# Patient Record
Sex: MALE | Race: WHITE | NOT HISPANIC OR LATINO
[De-identification: names, ages, dates, MRNs, and addresses within clinical notes are randomized per-mention and may not be internally consistent; named-entity substitution may affect disease eponyms.]

---

## 2017-08-18 ENCOUNTER — APPOINTMENT (OUTPATIENT)
Dept: PULMONOLOGY | Facility: CLINIC | Age: 77
End: 2017-08-18
Payer: MEDICARE

## 2017-08-18 VITALS
SYSTOLIC BLOOD PRESSURE: 122 MMHG | BODY MASS INDEX: 30.61 KG/M2 | DIASTOLIC BLOOD PRESSURE: 80 MMHG | OXYGEN SATURATION: 94 % | HEART RATE: 55 BPM | WEIGHT: 232 LBS

## 2017-08-18 PROCEDURE — 94664 DEMO&/EVAL PT USE INHALER: CPT | Mod: 59

## 2017-08-18 PROCEDURE — 94060 EVALUATION OF WHEEZING: CPT

## 2017-08-18 PROCEDURE — 99214 OFFICE O/P EST MOD 30 MIN: CPT | Mod: 25

## 2017-08-28 ENCOUNTER — MEDICATION RENEWAL (OUTPATIENT)
Age: 77
End: 2017-08-28

## 2017-10-02 ENCOUNTER — APPOINTMENT (OUTPATIENT)
Dept: PULMONOLOGY | Facility: CLINIC | Age: 77
End: 2017-10-02
Payer: MEDICARE

## 2017-10-02 VITALS
HEIGHT: 73 IN | DIASTOLIC BLOOD PRESSURE: 80 MMHG | HEART RATE: 60 BPM | RESPIRATION RATE: 16 BRPM | BODY MASS INDEX: 30.75 KG/M2 | WEIGHT: 232 LBS | OXYGEN SATURATION: 88 % | TEMPERATURE: 98 F | SYSTOLIC BLOOD PRESSURE: 140 MMHG

## 2017-10-02 PROCEDURE — 99215 OFFICE O/P EST HI 40 MIN: CPT | Mod: 25

## 2017-10-02 PROCEDURE — 94060 EVALUATION OF WHEEZING: CPT

## 2017-10-02 PROCEDURE — 94664 DEMO&/EVAL PT USE INHALER: CPT | Mod: 59

## 2017-10-02 RX ORDER — UMECLIDINIUM BROMIDE AND VILANTEROL TRIFENATATE 62.5; 25 UG/1; UG/1
62.5-25 POWDER RESPIRATORY (INHALATION) DAILY
Qty: 3 | Refills: 3 | Status: DISCONTINUED | COMMUNITY
Start: 2017-08-18 | End: 2017-10-02

## 2017-10-02 RX ORDER — AZITHROMYCIN 250 MG/1
250 TABLET, FILM COATED ORAL
Qty: 45 | Refills: 3 | Status: DISCONTINUED | COMMUNITY
Start: 2017-08-18 | End: 2017-10-02

## 2017-10-02 RX ORDER — BUDESONIDE 180 UG/1
180 AEROSOL, POWDER RESPIRATORY (INHALATION) DAILY
Qty: 3 | Refills: 1 | Status: DISCONTINUED | COMMUNITY
Start: 2017-08-18 | End: 2017-10-02

## 2017-10-12 ENCOUNTER — RX RENEWAL (OUTPATIENT)
Age: 77
End: 2017-10-12

## 2017-11-09 ENCOUNTER — APPOINTMENT (OUTPATIENT)
Dept: PULMONOLOGY | Facility: CLINIC | Age: 77
End: 2017-11-09

## 2017-11-09 ENCOUNTER — OUTPATIENT (OUTPATIENT)
Dept: OUTPATIENT SERVICES | Facility: HOSPITAL | Age: 77
LOS: 1 days | End: 2017-11-09
Payer: MEDICARE

## 2017-11-09 DIAGNOSIS — J44.9 CHRONIC OBSTRUCTIVE PULMONARY DISEASE, UNSPECIFIED: ICD-10-CM

## 2017-11-10 ENCOUNTER — APPOINTMENT (OUTPATIENT)
Dept: DERMATOLOGY | Facility: CLINIC | Age: 77
End: 2017-11-10
Payer: MEDICARE

## 2017-11-10 PROCEDURE — 99203 OFFICE O/P NEW LOW 30 MIN: CPT | Mod: 25

## 2017-11-10 PROCEDURE — 10061 I&D ABSCESS COMP/MULTIPLE: CPT

## 2017-11-14 ENCOUNTER — APPOINTMENT (OUTPATIENT)
Dept: PULMONOLOGY | Facility: CLINIC | Age: 77
End: 2017-11-14

## 2017-11-28 ENCOUNTER — RX RENEWAL (OUTPATIENT)
Age: 77
End: 2017-11-28

## 2018-01-01 ENCOUNTER — MEDICATION RENEWAL (OUTPATIENT)
Age: 78
End: 2018-01-01

## 2018-01-01 ENCOUNTER — APPOINTMENT (OUTPATIENT)
Dept: PULMONOLOGY | Facility: CLINIC | Age: 78
End: 2018-01-01
Payer: MEDICARE

## 2018-01-01 ENCOUNTER — APPOINTMENT (OUTPATIENT)
Dept: CARDIOLOGY | Facility: CLINIC | Age: 78
End: 2018-01-01
Payer: MEDICARE

## 2018-01-01 ENCOUNTER — NON-APPOINTMENT (OUTPATIENT)
Age: 78
End: 2018-01-01

## 2018-01-01 ENCOUNTER — OTHER (OUTPATIENT)
Age: 78
End: 2018-01-01

## 2018-01-01 VITALS — DIASTOLIC BLOOD PRESSURE: 78 MMHG | SYSTOLIC BLOOD PRESSURE: 134 MMHG

## 2018-01-01 VITALS — DIASTOLIC BLOOD PRESSURE: 82 MMHG | BODY MASS INDEX: 32.46 KG/M2 | SYSTOLIC BLOOD PRESSURE: 132 MMHG | WEIGHT: 246 LBS

## 2018-01-01 VITALS
BODY MASS INDEX: 33.13 KG/M2 | OXYGEN SATURATION: 95 % | SYSTOLIC BLOOD PRESSURE: 138 MMHG | DIASTOLIC BLOOD PRESSURE: 74 MMHG | HEART RATE: 75 BPM | RESPIRATION RATE: 20 BRPM | WEIGHT: 250 LBS | HEIGHT: 73 IN

## 2018-01-01 VITALS — OXYGEN SATURATION: 93 % | HEART RATE: 89 BPM

## 2018-01-01 DIAGNOSIS — I50.42 CHRONIC COMBINED SYSTOLIC (CONGESTIVE) AND DIASTOLIC (CONGESTIVE) HEART FAILURE: ICD-10-CM

## 2018-01-01 DIAGNOSIS — I27.81 COR PULMONALE (CHRONIC): ICD-10-CM

## 2018-01-01 PROCEDURE — 93000 ELECTROCARDIOGRAM COMPLETE: CPT

## 2018-01-01 PROCEDURE — 90732 PPSV23 VACC 2 YRS+ SUBQ/IM: CPT

## 2018-01-01 PROCEDURE — 99213 OFFICE O/P EST LOW 20 MIN: CPT

## 2018-01-01 PROCEDURE — 99214 OFFICE O/P EST MOD 30 MIN: CPT

## 2018-01-01 PROCEDURE — G0009: CPT

## 2018-01-01 RX ORDER — ALBUTEROL SULFATE 90 UG/1
108 (90 BASE) AEROSOL, METERED RESPIRATORY (INHALATION)
Qty: 1 | Refills: 3 | Status: DISCONTINUED | COMMUNITY
Start: 2017-11-28 | End: 2018-01-01

## 2018-01-01 RX ORDER — PREDNISONE 5 MG/1
5 TABLET ORAL DAILY
Qty: 180 | Refills: 1 | Status: DISCONTINUED | COMMUNITY
Start: 2018-08-15 | End: 2018-01-01

## 2018-01-01 RX ORDER — VALSARTAN 320 MG/1
320 TABLET, COATED ORAL
Qty: 90 | Refills: 1 | Status: DISCONTINUED | COMMUNITY
End: 2018-01-01

## 2018-01-01 RX ORDER — AZITHROMYCIN 250 MG/1
250 TABLET, FILM COATED ORAL
Qty: 6 | Refills: 0 | Status: DISCONTINUED | COMMUNITY
Start: 2017-10-02 | End: 2018-01-01

## 2018-01-02 PROCEDURE — G0424: CPT

## 2018-01-02 PROCEDURE — 94618 PULMONARY STRESS TESTING: CPT

## 2018-01-03 ENCOUNTER — OTHER (OUTPATIENT)
Age: 78
End: 2018-01-03

## 2018-02-09 ENCOUNTER — RX RENEWAL (OUTPATIENT)
Age: 78
End: 2018-02-09

## 2018-05-17 ENCOUNTER — NON-APPOINTMENT (OUTPATIENT)
Age: 78
End: 2018-05-17

## 2018-05-17 ENCOUNTER — APPOINTMENT (OUTPATIENT)
Dept: CARDIOLOGY | Facility: CLINIC | Age: 78
End: 2018-05-17
Payer: MEDICARE

## 2018-05-17 VITALS
HEART RATE: 88 BPM | OXYGEN SATURATION: 93 % | DIASTOLIC BLOOD PRESSURE: 68 MMHG | WEIGHT: 231 LBS | BODY MASS INDEX: 30.48 KG/M2 | SYSTOLIC BLOOD PRESSURE: 138 MMHG

## 2018-05-17 DIAGNOSIS — I10 ESSENTIAL (PRIMARY) HYPERTENSION: ICD-10-CM

## 2018-05-17 DIAGNOSIS — Z87.891 PERSONAL HISTORY OF NICOTINE DEPENDENCE: ICD-10-CM

## 2018-05-17 PROCEDURE — 93000 ELECTROCARDIOGRAM COMPLETE: CPT

## 2018-05-17 PROCEDURE — 99204 OFFICE O/P NEW MOD 45 MIN: CPT | Mod: 25

## 2018-05-17 RX ORDER — AZITHROMYCIN 250 MG/1
250 TABLET, FILM COATED ORAL
Qty: 90 | Refills: 0 | Status: DISCONTINUED | COMMUNITY
Start: 2017-10-02 | End: 2018-05-17

## 2018-05-17 RX ORDER — ALBUTEROL SULFATE 90 UG/1
108 (90 BASE) AEROSOL, METERED RESPIRATORY (INHALATION)
Qty: 1 | Refills: 5 | Status: DISCONTINUED | COMMUNITY
Start: 2017-10-02 | End: 2018-05-17

## 2018-05-17 RX ORDER — MULTIVIT-MIN/IRON/FOLIC ACID/K 18-600-40
CAPSULE ORAL
Refills: 0 | Status: ACTIVE | COMMUNITY

## 2018-05-17 RX ORDER — BECLOMETHASONE DIPROPIONATE 80 UG/1
80 AEROSOL, METERED RESPIRATORY (INHALATION) TWICE DAILY
Qty: 1 | Refills: 5 | Status: DISCONTINUED | COMMUNITY
Start: 2017-10-02 | End: 2018-05-17

## 2018-05-21 ENCOUNTER — RX RENEWAL (OUTPATIENT)
Age: 78
End: 2018-05-21

## 2018-05-25 ENCOUNTER — APPOINTMENT (OUTPATIENT)
Dept: PULMONOLOGY | Facility: CLINIC | Age: 78
End: 2018-05-25
Payer: MEDICARE

## 2018-05-25 VITALS
SYSTOLIC BLOOD PRESSURE: 138 MMHG | BODY MASS INDEX: 31.01 KG/M2 | OXYGEN SATURATION: 90 % | WEIGHT: 234 LBS | DIASTOLIC BLOOD PRESSURE: 80 MMHG | HEART RATE: 94 BPM | HEIGHT: 73 IN

## 2018-05-25 PROCEDURE — 99214 OFFICE O/P EST MOD 30 MIN: CPT

## 2018-05-25 RX ORDER — MOMETASONE FUROATE 220 UG/1
220 INHALANT RESPIRATORY (INHALATION) DAILY
Qty: 1 | Refills: 5 | Status: DISCONTINUED | COMMUNITY
Start: 2017-10-12 | End: 2018-05-25

## 2018-05-25 RX ORDER — ALBUTEROL SULFATE 90 UG/1
108 (90 BASE) AEROSOL, METERED RESPIRATORY (INHALATION)
Qty: 1 | Refills: 5 | Status: ACTIVE | COMMUNITY
Start: 2018-05-25 | End: 1900-01-01

## 2018-05-25 RX ORDER — UMECLIDINIUM BROMIDE AND VILANTEROL TRIFENATATE 62.5; 25 UG/1; UG/1
62.5-25 POWDER RESPIRATORY (INHALATION) DAILY
Qty: 1 | Refills: 5 | Status: DISCONTINUED | COMMUNITY
Start: 2017-10-02 | End: 2018-05-25

## 2018-06-14 ENCOUNTER — APPOINTMENT (OUTPATIENT)
Dept: CARDIOLOGY | Facility: CLINIC | Age: 78
End: 2018-06-14
Payer: MEDICARE

## 2018-06-14 PROCEDURE — A9500: CPT

## 2018-06-14 PROCEDURE — 78452 HT MUSCLE IMAGE SPECT MULT: CPT

## 2018-06-14 PROCEDURE — 93015 CV STRESS TEST SUPVJ I&R: CPT

## 2018-06-27 ENCOUNTER — RESULT REVIEW (OUTPATIENT)
Age: 78
End: 2018-06-27

## 2018-07-18 ENCOUNTER — NON-APPOINTMENT (OUTPATIENT)
Age: 78
End: 2018-07-18

## 2018-07-18 ENCOUNTER — APPOINTMENT (OUTPATIENT)
Dept: CARDIOLOGY | Facility: CLINIC | Age: 78
End: 2018-07-18
Payer: MEDICARE

## 2018-07-18 VITALS
OXYGEN SATURATION: 92 % | SYSTOLIC BLOOD PRESSURE: 156 MMHG | DIASTOLIC BLOOD PRESSURE: 75 MMHG | BODY MASS INDEX: 31.01 KG/M2 | WEIGHT: 234 LBS | HEIGHT: 73 IN | HEART RATE: 76 BPM

## 2018-07-18 VITALS — SYSTOLIC BLOOD PRESSURE: 142 MMHG | DIASTOLIC BLOOD PRESSURE: 76 MMHG

## 2018-07-18 PROCEDURE — 99213 OFFICE O/P EST LOW 20 MIN: CPT

## 2018-07-18 PROCEDURE — 93000 ELECTROCARDIOGRAM COMPLETE: CPT

## 2018-08-01 ENCOUNTER — RESULT REVIEW (OUTPATIENT)
Age: 78
End: 2018-08-01

## 2018-08-01 LAB
ANION GAP SERPL CALC-SCNC: 12 MMOL/L
BUN SERPL-MCNC: 26 MG/DL
CALCIUM SERPL-MCNC: 9 MG/DL
CHLORIDE SERPL-SCNC: 106 MMOL/L
CO2 SERPL-SCNC: 27 MMOL/L
CREAT SERPL-MCNC: 0.98 MG/DL
GLUCOSE SERPL-MCNC: 80 MG/DL
POTASSIUM SERPL-SCNC: 4 MMOL/L
SODIUM SERPL-SCNC: 145 MMOL/L

## 2018-08-15 ENCOUNTER — RX RENEWAL (OUTPATIENT)
Age: 78
End: 2018-08-15

## 2018-08-21 RX ORDER — TIOTROPIUM BROMIDE 18 UG/1
18 CAPSULE ORAL; RESPIRATORY (INHALATION) DAILY
Qty: 3 | Refills: 1 | Status: DISCONTINUED | COMMUNITY
Start: 2018-08-21 | End: 2018-08-21

## 2018-08-21 RX ORDER — FLUTICASONE FUROATE, UMECLIDINIUM BROMIDE AND VILANTEROL TRIFENATATE 100; 62.5; 25 UG/1; UG/1; UG/1
100-62.5-25 POWDER RESPIRATORY (INHALATION) DAILY
Qty: 3 | Refills: 3 | Status: DISCONTINUED | COMMUNITY
Start: 2018-05-25 | End: 2018-08-21

## 2018-08-22 ENCOUNTER — RX RENEWAL (OUTPATIENT)
Age: 78
End: 2018-08-22

## 2018-08-30 ENCOUNTER — APPOINTMENT (OUTPATIENT)
Dept: PULMONOLOGY | Facility: CLINIC | Age: 78
End: 2018-08-30
Payer: MEDICARE

## 2018-08-30 VITALS — BODY MASS INDEX: 31.4 KG/M2 | SYSTOLIC BLOOD PRESSURE: 146 MMHG | DIASTOLIC BLOOD PRESSURE: 84 MMHG | WEIGHT: 238 LBS

## 2018-08-30 VITALS — OXYGEN SATURATION: 92 % | HEART RATE: 72 BPM

## 2018-08-30 PROCEDURE — 99214 OFFICE O/P EST MOD 30 MIN: CPT

## 2018-09-19 ENCOUNTER — APPOINTMENT (OUTPATIENT)
Dept: CARDIOLOGY | Facility: CLINIC | Age: 78
End: 2018-09-19
Payer: MEDICARE

## 2018-09-19 ENCOUNTER — NON-APPOINTMENT (OUTPATIENT)
Age: 78
End: 2018-09-19

## 2018-09-19 VITALS
DIASTOLIC BLOOD PRESSURE: 78 MMHG | HEART RATE: 81 BPM | SYSTOLIC BLOOD PRESSURE: 138 MMHG | WEIGHT: 242 LBS | BODY MASS INDEX: 31.93 KG/M2 | RESPIRATION RATE: 22 BRPM | TEMPERATURE: 98.2 F | OXYGEN SATURATION: 92 %

## 2018-09-19 PROCEDURE — 99213 OFFICE O/P EST LOW 20 MIN: CPT | Mod: 25

## 2018-09-19 PROCEDURE — 93000 ELECTROCARDIOGRAM COMPLETE: CPT

## 2018-09-19 RX ORDER — FUROSEMIDE 40 MG/1
40 TABLET ORAL
Refills: 0 | Status: DISCONTINUED | COMMUNITY
End: 2018-09-19

## 2018-09-19 RX ORDER — AZITHROMYCIN 250 MG/1
250 TABLET, FILM COATED ORAL DAILY
Qty: 45 | Refills: 3 | Status: DISCONTINUED | COMMUNITY
Start: 2018-05-25 | End: 2018-09-19

## 2018-10-24 ENCOUNTER — MEDICATION RENEWAL (OUTPATIENT)
Age: 78
End: 2018-10-24

## 2018-10-29 ENCOUNTER — RX RENEWAL (OUTPATIENT)
Age: 78
End: 2018-10-29

## 2018-12-19 PROBLEM — I50.42 CHRONIC COMBINED SYSTOLIC AND DIASTOLIC HEART FAILURE, NYHA CLASS 3: Status: ACTIVE | Noted: 2018-07-18

## 2018-12-19 NOTE — ASSESSMENT
[FreeTextEntry1] : EKG- Paced rhythm\par \par Pharm nuclear stress test 6/2018- Large size fixed inferior and inferoseptal wall. No evidence of reversible ischemia. LVEF 34%\par \par ECHO from [place] 2/2018- LVEF 45%.\par \par BMP- Normal, Normal K on spironolactone and valsartan\par \par Diagnosis:\par 1. HFrEF- On  Valsartan, Spironolactone. NOT ON BB DUE TO COPD\par 2. CAD- Abnormal nuclear stress test- per patient he had a cath in [place] in 2017 which is reportedly normal.\par 3. Probably sick sinus syndrome status post permanent pacer implant March 2018\par 4. Exertion dyspnea - probably secondary to COPD and CHF combination.\par 5. COPD/Hypertension/and other medical problems as noted in the chart\par \par Recommendations:\par \par 1. Continue current medical therapy\par 2. Once again I have asked the Cardiac Cath report from [place] requested.\par 3. F/u in 6 months.\par

## 2018-12-19 NOTE — PHYSICAL EXAM
[General Appearance - Well Developed] : well developed [Normal Appearance] : normal appearance [Well Groomed] : well groomed [General Appearance - Well Nourished] : well nourished [No Deformities] : no deformities [General Appearance - In No Acute Distress] : no acute distress [Normal Conjunctiva] : the conjunctiva exhibited no abnormalities [Eyelids - No Xanthelasma] : the eyelids demonstrated no xanthelasmas [Normal Oral Mucosa] : normal oral mucosa [No Oral Pallor] : no oral pallor [No Oral Cyanosis] : no oral cyanosis [Normal Jugular Venous A Waves Present] : normal jugular venous A waves present [Normal Jugular Venous V Waves Present] : normal jugular venous V waves present [No Jugular Venous Kolb A Waves] : no jugular venous kolb A waves [Respiration, Rhythm And Depth] : normal respiratory rhythm and effort [Exaggerated Use Of Accessory Muscles For Inspiration] : no accessory muscle use [Auscultation Breath Sounds / Voice Sounds] : lungs were clear to auscultation bilaterally [Heart Rate And Rhythm] : heart rate and rhythm were normal [Heart Sounds] : normal S1 and S2 [Murmurs] : no murmurs present [Abdomen Soft] : soft [Abdomen Tenderness] : non-tender [Abdomen Mass (___ Cm)] : no abdominal mass palpated [Nail Clubbing] : no clubbing of the fingernails [Cyanosis, Localized] : no localized cyanosis [Petechial Hemorrhages (___cm)] : no petechial hemorrhages [Skin Color & Pigmentation] : normal skin color and pigmentation [] : no rash [No Venous Stasis] : no venous stasis [Skin Lesions] : no skin lesions [No Skin Ulcers] : no skin ulcer [No Xanthoma] : no  xanthoma was observed [Oriented To Time, Place, And Person] : oriented to person, place, and time [Affect] : the affect was normal [Mood] : the mood was normal [No Anxiety] : not feeling anxious [FreeTextEntry1] : MITCHEL 1/6

## 2018-12-19 NOTE — HISTORY OF PRESENT ILLNESS
[FreeTextEntry1] : Patient is a 78-year-old  male with a past medical history of hypertension, former smoker, COPD with chronic dyspnea was seen by me as a new patient on 5/17/2018. Patient had PPM implanted in [place] around March/April 2018 for probably sick sinus syndrome. Patient had an echocardiogram prior to the pacemaker implantation which showed LVEF 45%. Patient has no history of CAD,  myocardial infarction, cardiac arrhythmias.\par \par Patient has a chronic dyspnea with minimal exertion which he attributes to COPD. There is no change in his dyspnea symptoms.  Patient denies any exertional chest tightness, palpitations or syncope. Patient is tolerating spironolactone. Still I have not received his cardiac catheterization report from [place]

## 2019-01-01 ENCOUNTER — TRANSCRIPTION ENCOUNTER (OUTPATIENT)
Age: 79
End: 2019-01-01

## 2019-01-01 ENCOUNTER — APPOINTMENT (OUTPATIENT)
Dept: THORACIC SURGERY | Facility: CLINIC | Age: 79
End: 2019-01-01
Payer: MEDICARE

## 2019-01-01 ENCOUNTER — APPOINTMENT (OUTPATIENT)
Dept: THORACIC SURGERY | Facility: CLINIC | Age: 79
End: 2019-01-01

## 2019-01-01 ENCOUNTER — INPATIENT (INPATIENT)
Facility: HOSPITAL | Age: 79
LOS: 5 days | Discharge: HOME | DRG: 292 | End: 2019-06-07
Attending: INTERNAL MEDICINE | Admitting: INTERNAL MEDICINE
Payer: MEDICARE

## 2019-01-01 ENCOUNTER — APPOINTMENT (OUTPATIENT)
Dept: INTERNAL MEDICINE | Facility: CLINIC | Age: 79
End: 2019-01-01
Payer: MEDICARE

## 2019-01-01 ENCOUNTER — INPATIENT (INPATIENT)
Facility: HOSPITAL | Age: 79
LOS: 14 days | Discharge: HOME | DRG: 856 | End: 2019-10-11
Attending: THORACIC SURGERY (CARDIOTHORACIC VASCULAR SURGERY) | Admitting: THORACIC SURGERY (CARDIOTHORACIC VASCULAR SURGERY)
Payer: MEDICARE

## 2019-01-01 ENCOUNTER — APPOINTMENT (OUTPATIENT)
Dept: PULMONOLOGY | Facility: CLINIC | Age: 79
End: 2019-01-01
Payer: MEDICARE

## 2019-01-01 ENCOUNTER — APPOINTMENT (OUTPATIENT)
Dept: CARDIOLOGY | Facility: CLINIC | Age: 79
End: 2019-01-01

## 2019-01-01 ENCOUNTER — OUTPATIENT (OUTPATIENT)
Dept: OUTPATIENT SERVICES | Facility: HOSPITAL | Age: 79
LOS: 1 days | End: 2019-01-01
Payer: MEDICARE

## 2019-01-01 ENCOUNTER — APPOINTMENT (OUTPATIENT)
Dept: THORACIC SURGERY | Facility: HOSPITAL | Age: 79
End: 2019-01-01

## 2019-01-01 ENCOUNTER — RESULT REVIEW (OUTPATIENT)
Age: 79
End: 2019-01-01

## 2019-01-01 ENCOUNTER — RX RENEWAL (OUTPATIENT)
Age: 79
End: 2019-01-01

## 2019-01-01 ENCOUNTER — MEDICATION RENEWAL (OUTPATIENT)
Age: 79
End: 2019-01-01

## 2019-01-01 ENCOUNTER — INPATIENT (INPATIENT)
Facility: HOSPITAL | Age: 79
LOS: 5 days | DRG: 542 | End: 2019-11-03
Attending: HOSPITALIST | Admitting: THORACIC SURGERY (CARDIOTHORACIC VASCULAR SURGERY)
Payer: MEDICARE

## 2019-01-01 ENCOUNTER — APPOINTMENT (OUTPATIENT)
Dept: CT IMAGING | Facility: CLINIC | Age: 79
End: 2019-01-01
Payer: MEDICARE

## 2019-01-01 ENCOUNTER — NON-APPOINTMENT (OUTPATIENT)
Age: 79
End: 2019-01-01

## 2019-01-01 ENCOUNTER — APPOINTMENT (OUTPATIENT)
Dept: INTERNAL MEDICINE | Facility: CLINIC | Age: 79
End: 2019-01-01

## 2019-01-01 ENCOUNTER — INPATIENT (INPATIENT)
Facility: HOSPITAL | Age: 79
LOS: 7 days | Discharge: HOME | DRG: 853 | End: 2019-06-18
Attending: HOSPITALIST | Admitting: HOSPITALIST
Payer: MEDICARE

## 2019-01-01 ENCOUNTER — APPOINTMENT (OUTPATIENT)
Dept: PULMONOLOGY | Facility: CLINIC | Age: 79
End: 2019-01-01

## 2019-01-01 ENCOUNTER — INBOUND DOCUMENT (OUTPATIENT)
Age: 79
End: 2019-01-01

## 2019-01-01 ENCOUNTER — APPOINTMENT (OUTPATIENT)
Dept: RADIOLOGY | Facility: CLINIC | Age: 79
End: 2019-01-01
Payer: MEDICARE

## 2019-01-01 ENCOUNTER — OUTPATIENT (OUTPATIENT)
Dept: OUTPATIENT SERVICES | Facility: HOSPITAL | Age: 79
LOS: 1 days | Discharge: HOME | End: 2019-01-01
Payer: MEDICARE

## 2019-01-01 ENCOUNTER — APPOINTMENT (OUTPATIENT)
Dept: CARDIOLOGY | Facility: CLINIC | Age: 79
End: 2019-01-01
Payer: MEDICARE

## 2019-01-01 ENCOUNTER — INPATIENT (INPATIENT)
Facility: HOSPITAL | Age: 79
LOS: 15 days | Discharge: HOME | DRG: 853 | End: 2019-05-06
Attending: THORACIC SURGERY (CARDIOTHORACIC VASCULAR SURGERY) | Admitting: INTERNAL MEDICINE
Payer: MEDICARE

## 2019-01-01 VITALS
DIASTOLIC BLOOD PRESSURE: 71 MMHG | OXYGEN SATURATION: 92 % | HEART RATE: 127 BPM | SYSTOLIC BLOOD PRESSURE: 123 MMHG | RESPIRATION RATE: 34 BRPM

## 2019-01-01 VITALS
OXYGEN SATURATION: 97 % | WEIGHT: 240.52 LBS | HEIGHT: 73 IN | TEMPERATURE: 98 F | RESPIRATION RATE: 18 BRPM | DIASTOLIC BLOOD PRESSURE: 80 MMHG | SYSTOLIC BLOOD PRESSURE: 127 MMHG | HEART RATE: 64 BPM

## 2019-01-01 VITALS
TEMPERATURE: 97.7 F | BODY MASS INDEX: 32.74 KG/M2 | WEIGHT: 247 LBS | HEART RATE: 91 BPM | OXYGEN SATURATION: 95 % | SYSTOLIC BLOOD PRESSURE: 105 MMHG | HEIGHT: 73 IN | DIASTOLIC BLOOD PRESSURE: 68 MMHG

## 2019-01-01 VITALS
RESPIRATION RATE: 20 BRPM | HEART RATE: 72 BPM | DIASTOLIC BLOOD PRESSURE: 76 MMHG | SYSTOLIC BLOOD PRESSURE: 132 MMHG | OXYGEN SATURATION: 93 % | TEMPERATURE: 99 F

## 2019-01-01 VITALS — HEART RATE: 62 BPM | OXYGEN SATURATION: 98 %

## 2019-01-01 VITALS
BODY MASS INDEX: 31.81 KG/M2 | RESPIRATION RATE: 20 BRPM | HEIGHT: 73 IN | DIASTOLIC BLOOD PRESSURE: 75 MMHG | OXYGEN SATURATION: 95 % | SYSTOLIC BLOOD PRESSURE: 143 MMHG | HEART RATE: 90 BPM | WEIGHT: 240 LBS

## 2019-01-01 VITALS
TEMPERATURE: 98 F | SYSTOLIC BLOOD PRESSURE: 131 MMHG | DIASTOLIC BLOOD PRESSURE: 42 MMHG | HEART RATE: 95 BPM | RESPIRATION RATE: 18 BRPM

## 2019-01-01 VITALS
HEART RATE: 105 BPM | RESPIRATION RATE: 22 BRPM | DIASTOLIC BLOOD PRESSURE: 72 MMHG | TEMPERATURE: 98 F | SYSTOLIC BLOOD PRESSURE: 130 MMHG | OXYGEN SATURATION: 98 %

## 2019-01-01 VITALS
HEART RATE: 100 BPM | WEIGHT: 244 LBS | SYSTOLIC BLOOD PRESSURE: 118 MMHG | RESPIRATION RATE: 20 BRPM | OXYGEN SATURATION: 85 % | BODY MASS INDEX: 32.19 KG/M2 | DIASTOLIC BLOOD PRESSURE: 78 MMHG

## 2019-01-01 VITALS
OXYGEN SATURATION: 90 % | DIASTOLIC BLOOD PRESSURE: 80 MMHG | WEIGHT: 245 LBS | BODY MASS INDEX: 32.47 KG/M2 | RESPIRATION RATE: 20 BRPM | HEIGHT: 73 IN | SYSTOLIC BLOOD PRESSURE: 128 MMHG

## 2019-01-01 VITALS
HEART RATE: 85 BPM | BODY MASS INDEX: 31.14 KG/M2 | TEMPERATURE: 97.5 F | OXYGEN SATURATION: 95 % | RESPIRATION RATE: 22 BRPM | HEIGHT: 73 IN | DIASTOLIC BLOOD PRESSURE: 83 MMHG | WEIGHT: 235 LBS | SYSTOLIC BLOOD PRESSURE: 142 MMHG

## 2019-01-01 VITALS
WEIGHT: 257 LBS | DIASTOLIC BLOOD PRESSURE: 66 MMHG | HEIGHT: 73 IN | HEART RATE: 127 BPM | BODY MASS INDEX: 34.06 KG/M2 | SYSTOLIC BLOOD PRESSURE: 153 MMHG | OXYGEN SATURATION: 88 %

## 2019-01-01 VITALS
TEMPERATURE: 98 F | OXYGEN SATURATION: 98 % | DIASTOLIC BLOOD PRESSURE: 76 MMHG | SYSTOLIC BLOOD PRESSURE: 118 MMHG | HEART RATE: 63 BPM | RESPIRATION RATE: 20 BRPM

## 2019-01-01 VITALS — WEIGHT: 245 LBS | BODY MASS INDEX: 32.32 KG/M2

## 2019-01-01 VITALS
HEART RATE: 99 BPM | WEIGHT: 255.07 LBS | DIASTOLIC BLOOD PRESSURE: 80 MMHG | SYSTOLIC BLOOD PRESSURE: 131 MMHG | HEIGHT: 73 IN | TEMPERATURE: 98 F | OXYGEN SATURATION: 100 % | RESPIRATION RATE: 22 BRPM

## 2019-01-01 VITALS
BODY MASS INDEX: 30.48 KG/M2 | HEART RATE: 92 BPM | WEIGHT: 230 LBS | HEIGHT: 73 IN | OXYGEN SATURATION: 91 % | RESPIRATION RATE: 18 BRPM

## 2019-01-01 VITALS — SYSTOLIC BLOOD PRESSURE: 128 MMHG | DIASTOLIC BLOOD PRESSURE: 70 MMHG | HEART RATE: 97 BPM | OXYGEN SATURATION: 91 %

## 2019-01-01 VITALS
HEIGHT: 73 IN | DIASTOLIC BLOOD PRESSURE: 70 MMHG | WEIGHT: 240 LBS | RESPIRATION RATE: 16 BRPM | HEART RATE: 70 BPM | SYSTOLIC BLOOD PRESSURE: 130 MMHG | OXYGEN SATURATION: 95 % | BODY MASS INDEX: 31.81 KG/M2

## 2019-01-01 VITALS — DIASTOLIC BLOOD PRESSURE: 62 MMHG | SYSTOLIC BLOOD PRESSURE: 99 MMHG

## 2019-01-01 VITALS
RESPIRATION RATE: 18 BRPM | DIASTOLIC BLOOD PRESSURE: 76 MMHG | HEART RATE: 77 BPM | TEMPERATURE: 98 F | OXYGEN SATURATION: 96 % | SYSTOLIC BLOOD PRESSURE: 130 MMHG

## 2019-01-01 VITALS — DIASTOLIC BLOOD PRESSURE: 80 MMHG | SYSTOLIC BLOOD PRESSURE: 138 MMHG

## 2019-01-01 VITALS
HEART RATE: 93 BPM | WEIGHT: 240 LBS | RESPIRATION RATE: 18 BRPM | TEMPERATURE: 98 F | OXYGEN SATURATION: 84 % | BODY MASS INDEX: 31.66 KG/M2 | SYSTOLIC BLOOD PRESSURE: 120 MMHG | DIASTOLIC BLOOD PRESSURE: 72 MMHG

## 2019-01-01 DIAGNOSIS — M54.6 PAIN IN THORACIC SPINE: ICD-10-CM

## 2019-01-01 DIAGNOSIS — Z51.5 ENCOUNTER FOR PALLIATIVE CARE: ICD-10-CM

## 2019-01-01 DIAGNOSIS — Z95.0 PRESENCE OF CARDIAC PACEMAKER: Chronic | ICD-10-CM

## 2019-01-01 DIAGNOSIS — J86.9 PYOTHORAX WITHOUT FISTULA: ICD-10-CM

## 2019-01-01 DIAGNOSIS — I65.23 OCCLUSION AND STENOSIS OF BILATERAL CAROTID ARTERIES: ICD-10-CM

## 2019-01-01 DIAGNOSIS — I10 ESSENTIAL (PRIMARY) HYPERTENSION: ICD-10-CM

## 2019-01-01 DIAGNOSIS — Z71.89 OTHER SPECIFIED COUNSELING: ICD-10-CM

## 2019-01-01 DIAGNOSIS — S21.101A UNSPECIFIED OPEN WOUND OF RIGHT FRONT WALL OF THORAX WITHOUT PENETRATION INTO THORACIC CAVITY, INITIAL ENCOUNTER: ICD-10-CM

## 2019-01-01 DIAGNOSIS — A41.9 SEPSIS, UNSPECIFIED ORGANISM: ICD-10-CM

## 2019-01-01 DIAGNOSIS — I50.23 ACUTE ON CHRONIC SYSTOLIC (CONGESTIVE) HEART FAILURE: ICD-10-CM

## 2019-01-01 DIAGNOSIS — J18.1 LOBAR PNEUMONIA, UNSPECIFIED ORGANISM: ICD-10-CM

## 2019-01-01 DIAGNOSIS — J90 PLEURAL EFFUSION, NOT ELSEWHERE CLASSIFIED: ICD-10-CM

## 2019-01-01 DIAGNOSIS — I50.9 HEART FAILURE, UNSPECIFIED: ICD-10-CM

## 2019-01-01 DIAGNOSIS — J42 UNSPECIFIED CHRONIC BRONCHITIS: ICD-10-CM

## 2019-01-01 DIAGNOSIS — J44.1 CHRONIC OBSTRUCTIVE PULMONARY DISEASE WITH (ACUTE) EXACERBATION: ICD-10-CM

## 2019-01-01 DIAGNOSIS — I35.0 NONRHEUMATIC AORTIC (VALVE) STENOSIS: ICD-10-CM

## 2019-01-01 DIAGNOSIS — K76.9 LIVER DISEASE, UNSPECIFIED: ICD-10-CM

## 2019-01-01 DIAGNOSIS — M54.2 CERVICALGIA: ICD-10-CM

## 2019-01-01 DIAGNOSIS — M54.9 DORSALGIA, UNSPECIFIED: ICD-10-CM

## 2019-01-01 DIAGNOSIS — R16.0 HEPATOMEGALY, NOT ELSEWHERE CLASSIFIED: ICD-10-CM

## 2019-01-01 DIAGNOSIS — Z29.9 ENCOUNTER FOR PROPHYLACTIC MEASURES, UNSPECIFIED: ICD-10-CM

## 2019-01-01 DIAGNOSIS — J86.0 PYOTHORAX WITH FISTULA: ICD-10-CM

## 2019-01-01 DIAGNOSIS — J15.9 UNSPECIFIED BACTERIAL PNEUMONIA: ICD-10-CM

## 2019-01-01 DIAGNOSIS — J86.9 PYOTHORAX W/OUT FISTULA: ICD-10-CM

## 2019-01-01 DIAGNOSIS — J44.9 CHRONIC OBSTRUCTIVE PULMONARY DISEASE, UNSPECIFIED: ICD-10-CM

## 2019-01-01 DIAGNOSIS — E66.9 OBESITY, UNSPECIFIED: ICD-10-CM

## 2019-01-01 DIAGNOSIS — Z87.01 PERSONAL HISTORY OF PNEUMONIA (RECURRENT): ICD-10-CM

## 2019-01-01 DIAGNOSIS — R60.0 LOCALIZED EDEMA: ICD-10-CM

## 2019-01-01 DIAGNOSIS — M89.9 DISORDER OF BONE, UNSPECIFIED: ICD-10-CM

## 2019-01-01 DIAGNOSIS — I25.10 ATHEROSCLEROTIC HEART DISEASE OF NATIVE CORONARY ARTERY WITHOUT ANGINA PECTORIS: ICD-10-CM

## 2019-01-01 DIAGNOSIS — K59.03 DRUG INDUCED CONSTIPATION: ICD-10-CM

## 2019-01-01 DIAGNOSIS — L02.213 CUTANEOUS ABSCESS OF CHEST WALL: ICD-10-CM

## 2019-01-01 DIAGNOSIS — R52 PAIN, UNSPECIFIED: ICD-10-CM

## 2019-01-01 DIAGNOSIS — J96.01 ACUTE RESPIRATORY FAILURE WITH HYPOXIA: ICD-10-CM

## 2019-01-01 DIAGNOSIS — J96.11 CHRONIC RESPIRATORY FAILURE WITH HYPOXIA: ICD-10-CM

## 2019-01-01 DIAGNOSIS — R06.09 OTHER FORMS OF DYSPNEA: ICD-10-CM

## 2019-01-01 DIAGNOSIS — F41.9 ANXIETY DISORDER, UNSPECIFIED: ICD-10-CM

## 2019-01-01 DIAGNOSIS — G89.3 NEOPLASM RELATED PAIN (ACUTE) (CHRONIC): ICD-10-CM

## 2019-01-01 DIAGNOSIS — Z22.39 CARRIER OF OTHER SPECIFIED BACTERIAL DISEASES: ICD-10-CM

## 2019-01-01 DIAGNOSIS — G95.20 UNSPECIFIED CORD COMPRESSION: ICD-10-CM

## 2019-01-01 DIAGNOSIS — I49.9 CARDIAC ARRHYTHMIA, UNSPECIFIED: ICD-10-CM

## 2019-01-01 DIAGNOSIS — E78.5 HYPERLIPIDEMIA, UNSPECIFIED: ICD-10-CM

## 2019-01-01 DIAGNOSIS — I25.810 ATHEROSCLEROSIS OF CORONARY ARTERY BYPASS GRAFT(S) WITHOUT ANGINA PECTORIS: ICD-10-CM

## 2019-01-01 DIAGNOSIS — R91.1 SOLITARY PULMONARY NODULE: ICD-10-CM

## 2019-01-01 DIAGNOSIS — J96.21 ACUTE AND CHRONIC RESPIRATORY FAILURE WITH HYPOXIA: ICD-10-CM

## 2019-01-01 DIAGNOSIS — E87.2 ACIDOSIS: ICD-10-CM

## 2019-01-01 DIAGNOSIS — Z00.00 ENCOUNTER FOR GENERAL ADULT MEDICAL EXAMINATION WITHOUT ABNORMAL FINDINGS: ICD-10-CM

## 2019-01-01 DIAGNOSIS — R06.00 DYSPNEA, UNSPECIFIED: ICD-10-CM

## 2019-01-01 DIAGNOSIS — R06.02 SHORTNESS OF BREATH: ICD-10-CM

## 2019-01-01 DIAGNOSIS — J18.9 PNEUMONIA, UNSPECIFIED ORGANISM: ICD-10-CM

## 2019-01-01 DIAGNOSIS — I50.22 CHRONIC SYSTOLIC (CONGESTIVE) HEART FAILURE: ICD-10-CM

## 2019-01-01 LAB
-  CEFTRIAXONE: SIGNIFICANT CHANGE UP
-  CLINDAMYCIN: SIGNIFICANT CHANGE UP
-  ERYTHROMYCIN: SIGNIFICANT CHANGE UP
-  PENICILLIN: SIGNIFICANT CHANGE UP
-  VANCOMYCIN: SIGNIFICANT CHANGE UP
ABO RH CONFIRMATION: SIGNIFICANT CHANGE UP
AFP-TM SERPL-MCNC: 2.1 NG/ML — SIGNIFICANT CHANGE UP
ALBUMIN FLD-MCNC: 1.9 G/DL — SIGNIFICANT CHANGE UP
ALBUMIN FLD-MCNC: 2.5 G/DL — SIGNIFICANT CHANGE UP
ALBUMIN SERPL ELPH-MCNC: 2.4 G/DL — LOW (ref 3.3–5.2)
ALBUMIN SERPL ELPH-MCNC: 2.7 G/DL — LOW (ref 3.3–5.2)
ALBUMIN SERPL ELPH-MCNC: 2.7 G/DL — LOW (ref 3.3–5.2)
ALBUMIN SERPL ELPH-MCNC: 2.8 G/DL — LOW (ref 3.3–5.2)
ALBUMIN SERPL ELPH-MCNC: 2.8 G/DL — LOW (ref 3.3–5.2)
ALBUMIN SERPL ELPH-MCNC: 2.9 G/DL — LOW (ref 3.3–5.2)
ALBUMIN SERPL ELPH-MCNC: 3 G/DL — LOW (ref 3.3–5.2)
ALBUMIN SERPL ELPH-MCNC: 3.1 G/DL — LOW (ref 3.3–5.2)
ALBUMIN SERPL ELPH-MCNC: 3.2 G/DL — LOW (ref 3.3–5.2)
ALBUMIN SERPL ELPH-MCNC: 3.6 G/DL — SIGNIFICANT CHANGE UP (ref 3.3–5.2)
ALBUMIN SERPL ELPH-MCNC: 3.7 G/DL — SIGNIFICANT CHANGE UP (ref 3.3–5.2)
ALBUMIN SERPL ELPH-MCNC: 3.8 G/DL — SIGNIFICANT CHANGE UP (ref 3.3–5.2)
ALBUMIN SERPL ELPH-MCNC: <1 G/DL — LOW (ref 3.3–5.2)
ALP SERPL-CCNC: 100 U/L — SIGNIFICANT CHANGE UP (ref 40–120)
ALP SERPL-CCNC: 100 U/L — SIGNIFICANT CHANGE UP (ref 40–120)
ALP SERPL-CCNC: 104 U/L — SIGNIFICANT CHANGE UP (ref 40–120)
ALP SERPL-CCNC: 107 U/L — SIGNIFICANT CHANGE UP (ref 40–120)
ALP SERPL-CCNC: 116 U/L — SIGNIFICANT CHANGE UP (ref 40–120)
ALP SERPL-CCNC: 24 U/L — LOW (ref 40–120)
ALP SERPL-CCNC: 60 U/L — SIGNIFICANT CHANGE UP (ref 40–120)
ALP SERPL-CCNC: 64 U/L — SIGNIFICANT CHANGE UP (ref 40–120)
ALP SERPL-CCNC: 64 U/L — SIGNIFICANT CHANGE UP (ref 40–120)
ALP SERPL-CCNC: 65 U/L — SIGNIFICANT CHANGE UP (ref 40–120)
ALP SERPL-CCNC: 66 U/L — SIGNIFICANT CHANGE UP (ref 40–120)
ALP SERPL-CCNC: 70 U/L — SIGNIFICANT CHANGE UP (ref 40–120)
ALP SERPL-CCNC: 70 U/L — SIGNIFICANT CHANGE UP (ref 40–120)
ALP SERPL-CCNC: 82 U/L — SIGNIFICANT CHANGE UP (ref 40–120)
ALP SERPL-CCNC: 84 U/L — SIGNIFICANT CHANGE UP (ref 40–120)
ALP SERPL-CCNC: 85 U/L — SIGNIFICANT CHANGE UP (ref 40–120)
ALP SERPL-CCNC: 90 U/L — SIGNIFICANT CHANGE UP (ref 40–120)
ALT FLD-CCNC: 10 U/L — SIGNIFICANT CHANGE UP
ALT FLD-CCNC: 12 U/L — SIGNIFICANT CHANGE UP
ALT FLD-CCNC: 12 U/L — SIGNIFICANT CHANGE UP
ALT FLD-CCNC: 15 U/L — SIGNIFICANT CHANGE UP
ALT FLD-CCNC: 15 U/L — SIGNIFICANT CHANGE UP
ALT FLD-CCNC: 21 U/L — SIGNIFICANT CHANGE UP
ALT FLD-CCNC: 5 U/L — SIGNIFICANT CHANGE UP
ALT FLD-CCNC: 6 U/L — SIGNIFICANT CHANGE UP
ALT FLD-CCNC: 7 U/L — SIGNIFICANT CHANGE UP
ALT FLD-CCNC: 8 U/L — SIGNIFICANT CHANGE UP
ALT FLD-CCNC: 8 U/L — SIGNIFICANT CHANGE UP
ALT FLD-CCNC: <5 U/L — SIGNIFICANT CHANGE UP
ANION GAP SERPL CALC-SCNC: 10 MMOL/L — SIGNIFICANT CHANGE UP (ref 5–17)
ANION GAP SERPL CALC-SCNC: 11 MMOL/L — SIGNIFICANT CHANGE UP (ref 5–17)
ANION GAP SERPL CALC-SCNC: 12 MMOL/L — SIGNIFICANT CHANGE UP (ref 5–17)
ANION GAP SERPL CALC-SCNC: 13 MMOL/L — SIGNIFICANT CHANGE UP (ref 5–17)
ANION GAP SERPL CALC-SCNC: 14 MMOL/L — SIGNIFICANT CHANGE UP (ref 5–17)
ANION GAP SERPL CALC-SCNC: 21 MMOL/L — HIGH (ref 5–17)
ANION GAP SERPL CALC-SCNC: 8 MMOL/L — SIGNIFICANT CHANGE UP (ref 5–17)
ANION GAP SERPL CALC-SCNC: 9 MMOL/L — SIGNIFICANT CHANGE UP (ref 5–17)
ANISOCYTOSIS BLD QL: SLIGHT — SIGNIFICANT CHANGE UP
APPEARANCE UR: CLEAR — SIGNIFICANT CHANGE UP
APPEARANCE UR: CLEAR — SIGNIFICANT CHANGE UP
APTT BLD: 29.2 SEC — SIGNIFICANT CHANGE UP (ref 27.5–36.3)
APTT BLD: 29.4 SEC — SIGNIFICANT CHANGE UP (ref 27.5–36.3)
APTT BLD: 30.1 SEC — SIGNIFICANT CHANGE UP (ref 27.5–36.3)
APTT BLD: 31.4 SEC — SIGNIFICANT CHANGE UP (ref 27.5–36.3)
APTT BLD: 32.5 SEC — SIGNIFICANT CHANGE UP (ref 27.5–36.3)
APTT BLD: 33 SEC — SIGNIFICANT CHANGE UP (ref 27.5–36.3)
APTT BLD: 33 SEC — SIGNIFICANT CHANGE UP (ref 27.5–36.3)
AST SERPL-CCNC: 10 U/L — SIGNIFICANT CHANGE UP
AST SERPL-CCNC: 11 U/L — SIGNIFICANT CHANGE UP
AST SERPL-CCNC: 12 U/L — SIGNIFICANT CHANGE UP
AST SERPL-CCNC: 13 U/L — SIGNIFICANT CHANGE UP
AST SERPL-CCNC: 14 U/L — SIGNIFICANT CHANGE UP
AST SERPL-CCNC: 17 U/L — SIGNIFICANT CHANGE UP
AST SERPL-CCNC: 19 U/L — SIGNIFICANT CHANGE UP
AST SERPL-CCNC: 20 U/L — SIGNIFICANT CHANGE UP
AST SERPL-CCNC: 20 U/L — SIGNIFICANT CHANGE UP
AST SERPL-CCNC: 26 U/L — SIGNIFICANT CHANGE UP
AST SERPL-CCNC: 27 U/L — SIGNIFICANT CHANGE UP
AST SERPL-CCNC: 36 U/L — SIGNIFICANT CHANGE UP
AST SERPL-CCNC: 7 U/L — SIGNIFICANT CHANGE UP
B PERT IGG+IGM PNL SER: ABNORMAL
B PERT IGG+IGM PNL SER: ABNORMAL
BACTERIA # UR AUTO: ABNORMAL
BACTERIA # UR AUTO: ABNORMAL
BASE EXCESS BLDA CALC-SCNC: -0.1 MMOL/L — SIGNIFICANT CHANGE UP (ref -3–3)
BASE EXCESS BLDA CALC-SCNC: -4.1 MMOL/L — LOW (ref -2–2)
BASE EXCESS BLDA CALC-SCNC: -5.4 MMOL/L — LOW (ref -2–2)
BASE EXCESS BLDA CALC-SCNC: 0.7 MMOL/L — SIGNIFICANT CHANGE UP (ref -2–2)
BASE EXCESS BLDA CALC-SCNC: 15.3 MMOL/L — HIGH (ref -2–2)
BASE EXCESS BLDA CALC-SCNC: 2 MMOL/L — SIGNIFICANT CHANGE UP (ref -2–2)
BASOPHILS # BLD AUTO: 0 K/UL — SIGNIFICANT CHANGE UP (ref 0–0.2)
BASOPHILS # BLD AUTO: 0.03 K/UL — SIGNIFICANT CHANGE UP (ref 0–0.2)
BASOPHILS # BLD AUTO: 0.03 K/UL — SIGNIFICANT CHANGE UP (ref 0–0.2)
BASOPHILS # BLD AUTO: 0.04 K/UL — SIGNIFICANT CHANGE UP (ref 0–0.2)
BASOPHILS # BLD AUTO: 0.06 K/UL — SIGNIFICANT CHANGE UP (ref 0–0.2)
BASOPHILS # BLD AUTO: 0.07 K/UL — SIGNIFICANT CHANGE UP (ref 0–0.2)
BASOPHILS # BLD AUTO: 0.11 K/UL — SIGNIFICANT CHANGE UP (ref 0–0.2)
BASOPHILS NFR BLD AUTO: 0 % — SIGNIFICANT CHANGE UP (ref 0–2)
BASOPHILS NFR BLD AUTO: 0 % — SIGNIFICANT CHANGE UP (ref 0–2)
BASOPHILS NFR BLD AUTO: 0.1 % — SIGNIFICANT CHANGE UP (ref 0–2)
BASOPHILS NFR BLD AUTO: 0.2 % — SIGNIFICANT CHANGE UP (ref 0–2)
BASOPHILS NFR BLD AUTO: 0.2 % — SIGNIFICANT CHANGE UP (ref 0–2)
BASOPHILS NFR BLD AUTO: 0.3 % — SIGNIFICANT CHANGE UP (ref 0–2)
BASOPHILS NFR BLD AUTO: 0.3 % — SIGNIFICANT CHANGE UP (ref 0–2)
BASOPHILS NFR BLD AUTO: 0.4 % — SIGNIFICANT CHANGE UP (ref 0–2)
BASOPHILS NFR BLD AUTO: 0.4 % — SIGNIFICANT CHANGE UP (ref 0–2)
BASOPHILS NFR BLD AUTO: 0.5 % — SIGNIFICANT CHANGE UP (ref 0–2)
BASOPHILS NFR BLD AUTO: 0.6 % — SIGNIFICANT CHANGE UP (ref 0–2)
BASOPHILS NFR BLD AUTO: 0.9 % — SIGNIFICANT CHANGE UP (ref 0–2)
BILIRUB DIRECT SERPL-MCNC: 0.1 MG/DL — SIGNIFICANT CHANGE UP (ref 0–0.3)
BILIRUB INDIRECT FLD-MCNC: 0.3 MG/DL — SIGNIFICANT CHANGE UP (ref 0.2–1)
BILIRUB SERPL-MCNC: 0.3 MG/DL — LOW (ref 0.4–2)
BILIRUB SERPL-MCNC: 0.4 MG/DL — SIGNIFICANT CHANGE UP (ref 0.4–2)
BILIRUB SERPL-MCNC: 0.5 MG/DL — SIGNIFICANT CHANGE UP (ref 0.4–2)
BILIRUB SERPL-MCNC: <0.2 MG/DL — LOW (ref 0.4–2)
BILIRUB UR-MCNC: NEGATIVE — SIGNIFICANT CHANGE UP
BILIRUB UR-MCNC: NEGATIVE — SIGNIFICANT CHANGE UP
BLD GP AB SCN SERPL QL: SIGNIFICANT CHANGE UP
BLOOD GAS COMMENTS ARTERIAL: SIGNIFICANT CHANGE UP
BUN SERPL-MCNC: 22 MG/DL — HIGH (ref 8–20)
BUN SERPL-MCNC: 22 MG/DL — HIGH (ref 8–20)
BUN SERPL-MCNC: 24 MG/DL — HIGH (ref 8–20)
BUN SERPL-MCNC: 24 MG/DL — HIGH (ref 8–20)
BUN SERPL-MCNC: 27 MG/DL — HIGH (ref 8–20)
BUN SERPL-MCNC: 27 MG/DL — HIGH (ref 8–20)
BUN SERPL-MCNC: 28 MG/DL — HIGH (ref 8–20)
BUN SERPL-MCNC: 28 MG/DL — HIGH (ref 8–20)
BUN SERPL-MCNC: 29 MG/DL — HIGH (ref 8–20)
BUN SERPL-MCNC: 29 MG/DL — HIGH (ref 8–20)
BUN SERPL-MCNC: 30 MG/DL — HIGH (ref 8–20)
BUN SERPL-MCNC: 31 MG/DL — HIGH (ref 8–20)
BUN SERPL-MCNC: 32 MG/DL — HIGH (ref 8–20)
BUN SERPL-MCNC: 33 MG/DL — HIGH (ref 8–20)
BUN SERPL-MCNC: 34 MG/DL — HIGH (ref 8–20)
BUN SERPL-MCNC: 35 MG/DL — HIGH (ref 8–20)
BUN SERPL-MCNC: 35 MG/DL — HIGH (ref 8–20)
BUN SERPL-MCNC: 36 MG/DL — HIGH (ref 8–20)
BUN SERPL-MCNC: 37 MG/DL — HIGH (ref 8–20)
BUN SERPL-MCNC: 37 MG/DL — HIGH (ref 8–20)
BUN SERPL-MCNC: 38 MG/DL — HIGH (ref 8–20)
BUN SERPL-MCNC: 39 MG/DL — HIGH (ref 8–20)
BUN SERPL-MCNC: 39 MG/DL — HIGH (ref 8–20)
BUN SERPL-MCNC: 40 MG/DL — HIGH (ref 8–20)
BUN SERPL-MCNC: 41 MG/DL — HIGH (ref 8–20)
BUN SERPL-MCNC: 44 MG/DL — HIGH (ref 8–20)
BUN SERPL-MCNC: 46 MG/DL — HIGH (ref 8–20)
BUN SERPL-MCNC: 49 MG/DL — HIGH (ref 8–20)
BUN SERPL-MCNC: 52 MG/DL — HIGH (ref 8–20)
BUN SERPL-MCNC: 53 MG/DL — HIGH (ref 8–20)
BUN SERPL-MCNC: 53 MG/DL — HIGH (ref 8–20)
BUN SERPL-MCNC: 54 MG/DL — HIGH (ref 8–20)
BUN SERPL-MCNC: 54 MG/DL — HIGH (ref 8–20)
BUN SERPL-MCNC: 58 MG/DL — HIGH (ref 8–20)
BUN SERPL-MCNC: 58 MG/DL — HIGH (ref 8–20)
BUN SERPL-MCNC: 59 MG/DL — HIGH (ref 8–20)
BUN SERPL-MCNC: 59 MG/DL — HIGH (ref 8–20)
BUN SERPL-MCNC: 62 MG/DL — HIGH (ref 8–20)
CALCIUM SERPL-MCNC: 6.8 MG/DL — LOW (ref 8.6–10.2)
CALCIUM SERPL-MCNC: 8.6 MG/DL — SIGNIFICANT CHANGE UP (ref 8.6–10.2)
CALCIUM SERPL-MCNC: 8.7 MG/DL — SIGNIFICANT CHANGE UP (ref 8.6–10.2)
CALCIUM SERPL-MCNC: 8.8 MG/DL — SIGNIFICANT CHANGE UP (ref 8.6–10.2)
CALCIUM SERPL-MCNC: 8.8 MG/DL — SIGNIFICANT CHANGE UP (ref 8.6–10.2)
CALCIUM SERPL-MCNC: 8.9 MG/DL — SIGNIFICANT CHANGE UP (ref 8.6–10.2)
CALCIUM SERPL-MCNC: 9 MG/DL — SIGNIFICANT CHANGE UP (ref 8.6–10.2)
CALCIUM SERPL-MCNC: 9.1 MG/DL — SIGNIFICANT CHANGE UP (ref 8.6–10.2)
CALCIUM SERPL-MCNC: 9.2 MG/DL — SIGNIFICANT CHANGE UP (ref 8.6–10.2)
CALCIUM SERPL-MCNC: 9.3 MG/DL — SIGNIFICANT CHANGE UP (ref 8.6–10.2)
CALCIUM SERPL-MCNC: 9.4 MG/DL — SIGNIFICANT CHANGE UP (ref 8.6–10.2)
CALCIUM SERPL-MCNC: 9.5 MG/DL — SIGNIFICANT CHANGE UP (ref 8.6–10.2)
CALCIUM SERPL-MCNC: 9.6 MG/DL — SIGNIFICANT CHANGE UP (ref 8.6–10.2)
CALCIUM SERPL-MCNC: 9.6 MG/DL — SIGNIFICANT CHANGE UP (ref 8.6–10.2)
CALCIUM SERPL-MCNC: 9.7 MG/DL — SIGNIFICANT CHANGE UP (ref 8.6–10.2)
CALCIUM SERPL-MCNC: 9.8 MG/DL — SIGNIFICANT CHANGE UP (ref 8.6–10.2)
CALCIUM SERPL-MCNC: 9.9 MG/DL — SIGNIFICANT CHANGE UP (ref 8.6–10.2)
CANCER AG19-9 SERPL-ACNC: 37 U/ML — HIGH
CEA SERPL-MCNC: 6.2 NG/ML — HIGH (ref 0–3.8)
CHLORIDE SERPL-SCNC: 100 MMOL/L — SIGNIFICANT CHANGE UP (ref 98–107)
CHLORIDE SERPL-SCNC: 101 MMOL/L — SIGNIFICANT CHANGE UP (ref 98–107)
CHLORIDE SERPL-SCNC: 101 MMOL/L — SIGNIFICANT CHANGE UP (ref 98–107)
CHLORIDE SERPL-SCNC: 102 MMOL/L — SIGNIFICANT CHANGE UP (ref 98–107)
CHLORIDE SERPL-SCNC: 102 MMOL/L — SIGNIFICANT CHANGE UP (ref 98–107)
CHLORIDE SERPL-SCNC: 104 MMOL/L — SIGNIFICANT CHANGE UP (ref 98–107)
CHLORIDE SERPL-SCNC: 105 MMOL/L — SIGNIFICANT CHANGE UP (ref 98–107)
CHLORIDE SERPL-SCNC: 105 MMOL/L — SIGNIFICANT CHANGE UP (ref 98–107)
CHLORIDE SERPL-SCNC: 106 MMOL/L — SIGNIFICANT CHANGE UP (ref 98–107)
CHLORIDE SERPL-SCNC: 110 MMOL/L — HIGH (ref 98–107)
CHLORIDE SERPL-SCNC: 90 MMOL/L — LOW (ref 98–107)
CHLORIDE SERPL-SCNC: 92 MMOL/L — LOW (ref 98–107)
CHLORIDE SERPL-SCNC: 93 MMOL/L — LOW (ref 98–107)
CHLORIDE SERPL-SCNC: 94 MMOL/L — LOW (ref 98–107)
CHLORIDE SERPL-SCNC: 94 MMOL/L — LOW (ref 98–107)
CHLORIDE SERPL-SCNC: 95 MMOL/L — LOW (ref 98–107)
CHLORIDE SERPL-SCNC: 96 MMOL/L — LOW (ref 98–107)
CHLORIDE SERPL-SCNC: 96 MMOL/L — LOW (ref 98–107)
CHLORIDE SERPL-SCNC: 97 MMOL/L — LOW (ref 98–107)
CHLORIDE SERPL-SCNC: 98 MMOL/L — SIGNIFICANT CHANGE UP (ref 98–107)
CHLORIDE SERPL-SCNC: 99 MMOL/L — SIGNIFICANT CHANGE UP (ref 98–107)
CK SERPL-CCNC: 16 U/L — LOW (ref 30–200)
CK SERPL-CCNC: 28 U/L — LOW (ref 30–200)
CK SERPL-CCNC: 35 U/L — SIGNIFICANT CHANGE UP (ref 30–200)
CO2 SERPL-SCNC: 24 MMOL/L — SIGNIFICANT CHANGE UP (ref 22–29)
CO2 SERPL-SCNC: 24 MMOL/L — SIGNIFICANT CHANGE UP (ref 22–29)
CO2 SERPL-SCNC: 26 MMOL/L — SIGNIFICANT CHANGE UP (ref 22–29)
CO2 SERPL-SCNC: 27 MMOL/L — SIGNIFICANT CHANGE UP (ref 22–29)
CO2 SERPL-SCNC: 28 MMOL/L — SIGNIFICANT CHANGE UP (ref 22–29)
CO2 SERPL-SCNC: 28 MMOL/L — SIGNIFICANT CHANGE UP (ref 22–29)
CO2 SERPL-SCNC: 29 MMOL/L — SIGNIFICANT CHANGE UP (ref 22–29)
CO2 SERPL-SCNC: 30 MMOL/L — HIGH (ref 22–29)
CO2 SERPL-SCNC: 31 MMOL/L — HIGH (ref 22–29)
CO2 SERPL-SCNC: 31 MMOL/L — HIGH (ref 22–29)
CO2 SERPL-SCNC: 32 MMOL/L — HIGH (ref 22–29)
CO2 SERPL-SCNC: 32 MMOL/L — HIGH (ref 22–29)
CO2 SERPL-SCNC: 33 MMOL/L — HIGH (ref 22–29)
CO2 SERPL-SCNC: 33 MMOL/L — HIGH (ref 22–29)
CO2 SERPL-SCNC: 34 MMOL/L — HIGH (ref 22–29)
CO2 SERPL-SCNC: 35 MMOL/L — HIGH (ref 22–29)
CO2 SERPL-SCNC: 36 MMOL/L — HIGH (ref 22–29)
CO2 SERPL-SCNC: 37 MMOL/L — HIGH (ref 22–29)
CO2 SERPL-SCNC: 38 MMOL/L — HIGH (ref 22–29)
CO2 SERPL-SCNC: 39 MMOL/L — HIGH (ref 22–29)
CO2 SERPL-SCNC: 40 MMOL/L — HIGH (ref 22–29)
CO2 SERPL-SCNC: 40 MMOL/L — HIGH (ref 22–29)
CO2 SERPL-SCNC: 41 MMOL/L — HIGH (ref 22–29)
CO2 SERPL-SCNC: 42 MMOL/L — HIGH (ref 22–29)
CO2 SERPL-SCNC: 42 MMOL/L — HIGH (ref 22–29)
CO2 SERPL-SCNC: 43 MMOL/L — HIGH (ref 22–29)
CO2 SERPL-SCNC: 45 MMOL/L — CRITICAL HIGH (ref 22–29)
CO2 SERPL-SCNC: 9 MMOL/L — CRITICAL LOW (ref 22–29)
COLOR FLD: YELLOW
COLOR FLD: YELLOW
COLOR SPEC: YELLOW — SIGNIFICANT CHANGE UP
COLOR SPEC: YELLOW — SIGNIFICANT CHANGE UP
COMMENT - FLUIDS: SIGNIFICANT CHANGE UP
CREAT SERPL-MCNC: 0.69 MG/DL — SIGNIFICANT CHANGE UP (ref 0.5–1.3)
CREAT SERPL-MCNC: 0.78 MG/DL — SIGNIFICANT CHANGE UP (ref 0.5–1.3)
CREAT SERPL-MCNC: 0.78 MG/DL — SIGNIFICANT CHANGE UP (ref 0.5–1.3)
CREAT SERPL-MCNC: 0.83 MG/DL — SIGNIFICANT CHANGE UP (ref 0.5–1.3)
CREAT SERPL-MCNC: 0.84 MG/DL — SIGNIFICANT CHANGE UP (ref 0.5–1.3)
CREAT SERPL-MCNC: 0.84 MG/DL — SIGNIFICANT CHANGE UP (ref 0.5–1.3)
CREAT SERPL-MCNC: 0.87 MG/DL — SIGNIFICANT CHANGE UP (ref 0.5–1.3)
CREAT SERPL-MCNC: 0.87 MG/DL — SIGNIFICANT CHANGE UP (ref 0.5–1.3)
CREAT SERPL-MCNC: 0.88 MG/DL — SIGNIFICANT CHANGE UP (ref 0.5–1.3)
CREAT SERPL-MCNC: 0.88 MG/DL — SIGNIFICANT CHANGE UP (ref 0.5–1.3)
CREAT SERPL-MCNC: 0.91 MG/DL — SIGNIFICANT CHANGE UP (ref 0.5–1.3)
CREAT SERPL-MCNC: 0.92 MG/DL — SIGNIFICANT CHANGE UP (ref 0.5–1.3)
CREAT SERPL-MCNC: 0.93 MG/DL — SIGNIFICANT CHANGE UP (ref 0.5–1.3)
CREAT SERPL-MCNC: 0.96 MG/DL — SIGNIFICANT CHANGE UP (ref 0.5–1.3)
CREAT SERPL-MCNC: 0.98 MG/DL — SIGNIFICANT CHANGE UP (ref 0.5–1.3)
CREAT SERPL-MCNC: 1 MG/DL — SIGNIFICANT CHANGE UP (ref 0.5–1.3)
CREAT SERPL-MCNC: 1 MG/DL — SIGNIFICANT CHANGE UP (ref 0.5–1.3)
CREAT SERPL-MCNC: 1.01 MG/DL — SIGNIFICANT CHANGE UP (ref 0.5–1.3)
CREAT SERPL-MCNC: 1.02 MG/DL — SIGNIFICANT CHANGE UP (ref 0.5–1.3)
CREAT SERPL-MCNC: 1.02 MG/DL — SIGNIFICANT CHANGE UP (ref 0.5–1.3)
CREAT SERPL-MCNC: 1.04 MG/DL — SIGNIFICANT CHANGE UP (ref 0.5–1.3)
CREAT SERPL-MCNC: 1.05 MG/DL — SIGNIFICANT CHANGE UP (ref 0.5–1.3)
CREAT SERPL-MCNC: 1.07 MG/DL — SIGNIFICANT CHANGE UP (ref 0.5–1.3)
CREAT SERPL-MCNC: 1.08 MG/DL — SIGNIFICANT CHANGE UP (ref 0.5–1.3)
CREAT SERPL-MCNC: 1.08 MG/DL — SIGNIFICANT CHANGE UP (ref 0.5–1.3)
CREAT SERPL-MCNC: 1.09 MG/DL — SIGNIFICANT CHANGE UP (ref 0.5–1.3)
CREAT SERPL-MCNC: 1.1 MG/DL — SIGNIFICANT CHANGE UP (ref 0.5–1.3)
CREAT SERPL-MCNC: 1.13 MG/DL — SIGNIFICANT CHANGE UP (ref 0.5–1.3)
CREAT SERPL-MCNC: 1.15 MG/DL — SIGNIFICANT CHANGE UP (ref 0.5–1.3)
CREAT SERPL-MCNC: 1.16 MG/DL — SIGNIFICANT CHANGE UP (ref 0.5–1.3)
CREAT SERPL-MCNC: 1.16 MG/DL — SIGNIFICANT CHANGE UP (ref 0.5–1.3)
CREAT SERPL-MCNC: 1.17 MG/DL — SIGNIFICANT CHANGE UP (ref 0.5–1.3)
CREAT SERPL-MCNC: 1.2 MG/DL — SIGNIFICANT CHANGE UP (ref 0.5–1.3)
CREAT SERPL-MCNC: 1.21 MG/DL — SIGNIFICANT CHANGE UP (ref 0.5–1.3)
CREAT SERPL-MCNC: 1.24 MG/DL — SIGNIFICANT CHANGE UP (ref 0.5–1.3)
CREAT SERPL-MCNC: 1.24 MG/DL — SIGNIFICANT CHANGE UP (ref 0.5–1.3)
CREAT SERPL-MCNC: 1.26 MG/DL — SIGNIFICANT CHANGE UP (ref 0.5–1.3)
CREAT SERPL-MCNC: 1.26 MG/DL — SIGNIFICANT CHANGE UP (ref 0.5–1.3)
CREAT SERPL-MCNC: 1.27 MG/DL — SIGNIFICANT CHANGE UP (ref 0.5–1.3)
CREAT SERPL-MCNC: 1.28 MG/DL — SIGNIFICANT CHANGE UP (ref 0.5–1.3)
CREAT SERPL-MCNC: 1.32 MG/DL — HIGH (ref 0.5–1.3)
CREAT SERPL-MCNC: 1.33 MG/DL — HIGH (ref 0.5–1.3)
CREAT SERPL-MCNC: 1.33 MG/DL — HIGH (ref 0.5–1.3)
CREAT SERPL-MCNC: 1.38 MG/DL — HIGH (ref 0.5–1.3)
CREAT SERPL-MCNC: 1.4 MG/DL — HIGH (ref 0.5–1.3)
CREAT SERPL-MCNC: 1.42 MG/DL — HIGH (ref 0.5–1.3)
CREAT SERPL-MCNC: 1.44 MG/DL — HIGH (ref 0.5–1.3)
CREAT SERPL-MCNC: 1.61 MG/DL — HIGH (ref 0.5–1.3)
CREAT SERPL-MCNC: 1.65 MG/DL — HIGH (ref 0.5–1.3)
CREAT SERPL-MCNC: 1.81 MG/DL — HIGH (ref 0.5–1.3)
CULTURE RESULTS: NO GROWTH — SIGNIFICANT CHANGE UP
CULTURE RESULTS: SIGNIFICANT CHANGE UP
DIFF PNL FLD: ABNORMAL
DIFF PNL FLD: NEGATIVE — SIGNIFICANT CHANGE UP
ELLIPTOCYTES BLD QL SMEAR: SLIGHT — SIGNIFICANT CHANGE UP
ELLIPTOCYTES BLD QL SMEAR: SLIGHT — SIGNIFICANT CHANGE UP
EOSINOPHIL # BLD AUTO: 0 K/UL — SIGNIFICANT CHANGE UP (ref 0–0.5)
EOSINOPHIL # BLD AUTO: 0.02 K/UL — SIGNIFICANT CHANGE UP (ref 0–0.5)
EOSINOPHIL # BLD AUTO: 0.03 K/UL — SIGNIFICANT CHANGE UP (ref 0–0.5)
EOSINOPHIL # BLD AUTO: 0.06 K/UL — SIGNIFICANT CHANGE UP (ref 0–0.5)
EOSINOPHIL # BLD AUTO: 0.09 K/UL — SIGNIFICANT CHANGE UP (ref 0–0.5)
EOSINOPHIL # BLD AUTO: 0.1 K/UL — SIGNIFICANT CHANGE UP (ref 0–0.5)
EOSINOPHIL # BLD AUTO: 0.11 K/UL — SIGNIFICANT CHANGE UP (ref 0–0.5)
EOSINOPHIL # BLD AUTO: 0.11 K/UL — SIGNIFICANT CHANGE UP (ref 0–0.5)
EOSINOPHIL NFR BLD AUTO: 0 % — SIGNIFICANT CHANGE UP (ref 0–5)
EOSINOPHIL NFR BLD AUTO: 0 % — SIGNIFICANT CHANGE UP (ref 0–6)
EOSINOPHIL NFR BLD AUTO: 0.2 % — SIGNIFICANT CHANGE UP (ref 0–5)
EOSINOPHIL NFR BLD AUTO: 0.3 % — SIGNIFICANT CHANGE UP (ref 0–6)
EOSINOPHIL NFR BLD AUTO: 0.4 % — SIGNIFICANT CHANGE UP (ref 0–6)
EOSINOPHIL NFR BLD AUTO: 0.5 % — SIGNIFICANT CHANGE UP (ref 0–6)
EOSINOPHIL NFR BLD AUTO: 0.8 % — SIGNIFICANT CHANGE UP (ref 0–6)
EOSINOPHIL NFR BLD AUTO: 0.9 % — SIGNIFICANT CHANGE UP (ref 0–5)
EOSINOPHIL NFR BLD AUTO: 0.9 % — SIGNIFICANT CHANGE UP (ref 0–6)
EOSINOPHIL NFR BLD AUTO: 0.9 % — SIGNIFICANT CHANGE UP (ref 0–6)
EOSINOPHIL NFR BLD AUTO: 1 % — SIGNIFICANT CHANGE UP (ref 0–6)
EOSINOPHIL NFR BLD AUTO: 1.1 % — SIGNIFICANT CHANGE UP (ref 0–5)
EPI CELLS # UR: SIGNIFICANT CHANGE UP
EPI CELLS # UR: SIGNIFICANT CHANGE UP
FLUID INTAKE SUBSTANCE CLASS: SIGNIFICANT CHANGE UP
FLUID INTAKE SUBSTANCE CLASS: SIGNIFICANT CHANGE UP
FLUID SEGMENTED GRANULOCYTES: 77 % — SIGNIFICANT CHANGE UP
FLUID SEGMENTED GRANULOCYTES: 86 % — SIGNIFICANT CHANGE UP
GAS PNL BLDA: SIGNIFICANT CHANGE UP
GIANT PLATELETS BLD QL SMEAR: PRESENT — SIGNIFICANT CHANGE UP
GIANT PLATELETS BLD QL SMEAR: PRESENT — SIGNIFICANT CHANGE UP
GLUCOSE BLDC GLUCOMTR-MCNC: 101 MG/DL — HIGH (ref 70–99)
GLUCOSE BLDC GLUCOMTR-MCNC: 122 MG/DL — HIGH (ref 70–99)
GLUCOSE BLDC GLUCOMTR-MCNC: 136 MG/DL — HIGH (ref 70–99)
GLUCOSE BLDC GLUCOMTR-MCNC: 94 MG/DL — SIGNIFICANT CHANGE UP (ref 70–99)
GLUCOSE FLD-MCNC: 125 MG/DL — SIGNIFICANT CHANGE UP
GLUCOSE FLD-MCNC: 48 MG/DL — SIGNIFICANT CHANGE UP
GLUCOSE SERPL-MCNC: 100 MG/DL — SIGNIFICANT CHANGE UP (ref 70–115)
GLUCOSE SERPL-MCNC: 102 MG/DL — SIGNIFICANT CHANGE UP (ref 70–115)
GLUCOSE SERPL-MCNC: 104 MG/DL — SIGNIFICANT CHANGE UP (ref 70–115)
GLUCOSE SERPL-MCNC: 104 MG/DL — SIGNIFICANT CHANGE UP (ref 70–115)
GLUCOSE SERPL-MCNC: 105 MG/DL — SIGNIFICANT CHANGE UP (ref 70–115)
GLUCOSE SERPL-MCNC: 106 MG/DL — SIGNIFICANT CHANGE UP (ref 70–115)
GLUCOSE SERPL-MCNC: 109 MG/DL — SIGNIFICANT CHANGE UP (ref 70–115)
GLUCOSE SERPL-MCNC: 110 MG/DL — SIGNIFICANT CHANGE UP (ref 70–115)
GLUCOSE SERPL-MCNC: 110 MG/DL — SIGNIFICANT CHANGE UP (ref 70–115)
GLUCOSE SERPL-MCNC: 111 MG/DL — SIGNIFICANT CHANGE UP (ref 70–115)
GLUCOSE SERPL-MCNC: 116 MG/DL — HIGH (ref 70–115)
GLUCOSE SERPL-MCNC: 120 MG/DL — HIGH (ref 70–115)
GLUCOSE SERPL-MCNC: 120 MG/DL — HIGH (ref 70–115)
GLUCOSE SERPL-MCNC: 122 MG/DL — HIGH (ref 70–115)
GLUCOSE SERPL-MCNC: 126 MG/DL — HIGH (ref 70–115)
GLUCOSE SERPL-MCNC: 127 MG/DL — HIGH (ref 70–115)
GLUCOSE SERPL-MCNC: 132 MG/DL — HIGH (ref 70–115)
GLUCOSE SERPL-MCNC: 133 MG/DL — HIGH (ref 70–115)
GLUCOSE SERPL-MCNC: 134 MG/DL — HIGH (ref 70–115)
GLUCOSE SERPL-MCNC: 137 MG/DL — HIGH (ref 70–115)
GLUCOSE SERPL-MCNC: 152 MG/DL — HIGH (ref 70–115)
GLUCOSE SERPL-MCNC: 161 MG/DL — HIGH (ref 70–115)
GLUCOSE SERPL-MCNC: 173 MG/DL — HIGH (ref 70–115)
GLUCOSE SERPL-MCNC: 201 MG/DL — HIGH (ref 70–115)
GLUCOSE SERPL-MCNC: 79 MG/DL — SIGNIFICANT CHANGE UP (ref 70–115)
GLUCOSE SERPL-MCNC: 82 MG/DL — SIGNIFICANT CHANGE UP (ref 70–115)
GLUCOSE SERPL-MCNC: 83 MG/DL — SIGNIFICANT CHANGE UP (ref 70–115)
GLUCOSE SERPL-MCNC: 84 MG/DL — SIGNIFICANT CHANGE UP (ref 70–115)
GLUCOSE SERPL-MCNC: 85 MG/DL — SIGNIFICANT CHANGE UP (ref 70–115)
GLUCOSE SERPL-MCNC: 87 MG/DL — SIGNIFICANT CHANGE UP (ref 70–115)
GLUCOSE SERPL-MCNC: 90 MG/DL — SIGNIFICANT CHANGE UP (ref 70–115)
GLUCOSE SERPL-MCNC: 91 MG/DL — SIGNIFICANT CHANGE UP (ref 70–115)
GLUCOSE SERPL-MCNC: 91 MG/DL — SIGNIFICANT CHANGE UP (ref 70–115)
GLUCOSE SERPL-MCNC: 93 MG/DL — SIGNIFICANT CHANGE UP (ref 70–115)
GLUCOSE SERPL-MCNC: 93 MG/DL — SIGNIFICANT CHANGE UP (ref 70–115)
GLUCOSE SERPL-MCNC: 94 MG/DL — SIGNIFICANT CHANGE UP (ref 70–115)
GLUCOSE SERPL-MCNC: 94 MG/DL — SIGNIFICANT CHANGE UP (ref 70–115)
GLUCOSE SERPL-MCNC: 95 MG/DL — SIGNIFICANT CHANGE UP (ref 70–115)
GLUCOSE SERPL-MCNC: 96 MG/DL — SIGNIFICANT CHANGE UP (ref 70–115)
GLUCOSE SERPL-MCNC: 97 MG/DL — SIGNIFICANT CHANGE UP (ref 70–115)
GLUCOSE SERPL-MCNC: 98 MG/DL — SIGNIFICANT CHANGE UP (ref 70–115)
GLUCOSE SERPL-MCNC: 99 MG/DL — SIGNIFICANT CHANGE UP (ref 70–115)
GLUCOSE UR QL: NEGATIVE MG/DL — SIGNIFICANT CHANGE UP
GLUCOSE UR QL: NEGATIVE MG/DL — SIGNIFICANT CHANGE UP
GRAM STN FLD: SIGNIFICANT CHANGE UP
HBA1C BLD-MCNC: 5.4 % — SIGNIFICANT CHANGE UP (ref 4–5.6)
HCO3 BLDA-SCNC: 20 MMOL/L — SIGNIFICANT CHANGE UP (ref 20–26)
HCO3 BLDA-SCNC: 21 MMOL/L — SIGNIFICANT CHANGE UP (ref 20–26)
HCO3 BLDA-SCNC: 24 MMOL/L — SIGNIFICANT CHANGE UP (ref 20–26)
HCO3 BLDA-SCNC: 24 MMOL/L — SIGNIFICANT CHANGE UP (ref 20–26)
HCO3 BLDA-SCNC: 25 MMOL/L — SIGNIFICANT CHANGE UP (ref 20–26)
HCO3 BLDA-SCNC: 25 MMOL/L — SIGNIFICANT CHANGE UP (ref 20–26)
HCO3 BLDA-SCNC: 39 MMOL/L — HIGH (ref 20–26)
HCT VFR BLD CALC: 32.6 % — LOW (ref 42–52)
HCT VFR BLD CALC: 34.3 % — LOW (ref 42–52)
HCT VFR BLD CALC: 34.5 % — LOW (ref 42–52)
HCT VFR BLD CALC: 34.5 % — LOW (ref 42–52)
HCT VFR BLD CALC: 34.9 % — LOW (ref 42–52)
HCT VFR BLD CALC: 35.2 % — LOW (ref 42–52)
HCT VFR BLD CALC: 35.6 % — LOW (ref 42–52)
HCT VFR BLD CALC: 35.9 % — LOW (ref 42–52)
HCT VFR BLD CALC: 36.5 % — LOW (ref 42–52)
HCT VFR BLD CALC: 37 % — LOW (ref 42–52)
HCT VFR BLD CALC: 37.1 % — LOW (ref 42–52)
HCT VFR BLD CALC: 37.3 % — LOW (ref 42–52)
HCT VFR BLD CALC: 38 % — LOW (ref 39–50)
HCT VFR BLD CALC: 38.4 % — LOW (ref 42–52)
HCT VFR BLD CALC: 38.5 % — LOW (ref 39–50)
HCT VFR BLD CALC: 38.8 % — LOW (ref 42–52)
HCT VFR BLD CALC: 38.9 % — LOW (ref 39–50)
HCT VFR BLD CALC: 38.9 % — LOW (ref 39–50)
HCT VFR BLD CALC: 39.1 % — SIGNIFICANT CHANGE UP (ref 39–50)
HCT VFR BLD CALC: 39.1 % — SIGNIFICANT CHANGE UP (ref 39–50)
HCT VFR BLD CALC: 39.4 % — LOW (ref 42–52)
HCT VFR BLD CALC: 39.4 % — LOW (ref 42–52)
HCT VFR BLD CALC: 39.5 % — LOW (ref 42–52)
HCT VFR BLD CALC: 39.5 % — SIGNIFICANT CHANGE UP (ref 39–50)
HCT VFR BLD CALC: 39.6 % — SIGNIFICANT CHANGE UP (ref 39–50)
HCT VFR BLD CALC: 39.9 % — LOW (ref 42–52)
HCT VFR BLD CALC: 40 % — SIGNIFICANT CHANGE UP (ref 39–50)
HCT VFR BLD CALC: 40 % — SIGNIFICANT CHANGE UP (ref 39–50)
HCT VFR BLD CALC: 40.1 % — SIGNIFICANT CHANGE UP (ref 39–50)
HCT VFR BLD CALC: 40.2 % — SIGNIFICANT CHANGE UP (ref 39–50)
HCT VFR BLD CALC: 40.5 % — SIGNIFICANT CHANGE UP (ref 39–50)
HCT VFR BLD CALC: 40.7 % — LOW (ref 42–52)
HCT VFR BLD CALC: 40.7 % — SIGNIFICANT CHANGE UP (ref 39–50)
HCT VFR BLD CALC: 41.1 % — SIGNIFICANT CHANGE UP (ref 39–50)
HCT VFR BLD CALC: 41.1 % — SIGNIFICANT CHANGE UP (ref 39–50)
HCT VFR BLD CALC: 41.2 % — LOW (ref 42–52)
HCT VFR BLD CALC: 41.3 % — SIGNIFICANT CHANGE UP (ref 39–50)
HCT VFR BLD CALC: 41.4 % — SIGNIFICANT CHANGE UP (ref 39–50)
HCT VFR BLD CALC: 41.5 % — SIGNIFICANT CHANGE UP (ref 39–50)
HCT VFR BLD CALC: 41.6 % — LOW (ref 42–52)
HCT VFR BLD CALC: 41.7 % — LOW (ref 42–52)
HCT VFR BLD CALC: 41.9 % — SIGNIFICANT CHANGE UP (ref 39–50)
HCT VFR BLD CALC: 42 % — SIGNIFICANT CHANGE UP (ref 42–52)
HCT VFR BLD CALC: 43.1 % — SIGNIFICANT CHANGE UP (ref 42–52)
HCT VFR BLD CALC: 45.1 % — SIGNIFICANT CHANGE UP (ref 42–52)
HCT VFR BLD CALC: 45.7 % — SIGNIFICANT CHANGE UP (ref 42–52)
HCT VFR BLD CALC: 47.7 % — SIGNIFICANT CHANGE UP (ref 39–50)
HCT VFR BLD CALC: 47.8 % — SIGNIFICANT CHANGE UP (ref 42–52)
HGB BLD-MCNC: 10.1 G/DL — LOW (ref 14–18)
HGB BLD-MCNC: 10.1 G/DL — LOW (ref 14–18)
HGB BLD-MCNC: 10.3 G/DL — LOW (ref 14–18)
HGB BLD-MCNC: 10.3 G/DL — LOW (ref 14–18)
HGB BLD-MCNC: 10.4 G/DL — LOW (ref 14–18)
HGB BLD-MCNC: 10.6 G/DL — LOW (ref 14–18)
HGB BLD-MCNC: 10.7 G/DL — LOW (ref 14–18)
HGB BLD-MCNC: 10.9 G/DL — LOW (ref 14–18)
HGB BLD-MCNC: 11 G/DL — LOW (ref 14–18)
HGB BLD-MCNC: 11.1 G/DL — LOW (ref 14–18)
HGB BLD-MCNC: 11.2 G/DL — LOW (ref 14–18)
HGB BLD-MCNC: 11.5 G/DL — LOW (ref 13–17)
HGB BLD-MCNC: 11.6 G/DL — LOW (ref 13–17)
HGB BLD-MCNC: 11.6 G/DL — LOW (ref 14–18)
HGB BLD-MCNC: 11.7 G/DL — LOW (ref 13–17)
HGB BLD-MCNC: 11.8 G/DL — LOW (ref 13–17)
HGB BLD-MCNC: 11.9 G/DL — LOW (ref 13–17)
HGB BLD-MCNC: 12 G/DL — LOW (ref 14–18)
HGB BLD-MCNC: 12 G/DL — LOW (ref 14–18)
HGB BLD-MCNC: 12.1 G/DL — LOW (ref 13–17)
HGB BLD-MCNC: 12.1 G/DL — LOW (ref 14–18)
HGB BLD-MCNC: 12.2 G/DL — LOW (ref 13–17)
HGB BLD-MCNC: 12.4 G/DL — LOW (ref 13–17)
HGB BLD-MCNC: 12.4 G/DL — LOW (ref 14–18)
HGB BLD-MCNC: 12.4 G/DL — LOW (ref 14–18)
HGB BLD-MCNC: 12.5 G/DL — LOW (ref 14–18)
HGB BLD-MCNC: 12.5 G/DL — LOW (ref 14–18)
HGB BLD-MCNC: 12.6 G/DL — LOW (ref 14–18)
HGB BLD-MCNC: 12.8 G/DL — LOW (ref 13–17)
HGB BLD-MCNC: 12.9 G/DL — LOW (ref 13–17)
HGB BLD-MCNC: 12.9 G/DL — LOW (ref 14–18)
HGB BLD-MCNC: 13.1 G/DL — LOW (ref 14–18)
HGB BLD-MCNC: 13.3 G/DL — LOW (ref 14–18)
HGB BLD-MCNC: 13.3 G/DL — SIGNIFICANT CHANGE UP (ref 13–17)
HGB BLD-MCNC: 13.7 G/DL — LOW (ref 14–18)
HGB BLD-MCNC: 14 G/DL — SIGNIFICANT CHANGE UP (ref 13–17)
HGB BLD-MCNC: 14.1 G/DL — SIGNIFICANT CHANGE UP (ref 14–18)
HGB BLD-MCNC: 14.9 G/DL — SIGNIFICANT CHANGE UP (ref 14–18)
HGB BLD-MCNC: 9.6 G/DL — LOW (ref 14–18)
HOROWITZ INDEX BLDA+IHG-RTO: 100 — SIGNIFICANT CHANGE UP
HOROWITZ INDEX BLDA+IHG-RTO: 100 — SIGNIFICANT CHANGE UP
HOROWITZ INDEX BLDA+IHG-RTO: 3 — SIGNIFICANT CHANGE UP
HOROWITZ INDEX BLDA+IHG-RTO: SIGNIFICANT CHANGE UP
HYALINE CASTS # UR AUTO: ABNORMAL /LPF
HYPOCHROMIA BLD QL: SLIGHT — SIGNIFICANT CHANGE UP
IMM GRANULOCYTES NFR BLD AUTO: 0.9 % — SIGNIFICANT CHANGE UP (ref 0–1.5)
IMM GRANULOCYTES NFR BLD AUTO: 0.9 % — SIGNIFICANT CHANGE UP (ref 0–1.5)
IMM GRANULOCYTES NFR BLD AUTO: 2.3 % — HIGH (ref 0–1.5)
IMM GRANULOCYTES NFR BLD AUTO: 3.7 % — HIGH (ref 0–1.5)
IMM GRANULOCYTES NFR BLD AUTO: 4.7 % — HIGH (ref 0–1.5)
INR BLD: 1.04 RATIO — SIGNIFICANT CHANGE UP (ref 0.88–1.16)
INR BLD: 1.09 RATIO — SIGNIFICANT CHANGE UP (ref 0.88–1.16)
INR BLD: 1.1 RATIO — SIGNIFICANT CHANGE UP (ref 0.88–1.16)
INR BLD: 1.11 RATIO — SIGNIFICANT CHANGE UP (ref 0.88–1.16)
INR BLD: 1.18 RATIO — HIGH (ref 0.88–1.16)
INR BLD: 1.19 RATIO — HIGH (ref 0.88–1.16)
INR BLD: 1.21 RATIO — HIGH (ref 0.88–1.16)
INR BLD: 1.3 RATIO — HIGH (ref 0.88–1.16)
KETONES UR-MCNC: ABNORMAL
KETONES UR-MCNC: NEGATIVE — SIGNIFICANT CHANGE UP
LACTATE BLDV-MCNC: 1.3 MMOL/L — SIGNIFICANT CHANGE UP (ref 0.5–2)
LACTATE BLDV-MCNC: 1.5 MMOL/L — SIGNIFICANT CHANGE UP (ref 0.5–2)
LACTATE BLDV-MCNC: 2.9 MMOL/L — HIGH (ref 0.5–2)
LDH SERPL L TO P-CCNC: 247 U/L — HIGH (ref 98–192)
LDH SERPL L TO P-CCNC: 4930 U/L — SIGNIFICANT CHANGE UP
LDH SERPL L TO P-CCNC: 715 U/L — SIGNIFICANT CHANGE UP
LEGIONELLA AG UR QL: NEGATIVE — SIGNIFICANT CHANGE UP
LEUKOCYTE ESTERASE UR-ACNC: ABNORMAL
LEUKOCYTE ESTERASE UR-ACNC: NEGATIVE — SIGNIFICANT CHANGE UP
LYMPHOCYTES # BLD AUTO: 0.12 K/UL — LOW (ref 1–3.3)
LYMPHOCYTES # BLD AUTO: 0.4 K/UL — LOW (ref 1–4.8)
LYMPHOCYTES # BLD AUTO: 0.57 K/UL — LOW (ref 1–3.3)
LYMPHOCYTES # BLD AUTO: 0.6 K/UL — LOW (ref 1–4.8)
LYMPHOCYTES # BLD AUTO: 0.6 K/UL — LOW (ref 1–4.8)
LYMPHOCYTES # BLD AUTO: 0.63 K/UL — LOW (ref 1–3.3)
LYMPHOCYTES # BLD AUTO: 0.7 K/UL — LOW (ref 1–4.8)
LYMPHOCYTES # BLD AUTO: 0.75 K/UL — LOW (ref 1–3.3)
LYMPHOCYTES # BLD AUTO: 0.76 K/UL — LOW (ref 1–3.3)
LYMPHOCYTES # BLD AUTO: 0.9 % — LOW (ref 13–44)
LYMPHOCYTES # BLD AUTO: 1 % — LOW (ref 20–55)
LYMPHOCYTES # BLD AUTO: 1 K/UL — SIGNIFICANT CHANGE UP (ref 1–4.8)
LYMPHOCYTES # BLD AUTO: 1 K/UL — SIGNIFICANT CHANGE UP (ref 1–4.8)
LYMPHOCYTES # BLD AUTO: 1.03 K/UL — SIGNIFICANT CHANGE UP (ref 1–3.3)
LYMPHOCYTES # BLD AUTO: 1.45 K/UL — SIGNIFICANT CHANGE UP (ref 1–3.3)
LYMPHOCYTES # BLD AUTO: 1.6 K/UL — SIGNIFICANT CHANGE UP (ref 1–4.8)
LYMPHOCYTES # BLD AUTO: 1.7 K/UL — SIGNIFICANT CHANGE UP (ref 1–4.8)
LYMPHOCYTES # BLD AUTO: 1.9 K/UL — SIGNIFICANT CHANGE UP (ref 1–4.8)
LYMPHOCYTES # BLD AUTO: 11.9 % — LOW (ref 20–55)
LYMPHOCYTES # BLD AUTO: 13.2 % — SIGNIFICANT CHANGE UP (ref 13–44)
LYMPHOCYTES # BLD AUTO: 18.1 % — LOW (ref 20–55)
LYMPHOCYTES # BLD AUTO: 18.4 % — LOW (ref 20–55)
LYMPHOCYTES # BLD AUTO: 2.9 % — LOW (ref 20–55)
LYMPHOCYTES # BLD AUTO: 21 % — SIGNIFICANT CHANGE UP (ref 20–55)
LYMPHOCYTES # BLD AUTO: 3 % — LOW (ref 20–55)
LYMPHOCYTES # BLD AUTO: 3.6 % — LOW (ref 20–55)
LYMPHOCYTES # BLD AUTO: 3.9 % — LOW (ref 20–55)
LYMPHOCYTES # BLD AUTO: 5.8 % — LOW (ref 13–44)
LYMPHOCYTES # BLD AUTO: 6.1 % — LOW (ref 13–44)
LYMPHOCYTES # BLD AUTO: 7 % — LOW (ref 20–55)
LYMPHOCYTES # BLD AUTO: 7 % — LOW (ref 20–55)
LYMPHOCYTES # BLD AUTO: 7.4 % — LOW (ref 13–44)
LYMPHOCYTES # BLD AUTO: 9.2 % — LOW (ref 13–44)
LYMPHOCYTES # BLD AUTO: 9.4 % — LOW (ref 13–44)
LYMPHOCYTES # FLD: 4 % — SIGNIFICANT CHANGE UP
LYMPHOCYTES # FLD: 5 % — SIGNIFICANT CHANGE UP
MACROCYTES BLD QL: SLIGHT — SIGNIFICANT CHANGE UP
MAGNESIUM SERPL-MCNC: 1.8 MG/DL — SIGNIFICANT CHANGE UP (ref 1.6–2.6)
MAGNESIUM SERPL-MCNC: 1.9 MG/DL — SIGNIFICANT CHANGE UP (ref 1.6–2.6)
MAGNESIUM SERPL-MCNC: 1.9 MG/DL — SIGNIFICANT CHANGE UP (ref 1.6–2.6)
MAGNESIUM SERPL-MCNC: 1.9 MG/DL — SIGNIFICANT CHANGE UP (ref 1.8–2.6)
MAGNESIUM SERPL-MCNC: 2 MG/DL — SIGNIFICANT CHANGE UP (ref 1.6–2.6)
MAGNESIUM SERPL-MCNC: 2 MG/DL — SIGNIFICANT CHANGE UP (ref 1.8–2.6)
MAGNESIUM SERPL-MCNC: 2 MG/DL — SIGNIFICANT CHANGE UP (ref 1.8–2.6)
MAGNESIUM SERPL-MCNC: 2.1 MG/DL — SIGNIFICANT CHANGE UP (ref 1.6–2.6)
MAGNESIUM SERPL-MCNC: 2.2 MG/DL — SIGNIFICANT CHANGE UP (ref 1.6–2.6)
MAGNESIUM SERPL-MCNC: 2.3 MG/DL — SIGNIFICANT CHANGE UP (ref 1.6–2.6)
MAGNESIUM SERPL-MCNC: 2.4 MG/DL — SIGNIFICANT CHANGE UP (ref 1.6–2.6)
MAGNESIUM SERPL-MCNC: 2.4 MG/DL — SIGNIFICANT CHANGE UP (ref 1.6–2.6)
MAGNESIUM SERPL-MCNC: 2.5 MG/DL — SIGNIFICANT CHANGE UP (ref 1.6–2.6)
MAGNESIUM SERPL-MCNC: 2.6 MG/DL — SIGNIFICANT CHANGE UP (ref 1.6–2.6)
MAGNESIUM SERPL-MCNC: 2.6 MG/DL — SIGNIFICANT CHANGE UP (ref 1.8–2.6)
MANUAL SMEAR VERIFICATION: SIGNIFICANT CHANGE UP
MANUAL SMEAR VERIFICATION: SIGNIFICANT CHANGE UP
MCHC RBC-ENTMCNC: 25.5 PG — LOW (ref 27–31)
MCHC RBC-ENTMCNC: 25.8 PG — LOW (ref 27–31)
MCHC RBC-ENTMCNC: 25.8 PG — LOW (ref 27–31)
MCHC RBC-ENTMCNC: 25.9 PG — LOW (ref 27–34)
MCHC RBC-ENTMCNC: 26 PG — LOW (ref 27–31)
MCHC RBC-ENTMCNC: 26 PG — LOW (ref 27–34)
MCHC RBC-ENTMCNC: 26 PG — LOW (ref 27–34)
MCHC RBC-ENTMCNC: 26.1 PG — LOW (ref 27–31)
MCHC RBC-ENTMCNC: 26.1 PG — LOW (ref 27–34)
MCHC RBC-ENTMCNC: 26.2 PG — LOW (ref 27–31)
MCHC RBC-ENTMCNC: 26.2 PG — LOW (ref 27–34)
MCHC RBC-ENTMCNC: 26.2 PG — LOW (ref 27–34)
MCHC RBC-ENTMCNC: 26.3 PG — LOW (ref 27–31)
MCHC RBC-ENTMCNC: 26.3 PG — LOW (ref 27–31)
MCHC RBC-ENTMCNC: 26.3 PG — LOW (ref 27–34)
MCHC RBC-ENTMCNC: 26.4 PG — LOW (ref 27–34)
MCHC RBC-ENTMCNC: 26.4 PG — LOW (ref 27–34)
MCHC RBC-ENTMCNC: 26.5 PG — LOW (ref 27–31)
MCHC RBC-ENTMCNC: 26.6 PG — LOW (ref 27–31)
MCHC RBC-ENTMCNC: 26.7 PG — LOW (ref 27–31)
MCHC RBC-ENTMCNC: 26.7 PG — LOW (ref 27–34)
MCHC RBC-ENTMCNC: 26.8 PG — LOW (ref 27–34)
MCHC RBC-ENTMCNC: 26.9 PG — LOW (ref 27–31)
MCHC RBC-ENTMCNC: 26.9 PG — LOW (ref 27–34)
MCHC RBC-ENTMCNC: 27 PG — SIGNIFICANT CHANGE UP (ref 27–34)
MCHC RBC-ENTMCNC: 27.1 PG — SIGNIFICANT CHANGE UP (ref 27–31)
MCHC RBC-ENTMCNC: 27.2 PG — SIGNIFICANT CHANGE UP (ref 27–31)
MCHC RBC-ENTMCNC: 27.4 PG — SIGNIFICANT CHANGE UP (ref 27–31)
MCHC RBC-ENTMCNC: 27.5 PG — SIGNIFICANT CHANGE UP (ref 27–31)
MCHC RBC-ENTMCNC: 27.7 PG — SIGNIFICANT CHANGE UP (ref 27–31)
MCHC RBC-ENTMCNC: 27.8 PG — SIGNIFICANT CHANGE UP (ref 27–31)
MCHC RBC-ENTMCNC: 27.9 PG — SIGNIFICANT CHANGE UP (ref 27–31)
MCHC RBC-ENTMCNC: 27.9 PG — SIGNIFICANT CHANGE UP (ref 27–31)
MCHC RBC-ENTMCNC: 28.1 PG — SIGNIFICANT CHANGE UP (ref 27–31)
MCHC RBC-ENTMCNC: 28.2 PG — SIGNIFICANT CHANGE UP (ref 27–31)
MCHC RBC-ENTMCNC: 28.2 PG — SIGNIFICANT CHANGE UP (ref 27–31)
MCHC RBC-ENTMCNC: 28.3 PG — SIGNIFICANT CHANGE UP (ref 27–31)
MCHC RBC-ENTMCNC: 28.4 PG — SIGNIFICANT CHANGE UP (ref 27–31)
MCHC RBC-ENTMCNC: 28.4 PG — SIGNIFICANT CHANGE UP (ref 27–34)
MCHC RBC-ENTMCNC: 29.3 G/DL — LOW (ref 32–36)
MCHC RBC-ENTMCNC: 29.4 G/DL — LOW (ref 32–36)
MCHC RBC-ENTMCNC: 29.4 GM/DL — LOW (ref 32–36)
MCHC RBC-ENTMCNC: 29.5 G/DL — LOW (ref 32–36)
MCHC RBC-ENTMCNC: 29.5 GM/DL — LOW (ref 32–36)
MCHC RBC-ENTMCNC: 29.6 GM/DL — LOW (ref 32–36)
MCHC RBC-ENTMCNC: 29.7 G/DL — LOW (ref 32–36)
MCHC RBC-ENTMCNC: 29.8 G/DL — LOW (ref 32–36)
MCHC RBC-ENTMCNC: 29.8 GM/DL — LOW (ref 32–36)
MCHC RBC-ENTMCNC: 29.9 G/DL — LOW (ref 32–36)
MCHC RBC-ENTMCNC: 29.9 GM/DL — LOW (ref 32–36)
MCHC RBC-ENTMCNC: 30 G/DL — LOW (ref 32–36)
MCHC RBC-ENTMCNC: 30 GM/DL — LOW (ref 32–36)
MCHC RBC-ENTMCNC: 30.1 GM/DL — LOW (ref 32–36)
MCHC RBC-ENTMCNC: 30.2 GM/DL — LOW (ref 32–36)
MCHC RBC-ENTMCNC: 30.2 GM/DL — LOW (ref 32–36)
MCHC RBC-ENTMCNC: 30.3 G/DL — LOW (ref 32–36)
MCHC RBC-ENTMCNC: 30.3 G/DL — LOW (ref 32–36)
MCHC RBC-ENTMCNC: 30.3 GM/DL — LOW (ref 32–36)
MCHC RBC-ENTMCNC: 30.4 G/DL — LOW (ref 32–36)
MCHC RBC-ENTMCNC: 30.4 GM/DL — LOW (ref 32–36)
MCHC RBC-ENTMCNC: 30.4 GM/DL — LOW (ref 32–36)
MCHC RBC-ENTMCNC: 30.5 G/DL — LOW (ref 32–36)
MCHC RBC-ENTMCNC: 30.8 GM/DL — LOW (ref 32–36)
MCHC RBC-ENTMCNC: 30.8 GM/DL — LOW (ref 32–36)
MCHC RBC-ENTMCNC: 30.9 G/DL — LOW (ref 32–36)
MCHC RBC-ENTMCNC: 30.9 G/DL — LOW (ref 32–36)
MCHC RBC-ENTMCNC: 31 G/DL — LOW (ref 32–36)
MCHC RBC-ENTMCNC: 31.1 G/DL — LOW (ref 32–36)
MCHC RBC-ENTMCNC: 31.1 GM/DL — LOW (ref 32–36)
MCHC RBC-ENTMCNC: 31.2 G/DL — LOW (ref 32–36)
MCHC RBC-ENTMCNC: 31.3 G/DL — LOW (ref 32–36)
MCHC RBC-ENTMCNC: 31.3 GM/DL — LOW (ref 32–36)
MCHC RBC-ENTMCNC: 31.4 G/DL — LOW (ref 32–36)
MCHC RBC-ENTMCNC: 31.4 G/DL — LOW (ref 32–36)
MCHC RBC-ENTMCNC: 32 G/DL — SIGNIFICANT CHANGE UP (ref 32–36)
MCHC RBC-ENTMCNC: 32.4 GM/DL — SIGNIFICANT CHANGE UP (ref 32–36)
MCV RBC AUTO: 85.9 FL — SIGNIFICANT CHANGE UP (ref 80–100)
MCV RBC AUTO: 86 FL — SIGNIFICANT CHANGE UP (ref 80–100)
MCV RBC AUTO: 86.1 FL — SIGNIFICANT CHANGE UP (ref 80–100)
MCV RBC AUTO: 86.1 FL — SIGNIFICANT CHANGE UP (ref 80–100)
MCV RBC AUTO: 86.2 FL — SIGNIFICANT CHANGE UP (ref 80–100)
MCV RBC AUTO: 86.4 FL — SIGNIFICANT CHANGE UP (ref 80–100)
MCV RBC AUTO: 86.6 FL — SIGNIFICANT CHANGE UP (ref 80–100)
MCV RBC AUTO: 86.7 FL — SIGNIFICANT CHANGE UP (ref 80–100)
MCV RBC AUTO: 86.7 FL — SIGNIFICANT CHANGE UP (ref 80–100)
MCV RBC AUTO: 86.8 FL — SIGNIFICANT CHANGE UP (ref 80–100)
MCV RBC AUTO: 87 FL — SIGNIFICANT CHANGE UP (ref 80–100)
MCV RBC AUTO: 87.1 FL — SIGNIFICANT CHANGE UP (ref 80–94)
MCV RBC AUTO: 87.2 FL — SIGNIFICANT CHANGE UP (ref 80–100)
MCV RBC AUTO: 87.2 FL — SIGNIFICANT CHANGE UP (ref 80–100)
MCV RBC AUTO: 87.3 FL — SIGNIFICANT CHANGE UP (ref 80–94)
MCV RBC AUTO: 87.3 FL — SIGNIFICANT CHANGE UP (ref 80–94)
MCV RBC AUTO: 87.4 FL — SIGNIFICANT CHANGE UP (ref 80–100)
MCV RBC AUTO: 87.5 FL — SIGNIFICANT CHANGE UP (ref 80–100)
MCV RBC AUTO: 87.5 FL — SIGNIFICANT CHANGE UP (ref 80–100)
MCV RBC AUTO: 87.6 FL — SIGNIFICANT CHANGE UP (ref 80–94)
MCV RBC AUTO: 87.8 FL — SIGNIFICANT CHANGE UP (ref 80–100)
MCV RBC AUTO: 88.2 FL — SIGNIFICANT CHANGE UP (ref 80–94)
MCV RBC AUTO: 88.3 FL — SIGNIFICANT CHANGE UP (ref 80–94)
MCV RBC AUTO: 88.4 FL — SIGNIFICANT CHANGE UP (ref 80–94)
MCV RBC AUTO: 88.5 FL — SIGNIFICANT CHANGE UP (ref 80–100)
MCV RBC AUTO: 88.7 FL — SIGNIFICANT CHANGE UP (ref 80–94)
MCV RBC AUTO: 88.8 FL — SIGNIFICANT CHANGE UP (ref 80–94)
MCV RBC AUTO: 88.9 FL — SIGNIFICANT CHANGE UP (ref 80–94)
MCV RBC AUTO: 89 FL — SIGNIFICANT CHANGE UP (ref 80–94)
MCV RBC AUTO: 89.1 FL — SIGNIFICANT CHANGE UP (ref 80–94)
MCV RBC AUTO: 89.1 FL — SIGNIFICANT CHANGE UP (ref 80–94)
MCV RBC AUTO: 89.2 FL — SIGNIFICANT CHANGE UP (ref 80–94)
MCV RBC AUTO: 89.2 FL — SIGNIFICANT CHANGE UP (ref 80–94)
MCV RBC AUTO: 89.3 FL — SIGNIFICANT CHANGE UP (ref 80–94)
MCV RBC AUTO: 89.4 FL — SIGNIFICANT CHANGE UP (ref 80–94)
MCV RBC AUTO: 90.4 FL — SIGNIFICANT CHANGE UP (ref 80–94)
MCV RBC AUTO: 90.6 FL — SIGNIFICANT CHANGE UP (ref 80–94)
MCV RBC AUTO: 90.9 FL — SIGNIFICANT CHANGE UP (ref 80–94)
MCV RBC AUTO: 90.9 FL — SIGNIFICANT CHANGE UP (ref 80–94)
MCV RBC AUTO: 91 FL — SIGNIFICANT CHANGE UP (ref 80–100)
MCV RBC AUTO: 91.2 FL — SIGNIFICANT CHANGE UP (ref 80–94)
MCV RBC AUTO: 91.2 FL — SIGNIFICANT CHANGE UP (ref 80–94)
MCV RBC AUTO: 91.3 FL — SIGNIFICANT CHANGE UP (ref 80–94)
MCV RBC AUTO: 91.4 FL — SIGNIFICANT CHANGE UP (ref 80–94)
MESOTHL CELL # FLD: 2 % — SIGNIFICANT CHANGE UP
METAMYELOCYTES # FLD: 0.9 % — HIGH (ref 0–0)
METHOD TYPE: SIGNIFICANT CHANGE UP
MICROCYTES BLD QL: SLIGHT — SIGNIFICANT CHANGE UP
MONOCYTES # BLD AUTO: 0.5 K/UL — SIGNIFICANT CHANGE UP (ref 0–0.8)
MONOCYTES # BLD AUTO: 0.5 K/UL — SIGNIFICANT CHANGE UP (ref 0–0.8)
MONOCYTES # BLD AUTO: 0.54 K/UL — SIGNIFICANT CHANGE UP (ref 0–0.9)
MONOCYTES # BLD AUTO: 0.56 K/UL — SIGNIFICANT CHANGE UP (ref 0–0.9)
MONOCYTES # BLD AUTO: 0.66 K/UL — SIGNIFICANT CHANGE UP (ref 0–0.9)
MONOCYTES # BLD AUTO: 0.7 K/UL — SIGNIFICANT CHANGE UP (ref 0–0.8)
MONOCYTES # BLD AUTO: 0.72 K/UL — SIGNIFICANT CHANGE UP (ref 0–0.9)
MONOCYTES # BLD AUTO: 1 K/UL — HIGH (ref 0–0.8)
MONOCYTES # BLD AUTO: 1.1 K/UL — HIGH (ref 0–0.8)
MONOCYTES # BLD AUTO: 1.2 K/UL — HIGH (ref 0–0.8)
MONOCYTES # BLD AUTO: 1.2 K/UL — HIGH (ref 0–0.8)
MONOCYTES # BLD AUTO: 1.3 K/UL — HIGH (ref 0–0.8)
MONOCYTES # BLD AUTO: 1.32 K/UL — HIGH (ref 0–0.9)
MONOCYTES # BLD AUTO: 1.34 K/UL — HIGH (ref 0–0.9)
MONOCYTES # BLD AUTO: 1.46 K/UL — HIGH (ref 0–0.9)
MONOCYTES # BLD AUTO: 2.1 K/UL — HIGH (ref 0–0.8)
MONOCYTES NFR BLD AUTO: 10.6 % — SIGNIFICANT CHANGE UP (ref 2–14)
MONOCYTES NFR BLD AUTO: 11 % — HIGH (ref 3–10)
MONOCYTES NFR BLD AUTO: 11.2 % — HIGH (ref 3–10)
MONOCYTES NFR BLD AUTO: 11.3 % — SIGNIFICANT CHANGE UP (ref 2–14)
MONOCYTES NFR BLD AUTO: 12 % — SIGNIFICANT CHANGE UP (ref 2–14)
MONOCYTES NFR BLD AUTO: 12.1 % — HIGH (ref 3–10)
MONOCYTES NFR BLD AUTO: 12.2 % — SIGNIFICANT CHANGE UP (ref 2–14)
MONOCYTES NFR BLD AUTO: 14 % — HIGH (ref 3–10)
MONOCYTES NFR BLD AUTO: 15.7 % — HIGH (ref 3–10)
MONOCYTES NFR BLD AUTO: 2.6 % — LOW (ref 3–10)
MONOCYTES NFR BLD AUTO: 3.8 % — SIGNIFICANT CHANGE UP (ref 3–10)
MONOCYTES NFR BLD AUTO: 4.3 % — SIGNIFICANT CHANGE UP (ref 2–14)
MONOCYTES NFR BLD AUTO: 4.3 % — SIGNIFICANT CHANGE UP (ref 2–14)
MONOCYTES NFR BLD AUTO: 6 % — SIGNIFICANT CHANGE UP (ref 3–10)
MONOCYTES NFR BLD AUTO: 6.9 % — SIGNIFICANT CHANGE UP (ref 3–10)
MONOCYTES NFR BLD AUTO: 7 % — SIGNIFICANT CHANGE UP (ref 3–10)
MONOCYTES NFR BLD AUTO: 7.5 % — SIGNIFICANT CHANGE UP (ref 3–10)
MONOCYTES NFR BLD AUTO: 8.5 % — SIGNIFICANT CHANGE UP (ref 2–14)
MONOS+MACROS # FLD: 18 % — SIGNIFICANT CHANGE UP
MONOS+MACROS # FLD: 8 % — SIGNIFICANT CHANGE UP
MRSA PCR RESULT.: SIGNIFICANT CHANGE UP
MYELOCYTES NFR BLD: 1.7 % — HIGH (ref 0–0)
NEUTROPHILS # BLD AUTO: 10.32 K/UL — HIGH (ref 1.8–7.4)
NEUTROPHILS # BLD AUTO: 10.51 K/UL — HIGH (ref 1.8–7.4)
NEUTROPHILS # BLD AUTO: 12.2 K/UL — HIGH (ref 1.8–8)
NEUTROPHILS # BLD AUTO: 12.38 K/UL — HIGH (ref 1.8–7.4)
NEUTROPHILS # BLD AUTO: 13.3 K/UL — HIGH (ref 1.8–8)
NEUTROPHILS # BLD AUTO: 14.2 K/UL — HIGH (ref 1.8–8)
NEUTROPHILS # BLD AUTO: 16.5 K/UL — HIGH (ref 1.8–8)
NEUTROPHILS # BLD AUTO: 5.35 K/UL — SIGNIFICANT CHANGE UP (ref 1.8–7.4)
NEUTROPHILS # BLD AUTO: 5.7 K/UL — SIGNIFICANT CHANGE UP (ref 1.8–8)
NEUTROPHILS # BLD AUTO: 5.8 K/UL — SIGNIFICANT CHANGE UP (ref 1.8–8)
NEUTROPHILS # BLD AUTO: 6 K/UL — SIGNIFICANT CHANGE UP (ref 1.8–8)
NEUTROPHILS # BLD AUTO: 6.1 K/UL — SIGNIFICANT CHANGE UP (ref 1.8–8)
NEUTROPHILS # BLD AUTO: 6.4 K/UL — SIGNIFICANT CHANGE UP (ref 1.8–7.4)
NEUTROPHILS # BLD AUTO: 7.51 K/UL — HIGH (ref 1.8–7.4)
NEUTROPHILS # BLD AUTO: 8.02 K/UL — HIGH (ref 1.8–7.4)
NEUTROPHILS # BLD AUTO: 8.2 K/UL — HIGH (ref 1.8–8)
NEUTROPHILS NFR BLD AUTO: 63.8 % — SIGNIFICANT CHANGE UP (ref 37–73)
NEUTROPHILS NFR BLD AUTO: 64.7 % — SIGNIFICANT CHANGE UP (ref 37–73)
NEUTROPHILS NFR BLD AUTO: 66.5 % — SIGNIFICANT CHANGE UP (ref 37–73)
NEUTROPHILS NFR BLD AUTO: 68.6 % — SIGNIFICANT CHANGE UP (ref 43–77)
NEUTROPHILS NFR BLD AUTO: 70.9 % — SIGNIFICANT CHANGE UP (ref 37–73)
NEUTROPHILS NFR BLD AUTO: 73.3 % — SIGNIFICANT CHANGE UP (ref 43–77)
NEUTROPHILS NFR BLD AUTO: 78.5 % — HIGH (ref 43–77)
NEUTROPHILS NFR BLD AUTO: 79.8 % — HIGH (ref 43–77)
NEUTROPHILS NFR BLD AUTO: 82.5 % — HIGH (ref 43–77)
NEUTROPHILS NFR BLD AUTO: 84.3 % — HIGH (ref 43–77)
NEUTROPHILS NFR BLD AUTO: 85.5 % — HIGH (ref 37–73)
NEUTROPHILS NFR BLD AUTO: 86 % — HIGH (ref 37–73)
NEUTROPHILS NFR BLD AUTO: 87 % — HIGH (ref 37–73)
NEUTROPHILS NFR BLD AUTO: 88.1 % — HIGH (ref 37–73)
NEUTROPHILS NFR BLD AUTO: 91.3 % — HIGH (ref 43–77)
NEUTROPHILS NFR BLD AUTO: 91.4 % — HIGH (ref 37–73)
NEUTROPHILS NFR BLD AUTO: 92 % — HIGH (ref 37–73)
NEUTROPHILS NFR BLD AUTO: 93.5 % — HIGH (ref 37–73)
NEUTS BAND # BLD: 3.5 % — SIGNIFICANT CHANGE UP (ref 0–8)
NIGHT BLUE STAIN TISS: SIGNIFICANT CHANGE UP
NITRITE UR-MCNC: NEGATIVE — SIGNIFICANT CHANGE UP
NITRITE UR-MCNC: NEGATIVE — SIGNIFICANT CHANGE UP
NON-GYNECOLOGICAL CYTOLOGY STUDY: SIGNIFICANT CHANGE UP
NT-PROBNP SERPL-SCNC: 1976 PG/ML — HIGH (ref 0–300)
NT-PROBNP SERPL-SCNC: 2686 PG/ML — HIGH (ref 0–300)
NT-PROBNP SERPL-SCNC: 2829 PG/ML — HIGH (ref 0–300)
NT-PROBNP SERPL-SCNC: 2943 PG/ML — HIGH (ref 0–300)
NT-PROBNP SERPL-SCNC: 3231 PG/ML — HIGH (ref 0–300)
NT-PROBNP SERPL-SCNC: 3346 PG/ML — HIGH (ref 0–300)
NT-PROBNP SERPL-SCNC: 3549 PG/ML — HIGH (ref 0–300)
NT-PROBNP SERPL-SCNC: 3671 PG/ML — HIGH (ref 0–300)
NT-PROBNP SERPL-SCNC: 3700 PG/ML — HIGH (ref 0–300)
ORGANISM # SPEC MICROSCOPIC CNT: SIGNIFICANT CHANGE UP
OVALOCYTES BLD QL SMEAR: SLIGHT — SIGNIFICANT CHANGE UP
PCO2 BLDA: 64 MMHG — HIGH (ref 35–45)
PCO2 BLDA: 64 MMHG — HIGH (ref 35–45)
PCO2 BLDA: 68 MMHG — HIGH (ref 35–45)
PCO2 BLDA: >100 MMHG — CRITICAL HIGH (ref 35–45)
PCO2 BLDA: >108 MMHG — CRITICAL HIGH (ref 35–45)
PCO2 BLDA: >108 MMHG — CRITICAL HIGH (ref 35–45)
PCO2 BLDA: SIGNIFICANT CHANGE UP MMHG (ref 35–45)
PH BLDA: 7.04 — CRITICAL LOW (ref 7.35–7.45)
PH BLDA: 7.07 — CRITICAL LOW (ref 7.35–7.45)
PH BLDA: 7.07 — CRITICAL LOW (ref 7.35–7.45)
PH BLDA: 7.09 — CRITICAL LOW (ref 7.35–7.45)
PH BLDA: 7.18 — CRITICAL LOW (ref 7.35–7.45)
PH BLDA: 7.19 — CRITICAL LOW (ref 7.35–7.45)
PH BLDA: 7.43 — SIGNIFICANT CHANGE UP (ref 7.35–7.45)
PH FLD: 8 — SIGNIFICANT CHANGE UP
PH FLD: 8 — SIGNIFICANT CHANGE UP
PH UR: 5 — SIGNIFICANT CHANGE UP (ref 5–8)
PH UR: 5 — SIGNIFICANT CHANGE UP (ref 5–8)
PHOSPHATE SERPL-MCNC: 2.8 MG/DL — SIGNIFICANT CHANGE UP (ref 2.4–4.7)
PHOSPHATE SERPL-MCNC: 2.9 MG/DL — SIGNIFICANT CHANGE UP (ref 2.4–4.7)
PHOSPHATE SERPL-MCNC: 2.9 MG/DL — SIGNIFICANT CHANGE UP (ref 2.4–4.7)
PHOSPHATE SERPL-MCNC: 3 MG/DL — SIGNIFICANT CHANGE UP (ref 2.4–4.7)
PHOSPHATE SERPL-MCNC: 3.1 MG/DL — SIGNIFICANT CHANGE UP (ref 2.4–4.7)
PHOSPHATE SERPL-MCNC: 3.1 MG/DL — SIGNIFICANT CHANGE UP (ref 2.4–4.7)
PHOSPHATE SERPL-MCNC: 4.3 MG/DL — SIGNIFICANT CHANGE UP (ref 2.4–4.7)
PHOSPHATE SERPL-MCNC: 4.5 MG/DL — SIGNIFICANT CHANGE UP (ref 2.4–4.7)
PHOSPHATE SERPL-MCNC: 4.6 MG/DL — SIGNIFICANT CHANGE UP (ref 2.4–4.7)
PLAT MORPH BLD: NORMAL — SIGNIFICANT CHANGE UP
PLATELET # BLD AUTO: 123 K/UL — LOW (ref 150–400)
PLATELET # BLD AUTO: 126 K/UL — LOW (ref 150–400)
PLATELET # BLD AUTO: 129 K/UL — LOW (ref 150–400)
PLATELET # BLD AUTO: 136 K/UL — LOW (ref 150–400)
PLATELET # BLD AUTO: 142 K/UL — LOW (ref 150–400)
PLATELET # BLD AUTO: 156 K/UL — SIGNIFICANT CHANGE UP (ref 150–400)
PLATELET # BLD AUTO: 158 K/UL — SIGNIFICANT CHANGE UP (ref 150–400)
PLATELET # BLD AUTO: 164 K/UL — SIGNIFICANT CHANGE UP (ref 150–400)
PLATELET # BLD AUTO: 168 K/UL — SIGNIFICANT CHANGE UP (ref 150–400)
PLATELET # BLD AUTO: 171 K/UL — SIGNIFICANT CHANGE UP (ref 150–400)
PLATELET # BLD AUTO: 173 K/UL — SIGNIFICANT CHANGE UP (ref 150–400)
PLATELET # BLD AUTO: 181 K/UL — SIGNIFICANT CHANGE UP (ref 150–400)
PLATELET # BLD AUTO: 182 K/UL — SIGNIFICANT CHANGE UP (ref 150–400)
PLATELET # BLD AUTO: 182 K/UL — SIGNIFICANT CHANGE UP (ref 150–400)
PLATELET # BLD AUTO: 186 K/UL — SIGNIFICANT CHANGE UP (ref 150–400)
PLATELET # BLD AUTO: 189 K/UL — SIGNIFICANT CHANGE UP (ref 150–400)
PLATELET # BLD AUTO: 192 K/UL — SIGNIFICANT CHANGE UP (ref 150–400)
PLATELET # BLD AUTO: 194 K/UL — SIGNIFICANT CHANGE UP (ref 150–400)
PLATELET # BLD AUTO: 195 K/UL — SIGNIFICANT CHANGE UP (ref 150–400)
PLATELET # BLD AUTO: 196 K/UL — SIGNIFICANT CHANGE UP (ref 150–400)
PLATELET # BLD AUTO: 198 K/UL — SIGNIFICANT CHANGE UP (ref 150–400)
PLATELET # BLD AUTO: 199 K/UL — SIGNIFICANT CHANGE UP (ref 150–400)
PLATELET # BLD AUTO: 199 K/UL — SIGNIFICANT CHANGE UP (ref 150–400)
PLATELET # BLD AUTO: 201 K/UL — SIGNIFICANT CHANGE UP (ref 150–400)
PLATELET # BLD AUTO: 201 K/UL — SIGNIFICANT CHANGE UP (ref 150–400)
PLATELET # BLD AUTO: 204 K/UL — SIGNIFICANT CHANGE UP (ref 150–400)
PLATELET # BLD AUTO: 210 K/UL — SIGNIFICANT CHANGE UP (ref 150–400)
PLATELET # BLD AUTO: 211 K/UL — SIGNIFICANT CHANGE UP (ref 150–400)
PLATELET # BLD AUTO: 213 K/UL — SIGNIFICANT CHANGE UP (ref 150–400)
PLATELET # BLD AUTO: 213 K/UL — SIGNIFICANT CHANGE UP (ref 150–400)
PLATELET # BLD AUTO: 214 K/UL — SIGNIFICANT CHANGE UP (ref 150–400)
PLATELET # BLD AUTO: 215 K/UL — SIGNIFICANT CHANGE UP (ref 150–400)
PLATELET # BLD AUTO: 215 K/UL — SIGNIFICANT CHANGE UP (ref 150–400)
PLATELET # BLD AUTO: 218 K/UL — SIGNIFICANT CHANGE UP (ref 150–400)
PLATELET # BLD AUTO: 219 K/UL — SIGNIFICANT CHANGE UP (ref 150–400)
PLATELET # BLD AUTO: 220 K/UL — SIGNIFICANT CHANGE UP (ref 150–400)
PLATELET # BLD AUTO: 220 K/UL — SIGNIFICANT CHANGE UP (ref 150–400)
PLATELET # BLD AUTO: 224 K/UL — SIGNIFICANT CHANGE UP (ref 150–400)
PLATELET # BLD AUTO: 225 K/UL — SIGNIFICANT CHANGE UP (ref 150–400)
PLATELET # BLD AUTO: 228 K/UL — SIGNIFICANT CHANGE UP (ref 150–400)
PLATELET # BLD AUTO: 229 K/UL — SIGNIFICANT CHANGE UP (ref 150–400)
PLATELET # BLD AUTO: 232 K/UL — SIGNIFICANT CHANGE UP (ref 150–400)
PLATELET # BLD AUTO: 232 K/UL — SIGNIFICANT CHANGE UP (ref 150–400)
PLATELET # BLD AUTO: 233 K/UL — SIGNIFICANT CHANGE UP (ref 150–400)
PLATELET # BLD AUTO: 237 K/UL — SIGNIFICANT CHANGE UP (ref 150–400)
PLATELET # BLD AUTO: 240 K/UL — SIGNIFICANT CHANGE UP (ref 150–400)
PLATELET # BLD AUTO: 256 K/UL — SIGNIFICANT CHANGE UP (ref 150–400)
PLATELET # BLD AUTO: 261 K/UL — SIGNIFICANT CHANGE UP (ref 150–400)
PO2 BLDA: 118 MMHG — HIGH (ref 83–108)
PO2 BLDA: 282 MMHG — HIGH (ref 83–108)
PO2 BLDA: 336 MMHG — HIGH (ref 83–108)
PO2 BLDA: 37 MMHG — CRITICAL LOW (ref 83–108)
PO2 BLDA: 75 MMHG — LOW (ref 83–108)
PO2 BLDA: 84 MMHG — SIGNIFICANT CHANGE UP (ref 83–108)
PO2 BLDA: 89 MMHG — SIGNIFICANT CHANGE UP (ref 83–108)
POIKILOCYTOSIS BLD QL AUTO: SLIGHT — SIGNIFICANT CHANGE UP
POLYCHROMASIA BLD QL SMEAR: SLIGHT — SIGNIFICANT CHANGE UP
POTASSIUM SERPL-MCNC: 3.4 MMOL/L — LOW (ref 3.5–5.3)
POTASSIUM SERPL-MCNC: 3.5 MMOL/L — SIGNIFICANT CHANGE UP (ref 3.5–5.3)
POTASSIUM SERPL-MCNC: 3.6 MMOL/L — SIGNIFICANT CHANGE UP (ref 3.5–5.3)
POTASSIUM SERPL-MCNC: 3.7 MMOL/L — SIGNIFICANT CHANGE UP (ref 3.5–5.3)
POTASSIUM SERPL-MCNC: 3.8 MMOL/L — SIGNIFICANT CHANGE UP (ref 3.5–5.3)
POTASSIUM SERPL-MCNC: 3.8 MMOL/L — SIGNIFICANT CHANGE UP (ref 3.5–5.3)
POTASSIUM SERPL-MCNC: 4 MMOL/L — SIGNIFICANT CHANGE UP (ref 3.5–5.3)
POTASSIUM SERPL-MCNC: 4.1 MMOL/L — SIGNIFICANT CHANGE UP (ref 3.5–5.3)
POTASSIUM SERPL-MCNC: 4.2 MMOL/L — SIGNIFICANT CHANGE UP (ref 3.5–5.3)
POTASSIUM SERPL-MCNC: 4.2 MMOL/L — SIGNIFICANT CHANGE UP (ref 3.5–5.3)
POTASSIUM SERPL-MCNC: 4.3 MMOL/L — SIGNIFICANT CHANGE UP (ref 3.5–5.3)
POTASSIUM SERPL-MCNC: 4.4 MMOL/L — SIGNIFICANT CHANGE UP (ref 3.5–5.3)
POTASSIUM SERPL-MCNC: 4.5 MMOL/L — SIGNIFICANT CHANGE UP (ref 3.5–5.3)
POTASSIUM SERPL-MCNC: 4.6 MMOL/L — SIGNIFICANT CHANGE UP (ref 3.5–5.3)
POTASSIUM SERPL-MCNC: 4.7 MMOL/L — SIGNIFICANT CHANGE UP (ref 3.5–5.3)
POTASSIUM SERPL-MCNC: 4.7 MMOL/L — SIGNIFICANT CHANGE UP (ref 3.5–5.3)
POTASSIUM SERPL-MCNC: 4.8 MMOL/L — SIGNIFICANT CHANGE UP (ref 3.5–5.3)
POTASSIUM SERPL-MCNC: 4.9 MMOL/L — SIGNIFICANT CHANGE UP (ref 3.5–5.3)
POTASSIUM SERPL-MCNC: 5 MMOL/L — SIGNIFICANT CHANGE UP (ref 3.5–5.3)
POTASSIUM SERPL-MCNC: 5.1 MMOL/L — SIGNIFICANT CHANGE UP (ref 3.5–5.3)
POTASSIUM SERPL-MCNC: 5.2 MMOL/L — SIGNIFICANT CHANGE UP (ref 3.5–5.3)
POTASSIUM SERPL-MCNC: 5.4 MMOL/L — HIGH (ref 3.5–5.3)
POTASSIUM SERPL-MCNC: 5.4 MMOL/L — HIGH (ref 3.5–5.3)
POTASSIUM SERPL-MCNC: 5.6 MMOL/L — HIGH (ref 3.5–5.3)
POTASSIUM SERPL-SCNC: 3.4 MMOL/L — LOW (ref 3.5–5.3)
POTASSIUM SERPL-SCNC: 3.5 MMOL/L — SIGNIFICANT CHANGE UP (ref 3.5–5.3)
POTASSIUM SERPL-SCNC: 3.6 MMOL/L — SIGNIFICANT CHANGE UP (ref 3.5–5.3)
POTASSIUM SERPL-SCNC: 3.7 MMOL/L — SIGNIFICANT CHANGE UP (ref 3.5–5.3)
POTASSIUM SERPL-SCNC: 3.8 MMOL/L — SIGNIFICANT CHANGE UP (ref 3.5–5.3)
POTASSIUM SERPL-SCNC: 3.8 MMOL/L — SIGNIFICANT CHANGE UP (ref 3.5–5.3)
POTASSIUM SERPL-SCNC: 4 MMOL/L — SIGNIFICANT CHANGE UP (ref 3.5–5.3)
POTASSIUM SERPL-SCNC: 4.1 MMOL/L — SIGNIFICANT CHANGE UP (ref 3.5–5.3)
POTASSIUM SERPL-SCNC: 4.2 MMOL/L — SIGNIFICANT CHANGE UP (ref 3.5–5.3)
POTASSIUM SERPL-SCNC: 4.2 MMOL/L — SIGNIFICANT CHANGE UP (ref 3.5–5.3)
POTASSIUM SERPL-SCNC: 4.3 MMOL/L — SIGNIFICANT CHANGE UP (ref 3.5–5.3)
POTASSIUM SERPL-SCNC: 4.4 MMOL/L — SIGNIFICANT CHANGE UP (ref 3.5–5.3)
POTASSIUM SERPL-SCNC: 4.5 MMOL/L — SIGNIFICANT CHANGE UP (ref 3.5–5.3)
POTASSIUM SERPL-SCNC: 4.6 MMOL/L — SIGNIFICANT CHANGE UP (ref 3.5–5.3)
POTASSIUM SERPL-SCNC: 4.7 MMOL/L — SIGNIFICANT CHANGE UP (ref 3.5–5.3)
POTASSIUM SERPL-SCNC: 4.7 MMOL/L — SIGNIFICANT CHANGE UP (ref 3.5–5.3)
POTASSIUM SERPL-SCNC: 4.8 MMOL/L — SIGNIFICANT CHANGE UP (ref 3.5–5.3)
POTASSIUM SERPL-SCNC: 4.9 MMOL/L — SIGNIFICANT CHANGE UP (ref 3.5–5.3)
POTASSIUM SERPL-SCNC: 5 MMOL/L — SIGNIFICANT CHANGE UP (ref 3.5–5.3)
POTASSIUM SERPL-SCNC: 5.1 MMOL/L — SIGNIFICANT CHANGE UP (ref 3.5–5.3)
POTASSIUM SERPL-SCNC: 5.2 MMOL/L — SIGNIFICANT CHANGE UP (ref 3.5–5.3)
POTASSIUM SERPL-SCNC: 5.4 MMOL/L — HIGH (ref 3.5–5.3)
POTASSIUM SERPL-SCNC: 5.4 MMOL/L — HIGH (ref 3.5–5.3)
POTASSIUM SERPL-SCNC: 5.6 MMOL/L — HIGH (ref 3.5–5.3)
PROCALCITONIN SERPL-MCNC: 0.2 NG/ML — HIGH (ref 0.02–0.1)
PROCALCITONIN SERPL-MCNC: 0.28 NG/ML — HIGH (ref 0.02–0.1)
PROT FLD-MCNC: 3.9 G/DL — SIGNIFICANT CHANGE UP
PROT FLD-MCNC: 4.3 G/DL — SIGNIFICANT CHANGE UP
PROT SERPL-MCNC: 2.2 G/DL — LOW (ref 6.6–8.7)
PROT SERPL-MCNC: 6 G/DL — LOW (ref 6.6–8.7)
PROT SERPL-MCNC: 6.2 G/DL — LOW (ref 6.6–8.7)
PROT SERPL-MCNC: 6.6 G/DL — SIGNIFICANT CHANGE UP (ref 6.6–8.7)
PROT SERPL-MCNC: 6.6 G/DL — SIGNIFICANT CHANGE UP (ref 6.6–8.7)
PROT SERPL-MCNC: 6.7 G/DL — SIGNIFICANT CHANGE UP (ref 6.6–8.7)
PROT SERPL-MCNC: 6.9 G/DL — SIGNIFICANT CHANGE UP (ref 6.6–8.7)
PROT SERPL-MCNC: 6.9 G/DL — SIGNIFICANT CHANGE UP (ref 6.6–8.7)
PROT SERPL-MCNC: 7 G/DL — SIGNIFICANT CHANGE UP (ref 6.6–8.7)
PROT SERPL-MCNC: 7.2 G/DL — SIGNIFICANT CHANGE UP (ref 6.6–8.7)
PROT SERPL-MCNC: 7.2 G/DL — SIGNIFICANT CHANGE UP (ref 6.6–8.7)
PROT SERPL-MCNC: 7.4 G/DL — SIGNIFICANT CHANGE UP (ref 6.6–8.7)
PROT SERPL-MCNC: 7.5 G/DL — SIGNIFICANT CHANGE UP (ref 6.6–8.7)
PROT SERPL-MCNC: 7.6 G/DL — SIGNIFICANT CHANGE UP (ref 6.6–8.7)
PROT SERPL-MCNC: 8.2 G/DL — SIGNIFICANT CHANGE UP (ref 6.6–8.7)
PROT UR-MCNC: 15 MG/DL
PROT UR-MCNC: 30 MG/DL
PROTHROM AB SERPL-ACNC: 12 SEC — SIGNIFICANT CHANGE UP (ref 10–12.9)
PROTHROM AB SERPL-ACNC: 12.6 SEC — SIGNIFICANT CHANGE UP (ref 10–12.9)
PROTHROM AB SERPL-ACNC: 12.7 SEC — SIGNIFICANT CHANGE UP (ref 10–12.9)
PROTHROM AB SERPL-ACNC: 12.8 SEC — SIGNIFICANT CHANGE UP (ref 10–12.9)
PROTHROM AB SERPL-ACNC: 13.6 SEC — HIGH (ref 10–12.9)
PROTHROM AB SERPL-ACNC: 13.8 SEC — HIGH (ref 10–12.9)
PROTHROM AB SERPL-ACNC: 14 SEC — HIGH (ref 10–12.9)
PROTHROM AB SERPL-ACNC: 15.1 SEC — HIGH (ref 10–12.9)
PSA FLD-MCNC: 0.22 NG/ML — SIGNIFICANT CHANGE UP (ref 0–4)
RAPID RVP RESULT: SIGNIFICANT CHANGE UP
RAPID RVP RESULT: SIGNIFICANT CHANGE UP
RBC # BLD: 3.72 M/UL — LOW (ref 4.6–6.2)
RBC # BLD: 3.91 M/UL — LOW (ref 4.6–6.2)
RBC # BLD: 3.93 M/UL — LOW (ref 4.6–6.2)
RBC # BLD: 3.94 M/UL — LOW (ref 4.6–6.2)
RBC # BLD: 3.95 M/UL — LOW (ref 4.6–6.2)
RBC # BLD: 3.96 M/UL — LOW (ref 4.6–6.2)
RBC # BLD: 4.04 M/UL — LOW (ref 4.6–6.2)
RBC # BLD: 4.08 M/UL — LOW (ref 4.6–6.2)
RBC # BLD: 4.09 M/UL — LOW (ref 4.6–6.2)
RBC # BLD: 4.14 M/UL — LOW (ref 4.6–6.2)
RBC # BLD: 4.18 M/UL — LOW (ref 4.6–6.2)
RBC # BLD: 4.18 M/UL — LOW (ref 4.6–6.2)
RBC # BLD: 4.27 M/UL — LOW (ref 4.6–6.2)
RBC # BLD: 4.31 M/UL — LOW (ref 4.6–6.2)
RBC # BLD: 4.39 M/UL — SIGNIFICANT CHANGE UP (ref 4.2–5.8)
RBC # BLD: 4.42 M/UL — LOW (ref 4.6–6.2)
RBC # BLD: 4.44 M/UL — LOW (ref 4.6–6.2)
RBC # BLD: 4.44 M/UL — LOW (ref 4.6–6.2)
RBC # BLD: 4.44 M/UL — SIGNIFICANT CHANGE UP (ref 4.2–5.8)
RBC # BLD: 4.45 M/UL — SIGNIFICANT CHANGE UP (ref 4.2–5.8)
RBC # BLD: 4.46 M/UL — SIGNIFICANT CHANGE UP (ref 4.2–5.8)
RBC # BLD: 4.47 M/UL — SIGNIFICANT CHANGE UP (ref 4.2–5.8)
RBC # BLD: 4.49 M/UL — LOW (ref 4.6–6.2)
RBC # BLD: 4.52 M/UL — LOW (ref 4.6–6.2)
RBC # BLD: 4.52 M/UL — LOW (ref 4.6–6.2)
RBC # BLD: 4.52 M/UL — SIGNIFICANT CHANGE UP (ref 4.2–5.8)
RBC # BLD: 4.52 M/UL — SIGNIFICANT CHANGE UP (ref 4.2–5.8)
RBC # BLD: 4.54 M/UL — SIGNIFICANT CHANGE UP (ref 4.2–5.8)
RBC # BLD: 4.56 M/UL — LOW (ref 4.6–6.2)
RBC # BLD: 4.6 M/UL — SIGNIFICANT CHANGE UP (ref 4.2–5.8)
RBC # BLD: 4.6 M/UL — SIGNIFICANT CHANGE UP (ref 4.2–5.8)
RBC # BLD: 4.62 M/UL — SIGNIFICANT CHANGE UP (ref 4.6–6.2)
RBC # BLD: 4.63 M/UL — SIGNIFICANT CHANGE UP (ref 4.2–5.8)
RBC # BLD: 4.65 M/UL — SIGNIFICANT CHANGE UP (ref 4.2–5.8)
RBC # BLD: 4.67 M/UL — SIGNIFICANT CHANGE UP (ref 4.2–5.8)
RBC # BLD: 4.67 M/UL — SIGNIFICANT CHANGE UP (ref 4.6–6.2)
RBC # BLD: 4.68 M/UL — SIGNIFICANT CHANGE UP (ref 4.2–5.8)
RBC # BLD: 4.69 M/UL — SIGNIFICANT CHANGE UP (ref 4.2–5.8)
RBC # BLD: 4.72 M/UL — SIGNIFICANT CHANGE UP (ref 4.2–5.8)
RBC # BLD: 4.72 M/UL — SIGNIFICANT CHANGE UP (ref 4.6–6.2)
RBC # BLD: 4.75 M/UL — SIGNIFICANT CHANGE UP (ref 4.2–5.8)
RBC # BLD: 4.77 M/UL — SIGNIFICANT CHANGE UP (ref 4.2–5.8)
RBC # BLD: 4.78 M/UL — SIGNIFICANT CHANGE UP (ref 4.2–5.8)
RBC # BLD: 4.88 M/UL — SIGNIFICANT CHANGE UP (ref 4.2–5.8)
RBC # BLD: 4.99 M/UL — SIGNIFICANT CHANGE UP (ref 4.6–6.2)
RBC # BLD: 5 M/UL — SIGNIFICANT CHANGE UP (ref 4.6–6.2)
RBC # BLD: 5.24 M/UL — SIGNIFICANT CHANGE UP (ref 4.2–5.8)
RBC # BLD: 5.35 M/UL — SIGNIFICANT CHANGE UP (ref 4.6–6.2)
RBC # FLD: 14.7 % — SIGNIFICANT CHANGE UP (ref 11–15.6)
RBC # FLD: 14.8 % — SIGNIFICANT CHANGE UP (ref 11–15.6)
RBC # FLD: 14.9 % — SIGNIFICANT CHANGE UP (ref 11–15.6)
RBC # FLD: 15 % — HIGH (ref 10.3–14.5)
RBC # FLD: 15 % — SIGNIFICANT CHANGE UP (ref 11–15.6)
RBC # FLD: 15.1 % — SIGNIFICANT CHANGE UP (ref 11–15.6)
RBC # FLD: 15.2 % — SIGNIFICANT CHANGE UP (ref 11–15.6)
RBC # FLD: 15.3 % — HIGH (ref 10.3–14.5)
RBC # FLD: 15.3 % — HIGH (ref 10.3–14.5)
RBC # FLD: 15.3 % — SIGNIFICANT CHANGE UP (ref 11–15.6)
RBC # FLD: 15.4 % — HIGH (ref 10.3–14.5)
RBC # FLD: 15.5 % — HIGH (ref 10.3–14.5)
RBC # FLD: 15.5 % — SIGNIFICANT CHANGE UP (ref 11–15.6)
RBC # FLD: 15.6 % — HIGH (ref 10.3–14.5)
RBC # FLD: 15.6 % — HIGH (ref 10.3–14.5)
RBC # FLD: 15.6 % — SIGNIFICANT CHANGE UP (ref 11–15.6)
RBC # FLD: 15.7 % — HIGH (ref 11–15.6)
RBC # FLD: 15.7 % — HIGH (ref 11–15.6)
RBC # FLD: 15.8 % — HIGH (ref 10.3–14.5)
RBC # FLD: 15.9 % — HIGH (ref 10.3–14.5)
RBC # FLD: 15.9 % — HIGH (ref 11–15.6)
RBC # FLD: 15.9 % — HIGH (ref 11–15.6)
RBC # FLD: 16 % — HIGH (ref 10.3–14.5)
RBC # FLD: 16 % — HIGH (ref 11–15.6)
RBC # FLD: 16.1 % — HIGH (ref 11–15.6)
RBC # FLD: 16.2 % — HIGH (ref 10.3–14.5)
RBC # FLD: 16.3 % — HIGH (ref 10.3–14.5)
RBC # FLD: 17.2 % — HIGH (ref 10.3–14.5)
RBC # FLD: 17.4 % — HIGH (ref 10.3–14.5)
RBC # FLD: 17.5 % — HIGH (ref 10.3–14.5)
RBC # FLD: 17.7 % — HIGH (ref 10.3–14.5)
RBC BLD AUTO: ABNORMAL
RBC BLD AUTO: NORMAL — SIGNIFICANT CHANGE UP
RBC BLD AUTO: NORMAL — SIGNIFICANT CHANGE UP
RBC CASTS # UR COMP ASSIST: NEGATIVE /HPF — SIGNIFICANT CHANGE UP (ref 0–4)
RBC CASTS # UR COMP ASSIST: SIGNIFICANT CHANGE UP /HPF (ref 0–4)
RCV VOL RI: 1906 /UL — HIGH (ref 0–5)
RCV VOL RI: HIGH /UL (ref 0–5)
S AUREUS DNA NOSE QL NAA+PROBE: SIGNIFICANT CHANGE UP
SAO2 % BLDA: 100 % — HIGH (ref 95–99)
SAO2 % BLDA: 100 % — HIGH (ref 95–99)
SAO2 % BLDA: 66 % — LOW (ref 95–99)
SAO2 % BLDA: 93 % — LOW (ref 95–99)
SAO2 % BLDA: 94 % — LOW (ref 95–99)
SAO2 % BLDA: 98 % — SIGNIFICANT CHANGE UP (ref 95–99)
SAO2 % BLDA: 98 % — SIGNIFICANT CHANGE UP (ref 95–99)
SCHISTOCYTES BLD QL AUTO: SLIGHT — SIGNIFICANT CHANGE UP
SODIUM SERPL-SCNC: 136 MMOL/L — SIGNIFICANT CHANGE UP (ref 135–145)
SODIUM SERPL-SCNC: 139 MMOL/L — SIGNIFICANT CHANGE UP (ref 135–145)
SODIUM SERPL-SCNC: 140 MMOL/L — SIGNIFICANT CHANGE UP (ref 135–145)
SODIUM SERPL-SCNC: 141 MMOL/L — SIGNIFICANT CHANGE UP (ref 135–145)
SODIUM SERPL-SCNC: 142 MMOL/L — SIGNIFICANT CHANGE UP (ref 135–145)
SODIUM SERPL-SCNC: 143 MMOL/L — SIGNIFICANT CHANGE UP (ref 135–145)
SODIUM SERPL-SCNC: 144 MMOL/L — SIGNIFICANT CHANGE UP (ref 135–145)
SODIUM SERPL-SCNC: 145 MMOL/L — SIGNIFICANT CHANGE UP (ref 135–145)
SODIUM SERPL-SCNC: 146 MMOL/L — HIGH (ref 135–145)
SODIUM SERPL-SCNC: 147 MMOL/L — HIGH (ref 135–145)
SODIUM SERPL-SCNC: 148 MMOL/L — HIGH (ref 135–145)
SODIUM SERPL-SCNC: 148 MMOL/L — HIGH (ref 135–145)
SODIUM SERPL-SCNC: 149 MMOL/L — HIGH (ref 135–145)
SP GR SPEC: 1.02 — SIGNIFICANT CHANGE UP (ref 1.01–1.02)
SP GR SPEC: 1.02 — SIGNIFICANT CHANGE UP (ref 1.01–1.02)
SPECIMEN SOURCE FLD: SIGNIFICANT CHANGE UP
SPECIMEN SOURCE: SIGNIFICANT CHANGE UP
STOMATOCYTES BLD QL SMEAR: PRESENT — SIGNIFICANT CHANGE UP
STOMATOCYTES BLD QL SMEAR: SLIGHT — SIGNIFICANT CHANGE UP
SURGICAL PATHOLOGY STUDY: SIGNIFICANT CHANGE UP
SURGICAL PATHOLOGY STUDY: SIGNIFICANT CHANGE UP
TOTAL NUCLEATED CELL COUNT, BODY FLUID: 2068 /UL — HIGH (ref 0–5)
TOTAL NUCLEATED CELL COUNT, BODY FLUID: 8613 /UL — HIGH (ref 0–5)
TROPONIN T SERPL-MCNC: 0.05 NG/ML — SIGNIFICANT CHANGE UP (ref 0–0.06)
TROPONIN T SERPL-MCNC: 0.05 NG/ML — SIGNIFICANT CHANGE UP (ref 0–0.06)
TROPONIN T SERPL-MCNC: 0.09 NG/ML — HIGH (ref 0–0.06)
TROPONIN T SERPL-MCNC: 0.09 NG/ML — HIGH (ref 0–0.06)
TROPONIN T SERPL-MCNC: 0.1 NG/ML — HIGH (ref 0–0.06)
TSH SERPL-MCNC: 0.74 UIU/ML — SIGNIFICANT CHANGE UP (ref 0.27–4.2)
TSH SERPL-MCNC: 0.75 UIU/ML — SIGNIFICANT CHANGE UP (ref 0.27–4.2)
TUBE TYPE: SIGNIFICANT CHANGE UP
TUBE TYPE: SIGNIFICANT CHANGE UP
TYPE + AB SCN PNL BLD: SIGNIFICANT CHANGE UP
URATE CRY FLD QL MICRO: ABNORMAL
UROBILINOGEN FLD QL: NEGATIVE MG/DL — SIGNIFICANT CHANGE UP
UROBILINOGEN FLD QL: NEGATIVE MG/DL — SIGNIFICANT CHANGE UP
VANCOMYCIN TROUGH SERPL-MCNC: 10.3 UG/ML — SIGNIFICANT CHANGE UP (ref 10–20)
VANCOMYCIN TROUGH SERPL-MCNC: 15.1 UG/ML — SIGNIFICANT CHANGE UP (ref 10–20)
VANCOMYCIN TROUGH SERPL-MCNC: 19.6 UG/ML — SIGNIFICANT CHANGE UP (ref 10–20)
VARIANT LYMPHS # BLD: 0.9 % — SIGNIFICANT CHANGE UP (ref 0–6)
WBC # BLD: 10.1 K/UL — SIGNIFICANT CHANGE UP (ref 4.8–10.8)
WBC # BLD: 10.3 K/UL — SIGNIFICANT CHANGE UP (ref 4.8–10.8)
WBC # BLD: 10.69 K/UL — HIGH (ref 3.8–10.5)
WBC # BLD: 10.79 K/UL — HIGH (ref 3.8–10.5)
WBC # BLD: 10.8 K/UL — SIGNIFICANT CHANGE UP (ref 4.8–10.8)
WBC # BLD: 10.9 K/UL — HIGH (ref 3.8–10.5)
WBC # BLD: 10.96 K/UL — HIGH (ref 3.8–10.5)
WBC # BLD: 10.96 K/UL — HIGH (ref 3.8–10.5)
WBC # BLD: 11.14 K/UL — HIGH (ref 3.8–10.5)
WBC # BLD: 11.4 K/UL — HIGH (ref 4.8–10.8)
WBC # BLD: 11.4 K/UL — HIGH (ref 4.8–10.8)
WBC # BLD: 11.6 K/UL — HIGH (ref 4.8–10.8)
WBC # BLD: 11.7 K/UL — HIGH (ref 3.8–10.5)
WBC # BLD: 11.9 K/UL — HIGH (ref 3.8–10.5)
WBC # BLD: 11.95 K/UL — HIGH (ref 3.8–10.5)
WBC # BLD: 12 K/UL — HIGH (ref 4.8–10.8)
WBC # BLD: 12.01 K/UL — HIGH (ref 3.8–10.5)
WBC # BLD: 12.1 K/UL — HIGH (ref 4.8–10.8)
WBC # BLD: 12.2 K/UL — HIGH (ref 4.8–10.8)
WBC # BLD: 12.2 K/UL — HIGH (ref 4.8–10.8)
WBC # BLD: 12.23 K/UL — HIGH (ref 3.8–10.5)
WBC # BLD: 12.47 K/UL — HIGH (ref 3.8–10.5)
WBC # BLD: 12.93 K/UL — HIGH (ref 3.8–10.5)
WBC # BLD: 13.06 K/UL — HIGH (ref 3.8–10.5)
WBC # BLD: 13.06 K/UL — HIGH (ref 3.8–10.5)
WBC # BLD: 13.3 K/UL — HIGH (ref 4.8–10.8)
WBC # BLD: 13.55 K/UL — HIGH (ref 3.8–10.5)
WBC # BLD: 13.7 K/UL — HIGH (ref 4.8–10.8)
WBC # BLD: 13.8 K/UL — HIGH (ref 4.8–10.8)
WBC # BLD: 14.6 K/UL — HIGH (ref 4.8–10.8)
WBC # BLD: 15.2 K/UL — HIGH (ref 4.8–10.8)
WBC # BLD: 16.1 K/UL — HIGH (ref 4.8–10.8)
WBC # BLD: 16.4 K/UL — HIGH (ref 4.8–10.8)
WBC # BLD: 16.5 K/UL — HIGH (ref 4.8–10.8)
WBC # BLD: 17.5 K/UL — HIGH (ref 4.8–10.8)
WBC # BLD: 17.7 K/UL — HIGH (ref 4.8–10.8)
WBC # BLD: 18.4 K/UL — HIGH (ref 4.8–10.8)
WBC # BLD: 20.61 K/UL — HIGH (ref 3.8–10.5)
WBC # BLD: 29.7 K/UL — HIGH (ref 4.8–10.8)
WBC # BLD: 6.82 K/UL — SIGNIFICANT CHANGE UP (ref 3.8–10.5)
WBC # BLD: 7.75 K/UL — SIGNIFICANT CHANGE UP (ref 3.8–10.5)
WBC # BLD: 8.6 K/UL — SIGNIFICANT CHANGE UP (ref 4.8–10.8)
WBC # BLD: 8.8 K/UL — SIGNIFICANT CHANGE UP (ref 4.8–10.8)
WBC # BLD: 9 K/UL — SIGNIFICANT CHANGE UP (ref 4.8–10.8)
WBC # BLD: 9.1 K/UL — SIGNIFICANT CHANGE UP (ref 4.8–10.8)
WBC # BLD: 9.17 K/UL — SIGNIFICANT CHANGE UP (ref 3.8–10.5)
WBC # BLD: 9.6 K/UL — SIGNIFICANT CHANGE UP (ref 4.8–10.8)
WBC # BLD: 9.72 K/UL — SIGNIFICANT CHANGE UP (ref 3.8–10.5)
WBC # FLD AUTO: 10.1 K/UL — SIGNIFICANT CHANGE UP (ref 4.8–10.8)
WBC # FLD AUTO: 10.3 K/UL — SIGNIFICANT CHANGE UP (ref 4.8–10.8)
WBC # FLD AUTO: 10.69 K/UL — HIGH (ref 3.8–10.5)
WBC # FLD AUTO: 10.79 K/UL — HIGH (ref 3.8–10.5)
WBC # FLD AUTO: 10.8 K/UL — SIGNIFICANT CHANGE UP (ref 4.8–10.8)
WBC # FLD AUTO: 10.9 K/UL — HIGH (ref 3.8–10.5)
WBC # FLD AUTO: 10.96 K/UL — HIGH (ref 3.8–10.5)
WBC # FLD AUTO: 10.96 K/UL — HIGH (ref 3.8–10.5)
WBC # FLD AUTO: 11.14 K/UL — HIGH (ref 3.8–10.5)
WBC # FLD AUTO: 11.4 K/UL — HIGH (ref 4.8–10.8)
WBC # FLD AUTO: 11.4 K/UL — HIGH (ref 4.8–10.8)
WBC # FLD AUTO: 11.6 K/UL — HIGH (ref 4.8–10.8)
WBC # FLD AUTO: 11.7 K/UL — HIGH (ref 3.8–10.5)
WBC # FLD AUTO: 11.9 K/UL — HIGH (ref 3.8–10.5)
WBC # FLD AUTO: 11.95 K/UL — HIGH (ref 3.8–10.5)
WBC # FLD AUTO: 12 K/UL — HIGH (ref 4.8–10.8)
WBC # FLD AUTO: 12.01 K/UL — HIGH (ref 3.8–10.5)
WBC # FLD AUTO: 12.1 K/UL — HIGH (ref 4.8–10.8)
WBC # FLD AUTO: 12.2 K/UL — HIGH (ref 4.8–10.8)
WBC # FLD AUTO: 12.2 K/UL — HIGH (ref 4.8–10.8)
WBC # FLD AUTO: 12.23 K/UL — HIGH (ref 3.8–10.5)
WBC # FLD AUTO: 12.47 K/UL — HIGH (ref 3.8–10.5)
WBC # FLD AUTO: 12.93 K/UL — HIGH (ref 3.8–10.5)
WBC # FLD AUTO: 13.06 K/UL — HIGH (ref 3.8–10.5)
WBC # FLD AUTO: 13.06 K/UL — HIGH (ref 3.8–10.5)
WBC # FLD AUTO: 13.3 K/UL — HIGH (ref 4.8–10.8)
WBC # FLD AUTO: 13.55 K/UL — HIGH (ref 3.8–10.5)
WBC # FLD AUTO: 13.7 K/UL — HIGH (ref 4.8–10.8)
WBC # FLD AUTO: 13.8 K/UL — HIGH (ref 4.8–10.8)
WBC # FLD AUTO: 14.6 K/UL — HIGH (ref 4.8–10.8)
WBC # FLD AUTO: 15.2 K/UL — HIGH (ref 4.8–10.8)
WBC # FLD AUTO: 16.1 K/UL — HIGH (ref 4.8–10.8)
WBC # FLD AUTO: 16.4 K/UL — HIGH (ref 4.8–10.8)
WBC # FLD AUTO: 16.5 K/UL — HIGH (ref 4.8–10.8)
WBC # FLD AUTO: 17.5 K/UL — HIGH (ref 4.8–10.8)
WBC # FLD AUTO: 17.7 K/UL — HIGH (ref 4.8–10.8)
WBC # FLD AUTO: 18.4 K/UL — HIGH (ref 4.8–10.8)
WBC # FLD AUTO: 20.61 K/UL — HIGH (ref 3.8–10.5)
WBC # FLD AUTO: 29.7 K/UL — HIGH (ref 4.8–10.8)
WBC # FLD AUTO: 6.82 K/UL — SIGNIFICANT CHANGE UP (ref 3.8–10.5)
WBC # FLD AUTO: 7.75 K/UL — SIGNIFICANT CHANGE UP (ref 3.8–10.5)
WBC # FLD AUTO: 8.6 K/UL — SIGNIFICANT CHANGE UP (ref 4.8–10.8)
WBC # FLD AUTO: 8.8 K/UL — SIGNIFICANT CHANGE UP (ref 4.8–10.8)
WBC # FLD AUTO: 9 K/UL — SIGNIFICANT CHANGE UP (ref 4.8–10.8)
WBC # FLD AUTO: 9.1 K/UL — SIGNIFICANT CHANGE UP (ref 4.8–10.8)
WBC # FLD AUTO: 9.17 K/UL — SIGNIFICANT CHANGE UP (ref 3.8–10.5)
WBC # FLD AUTO: 9.6 K/UL — SIGNIFICANT CHANGE UP (ref 4.8–10.8)
WBC # FLD AUTO: 9.72 K/UL — SIGNIFICANT CHANGE UP (ref 3.8–10.5)
WBC UR QL: SIGNIFICANT CHANGE UP
WBC UR QL: SIGNIFICANT CHANGE UP

## 2019-01-01 PROCEDURE — 83605 ASSAY OF LACTIC ACID: CPT

## 2019-01-01 PROCEDURE — 82803 BLOOD GASES ANY COMBINATION: CPT

## 2019-01-01 PROCEDURE — 99233 SBSQ HOSP IP/OBS HIGH 50: CPT

## 2019-01-01 PROCEDURE — 87449 NOS EACH ORGANISM AG IA: CPT

## 2019-01-01 PROCEDURE — 93970 EXTREMITY STUDY: CPT

## 2019-01-01 PROCEDURE — 99223 1ST HOSP IP/OBS HIGH 75: CPT

## 2019-01-01 PROCEDURE — 99232 SBSQ HOSP IP/OBS MODERATE 35: CPT

## 2019-01-01 PROCEDURE — 84484 ASSAY OF TROPONIN QUANT: CPT

## 2019-01-01 PROCEDURE — 96375 TX/PRO/DX INJ NEW DRUG ADDON: CPT

## 2019-01-01 PROCEDURE — 71250 CT THORAX DX C-: CPT

## 2019-01-01 PROCEDURE — 31624 DX BRONCHOSCOPE/LAVAGE: CPT | Mod: 78

## 2019-01-01 PROCEDURE — 84145 PROCALCITONIN (PCT): CPT

## 2019-01-01 PROCEDURE — 99232 SBSQ HOSP IP/OBS MODERATE 35: CPT | Mod: 24

## 2019-01-01 PROCEDURE — 93005 ELECTROCARDIOGRAM TRACING: CPT

## 2019-01-01 PROCEDURE — 85027 COMPLETE CBC AUTOMATED: CPT

## 2019-01-01 PROCEDURE — 71250 CT THORAX DX C-: CPT | Mod: 26

## 2019-01-01 PROCEDURE — 71045 X-RAY EXAM CHEST 1 VIEW: CPT | Mod: 26

## 2019-01-01 PROCEDURE — 71045 X-RAY EXAM CHEST 1 VIEW: CPT

## 2019-01-01 PROCEDURE — 72157 MRI CHEST SPINE W/O & W/DYE: CPT | Mod: 26

## 2019-01-01 PROCEDURE — 83880 ASSAY OF NATRIURETIC PEPTIDE: CPT

## 2019-01-01 PROCEDURE — 94760 N-INVAS EAR/PLS OXIMETRY 1: CPT

## 2019-01-01 PROCEDURE — 88112 CYTOPATH CELL ENHANCE TECH: CPT

## 2019-01-01 PROCEDURE — 74177 CT ABD & PELVIS W/CONTRAST: CPT | Mod: 26

## 2019-01-01 PROCEDURE — 99497 ADVNCD CARE PLAN 30 MIN: CPT

## 2019-01-01 PROCEDURE — 96376 TX/PRO/DX INJ SAME DRUG ADON: CPT

## 2019-01-01 PROCEDURE — 80202 ASSAY OF VANCOMYCIN: CPT

## 2019-01-01 PROCEDURE — 82435 ASSAY OF BLOOD CHLORIDE: CPT

## 2019-01-01 PROCEDURE — 99213 OFFICE O/P EST LOW 20 MIN: CPT

## 2019-01-01 PROCEDURE — 97530 THERAPEUTIC ACTIVITIES: CPT

## 2019-01-01 PROCEDURE — 87633 RESP VIRUS 12-25 TARGETS: CPT

## 2019-01-01 PROCEDURE — 93306 TTE W/DOPPLER COMPLETE: CPT

## 2019-01-01 PROCEDURE — 97163 PT EVAL HIGH COMPLEX 45 MIN: CPT

## 2019-01-01 PROCEDURE — 71045 X-RAY EXAM CHEST 1 VIEW: CPT | Mod: 26,77

## 2019-01-01 PROCEDURE — 80053 COMPREHEN METABOLIC PANEL: CPT

## 2019-01-01 PROCEDURE — 84295 ASSAY OF SERUM SODIUM: CPT

## 2019-01-01 PROCEDURE — 94660 CPAP INITIATION&MGMT: CPT

## 2019-01-01 PROCEDURE — 87640 STAPH A DNA AMP PROBE: CPT

## 2019-01-01 PROCEDURE — 80048 BASIC METABOLIC PNL TOTAL CA: CPT

## 2019-01-01 PROCEDURE — 94640 AIRWAY INHALATION TREATMENT: CPT

## 2019-01-01 PROCEDURE — 87070 CULTURE OTHR SPECIMN AEROBIC: CPT

## 2019-01-01 PROCEDURE — 86301 IMMUNOASSAY TUMOR CA 19-9: CPT

## 2019-01-01 PROCEDURE — 84100 ASSAY OF PHOSPHORUS: CPT

## 2019-01-01 PROCEDURE — 99223 1ST HOSP IP/OBS HIGH 75: CPT | Mod: 25

## 2019-01-01 PROCEDURE — 99358 PROLONG SERVICE W/O CONTACT: CPT

## 2019-01-01 PROCEDURE — 36000 PLACE NEEDLE IN VEIN: CPT

## 2019-01-01 PROCEDURE — 97110 THERAPEUTIC EXERCISES: CPT

## 2019-01-01 PROCEDURE — 85610 PROTHROMBIN TIME: CPT

## 2019-01-01 PROCEDURE — 99233 SBSQ HOSP IP/OBS HIGH 50: CPT | Mod: 57

## 2019-01-01 PROCEDURE — 10060 I&D ABSCESS SIMPLE/SINGLE: CPT

## 2019-01-01 PROCEDURE — 36415 COLL VENOUS BLD VENIPUNCTURE: CPT

## 2019-01-01 PROCEDURE — 99232 SBSQ HOSP IP/OBS MODERATE 35: CPT | Mod: 25

## 2019-01-01 PROCEDURE — 82947 ASSAY GLUCOSE BLOOD QUANT: CPT

## 2019-01-01 PROCEDURE — 71046 X-RAY EXAM CHEST 2 VIEWS: CPT | Mod: 26

## 2019-01-01 PROCEDURE — 87102 FUNGUS ISOLATION CULTURE: CPT

## 2019-01-01 PROCEDURE — 93567 NJX CAR CTH SPRVLV AORTGRPHY: CPT

## 2019-01-01 PROCEDURE — 99024 POSTOP FOLLOW-UP VISIT: CPT

## 2019-01-01 PROCEDURE — 87206 SMEAR FLUORESCENT/ACID STAI: CPT

## 2019-01-01 PROCEDURE — 83036 HEMOGLOBIN GLYCOSYLATED A1C: CPT

## 2019-01-01 PROCEDURE — 99284 EMERGENCY DEPT VISIT MOD MDM: CPT

## 2019-01-01 PROCEDURE — 99239 HOSP IP/OBS DSCHRG MGMT >30: CPT

## 2019-01-01 PROCEDURE — 87075 CULTR BACTERIA EXCEPT BLOOD: CPT

## 2019-01-01 PROCEDURE — 93460 R&L HRT ART/VENTRICLE ANGIO: CPT | Mod: 26

## 2019-01-01 PROCEDURE — 83735 ASSAY OF MAGNESIUM: CPT

## 2019-01-01 PROCEDURE — 99215 OFFICE O/P EST HI 40 MIN: CPT

## 2019-01-01 PROCEDURE — 82962 GLUCOSE BLOOD TEST: CPT

## 2019-01-01 PROCEDURE — 36600 WITHDRAWAL OF ARTERIAL BLOOD: CPT

## 2019-01-01 PROCEDURE — 87015 SPECIMEN INFECT AGNT CONCNTJ: CPT

## 2019-01-01 PROCEDURE — C1769: CPT

## 2019-01-01 PROCEDURE — 72050 X-RAY EXAM NECK SPINE 4/5VWS: CPT

## 2019-01-01 PROCEDURE — 87086 URINE CULTURE/COLONY COUNT: CPT

## 2019-01-01 PROCEDURE — 97116 GAIT TRAINING THERAPY: CPT

## 2019-01-01 PROCEDURE — 82550 ASSAY OF CK (CPK): CPT

## 2019-01-01 PROCEDURE — 97605 NEG PRS WND THER DME<=50SQCM: CPT

## 2019-01-01 PROCEDURE — 72128 CT CHEST SPINE W/O DYE: CPT

## 2019-01-01 PROCEDURE — 88305 TISSUE EXAM BY PATHOLOGIST: CPT

## 2019-01-01 PROCEDURE — 99233 SBSQ HOSP IP/OBS HIGH 50: CPT | Mod: GC

## 2019-01-01 PROCEDURE — 84443 ASSAY THYROID STIM HORMONE: CPT

## 2019-01-01 PROCEDURE — 87040 BLOOD CULTURE FOR BACTERIA: CPT

## 2019-01-01 PROCEDURE — 87581 M.PNEUMON DNA AMP PROBE: CPT

## 2019-01-01 PROCEDURE — 86901 BLOOD TYPING SEROLOGIC RH(D): CPT

## 2019-01-01 PROCEDURE — 99356: CPT

## 2019-01-01 PROCEDURE — 99222 1ST HOSP IP/OBS MODERATE 55: CPT

## 2019-01-01 PROCEDURE — 96374 THER/PROPH/DIAG INJ IV PUSH: CPT

## 2019-01-01 PROCEDURE — 99214 OFFICE O/P EST MOD 30 MIN: CPT | Mod: 25

## 2019-01-01 PROCEDURE — 84132 ASSAY OF SERUM POTASSIUM: CPT

## 2019-01-01 PROCEDURE — 76937 US GUIDE VASCULAR ACCESS: CPT

## 2019-01-01 PROCEDURE — 93010 ELECTROCARDIOGRAM REPORT: CPT

## 2019-01-01 PROCEDURE — 82330 ASSAY OF CALCIUM: CPT

## 2019-01-01 PROCEDURE — 99053 MED SERV 10PM-8AM 24 HR FAC: CPT

## 2019-01-01 PROCEDURE — 87641 MR-STAPH DNA AMP PROBE: CPT

## 2019-01-01 PROCEDURE — 93970 EXTREMITY STUDY: CPT | Mod: 26

## 2019-01-01 PROCEDURE — 92610 EVALUATE SWALLOWING FUNCTION: CPT

## 2019-01-01 PROCEDURE — ZZZZZ: CPT

## 2019-01-01 PROCEDURE — 77290 THER RAD SIMULAJ FIELD CPLX: CPT | Mod: 26

## 2019-01-01 PROCEDURE — 77263 THER RADIOLOGY TX PLNG CPLX: CPT

## 2019-01-01 PROCEDURE — 77307 TELETHX ISODOSE PLAN CPLX: CPT | Mod: 26

## 2019-01-01 PROCEDURE — 31624 DX BRONCHOSCOPE/LAVAGE: CPT

## 2019-01-01 PROCEDURE — C1887: CPT

## 2019-01-01 PROCEDURE — 83986 ASSAY PH BODY FLUID NOS: CPT

## 2019-01-01 PROCEDURE — 87486 CHLMYD PNEUM DNA AMP PROBE: CPT

## 2019-01-01 PROCEDURE — 80076 HEPATIC FUNCTION PANEL: CPT

## 2019-01-01 PROCEDURE — C1889: CPT

## 2019-01-01 PROCEDURE — 32652 THORACOSCOPY REM TOTL CORTEX: CPT

## 2019-01-01 PROCEDURE — 82945 GLUCOSE OTHER FLUID: CPT

## 2019-01-01 PROCEDURE — 72074 X-RAY EXAM THORAC SPINE4/>VW: CPT

## 2019-01-01 PROCEDURE — 32609 THORACOSCOPY W/BX PLEURA: CPT

## 2019-01-01 PROCEDURE — 72074 X-RAY EXAM THORAC SPINE4/>VW: CPT | Mod: 26

## 2019-01-01 PROCEDURE — 99498 ADVNCD CARE PLAN ADDL 30 MIN: CPT

## 2019-01-01 PROCEDURE — 85730 THROMBOPLASTIN TIME PARTIAL: CPT

## 2019-01-01 PROCEDURE — 99212 OFFICE O/P EST SF 10 MIN: CPT

## 2019-01-01 PROCEDURE — 99285 EMERGENCY DEPT VISIT HI MDM: CPT | Mod: 25

## 2019-01-01 PROCEDURE — 76942 ECHO GUIDE FOR BIOPSY: CPT

## 2019-01-01 PROCEDURE — 32609 THORACOSCOPY W/BX PLEURA: CPT | Mod: AS

## 2019-01-01 PROCEDURE — 87116 MYCOBACTERIA CULTURE: CPT

## 2019-01-01 PROCEDURE — 88112 CYTOPATH CELL ENHANCE TECH: CPT | Mod: 26

## 2019-01-01 PROCEDURE — 85014 HEMATOCRIT: CPT

## 2019-01-01 PROCEDURE — 86850 RBC ANTIBODY SCREEN: CPT

## 2019-01-01 PROCEDURE — 94010 BREATHING CAPACITY TEST: CPT

## 2019-01-01 PROCEDURE — 86900 BLOOD TYPING SEROLOGIC ABO: CPT

## 2019-01-01 PROCEDURE — 72050 X-RAY EXAM NECK SPINE 4/5VWS: CPT | Mod: 26

## 2019-01-01 PROCEDURE — 99291 CRITICAL CARE FIRST HOUR: CPT | Mod: 25

## 2019-01-01 PROCEDURE — 99231 SBSQ HOSP IP/OBS SF/LOW 25: CPT

## 2019-01-01 PROCEDURE — 87186 SC STD MICRODIL/AGAR DIL: CPT

## 2019-01-01 PROCEDURE — 86923 COMPATIBILITY TEST ELECTRIC: CPT

## 2019-01-01 PROCEDURE — 93460 R&L HRT ART/VENTRICLE ANGIO: CPT

## 2019-01-01 PROCEDURE — 89051 BODY FLUID CELL COUNT: CPT

## 2019-01-01 PROCEDURE — 72157 MRI CHEST SPINE W/O & W/DYE: CPT

## 2019-01-01 PROCEDURE — 99231 SBSQ HOSP IP/OBS SF/LOW 25: CPT | Mod: 24

## 2019-01-01 PROCEDURE — 88305 TISSUE EXAM BY PATHOLOGIST: CPT | Mod: 26

## 2019-01-01 PROCEDURE — 93308 TTE F-UP OR LMTD: CPT

## 2019-01-01 PROCEDURE — C1894: CPT

## 2019-01-01 PROCEDURE — 81001 URINALYSIS AUTO W/SCOPE: CPT

## 2019-01-01 PROCEDURE — 76937 US GUIDE VASCULAR ACCESS: CPT | Mod: 26

## 2019-01-01 PROCEDURE — 72128 CT CHEST SPINE W/O DYE: CPT | Mod: 26

## 2019-01-01 PROCEDURE — 99285 EMERGENCY DEPT VISIT HI MDM: CPT

## 2019-01-01 PROCEDURE — 71046 X-RAY EXAM CHEST 2 VIEWS: CPT

## 2019-01-01 PROCEDURE — 82042 OTHER SOURCE ALBUMIN QUAN EA: CPT

## 2019-01-01 PROCEDURE — 87205 SMEAR GRAM STAIN: CPT

## 2019-01-01 PROCEDURE — 99291 CRITICAL CARE FIRST HOUR: CPT

## 2019-01-01 PROCEDURE — 77431 RADIATION THERAPY MANAGEMENT: CPT

## 2019-01-01 PROCEDURE — 82105 ALPHA-FETOPROTEIN SERUM: CPT

## 2019-01-01 PROCEDURE — 83615 LACTATE (LD) (LDH) ENZYME: CPT

## 2019-01-01 PROCEDURE — 99152 MOD SED SAME PHYS/QHP 5/>YRS: CPT

## 2019-01-01 PROCEDURE — 87798 DETECT AGENT NOS DNA AMP: CPT

## 2019-01-01 PROCEDURE — 76942 ECHO GUIDE FOR BIOPSY: CPT | Mod: 26,59

## 2019-01-01 PROCEDURE — 74177 CT ABD & PELVIS W/CONTRAST: CPT

## 2019-01-01 PROCEDURE — 84157 ASSAY OF PROTEIN OTHER: CPT

## 2019-01-01 PROCEDURE — 93000 ELECTROCARDIOGRAM COMPLETE: CPT

## 2019-01-01 PROCEDURE — G0103: CPT

## 2019-01-01 PROCEDURE — 80069 RENAL FUNCTION PANEL: CPT

## 2019-01-01 PROCEDURE — 82378 CARCINOEMBRYONIC ANTIGEN: CPT

## 2019-01-01 PROCEDURE — 99153 MOD SED SAME PHYS/QHP EA: CPT

## 2019-01-01 PROCEDURE — 71045 X-RAY EXAM CHEST 1 VIEW: CPT | Mod: 26,76

## 2019-01-01 PROCEDURE — 12345: CPT | Mod: NC

## 2019-01-01 PROCEDURE — 77334 RADIATION TREATMENT AID(S): CPT | Mod: 26

## 2019-01-01 RX ORDER — ALPRAZOLAM 0.25 MG
0.25 TABLET ORAL EVERY 8 HOURS
Qty: 0 | Refills: 0 | Status: DISCONTINUED | OUTPATIENT
Start: 2019-01-01 | End: 2019-01-01

## 2019-01-01 RX ORDER — TRAMADOL HYDROCHLORIDE 50 MG/1
50 TABLET ORAL ONCE
Refills: 0 | Status: DISCONTINUED | OUTPATIENT
Start: 2019-01-01 | End: 2019-01-01

## 2019-01-01 RX ORDER — ASPIRIN/CALCIUM CARB/MAGNESIUM 324 MG
81 TABLET ORAL DAILY
Refills: 0 | Status: DISCONTINUED | OUTPATIENT
Start: 2019-01-01 | End: 2019-01-01

## 2019-01-01 RX ORDER — IPRATROPIUM/ALBUTEROL SULFATE 18-103MCG
3 AEROSOL WITH ADAPTER (GRAM) INHALATION EVERY 6 HOURS
Refills: 0 | Status: DISCONTINUED | OUTPATIENT
Start: 2019-01-01 | End: 2019-01-01

## 2019-01-01 RX ORDER — CEFTRIAXONE SODIUM 1 G
VIAL (EA) INJECTION
Refills: 0 | Status: ACTIVE | COMMUNITY

## 2019-01-01 RX ORDER — CARVEDILOL PHOSPHATE 80 MG/1
3.12 CAPSULE, EXTENDED RELEASE ORAL EVERY 12 HOURS
Refills: 0 | Status: DISCONTINUED | OUTPATIENT
Start: 2019-01-01 | End: 2019-01-01

## 2019-01-01 RX ORDER — HYDROMORPHONE HYDROCHLORIDE 2 MG/ML
0.5 INJECTION INTRAMUSCULAR; INTRAVENOUS; SUBCUTANEOUS EVERY 6 HOURS
Refills: 0 | Status: DISCONTINUED | OUTPATIENT
Start: 2019-01-01 | End: 2019-01-01

## 2019-01-01 RX ORDER — ALPRAZOLAM 0.25 MG
0.25 TABLET ORAL AT BEDTIME
Refills: 0 | Status: DISCONTINUED | OUTPATIENT
Start: 2019-01-01 | End: 2019-01-01

## 2019-01-01 RX ORDER — IPRATROPIUM/ALBUTEROL SULFATE 18-103MCG
3 AEROSOL WITH ADAPTER (GRAM) INHALATION
Qty: 0 | Refills: 0 | DISCHARGE
Start: 2019-01-01

## 2019-01-01 RX ORDER — SPIRONOLACTONE 25 MG/1
25 TABLET, FILM COATED ORAL DAILY
Refills: 0 | Status: DISCONTINUED | OUTPATIENT
Start: 2019-01-01 | End: 2019-01-01

## 2019-01-01 RX ORDER — FENTANYL CITRATE 50 UG/ML
30 INJECTION INTRAVENOUS
Qty: 0 | Refills: 0 | Status: DISCONTINUED | OUTPATIENT
Start: 2019-01-01 | End: 2019-01-01

## 2019-01-01 RX ORDER — MORPHINE SULFATE 50 MG/1
4 CAPSULE, EXTENDED RELEASE ORAL
Qty: 100 | Refills: 0 | Status: DISCONTINUED | OUTPATIENT
Start: 2019-01-01 | End: 2019-01-01

## 2019-01-01 RX ORDER — SODIUM CHLORIDE 9 MG/ML
150 INJECTION INTRAMUSCULAR; INTRAVENOUS; SUBCUTANEOUS
Qty: 0 | Refills: 0 | Status: DISCONTINUED | OUTPATIENT
Start: 2019-01-01 | End: 2019-01-01

## 2019-01-01 RX ORDER — PANTOPRAZOLE SODIUM 20 MG/1
40 TABLET, DELAYED RELEASE ORAL
Qty: 0 | Refills: 0 | Status: DISCONTINUED | OUTPATIENT
Start: 2019-01-01 | End: 2019-01-01

## 2019-01-01 RX ORDER — DEXAMETHASONE 0.5 MG/5ML
8 ELIXIR ORAL EVERY 6 HOURS
Refills: 0 | Status: COMPLETED | OUTPATIENT
Start: 2019-01-01 | End: 2019-01-01

## 2019-01-01 RX ORDER — MAGNESIUM SULFATE 500 MG/ML
2 VIAL (ML) INJECTION ONCE
Refills: 0 | Status: COMPLETED | OUTPATIENT
Start: 2019-01-01 | End: 2019-01-01

## 2019-01-01 RX ORDER — FLUTICASONE FUROATE, UMECLIDINIUM BROMIDE AND VILANTEROL TRIFENATATE 100; 62.5; 25 UG/1; UG/1; UG/1
100-62.5-25 POWDER RESPIRATORY (INHALATION) DAILY
Qty: 3 | Refills: 3 | Status: DISCONTINUED | COMMUNITY
Start: 2019-01-01 | End: 2019-01-01

## 2019-01-01 RX ORDER — FLUTICASONE FUROATE AND VILANTEROL TRIFENATATE 200; 25 UG/1; UG/1
200-25 POWDER RESPIRATORY (INHALATION) DAILY
Qty: 3 | Refills: 1 | Status: ACTIVE | COMMUNITY
Start: 2018-08-21 | End: 1900-01-01

## 2019-01-01 RX ORDER — DOCUSATE SODIUM 100 MG
100 CAPSULE ORAL THREE TIMES A DAY
Qty: 0 | Refills: 0 | Status: DISCONTINUED | OUTPATIENT
Start: 2019-01-01 | End: 2019-01-01

## 2019-01-01 RX ORDER — SODIUM CHLORIDE 9 MG/ML
1000 INJECTION, SOLUTION INTRAVENOUS
Refills: 0 | Status: DISCONTINUED | OUTPATIENT
Start: 2019-01-01 | End: 2019-01-01

## 2019-01-01 RX ORDER — SERTRALINE 25 MG/1
25 TABLET, FILM COATED ORAL DAILY
Refills: 0 | Status: DISCONTINUED | OUTPATIENT
Start: 2019-01-01 | End: 2019-01-01

## 2019-01-01 RX ORDER — CARVEDILOL PHOSPHATE 80 MG/1
1 CAPSULE, EXTENDED RELEASE ORAL
Qty: 0 | Refills: 0 | DISCHARGE
Start: 2019-01-01

## 2019-01-01 RX ORDER — SPIRONOLACTONE 25 MG/1
1 TABLET, FILM COATED ORAL
Qty: 0 | Refills: 0 | DISCHARGE
Start: 2019-01-01

## 2019-01-01 RX ORDER — AZITHROMYCIN 500 MG/1
500 TABLET, FILM COATED ORAL ONCE
Qty: 0 | Refills: 0 | Status: COMPLETED | OUTPATIENT
Start: 2019-01-01 | End: 2019-01-01

## 2019-01-01 RX ORDER — ACETAMINOPHEN 500 MG
650 TABLET ORAL EVERY 6 HOURS
Refills: 0 | Status: DISCONTINUED | OUTPATIENT
Start: 2019-01-01 | End: 2019-01-01

## 2019-01-01 RX ORDER — SENNA PLUS 8.6 MG/1
2 TABLET ORAL AT BEDTIME
Qty: 0 | Refills: 0 | Status: DISCONTINUED | OUTPATIENT
Start: 2019-01-01 | End: 2019-01-01

## 2019-01-01 RX ORDER — SPIRONOLACTONE 25 MG/1
25 TABLET ORAL DAILY
Qty: 90 | Refills: 2 | Status: ACTIVE | COMMUNITY
Start: 2018-07-18

## 2019-01-01 RX ORDER — VALSARTAN 320 MG/1
320 TABLET, COATED ORAL
Qty: 30 | Refills: 1 | Status: ACTIVE | COMMUNITY
Start: 2019-01-01 | End: 1900-01-01

## 2019-01-01 RX ORDER — CEFTRIAXONE 500 MG/1
1 INJECTION, POWDER, FOR SOLUTION INTRAMUSCULAR; INTRAVENOUS EVERY 24 HOURS
Qty: 0 | Refills: 0 | Status: COMPLETED | OUTPATIENT
Start: 2019-01-01 | End: 2019-01-01

## 2019-01-01 RX ORDER — HYDROMORPHONE HYDROCHLORIDE 2 MG/ML
0.25 INJECTION INTRAMUSCULAR; INTRAVENOUS; SUBCUTANEOUS ONCE
Refills: 0 | Status: DISCONTINUED | OUTPATIENT
Start: 2019-01-01 | End: 2019-01-01

## 2019-01-01 RX ORDER — SENNA PLUS 8.6 MG/1
2 TABLET ORAL AT BEDTIME
Refills: 0 | Status: DISCONTINUED | OUTPATIENT
Start: 2019-01-01 | End: 2019-01-01

## 2019-01-01 RX ORDER — SODIUM POLYSTYRENE SULFONATE 4.1 MEQ/G
15 POWDER, FOR SUSPENSION ORAL ONCE
Qty: 0 | Refills: 0 | Status: DISCONTINUED | OUTPATIENT
Start: 2019-01-01 | End: 2019-01-01

## 2019-01-01 RX ORDER — PANTOPRAZOLE SODIUM 20 MG/1
1 TABLET, DELAYED RELEASE ORAL
Qty: 14 | Refills: 0 | OUTPATIENT
Start: 2019-01-01 | End: 2019-01-01

## 2019-01-01 RX ORDER — ACETAMINOPHEN 500 MG
650 TABLET ORAL ONCE
Refills: 0 | Status: COMPLETED | OUTPATIENT
Start: 2019-01-01 | End: 2019-01-01

## 2019-01-01 RX ORDER — ASPIRIN 81 MG
81 TABLET, DELAYED RELEASE (ENTERIC COATED) ORAL DAILY
Refills: 0 | Status: DISCONTINUED | COMMUNITY
End: 2019-01-01

## 2019-01-01 RX ORDER — ALPRAZOLAM 0.25 MG
0.5 TABLET ORAL
Qty: 0 | Refills: 0 | Status: DISCONTINUED | OUTPATIENT
Start: 2019-01-01 | End: 2019-01-01

## 2019-01-01 RX ORDER — UMECLIDINIUM 62.5 UG/1
62.5 AEROSOL, POWDER ORAL DAILY
Qty: 3 | Refills: 3 | Status: DISCONTINUED | COMMUNITY
Start: 2018-08-21 | End: 2019-01-01

## 2019-01-01 RX ORDER — LOSARTAN POTASSIUM 100 MG/1
25 TABLET, FILM COATED ORAL ONCE
Refills: 0 | Status: COMPLETED | OUTPATIENT
Start: 2019-01-01 | End: 2019-01-01

## 2019-01-01 RX ORDER — ATORVASTATIN CALCIUM 80 MG/1
20 TABLET, FILM COATED ORAL AT BEDTIME
Refills: 0 | Status: DISCONTINUED | OUTPATIENT
Start: 2019-01-01 | End: 2019-01-01

## 2019-01-01 RX ORDER — ACETAMINOPHEN 500 MG
1000 TABLET ORAL ONCE
Refills: 0 | Status: COMPLETED | OUTPATIENT
Start: 2019-01-01 | End: 2019-01-01

## 2019-01-01 RX ORDER — CEFTRIAXONE 500 MG/1
1000 INJECTION, POWDER, FOR SOLUTION INTRAMUSCULAR; INTRAVENOUS EVERY 24 HOURS
Refills: 0 | Status: COMPLETED | OUTPATIENT
Start: 2019-01-01 | End: 2019-01-01

## 2019-01-01 RX ORDER — ALPRAZOLAM 0.25 MG/1
0.25 TABLET ORAL
Qty: 90 | Refills: 1 | Status: ACTIVE | COMMUNITY
Start: 2019-01-01

## 2019-01-01 RX ORDER — MORPHINE SULFATE 50 MG/1
2 CAPSULE, EXTENDED RELEASE ORAL
Refills: 0 | Status: DISCONTINUED | OUTPATIENT
Start: 2019-01-01 | End: 2019-01-01

## 2019-01-01 RX ORDER — DOCUSATE SODIUM 100 MG
100 CAPSULE ORAL THREE TIMES A DAY
Refills: 0 | Status: DISCONTINUED | OUTPATIENT
Start: 2019-01-01 | End: 2019-01-01

## 2019-01-01 RX ORDER — VANCOMYCIN HCL 1 G
1250 VIAL (EA) INTRAVENOUS ONCE
Refills: 0 | Status: COMPLETED | OUTPATIENT
Start: 2019-01-01 | End: 2019-01-01

## 2019-01-01 RX ORDER — OXYCODONE HYDROCHLORIDE 5 MG/1
5 TABLET ORAL EVERY 4 HOURS
Refills: 0 | Status: DISCONTINUED | OUTPATIENT
Start: 2019-01-01 | End: 2019-01-01

## 2019-01-01 RX ORDER — HEPARIN SODIUM 5000 [USP'U]/ML
5000 INJECTION INTRAVENOUS; SUBCUTANEOUS EVERY 12 HOURS
Refills: 0 | Status: DISCONTINUED | OUTPATIENT
Start: 2019-01-01 | End: 2019-01-01

## 2019-01-01 RX ORDER — ONDANSETRON 8 MG/1
4 TABLET, FILM COATED ORAL ONCE
Refills: 0 | Status: DISCONTINUED | OUTPATIENT
Start: 2019-01-01 | End: 2019-01-01

## 2019-01-01 RX ORDER — ACETAMINOPHEN 500 MG
2 TABLET ORAL
Qty: 0 | Refills: 0 | DISCHARGE
Start: 2019-01-01

## 2019-01-01 RX ORDER — ONDANSETRON 8 MG/1
4 TABLET, FILM COATED ORAL ONCE
Qty: 0 | Refills: 0 | Status: DISCONTINUED | OUTPATIENT
Start: 2019-01-01 | End: 2019-01-01

## 2019-01-01 RX ORDER — PIPERACILLIN AND TAZOBACTAM 4; .5 G/20ML; G/20ML
3.38 INJECTION, POWDER, LYOPHILIZED, FOR SOLUTION INTRAVENOUS ONCE
Refills: 0 | Status: COMPLETED | OUTPATIENT
Start: 2019-01-01 | End: 2019-01-01

## 2019-01-01 RX ORDER — VANCOMYCIN HCL 1 G
1250 VIAL (EA) INTRAVENOUS EVERY 12 HOURS
Refills: 0 | Status: DISCONTINUED | OUTPATIENT
Start: 2019-01-01 | End: 2019-01-01

## 2019-01-01 RX ORDER — TRAMADOL HYDROCHLORIDE 50 MG/1
25 TABLET ORAL EVERY 6 HOURS
Refills: 0 | Status: DISCONTINUED | OUTPATIENT
Start: 2019-01-01 | End: 2019-01-01

## 2019-01-01 RX ORDER — OXYCODONE HYDROCHLORIDE 5 MG/1
10 TABLET ORAL EVERY 12 HOURS
Refills: 0 | Status: DISCONTINUED | OUTPATIENT
Start: 2019-01-01 | End: 2019-01-01

## 2019-01-01 RX ORDER — ACETAMINOPHEN 500 MG
1000 TABLET ORAL ONCE
Qty: 0 | Refills: 0 | Status: COMPLETED | OUTPATIENT
Start: 2019-01-01 | End: 2019-01-01

## 2019-01-01 RX ORDER — SODIUM CHLORIDE 9 MG/ML
1000 INJECTION INTRAMUSCULAR; INTRAVENOUS; SUBCUTANEOUS
Refills: 0 | Status: DISCONTINUED | OUTPATIENT
Start: 2019-01-01 | End: 2019-01-01

## 2019-01-01 RX ORDER — ALBUTEROL 90 UG/1
2 AEROSOL, METERED ORAL
Qty: 1 | Refills: 0
Start: 2019-01-01 | End: 2019-11-08

## 2019-01-01 RX ORDER — ENOXAPARIN SODIUM 100 MG/ML
40 INJECTION SUBCUTANEOUS DAILY
Qty: 0 | Refills: 0 | Status: DISCONTINUED | OUTPATIENT
Start: 2019-01-01 | End: 2019-01-01

## 2019-01-01 RX ORDER — VANCOMYCIN HCL 1 G
VIAL (EA) INTRAVENOUS
Refills: 0 | Status: DISCONTINUED | OUTPATIENT
Start: 2019-01-01 | End: 2019-01-01

## 2019-01-01 RX ORDER — FUROSEMIDE 40 MG
20 TABLET ORAL
Refills: 0 | Status: DISCONTINUED | OUTPATIENT
Start: 2019-01-01 | End: 2019-01-01

## 2019-01-01 RX ORDER — SODIUM CHLORIDE 9 MG/ML
1000 INJECTION, SOLUTION INTRAVENOUS
Qty: 0 | Refills: 0 | Status: DISCONTINUED | OUTPATIENT
Start: 2019-01-01 | End: 2019-01-01

## 2019-01-01 RX ORDER — PANTOPRAZOLE SODIUM 20 MG/1
1 TABLET, DELAYED RELEASE ORAL
Qty: 0 | Refills: 0 | COMMUNITY
Start: 2019-01-01

## 2019-01-01 RX ORDER — PANTOPRAZOLE SODIUM 20 MG/1
1 TABLET, DELAYED RELEASE ORAL
Qty: 14 | Refills: 0
Start: 2019-01-01 | End: 2019-01-01

## 2019-01-01 RX ORDER — AZITHROMYCIN 500 MG/1
0 TABLET, FILM COATED ORAL
Qty: 0 | Refills: 0 | COMMUNITY

## 2019-01-01 RX ORDER — ALBUTEROL 90 UG/1
2 AEROSOL, METERED ORAL
Qty: 0 | Refills: 0 | DISCHARGE
Start: 2019-01-01

## 2019-01-01 RX ORDER — DIAZEPAM 5 MG
2 TABLET ORAL ONCE
Refills: 0 | Status: DISCONTINUED | OUTPATIENT
Start: 2019-01-01 | End: 2019-01-01

## 2019-01-01 RX ORDER — NITROGLYCERIN 6.5 MG
0.4 CAPSULE, EXTENDED RELEASE ORAL ONCE
Refills: 0 | Status: COMPLETED | OUTPATIENT
Start: 2019-01-01 | End: 2019-01-01

## 2019-01-01 RX ORDER — ENOXAPARIN SODIUM 100 MG/ML
40 INJECTION SUBCUTANEOUS DAILY
Refills: 0 | Status: DISCONTINUED | OUTPATIENT
Start: 2019-01-01 | End: 2019-01-01

## 2019-01-01 RX ORDER — PANTOPRAZOLE SODIUM 20 MG/1
40 TABLET, DELAYED RELEASE ORAL DAILY
Qty: 0 | Refills: 0 | Status: DISCONTINUED | OUTPATIENT
Start: 2019-01-01 | End: 2019-01-01

## 2019-01-01 RX ORDER — FUROSEMIDE 40 MG
40 TABLET ORAL DAILY
Refills: 0 | Status: DISCONTINUED | OUTPATIENT
Start: 2019-01-01 | End: 2019-01-01

## 2019-01-01 RX ORDER — MONTELUKAST 4 MG/1
10 TABLET, CHEWABLE ORAL DAILY
Refills: 0 | Status: DISCONTINUED | OUTPATIENT
Start: 2019-01-01 | End: 2019-01-01

## 2019-01-01 RX ORDER — FENTANYL CITRATE 50 UG/ML
25 INJECTION INTRAVENOUS ONCE
Qty: 0 | Refills: 0 | Status: DISCONTINUED | OUTPATIENT
Start: 2019-01-01 | End: 2019-01-01

## 2019-01-01 RX ORDER — OXYCODONE AND ACETAMINOPHEN 5; 325 MG/1; MG/1
2 TABLET ORAL EVERY 4 HOURS
Refills: 0 | Status: DISCONTINUED | OUTPATIENT
Start: 2019-01-01 | End: 2019-01-01

## 2019-01-01 RX ORDER — SACCHAROMYCES BOULARDII 250 MG
250 POWDER IN PACKET (EA) ORAL
Refills: 0 | Status: DISCONTINUED | OUTPATIENT
Start: 2019-01-01 | End: 2019-01-01

## 2019-01-01 RX ORDER — ATORVASTATIN CALCIUM 10 MG/1
10 TABLET, FILM COATED ORAL
Refills: 0 | Status: ACTIVE | COMMUNITY
Start: 2019-01-01

## 2019-01-01 RX ORDER — LANOLIN ALCOHOL/MO/W.PET/CERES
5 CREAM (GRAM) TOPICAL AT BEDTIME
Qty: 0 | Refills: 0 | Status: DISCONTINUED | OUTPATIENT
Start: 2019-01-01 | End: 2019-01-01

## 2019-01-01 RX ORDER — IBUPROFEN 200 MG
1 TABLET ORAL
Qty: 0 | Refills: 0 | DISCHARGE
Start: 2019-01-01

## 2019-01-01 RX ORDER — SORBITOL SOLUTION 70 %
30 SOLUTION, ORAL MISCELLANEOUS ONCE
Refills: 0 | Status: COMPLETED | OUTPATIENT
Start: 2019-01-01 | End: 2019-01-01

## 2019-01-01 RX ORDER — TORSEMIDE 20 MG/1
20 TABLET ORAL DAILY
Qty: 30 | Refills: 0 | Status: DISCONTINUED | COMMUNITY
Start: 2019-01-01 | End: 2019-01-01

## 2019-01-01 RX ORDER — PIPERACILLIN AND TAZOBACTAM 4; .5 G/20ML; G/20ML
3.38 INJECTION, POWDER, LYOPHILIZED, FOR SOLUTION INTRAVENOUS EVERY 8 HOURS
Refills: 0 | Status: DISCONTINUED | OUTPATIENT
Start: 2019-01-01 | End: 2019-01-01

## 2019-01-01 RX ORDER — METOPROLOL TARTRATE 50 MG
5 TABLET ORAL EVERY 6 HOURS
Refills: 0 | Status: DISCONTINUED | OUTPATIENT
Start: 2019-01-01 | End: 2019-01-01

## 2019-01-01 RX ORDER — SODIUM CHLORIDE 9 MG/ML
3 INJECTION INTRAMUSCULAR; INTRAVENOUS; SUBCUTANEOUS EVERY 8 HOURS
Qty: 0 | Refills: 0 | Status: DISCONTINUED | OUTPATIENT
Start: 2019-01-01 | End: 2019-01-01

## 2019-01-01 RX ORDER — VANCOMYCIN HCL 1 G
1000 VIAL (EA) INTRAVENOUS ONCE
Refills: 0 | Status: COMPLETED | OUTPATIENT
Start: 2019-01-01 | End: 2019-01-01

## 2019-01-01 RX ORDER — ACETAMINOPHEN 500 MG
975 TABLET ORAL EVERY 6 HOURS
Refills: 0 | Status: DISCONTINUED | OUTPATIENT
Start: 2019-01-01 | End: 2019-01-01

## 2019-01-01 RX ORDER — MONTELUKAST 4 MG/1
1 TABLET, CHEWABLE ORAL
Qty: 0 | Refills: 0 | DISCHARGE
Start: 2019-01-01

## 2019-01-01 RX ORDER — PANTOPRAZOLE SODIUM 20 MG/1
40 TABLET, DELAYED RELEASE ORAL
Refills: 0 | Status: DISCONTINUED | OUTPATIENT
Start: 2019-01-01 | End: 2019-01-01

## 2019-01-01 RX ORDER — POTASSIUM CHLORIDE 20 MEQ
40 PACKET (EA) ORAL ONCE
Refills: 0 | Status: DISCONTINUED | OUTPATIENT
Start: 2019-01-01 | End: 2019-01-01

## 2019-01-01 RX ORDER — SENNA PLUS 8.6 MG/1
2 TABLET ORAL
Qty: 0 | Refills: 0 | DISCHARGE
Start: 2019-01-01

## 2019-01-01 RX ORDER — ACETAMINOPHEN 500 MG
650 TABLET ORAL EVERY 6 HOURS
Qty: 0 | Refills: 0 | Status: DISCONTINUED | OUTPATIENT
Start: 2019-01-01 | End: 2019-01-01

## 2019-01-01 RX ORDER — ALBUTEROL 90 UG/1
2 AEROSOL, METERED ORAL EVERY 6 HOURS
Refills: 0 | Status: DISCONTINUED | OUTPATIENT
Start: 2019-01-01 | End: 2019-01-01

## 2019-01-01 RX ORDER — OXYCODONE HYDROCHLORIDE 5 MG/1
0.5 TABLET ORAL
Qty: 24 | Refills: 0
Start: 2019-01-01 | End: 2019-01-01

## 2019-01-01 RX ORDER — HYDROMORPHONE HYDROCHLORIDE 2 MG/ML
0.5 INJECTION INTRAMUSCULAR; INTRAVENOUS; SUBCUTANEOUS EVERY 8 HOURS
Refills: 0 | Status: DISCONTINUED | OUTPATIENT
Start: 2019-01-01 | End: 2019-01-01

## 2019-01-01 RX ORDER — OXYCODONE AND ACETAMINOPHEN 5; 325 MG/1; MG/1
1 TABLET ORAL EVERY 4 HOURS
Refills: 0 | Status: DISCONTINUED | OUTPATIENT
Start: 2019-01-01 | End: 2019-01-01

## 2019-01-01 RX ORDER — OXYCODONE AND ACETAMINOPHEN 5; 325 MG/1; MG/1
2 TABLET ORAL EVERY 6 HOURS
Refills: 0 | Status: DISCONTINUED | OUTPATIENT
Start: 2019-01-01 | End: 2019-01-01

## 2019-01-01 RX ORDER — ACETAMINOPHEN 500 MG
1000 TABLET ORAL ONCE
Refills: 0 | Status: DISCONTINUED | OUTPATIENT
Start: 2019-01-01 | End: 2019-01-01

## 2019-01-01 RX ORDER — FUROSEMIDE 40 MG
40 TABLET ORAL ONCE
Refills: 0 | Status: COMPLETED | OUTPATIENT
Start: 2019-01-01 | End: 2019-01-01

## 2019-01-01 RX ORDER — CHLORHEXIDINE GLUCONATE 213 G/1000ML
1 SOLUTION TOPICAL
Qty: 0 | Refills: 0 | Status: DISCONTINUED | OUTPATIENT
Start: 2019-01-01 | End: 2019-01-01

## 2019-01-01 RX ORDER — PANTOPRAZOLE SODIUM 20 MG/1
40 TABLET, DELAYED RELEASE ORAL DAILY
Refills: 0 | Status: DISCONTINUED | OUTPATIENT
Start: 2019-01-01 | End: 2019-01-01

## 2019-01-01 RX ORDER — SERTRALINE 25 MG/1
1 TABLET, FILM COATED ORAL
Qty: 14 | Refills: 0
Start: 2019-01-01 | End: 2019-01-01

## 2019-01-01 RX ORDER — SODIUM CHLORIDE 9 MG/ML
3 INJECTION INTRAMUSCULAR; INTRAVENOUS; SUBCUTANEOUS ONCE
Refills: 0 | Status: DISCONTINUED | OUTPATIENT
Start: 2019-01-01 | End: 2019-01-01

## 2019-01-01 RX ORDER — LOSARTAN POTASSIUM 100 MG/1
50 TABLET, FILM COATED ORAL DAILY
Refills: 0 | Status: DISCONTINUED | OUTPATIENT
Start: 2019-01-01 | End: 2019-01-01

## 2019-01-01 RX ORDER — HEPARIN SODIUM 5000 [USP'U]/ML
5000 INJECTION INTRAVENOUS; SUBCUTANEOUS EVERY 8 HOURS
Refills: 0 | Status: DISCONTINUED | OUTPATIENT
Start: 2019-01-01 | End: 2019-01-01

## 2019-01-01 RX ORDER — POTASSIUM CHLORIDE 20 MEQ
20 PACKET (EA) ORAL ONCE
Refills: 0 | Status: COMPLETED | OUTPATIENT
Start: 2019-01-01 | End: 2019-01-01

## 2019-01-01 RX ORDER — LEVOFLOXACIN 500 MG/1
500 TABLET, FILM COATED ORAL DAILY
Qty: 7 | Refills: 0 | Status: DISCONTINUED | COMMUNITY
Start: 2018-01-01 | End: 2019-01-01

## 2019-01-01 RX ORDER — PIPERACILLIN AND TAZOBACTAM 4; .5 G/20ML; G/20ML
3.38 INJECTION, POWDER, LYOPHILIZED, FOR SOLUTION INTRAVENOUS EVERY 8 HOURS
Qty: 0 | Refills: 0 | Status: DISCONTINUED | OUTPATIENT
Start: 2019-01-01 | End: 2019-01-01

## 2019-01-01 RX ORDER — IPRATROPIUM/ALBUTEROL SULFATE 18-103MCG
3 AEROSOL WITH ADAPTER (GRAM) INHALATION ONCE
Qty: 0 | Refills: 0 | Status: COMPLETED | OUTPATIENT
Start: 2019-01-01 | End: 2019-01-01

## 2019-01-01 RX ORDER — POLYETHYLENE GLYCOL 3350 17 G/17G
17 POWDER, FOR SOLUTION ORAL DAILY
Qty: 0 | Refills: 0 | Status: DISCONTINUED | OUTPATIENT
Start: 2019-01-01 | End: 2019-01-01

## 2019-01-01 RX ORDER — LIDOCAINE 4 G/100G
1 CREAM TOPICAL DAILY
Refills: 0 | Status: DISCONTINUED | OUTPATIENT
Start: 2019-01-01 | End: 2019-01-01

## 2019-01-01 RX ORDER — FUROSEMIDE 40 MG
40 TABLET ORAL
Refills: 0 | Status: DISCONTINUED | OUTPATIENT
Start: 2019-01-01 | End: 2019-01-01

## 2019-01-01 RX ORDER — ASPIRIN/CALCIUM CARB/MAGNESIUM 324 MG
1 TABLET ORAL
Qty: 0 | Refills: 0 | DISCHARGE
Start: 2019-01-01

## 2019-01-01 RX ORDER — ALBUTEROL 90 UG/1
2.5 AEROSOL, METERED ORAL ONCE
Qty: 0 | Refills: 0 | Status: COMPLETED | OUTPATIENT
Start: 2019-01-01 | End: 2019-01-01

## 2019-01-01 RX ORDER — SODIUM POLYSTYRENE SULFONATE 4.1 MEQ/G
15 POWDER, FOR SUSPENSION ORAL ONCE
Qty: 0 | Refills: 0 | Status: COMPLETED | OUTPATIENT
Start: 2019-01-01 | End: 2019-01-01

## 2019-01-01 RX ORDER — ALBUTEROL SULFATE 2.5 MG/3ML
(2.5 MG/3ML) SOLUTION RESPIRATORY (INHALATION)
Qty: 1 | Refills: 3 | Status: ACTIVE | COMMUNITY
Start: 2017-10-02

## 2019-01-01 RX ORDER — SODIUM CHLORIDE 9 MG/ML
3 INJECTION INTRAMUSCULAR; INTRAVENOUS; SUBCUTANEOUS EVERY 8 HOURS
Refills: 0 | Status: DISCONTINUED | OUTPATIENT
Start: 2019-01-01 | End: 2019-01-01

## 2019-01-01 RX ORDER — KETOROLAC TROMETHAMINE 30 MG/ML
15 SYRINGE (ML) INJECTION ONCE
Qty: 0 | Refills: 0 | Status: DISCONTINUED | OUTPATIENT
Start: 2019-01-01 | End: 2019-01-01

## 2019-01-01 RX ORDER — IPRATROPIUM/ALBUTEROL SULFATE 18-103MCG
3 AEROSOL WITH ADAPTER (GRAM) INHALATION ONCE
Refills: 0 | Status: COMPLETED | OUTPATIENT
Start: 2019-01-01 | End: 2019-01-01

## 2019-01-01 RX ORDER — IBUPROFEN 200 MG
400 TABLET ORAL EVERY 4 HOURS
Refills: 0 | Status: DISCONTINUED | OUTPATIENT
Start: 2019-01-01 | End: 2019-01-01

## 2019-01-01 RX ORDER — CEFTRIAXONE 500 MG/1
1 INJECTION, POWDER, FOR SOLUTION INTRAMUSCULAR; INTRAVENOUS
Qty: 0 | Refills: 0 | DISCHARGE
Start: 2019-01-01

## 2019-01-01 RX ORDER — TRAMADOL HYDROCHLORIDE 50 MG/1
25 TABLET ORAL DAILY
Refills: 0 | Status: DISCONTINUED | OUTPATIENT
Start: 2019-01-01 | End: 2019-01-01

## 2019-01-01 RX ORDER — SODIUM CHLORIDE 9 MG/ML
1000 INJECTION INTRAMUSCULAR; INTRAVENOUS; SUBCUTANEOUS
Qty: 0 | Refills: 0 | Status: DISCONTINUED | OUTPATIENT
Start: 2019-01-01 | End: 2019-01-01

## 2019-01-01 RX ORDER — ALPRAZOLAM 0.25 MG
0.25 TABLET ORAL ONCE
Refills: 0 | Status: DISCONTINUED | OUTPATIENT
Start: 2019-01-01 | End: 2019-01-01

## 2019-01-01 RX ORDER — SODIUM CHLORIDE 9 MG/ML
3700 INJECTION, SOLUTION INTRAVENOUS ONCE
Qty: 0 | Refills: 0 | Status: COMPLETED | OUTPATIENT
Start: 2019-01-01 | End: 2019-01-01

## 2019-01-01 RX ORDER — FUROSEMIDE 40 MG
20 TABLET ORAL ONCE
Refills: 0 | Status: COMPLETED | OUTPATIENT
Start: 2019-01-01 | End: 2019-01-01

## 2019-01-01 RX ORDER — FLUTICASONE FUROATE, UMECLIDINIUM BROMIDE AND VILANTEROL TRIFENATATE 100; 62.5; 25 UG/1; UG/1; UG/1
100-62.5-25 POWDER RESPIRATORY (INHALATION)
Refills: 0 | Status: COMPLETED | COMMUNITY
End: 2019-01-01

## 2019-01-01 RX ORDER — ALBUTEROL 90 UG/1
2 AEROSOL, METERED ORAL
Qty: 1 | Refills: 0
Start: 2019-01-01 | End: 2019-01-01

## 2019-01-01 RX ORDER — POTASSIUM CHLORIDE 20 MEQ
40 PACKET (EA) ORAL ONCE
Refills: 0 | Status: COMPLETED | OUTPATIENT
Start: 2019-01-01 | End: 2019-01-01

## 2019-01-01 RX ORDER — ALPRAZOLAM 0.25 MG
0.25 TABLET ORAL AT BEDTIME
Qty: 0 | Refills: 0 | Status: DISCONTINUED | OUTPATIENT
Start: 2019-01-01 | End: 2019-01-01

## 2019-01-01 RX ORDER — CHLORHEXIDINE GLUCONATE 213 G/1000ML
1 SOLUTION TOPICAL DAILY
Qty: 0 | Refills: 0 | Status: DISCONTINUED | OUTPATIENT
Start: 2019-01-01 | End: 2019-01-01

## 2019-01-01 RX ORDER — PREDNISONE 10 MG/1
10 TABLET ORAL DAILY
Qty: 90 | Refills: 1 | Status: ACTIVE | COMMUNITY
Start: 2017-10-02 | End: 1900-01-01

## 2019-01-01 RX ORDER — OXYCODONE HYDROCHLORIDE 5 MG/1
15 TABLET ORAL EVERY 12 HOURS
Refills: 0 | Status: DISCONTINUED | OUTPATIENT
Start: 2019-01-01 | End: 2019-01-01

## 2019-01-01 RX ORDER — ATORVASTATIN CALCIUM 80 MG/1
40 TABLET, FILM COATED ORAL AT BEDTIME
Qty: 0 | Refills: 0 | Status: DISCONTINUED | OUTPATIENT
Start: 2019-01-01 | End: 2019-01-01

## 2019-01-01 RX ORDER — AZITHROMYCIN 250 MG/1
250 TABLET, FILM COATED ORAL
Qty: 36 | Refills: 1 | Status: DISCONTINUED | COMMUNITY
Start: 2018-01-01 | End: 2019-01-01

## 2019-01-01 RX ORDER — FENTANYL CITRATE 50 UG/ML
25 INJECTION INTRAVENOUS
Qty: 0 | Refills: 0 | Status: DISCONTINUED | OUTPATIENT
Start: 2019-01-01 | End: 2019-01-01

## 2019-01-01 RX ORDER — HYDRALAZINE HCL 50 MG
10 TABLET ORAL EVERY 6 HOURS
Qty: 0 | Refills: 0 | Status: DISCONTINUED | OUTPATIENT
Start: 2019-01-01 | End: 2019-01-01

## 2019-01-01 RX ORDER — HYDROMORPHONE HYDROCHLORIDE 2 MG/ML
0.4 INJECTION INTRAMUSCULAR; INTRAVENOUS; SUBCUTANEOUS ONCE
Refills: 0 | Status: DISCONTINUED | OUTPATIENT
Start: 2019-01-01 | End: 2019-01-01

## 2019-01-01 RX ORDER — TRAMADOL HYDROCHLORIDE 50 MG/1
50 TABLET ORAL
Qty: 0 | Refills: 0 | COMMUNITY

## 2019-01-01 RX ORDER — FENTANYL CITRATE 50 UG/ML
25 INJECTION INTRAVENOUS
Refills: 0 | Status: DISCONTINUED | OUTPATIENT
Start: 2019-01-01 | End: 2019-01-01

## 2019-01-01 RX ORDER — PIPERACILLIN AND TAZOBACTAM 4; .5 G/20ML; G/20ML
3.38 INJECTION, POWDER, LYOPHILIZED, FOR SOLUTION INTRAVENOUS ONCE
Qty: 0 | Refills: 0 | Status: COMPLETED | OUTPATIENT
Start: 2019-01-01 | End: 2019-01-01

## 2019-01-01 RX ORDER — MONTELUKAST 10 MG/1
10 TABLET, FILM COATED ORAL DAILY
Qty: 90 | Refills: 1 | Status: ACTIVE | COMMUNITY
Start: 1900-01-01 | End: 1900-01-01

## 2019-01-01 RX ORDER — ALBUTEROL 90 UG/1
2.5 AEROSOL, METERED ORAL ONCE
Refills: 0 | Status: COMPLETED | OUTPATIENT
Start: 2019-01-01 | End: 2019-01-01

## 2019-01-01 RX ORDER — LOSARTAN POTASSIUM 100 MG/1
25 TABLET, FILM COATED ORAL DAILY
Qty: 0 | Refills: 0 | Status: DISCONTINUED | OUTPATIENT
Start: 2019-01-01 | End: 2019-01-01

## 2019-01-01 RX ORDER — ACETAZOLAMIDE 250 MG/1
500 TABLET ORAL ONCE
Refills: 0 | Status: COMPLETED | OUTPATIENT
Start: 2019-01-01 | End: 2019-01-01

## 2019-01-01 RX ORDER — ZALEPLON 10 MG
5 CAPSULE ORAL AT BEDTIME
Refills: 0 | Status: DISCONTINUED | OUTPATIENT
Start: 2019-01-01 | End: 2019-01-01

## 2019-01-01 RX ORDER — IRBESARTAN 75 MG/1
1 TABLET ORAL
Qty: 30 | Refills: 0
Start: 2019-01-01 | End: 2019-11-09

## 2019-01-01 RX ORDER — TRAMADOL HYDROCHLORIDE 50 MG/1
50 TABLET ORAL EVERY 6 HOURS
Refills: 0 | Status: DISCONTINUED | OUTPATIENT
Start: 2019-01-01 | End: 2019-01-01

## 2019-01-01 RX ORDER — LIDOCAINE HCL 20 MG/ML
10 VIAL (ML) INJECTION ONCE
Refills: 0 | Status: COMPLETED | OUTPATIENT
Start: 2019-01-01 | End: 2019-01-01

## 2019-01-01 RX ORDER — SPIRONOLACTONE 25 MG/1
25 TABLET ORAL
Refills: 0 | Status: COMPLETED | COMMUNITY
End: 2019-01-01

## 2019-01-01 RX ORDER — PNEUMOCOCCAL 13-VALENT CONJUGATE VACCINE 2.2; 2.2; 2.2; 2.2; 2.2; 4.4; 2.2; 2.2; 2.2; 2.2; 2.2; 2.2; 2.2 UG/.5ML; UG/.5ML; UG/.5ML; UG/.5ML; UG/.5ML; UG/.5ML; UG/.5ML; UG/.5ML; UG/.5ML; UG/.5ML; UG/.5ML; UG/.5ML; UG/.5ML
INJECTION, SUSPENSION INTRAMUSCULAR
Qty: 1 | Refills: 0 | Status: DISCONTINUED | COMMUNITY
Start: 2018-01-01 | End: 2019-01-01

## 2019-01-01 RX ORDER — HYDROMORPHONE HYDROCHLORIDE 2 MG/ML
0.5 INJECTION INTRAMUSCULAR; INTRAVENOUS; SUBCUTANEOUS ONCE
Refills: 0 | Status: DISCONTINUED | OUTPATIENT
Start: 2019-01-01 | End: 2019-01-01

## 2019-01-01 RX ORDER — AZITHROMYCIN 250 MG
CAPSULE ORAL
Refills: 0 | Status: ACTIVE | COMMUNITY

## 2019-01-01 RX ORDER — IRBESARTAN 300 MG/1
300 TABLET ORAL
Qty: 90 | Refills: 1 | Status: DISCONTINUED | COMMUNITY
Start: 2019-01-01 | End: 2019-01-01

## 2019-01-01 RX ORDER — FLUTICASONE FUROATE, UMECLIDINIUM BROMIDE AND VILANTEROL TRIFENATATE 100; 62.5; 25 UG/1; UG/1; UG/1
100-62.5-25 POWDER RESPIRATORY (INHALATION) DAILY
Qty: 3 | Refills: 0 | Status: ACTIVE | COMMUNITY
Start: 2019-01-01 | End: 1900-01-01

## 2019-01-01 RX ORDER — LOSARTAN POTASSIUM 100 MG/1
50 TABLET, FILM COATED ORAL DAILY
Qty: 0 | Refills: 0 | Status: DISCONTINUED | OUTPATIENT
Start: 2019-01-01 | End: 2019-01-01

## 2019-01-01 RX ORDER — CEFTRIAXONE 500 MG/1
1000 INJECTION, POWDER, FOR SOLUTION INTRAMUSCULAR; INTRAVENOUS EVERY 24 HOURS
Refills: 0 | Status: DISCONTINUED | OUTPATIENT
Start: 2019-01-01 | End: 2019-01-01

## 2019-01-01 RX ORDER — SERTRALINE 25 MG/1
1 TABLET, FILM COATED ORAL
Qty: 0 | Refills: 0 | DISCHARGE
Start: 2019-01-01

## 2019-01-01 RX ORDER — ALPRAZOLAM 0.25 MG/1
0.25 TABLET ORAL 3 TIMES DAILY
Qty: 20 | Refills: 0 | Status: DISCONTINUED | COMMUNITY
Start: 2019-01-01 | End: 2019-01-01

## 2019-01-01 RX ORDER — AZITHROMYCIN 500 MG/1
500 TABLET, FILM COATED ORAL EVERY 24 HOURS
Qty: 0 | Refills: 0 | Status: DISCONTINUED | OUTPATIENT
Start: 2019-01-01 | End: 2019-01-01

## 2019-01-01 RX ORDER — OXYCODONE HYDROCHLORIDE 5 MG/1
10 TABLET ORAL ONCE
Refills: 0 | Status: DISCONTINUED | OUTPATIENT
Start: 2019-01-01 | End: 2019-01-01

## 2019-01-01 RX ORDER — LOSARTAN POTASSIUM 100 MG/1
1 TABLET, FILM COATED ORAL
Qty: 0 | Refills: 0 | DISCHARGE
Start: 2019-01-01

## 2019-01-01 RX ORDER — ENOXAPARIN SODIUM 100 MG/ML
40 INJECTION SUBCUTANEOUS EVERY 24 HOURS
Refills: 0 | Status: DISCONTINUED | OUTPATIENT
Start: 2019-01-01 | End: 2019-01-01

## 2019-01-01 RX ORDER — IPRATROPIUM/ALBUTEROL SULFATE 18-103MCG
3 AEROSOL WITH ADAPTER (GRAM) INHALATION EVERY 6 HOURS
Qty: 0 | Refills: 0 | Status: DISCONTINUED | OUTPATIENT
Start: 2019-01-01 | End: 2019-01-01

## 2019-01-01 RX ORDER — POTASSIUM CHLORIDE 10 MEQ
10 CAPSULE, EXTENDED RELEASE ORAL TWICE DAILY
Refills: 0 | Status: DISCONTINUED | COMMUNITY
End: 2019-01-01

## 2019-01-01 RX ORDER — ATORVASTATIN CALCIUM 80 MG/1
40 TABLET, FILM COATED ORAL AT BEDTIME
Refills: 0 | Status: DISCONTINUED | OUTPATIENT
Start: 2019-01-01 | End: 2019-01-01

## 2019-01-01 RX ORDER — ONDANSETRON 8 MG/1
4 TABLET, FILM COATED ORAL EVERY 6 HOURS
Refills: 0 | Status: DISCONTINUED | OUTPATIENT
Start: 2019-01-01 | End: 2019-01-01

## 2019-01-01 RX ORDER — OXYCODONE AND ACETAMINOPHEN 5; 325 MG/1; MG/1
2 TABLET ORAL EVERY 4 HOURS
Qty: 0 | Refills: 0 | Status: DISCONTINUED | OUTPATIENT
Start: 2019-01-01 | End: 2019-01-01

## 2019-01-01 RX ORDER — OXYCODONE AND ACETAMINOPHEN 5; 325 MG/1; MG/1
1 TABLET ORAL EVERY 4 HOURS
Qty: 0 | Refills: 0 | Status: DISCONTINUED | OUTPATIENT
Start: 2019-01-01 | End: 2019-01-01

## 2019-01-01 RX ORDER — VANCOMYCIN HCL 1 G
1000 VIAL (EA) INTRAVENOUS ONCE
Qty: 0 | Refills: 0 | Status: COMPLETED | OUTPATIENT
Start: 2019-01-01 | End: 2019-01-01

## 2019-01-01 RX ORDER — AMOXICILLIN AND CLAVULANATE POTASSIUM 500; 125 MG/1; MG/1
500-125 TABLET, FILM COATED ORAL
Qty: 30 | Refills: 0 | Status: COMPLETED | COMMUNITY
Start: 2019-01-01 | End: 2019-01-01

## 2019-01-01 RX ORDER — MONTELUKAST 4 MG/1
10 TABLET, CHEWABLE ORAL DAILY
Qty: 0 | Refills: 0 | Status: DISCONTINUED | OUTPATIENT
Start: 2019-01-01 | End: 2019-01-01

## 2019-01-01 RX ORDER — LOSARTAN POTASSIUM 100 MG/1
100 TABLET, FILM COATED ORAL DAILY
Refills: 0 | Status: DISCONTINUED | OUTPATIENT
Start: 2019-01-01 | End: 2019-01-01

## 2019-01-01 RX ORDER — DOCUSATE SODIUM 100 MG
1 CAPSULE ORAL
Qty: 0 | Refills: 0 | DISCHARGE
Start: 2019-01-01

## 2019-01-01 RX ORDER — ALPRAZOLAM 0.25 MG
0.25 TABLET ORAL ONCE
Qty: 0 | Refills: 0 | Status: DISCONTINUED | OUTPATIENT
Start: 2019-01-01 | End: 2019-01-01

## 2019-01-01 RX ORDER — VANCOMYCIN HCL 1 G
1000 VIAL (EA) INTRAVENOUS EVERY 12 HOURS
Refills: 0 | Status: DISCONTINUED | OUTPATIENT
Start: 2019-01-01 | End: 2019-01-01

## 2019-01-01 RX ORDER — ASPIRIN/CALCIUM CARB/MAGNESIUM 324 MG
81 TABLET ORAL ONCE
Qty: 0 | Refills: 0 | Status: COMPLETED | OUTPATIENT
Start: 2019-01-01 | End: 2019-01-01

## 2019-01-01 RX ORDER — OXYCODONE HYDROCHLORIDE 5 MG/1
2.5 TABLET ORAL EVERY 4 HOURS
Refills: 0 | Status: DISCONTINUED | OUTPATIENT
Start: 2019-01-01 | End: 2019-01-01

## 2019-01-01 RX ORDER — LABETALOL HCL 100 MG
10 TABLET ORAL ONCE
Qty: 0 | Refills: 0 | Status: COMPLETED | OUTPATIENT
Start: 2019-01-01 | End: 2019-01-01

## 2019-01-01 RX ORDER — TIOTROPIUM BROMIDE 18 UG/1
1 CAPSULE ORAL; RESPIRATORY (INHALATION) DAILY
Refills: 0 | Status: DISCONTINUED | OUTPATIENT
Start: 2019-01-01 | End: 2019-01-01

## 2019-01-01 RX ORDER — LOSARTAN POTASSIUM 100 MG/1
25 TABLET, FILM COATED ORAL DAILY
Refills: 0 | Status: DISCONTINUED | OUTPATIENT
Start: 2019-01-01 | End: 2019-01-01

## 2019-01-01 RX ORDER — IBUPROFEN 200 MG
1 TABLET ORAL
Qty: 0 | Refills: 0 | COMMUNITY

## 2019-01-01 RX ORDER — ATORVASTATIN CALCIUM 80 MG/1
1 TABLET, FILM COATED ORAL
Qty: 0 | Refills: 0 | DISCHARGE
Start: 2019-01-01

## 2019-01-01 RX ADMIN — SODIUM CHLORIDE 3 MILLILITER(S): 9 INJECTION INTRAMUSCULAR; INTRAVENOUS; SUBCUTANEOUS at 21:48

## 2019-01-01 RX ADMIN — HEPARIN SODIUM 5000 UNIT(S): 5000 INJECTION INTRAVENOUS; SUBCUTANEOUS at 17:15

## 2019-01-01 RX ADMIN — SODIUM CHLORIDE 3 MILLILITER(S): 9 INJECTION INTRAMUSCULAR; INTRAVENOUS; SUBCUTANEOUS at 21:19

## 2019-01-01 RX ADMIN — Medication 40 MILLIGRAM(S): at 06:05

## 2019-01-01 RX ADMIN — TRAMADOL HYDROCHLORIDE 50 MILLIGRAM(S): 50 TABLET ORAL at 05:59

## 2019-01-01 RX ADMIN — Medication 250 MILLIGRAM(S): at 17:41

## 2019-01-01 RX ADMIN — POLYETHYLENE GLYCOL 3350 17 GRAM(S): 17 POWDER, FOR SOLUTION ORAL at 12:13

## 2019-01-01 RX ADMIN — Medication 81 MILLIGRAM(S): at 13:39

## 2019-01-01 RX ADMIN — SODIUM CHLORIDE 3 MILLILITER(S): 9 INJECTION INTRAMUSCULAR; INTRAVENOUS; SUBCUTANEOUS at 05:58

## 2019-01-01 RX ADMIN — POLYETHYLENE GLYCOL 3350 17 GRAM(S): 17 POWDER, FOR SOLUTION ORAL at 11:23

## 2019-01-01 RX ADMIN — SERTRALINE 25 MILLIGRAM(S): 25 TABLET, FILM COATED ORAL at 16:58

## 2019-01-01 RX ADMIN — POLYETHYLENE GLYCOL 3350 17 GRAM(S): 17 POWDER, FOR SOLUTION ORAL at 11:59

## 2019-01-01 RX ADMIN — Medication 100 MILLIGRAM(S): at 12:30

## 2019-01-01 RX ADMIN — TIOTROPIUM BROMIDE 1 CAPSULE(S): 18 CAPSULE ORAL; RESPIRATORY (INHALATION) at 08:53

## 2019-01-01 RX ADMIN — Medication 650 MILLIGRAM(S): at 17:15

## 2019-01-01 RX ADMIN — SERTRALINE 25 MILLIGRAM(S): 25 TABLET, FILM COATED ORAL at 10:54

## 2019-01-01 RX ADMIN — Medication 3 MILLILITER(S): at 20:19

## 2019-01-01 RX ADMIN — Medication 10 MILLIGRAM(S): at 06:20

## 2019-01-01 RX ADMIN — PIPERACILLIN AND TAZOBACTAM 25 GRAM(S): 4; .5 INJECTION, POWDER, LYOPHILIZED, FOR SOLUTION INTRAVENOUS at 23:19

## 2019-01-01 RX ADMIN — Medication 100 MILLIGRAM(S): at 06:09

## 2019-01-01 RX ADMIN — Medication 100 MILLIGRAM(S): at 05:12

## 2019-01-01 RX ADMIN — FENTANYL CITRATE 30 MILLILITER(S): 50 INJECTION INTRAVENOUS at 16:51

## 2019-01-01 RX ADMIN — Medication 100 MILLIGRAM(S): at 21:30

## 2019-01-01 RX ADMIN — CARVEDILOL PHOSPHATE 3.12 MILLIGRAM(S): 80 CAPSULE, EXTENDED RELEASE ORAL at 18:13

## 2019-01-01 RX ADMIN — LIDOCAINE 1 PATCH: 4 CREAM TOPICAL at 12:42

## 2019-01-01 RX ADMIN — Medication 100 MILLIGRAM(S): at 22:58

## 2019-01-01 RX ADMIN — Medication 20 MILLIGRAM(S): at 06:12

## 2019-01-01 RX ADMIN — SPIRONOLACTONE 25 MILLIGRAM(S): 25 TABLET, FILM COATED ORAL at 06:17

## 2019-01-01 RX ADMIN — LOSARTAN POTASSIUM 25 MILLIGRAM(S): 100 TABLET, FILM COATED ORAL at 05:47

## 2019-01-01 RX ADMIN — SODIUM CHLORIDE 3 MILLILITER(S): 9 INJECTION INTRAMUSCULAR; INTRAVENOUS; SUBCUTANEOUS at 13:12

## 2019-01-01 RX ADMIN — ALBUTEROL 2.5 MILLIGRAM(S): 90 AEROSOL, METERED ORAL at 07:11

## 2019-01-01 RX ADMIN — Medication 3 MILLILITER(S): at 21:01

## 2019-01-01 RX ADMIN — Medication 81 MILLIGRAM(S): at 11:21

## 2019-01-01 RX ADMIN — CARVEDILOL PHOSPHATE 3.12 MILLIGRAM(S): 80 CAPSULE, EXTENDED RELEASE ORAL at 18:46

## 2019-01-01 RX ADMIN — PANTOPRAZOLE SODIUM 40 MILLIGRAM(S): 20 TABLET, DELAYED RELEASE ORAL at 11:52

## 2019-01-01 RX ADMIN — MONTELUKAST 10 MILLIGRAM(S): 4 TABLET, CHEWABLE ORAL at 09:04

## 2019-01-01 RX ADMIN — Medication 5 MILLIGRAM(S): at 21:20

## 2019-01-01 RX ADMIN — TRAMADOL HYDROCHLORIDE 25 MILLIGRAM(S): 50 TABLET ORAL at 23:02

## 2019-01-01 RX ADMIN — SPIRONOLACTONE 25 MILLIGRAM(S): 25 TABLET, FILM COATED ORAL at 05:11

## 2019-01-01 RX ADMIN — SENNA PLUS 2 TABLET(S): 8.6 TABLET ORAL at 21:53

## 2019-01-01 RX ADMIN — Medication 3 MILLILITER(S): at 14:53

## 2019-01-01 RX ADMIN — PIPERACILLIN AND TAZOBACTAM 25 GRAM(S): 4; .5 INJECTION, POWDER, LYOPHILIZED, FOR SOLUTION INTRAVENOUS at 07:01

## 2019-01-01 RX ADMIN — Medication 3 MILLILITER(S): at 03:23

## 2019-01-01 RX ADMIN — Medication 10 MILLIGRAM(S): at 05:31

## 2019-01-01 RX ADMIN — SPIRONOLACTONE 25 MILLIGRAM(S): 25 TABLET, FILM COATED ORAL at 05:18

## 2019-01-01 RX ADMIN — Medication 50 GRAM(S): at 03:21

## 2019-01-01 RX ADMIN — MONTELUKAST 10 MILLIGRAM(S): 4 TABLET, CHEWABLE ORAL at 09:18

## 2019-01-01 RX ADMIN — ENOXAPARIN SODIUM 40 MILLIGRAM(S): 100 INJECTION SUBCUTANEOUS at 22:46

## 2019-01-01 RX ADMIN — ALBUTEROL 2 PUFF(S): 90 AEROSOL, METERED ORAL at 20:18

## 2019-01-01 RX ADMIN — Medication 250 MILLIGRAM(S): at 06:52

## 2019-01-01 RX ADMIN — Medication 650 MILLIGRAM(S): at 05:59

## 2019-01-01 RX ADMIN — SPIRONOLACTONE 25 MILLIGRAM(S): 25 TABLET, FILM COATED ORAL at 05:13

## 2019-01-01 RX ADMIN — SODIUM CHLORIDE 3700 MILLILITER(S): 9 INJECTION, SOLUTION INTRAVENOUS at 11:10

## 2019-01-01 RX ADMIN — ENOXAPARIN SODIUM 40 MILLIGRAM(S): 100 INJECTION SUBCUTANEOUS at 11:23

## 2019-01-01 RX ADMIN — SENNA PLUS 2 TABLET(S): 8.6 TABLET ORAL at 21:45

## 2019-01-01 RX ADMIN — Medication 10 MILLIGRAM(S): at 05:09

## 2019-01-01 RX ADMIN — OXYCODONE HYDROCHLORIDE 15 MILLIGRAM(S): 5 TABLET ORAL at 05:38

## 2019-01-01 RX ADMIN — Medication 650 MILLIGRAM(S): at 17:30

## 2019-01-01 RX ADMIN — CARVEDILOL PHOSPHATE 3.12 MILLIGRAM(S): 80 CAPSULE, EXTENDED RELEASE ORAL at 17:16

## 2019-01-01 RX ADMIN — CARVEDILOL PHOSPHATE 3.12 MILLIGRAM(S): 80 CAPSULE, EXTENDED RELEASE ORAL at 17:38

## 2019-01-01 RX ADMIN — PANTOPRAZOLE SODIUM 40 MILLIGRAM(S): 20 TABLET, DELAYED RELEASE ORAL at 10:38

## 2019-01-01 RX ADMIN — SERTRALINE 25 MILLIGRAM(S): 25 TABLET, FILM COATED ORAL at 11:50

## 2019-01-01 RX ADMIN — FENTANYL CITRATE 30 MILLILITER(S): 50 INJECTION INTRAVENOUS at 19:29

## 2019-01-01 RX ADMIN — Medication 3 MILLILITER(S): at 15:17

## 2019-01-01 RX ADMIN — TRAMADOL HYDROCHLORIDE 50 MILLIGRAM(S): 50 TABLET ORAL at 01:15

## 2019-01-01 RX ADMIN — OXYCODONE HYDROCHLORIDE 2.5 MILLIGRAM(S): 5 TABLET ORAL at 18:13

## 2019-01-01 RX ADMIN — Medication 3 MILLILITER(S): at 20:55

## 2019-01-01 RX ADMIN — Medication 40 MILLIGRAM(S): at 05:55

## 2019-01-01 RX ADMIN — Medication 650 MILLIGRAM(S): at 22:21

## 2019-01-01 RX ADMIN — CARVEDILOL PHOSPHATE 3.12 MILLIGRAM(S): 80 CAPSULE, EXTENDED RELEASE ORAL at 17:10

## 2019-01-01 RX ADMIN — ATORVASTATIN CALCIUM 20 MILLIGRAM(S): 80 TABLET, FILM COATED ORAL at 21:13

## 2019-01-01 RX ADMIN — HEPARIN SODIUM 5000 UNIT(S): 5000 INJECTION INTRAVENOUS; SUBCUTANEOUS at 05:19

## 2019-01-01 RX ADMIN — PIPERACILLIN AND TAZOBACTAM 25 GRAM(S): 4; .5 INJECTION, POWDER, LYOPHILIZED, FOR SOLUTION INTRAVENOUS at 22:56

## 2019-01-01 RX ADMIN — Medication 1 MILLIGRAM(S): at 18:57

## 2019-01-01 RX ADMIN — Medication 10 MILLIGRAM(S): at 05:14

## 2019-01-01 RX ADMIN — PANTOPRAZOLE SODIUM 40 MILLIGRAM(S): 20 TABLET, DELAYED RELEASE ORAL at 13:32

## 2019-01-01 RX ADMIN — Medication 10 MILLIGRAM(S): at 05:55

## 2019-01-01 RX ADMIN — Medication 3 MILLILITER(S): at 14:55

## 2019-01-01 RX ADMIN — CARVEDILOL PHOSPHATE 3.12 MILLIGRAM(S): 80 CAPSULE, EXTENDED RELEASE ORAL at 06:09

## 2019-01-01 RX ADMIN — Medication 3 MILLILITER(S): at 21:22

## 2019-01-01 RX ADMIN — Medication 20 MILLIGRAM(S): at 21:30

## 2019-01-01 RX ADMIN — SODIUM CHLORIDE 3 MILLILITER(S): 9 INJECTION INTRAMUSCULAR; INTRAVENOUS; SUBCUTANEOUS at 23:18

## 2019-01-01 RX ADMIN — OXYCODONE AND ACETAMINOPHEN 2 TABLET(S): 5; 325 TABLET ORAL at 20:27

## 2019-01-01 RX ADMIN — ATORVASTATIN CALCIUM 20 MILLIGRAM(S): 80 TABLET, FILM COATED ORAL at 22:55

## 2019-01-01 RX ADMIN — PIPERACILLIN AND TAZOBACTAM 25 GRAM(S): 4; .5 INJECTION, POWDER, LYOPHILIZED, FOR SOLUTION INTRAVENOUS at 15:43

## 2019-01-01 RX ADMIN — SODIUM CHLORIDE 3 MILLILITER(S): 9 INJECTION INTRAMUSCULAR; INTRAVENOUS; SUBCUTANEOUS at 05:53

## 2019-01-01 RX ADMIN — PANTOPRAZOLE SODIUM 40 MILLIGRAM(S): 20 TABLET, DELAYED RELEASE ORAL at 12:50

## 2019-01-01 RX ADMIN — Medication 650 MILLIGRAM(S): at 17:22

## 2019-01-01 RX ADMIN — Medication 20 MILLIGRAM(S): at 18:57

## 2019-01-01 RX ADMIN — SODIUM CHLORIDE 3 MILLILITER(S): 9 INJECTION INTRAMUSCULAR; INTRAVENOUS; SUBCUTANEOUS at 22:34

## 2019-01-01 RX ADMIN — ATORVASTATIN CALCIUM 20 MILLIGRAM(S): 80 TABLET, FILM COATED ORAL at 22:26

## 2019-01-01 RX ADMIN — Medication 40 MILLIGRAM(S): at 11:52

## 2019-01-01 RX ADMIN — PANTOPRAZOLE SODIUM 40 MILLIGRAM(S): 20 TABLET, DELAYED RELEASE ORAL at 11:22

## 2019-01-01 RX ADMIN — ALBUTEROL 2 PUFF(S): 90 AEROSOL, METERED ORAL at 16:40

## 2019-01-01 RX ADMIN — TRAMADOL HYDROCHLORIDE 50 MILLIGRAM(S): 50 TABLET ORAL at 00:32

## 2019-01-01 RX ADMIN — Medication 40 MILLIEQUIVALENT(S): at 13:40

## 2019-01-01 RX ADMIN — CEFTRIAXONE 100 MILLIGRAM(S): 500 INJECTION, POWDER, FOR SOLUTION INTRAMUSCULAR; INTRAVENOUS at 14:04

## 2019-01-01 RX ADMIN — SODIUM CHLORIDE 3 MILLILITER(S): 9 INJECTION INTRAMUSCULAR; INTRAVENOUS; SUBCUTANEOUS at 14:45

## 2019-01-01 RX ADMIN — OXYCODONE HYDROCHLORIDE 5 MILLIGRAM(S): 5 TABLET ORAL at 03:57

## 2019-01-01 RX ADMIN — Medication 100 MILLIGRAM(S): at 06:01

## 2019-01-01 RX ADMIN — Medication 40 MILLIGRAM(S): at 06:34

## 2019-01-01 RX ADMIN — Medication 3 MILLILITER(S): at 20:54

## 2019-01-01 RX ADMIN — HYDROMORPHONE HYDROCHLORIDE 0.4 MILLIGRAM(S): 2 INJECTION INTRAMUSCULAR; INTRAVENOUS; SUBCUTANEOUS at 14:33

## 2019-01-01 RX ADMIN — CEFTRIAXONE 100 GRAM(S): 500 INJECTION, POWDER, FOR SOLUTION INTRAMUSCULAR; INTRAVENOUS at 09:58

## 2019-01-01 RX ADMIN — ENOXAPARIN SODIUM 40 MILLIGRAM(S): 100 INJECTION SUBCUTANEOUS at 20:28

## 2019-01-01 RX ADMIN — ENOXAPARIN SODIUM 40 MILLIGRAM(S): 100 INJECTION SUBCUTANEOUS at 21:43

## 2019-01-01 RX ADMIN — Medication 250 MILLIGRAM(S): at 05:12

## 2019-01-01 RX ADMIN — MONTELUKAST 10 MILLIGRAM(S): 4 TABLET, CHEWABLE ORAL at 13:51

## 2019-01-01 RX ADMIN — ATORVASTATIN CALCIUM 40 MILLIGRAM(S): 80 TABLET, FILM COATED ORAL at 21:30

## 2019-01-01 RX ADMIN — PIPERACILLIN AND TAZOBACTAM 25 GRAM(S): 4; .5 INJECTION, POWDER, LYOPHILIZED, FOR SOLUTION INTRAVENOUS at 13:53

## 2019-01-01 RX ADMIN — SERTRALINE 25 MILLIGRAM(S): 25 TABLET, FILM COATED ORAL at 12:52

## 2019-01-01 RX ADMIN — Medication 650 MILLIGRAM(S): at 07:06

## 2019-01-01 RX ADMIN — Medication 100 MILLIGRAM(S): at 05:29

## 2019-01-01 RX ADMIN — TRAMADOL HYDROCHLORIDE 25 MILLIGRAM(S): 50 TABLET ORAL at 23:15

## 2019-01-01 RX ADMIN — SODIUM CHLORIDE 3 MILLILITER(S): 9 INJECTION INTRAMUSCULAR; INTRAVENOUS; SUBCUTANEOUS at 13:26

## 2019-01-01 RX ADMIN — Medication 3 MILLILITER(S): at 08:24

## 2019-01-01 RX ADMIN — Medication 100 MILLIGRAM(S): at 22:32

## 2019-01-01 RX ADMIN — Medication 100 MILLIGRAM(S): at 13:50

## 2019-01-01 RX ADMIN — Medication 100 MILLIGRAM(S): at 22:21

## 2019-01-01 RX ADMIN — ATORVASTATIN CALCIUM 40 MILLIGRAM(S): 80 TABLET, FILM COATED ORAL at 21:52

## 2019-01-01 RX ADMIN — PANTOPRAZOLE SODIUM 40 MILLIGRAM(S): 20 TABLET, DELAYED RELEASE ORAL at 06:11

## 2019-01-01 RX ADMIN — Medication 60 MILLIGRAM(S): at 05:01

## 2019-01-01 RX ADMIN — PIPERACILLIN AND TAZOBACTAM 25 GRAM(S): 4; .5 INJECTION, POWDER, LYOPHILIZED, FOR SOLUTION INTRAVENOUS at 23:45

## 2019-01-01 RX ADMIN — SPIRONOLACTONE 25 MILLIGRAM(S): 25 TABLET, FILM COATED ORAL at 06:11

## 2019-01-01 RX ADMIN — OXYCODONE AND ACETAMINOPHEN 1 TABLET(S): 5; 325 TABLET ORAL at 01:00

## 2019-01-01 RX ADMIN — Medication 81 MILLIGRAM(S): at 12:29

## 2019-01-01 RX ADMIN — CARVEDILOL PHOSPHATE 3.12 MILLIGRAM(S): 80 CAPSULE, EXTENDED RELEASE ORAL at 05:25

## 2019-01-01 RX ADMIN — PANTOPRAZOLE SODIUM 40 MILLIGRAM(S): 20 TABLET, DELAYED RELEASE ORAL at 05:21

## 2019-01-01 RX ADMIN — Medication 250 MILLIGRAM(S): at 18:01

## 2019-01-01 RX ADMIN — Medication 40 MILLIGRAM(S): at 05:13

## 2019-01-01 RX ADMIN — MONTELUKAST 10 MILLIGRAM(S): 4 TABLET, CHEWABLE ORAL at 11:42

## 2019-01-01 RX ADMIN — Medication 3 MILLILITER(S): at 07:48

## 2019-01-01 RX ADMIN — Medication 650 MILLIGRAM(S): at 21:34

## 2019-01-01 RX ADMIN — MONTELUKAST 10 MILLIGRAM(S): 4 TABLET, CHEWABLE ORAL at 11:53

## 2019-01-01 RX ADMIN — FENTANYL CITRATE 25 MICROGRAM(S): 50 INJECTION INTRAVENOUS at 16:45

## 2019-01-01 RX ADMIN — SODIUM CHLORIDE 3 MILLILITER(S): 9 INJECTION INTRAMUSCULAR; INTRAVENOUS; SUBCUTANEOUS at 05:33

## 2019-01-01 RX ADMIN — SODIUM CHLORIDE 3 MILLILITER(S): 9 INJECTION INTRAMUSCULAR; INTRAVENOUS; SUBCUTANEOUS at 06:10

## 2019-01-01 RX ADMIN — Medication 0.25 MILLIGRAM(S): at 13:42

## 2019-01-01 RX ADMIN — Medication 100 MILLIGRAM(S): at 13:29

## 2019-01-01 RX ADMIN — TRAMADOL HYDROCHLORIDE 50 MILLIGRAM(S): 50 TABLET ORAL at 03:34

## 2019-01-01 RX ADMIN — PIPERACILLIN AND TAZOBACTAM 25 GRAM(S): 4; .5 INJECTION, POWDER, LYOPHILIZED, FOR SOLUTION INTRAVENOUS at 23:13

## 2019-01-01 RX ADMIN — Medication 250 MILLIGRAM(S): at 17:23

## 2019-01-01 RX ADMIN — Medication 3 MILLILITER(S): at 03:53

## 2019-01-01 RX ADMIN — Medication 40 MILLIGRAM(S): at 05:35

## 2019-01-01 RX ADMIN — Medication 20 MILLIGRAM(S): at 05:29

## 2019-01-01 RX ADMIN — SODIUM CHLORIDE 3 MILLILITER(S): 9 INJECTION INTRAMUSCULAR; INTRAVENOUS; SUBCUTANEOUS at 13:22

## 2019-01-01 RX ADMIN — Medication 81 MILLIGRAM(S): at 13:32

## 2019-01-01 RX ADMIN — HYDROMORPHONE HYDROCHLORIDE 0.5 MILLIGRAM(S): 2 INJECTION INTRAMUSCULAR; INTRAVENOUS; SUBCUTANEOUS at 16:27

## 2019-01-01 RX ADMIN — Medication 100 MILLIGRAM(S): at 12:51

## 2019-01-01 RX ADMIN — Medication 101.6 MILLIGRAM(S): at 05:39

## 2019-01-01 RX ADMIN — CARVEDILOL PHOSPHATE 3.12 MILLIGRAM(S): 80 CAPSULE, EXTENDED RELEASE ORAL at 05:12

## 2019-01-01 RX ADMIN — Medication 650 MILLIGRAM(S): at 04:27

## 2019-01-01 RX ADMIN — PANTOPRAZOLE SODIUM 40 MILLIGRAM(S): 20 TABLET, DELAYED RELEASE ORAL at 06:05

## 2019-01-01 RX ADMIN — Medication 10 MILLIGRAM(S): at 05:16

## 2019-01-01 RX ADMIN — Medication 650 MILLIGRAM(S): at 12:23

## 2019-01-01 RX ADMIN — Medication 250 MILLIGRAM(S): at 18:32

## 2019-01-01 RX ADMIN — MONTELUKAST 10 MILLIGRAM(S): 4 TABLET, CHEWABLE ORAL at 11:39

## 2019-01-01 RX ADMIN — HEPARIN SODIUM 5000 UNIT(S): 5000 INJECTION INTRAVENOUS; SUBCUTANEOUS at 15:44

## 2019-01-01 RX ADMIN — Medication 101.6 MILLIGRAM(S): at 07:32

## 2019-01-01 RX ADMIN — CARVEDILOL PHOSPHATE 3.12 MILLIGRAM(S): 80 CAPSULE, EXTENDED RELEASE ORAL at 17:23

## 2019-01-01 RX ADMIN — SODIUM CHLORIDE 3 MILLILITER(S): 9 INJECTION INTRAMUSCULAR; INTRAVENOUS; SUBCUTANEOUS at 14:36

## 2019-01-01 RX ADMIN — Medication 50 MILLIGRAM(S): at 21:46

## 2019-01-01 RX ADMIN — Medication 100 MILLIGRAM(S): at 21:57

## 2019-01-01 RX ADMIN — SODIUM CHLORIDE 50 MILLILITER(S): 9 INJECTION, SOLUTION INTRAVENOUS at 17:15

## 2019-01-01 RX ADMIN — Medication 10 MILLIGRAM(S): at 18:59

## 2019-01-01 RX ADMIN — Medication 10 MILLIGRAM(S): at 05:19

## 2019-01-01 RX ADMIN — Medication 40 MILLIGRAM(S): at 17:27

## 2019-01-01 RX ADMIN — Medication 20 MILLIGRAM(S): at 06:38

## 2019-01-01 RX ADMIN — PIPERACILLIN AND TAZOBACTAM 25 GRAM(S): 4; .5 INJECTION, POWDER, LYOPHILIZED, FOR SOLUTION INTRAVENOUS at 17:12

## 2019-01-01 RX ADMIN — TRAMADOL HYDROCHLORIDE 50 MILLIGRAM(S): 50 TABLET ORAL at 04:34

## 2019-01-01 RX ADMIN — ATORVASTATIN CALCIUM 40 MILLIGRAM(S): 80 TABLET, FILM COATED ORAL at 22:25

## 2019-01-01 RX ADMIN — HYDROMORPHONE HYDROCHLORIDE 0.25 MILLIGRAM(S): 2 INJECTION INTRAMUSCULAR; INTRAVENOUS; SUBCUTANEOUS at 13:38

## 2019-01-01 RX ADMIN — CEFTRIAXONE 100 GRAM(S): 500 INJECTION, POWDER, FOR SOLUTION INTRAMUSCULAR; INTRAVENOUS at 10:22

## 2019-01-01 RX ADMIN — Medication 10 MILLIGRAM(S): at 05:13

## 2019-01-01 RX ADMIN — Medication 100 MILLIGRAM(S): at 21:44

## 2019-01-01 RX ADMIN — Medication 3 MILLILITER(S): at 03:31

## 2019-01-01 RX ADMIN — CARVEDILOL PHOSPHATE 3.12 MILLIGRAM(S): 80 CAPSULE, EXTENDED RELEASE ORAL at 18:07

## 2019-01-01 RX ADMIN — ALBUTEROL 2 PUFF(S): 90 AEROSOL, METERED ORAL at 08:59

## 2019-01-01 RX ADMIN — OXYCODONE HYDROCHLORIDE 2.5 MILLIGRAM(S): 5 TABLET ORAL at 19:05

## 2019-01-01 RX ADMIN — ATORVASTATIN CALCIUM 20 MILLIGRAM(S): 80 TABLET, FILM COATED ORAL at 20:45

## 2019-01-01 RX ADMIN — Medication 10 MILLIGRAM(S): at 06:06

## 2019-01-01 RX ADMIN — Medication 3 MILLILITER(S): at 15:42

## 2019-01-01 RX ADMIN — PANTOPRAZOLE SODIUM 40 MILLIGRAM(S): 20 TABLET, DELAYED RELEASE ORAL at 06:22

## 2019-01-01 RX ADMIN — Medication 60 MILLIGRAM(S): at 05:34

## 2019-01-01 RX ADMIN — Medication 3 MILLILITER(S): at 09:21

## 2019-01-01 RX ADMIN — Medication 3 MILLILITER(S): at 03:46

## 2019-01-01 RX ADMIN — ALBUTEROL 2 PUFF(S): 90 AEROSOL, METERED ORAL at 09:24

## 2019-01-01 RX ADMIN — Medication 10 MILLIGRAM(S): at 06:11

## 2019-01-01 RX ADMIN — SENNA PLUS 2 TABLET(S): 8.6 TABLET ORAL at 22:11

## 2019-01-01 RX ADMIN — Medication 40 MILLIEQUIVALENT(S): at 23:20

## 2019-01-01 RX ADMIN — Medication 250 MILLIGRAM(S): at 06:12

## 2019-01-01 RX ADMIN — Medication 3 MILLILITER(S): at 15:23

## 2019-01-01 RX ADMIN — CARVEDILOL PHOSPHATE 3.12 MILLIGRAM(S): 80 CAPSULE, EXTENDED RELEASE ORAL at 17:25

## 2019-01-01 RX ADMIN — MONTELUKAST 10 MILLIGRAM(S): 4 TABLET, CHEWABLE ORAL at 11:58

## 2019-01-01 RX ADMIN — ATORVASTATIN CALCIUM 40 MILLIGRAM(S): 80 TABLET, FILM COATED ORAL at 21:02

## 2019-01-01 RX ADMIN — SPIRONOLACTONE 25 MILLIGRAM(S): 25 TABLET, FILM COATED ORAL at 06:16

## 2019-01-01 RX ADMIN — Medication 3 MILLILITER(S): at 20:13

## 2019-01-01 RX ADMIN — ATORVASTATIN CALCIUM 40 MILLIGRAM(S): 80 TABLET, FILM COATED ORAL at 22:11

## 2019-01-01 RX ADMIN — PANTOPRAZOLE SODIUM 40 MILLIGRAM(S): 20 TABLET, DELAYED RELEASE ORAL at 12:13

## 2019-01-01 RX ADMIN — Medication 40 MILLIGRAM(S): at 05:20

## 2019-01-01 RX ADMIN — SODIUM CHLORIDE 3 MILLILITER(S): 9 INJECTION INTRAMUSCULAR; INTRAVENOUS; SUBCUTANEOUS at 22:33

## 2019-01-01 RX ADMIN — MONTELUKAST 10 MILLIGRAM(S): 4 TABLET, CHEWABLE ORAL at 11:23

## 2019-01-01 RX ADMIN — LOSARTAN POTASSIUM 100 MILLIGRAM(S): 100 TABLET, FILM COATED ORAL at 05:38

## 2019-01-01 RX ADMIN — PIPERACILLIN AND TAZOBACTAM 25 GRAM(S): 4; .5 INJECTION, POWDER, LYOPHILIZED, FOR SOLUTION INTRAVENOUS at 21:34

## 2019-01-01 RX ADMIN — ATORVASTATIN CALCIUM 40 MILLIGRAM(S): 80 TABLET, FILM COATED ORAL at 21:33

## 2019-01-01 RX ADMIN — Medication 10 MILLIGRAM(S): at 05:10

## 2019-01-01 RX ADMIN — OXYCODONE AND ACETAMINOPHEN 1 TABLET(S): 5; 325 TABLET ORAL at 21:12

## 2019-01-01 RX ADMIN — CARVEDILOL PHOSPHATE 3.12 MILLIGRAM(S): 80 CAPSULE, EXTENDED RELEASE ORAL at 18:03

## 2019-01-01 RX ADMIN — SODIUM CHLORIDE 3 MILLILITER(S): 9 INJECTION INTRAMUSCULAR; INTRAVENOUS; SUBCUTANEOUS at 06:11

## 2019-01-01 RX ADMIN — LOSARTAN POTASSIUM 25 MILLIGRAM(S): 100 TABLET, FILM COATED ORAL at 05:34

## 2019-01-01 RX ADMIN — ATORVASTATIN CALCIUM 20 MILLIGRAM(S): 80 TABLET, FILM COATED ORAL at 22:24

## 2019-01-01 RX ADMIN — SPIRONOLACTONE 25 MILLIGRAM(S): 25 TABLET, FILM COATED ORAL at 05:26

## 2019-01-01 RX ADMIN — OXYCODONE HYDROCHLORIDE 15 MILLIGRAM(S): 5 TABLET ORAL at 18:33

## 2019-01-01 RX ADMIN — SPIRONOLACTONE 25 MILLIGRAM(S): 25 TABLET, FILM COATED ORAL at 05:14

## 2019-01-01 RX ADMIN — ATORVASTATIN CALCIUM 40 MILLIGRAM(S): 80 TABLET, FILM COATED ORAL at 21:08

## 2019-01-01 RX ADMIN — SERTRALINE 25 MILLIGRAM(S): 25 TABLET, FILM COATED ORAL at 12:29

## 2019-01-01 RX ADMIN — Medication 166.67 MILLIGRAM(S): at 22:09

## 2019-01-01 RX ADMIN — Medication 40 MILLIGRAM(S): at 19:04

## 2019-01-01 RX ADMIN — ATORVASTATIN CALCIUM 20 MILLIGRAM(S): 80 TABLET, FILM COATED ORAL at 21:37

## 2019-01-01 RX ADMIN — Medication 100 MILLIGRAM(S): at 11:42

## 2019-01-01 RX ADMIN — Medication 81 MILLIGRAM(S): at 11:39

## 2019-01-01 RX ADMIN — SENNA PLUS 2 TABLET(S): 8.6 TABLET ORAL at 20:47

## 2019-01-01 RX ADMIN — CARVEDILOL PHOSPHATE 3.12 MILLIGRAM(S): 80 CAPSULE, EXTENDED RELEASE ORAL at 17:15

## 2019-01-01 RX ADMIN — ATORVASTATIN CALCIUM 40 MILLIGRAM(S): 80 TABLET, FILM COATED ORAL at 21:57

## 2019-01-01 RX ADMIN — OXYCODONE AND ACETAMINOPHEN 1 TABLET(S): 5; 325 TABLET ORAL at 15:56

## 2019-01-01 RX ADMIN — Medication 30 MILLILITER(S): at 11:22

## 2019-01-01 RX ADMIN — Medication 100 MILLIGRAM(S): at 21:02

## 2019-01-01 RX ADMIN — HEPARIN SODIUM 5000 UNIT(S): 5000 INJECTION INTRAVENOUS; SUBCUTANEOUS at 23:13

## 2019-01-01 RX ADMIN — Medication 3 MILLILITER(S): at 02:56

## 2019-01-01 RX ADMIN — LOSARTAN POTASSIUM 50 MILLIGRAM(S): 100 TABLET, FILM COATED ORAL at 06:13

## 2019-01-01 RX ADMIN — ATORVASTATIN CALCIUM 20 MILLIGRAM(S): 80 TABLET, FILM COATED ORAL at 21:53

## 2019-01-01 RX ADMIN — PIPERACILLIN AND TAZOBACTAM 25 GRAM(S): 4; .5 INJECTION, POWDER, LYOPHILIZED, FOR SOLUTION INTRAVENOUS at 13:08

## 2019-01-01 RX ADMIN — LOSARTAN POTASSIUM 25 MILLIGRAM(S): 100 TABLET, FILM COATED ORAL at 06:11

## 2019-01-01 RX ADMIN — CARVEDILOL PHOSPHATE 3.12 MILLIGRAM(S): 80 CAPSULE, EXTENDED RELEASE ORAL at 06:11

## 2019-01-01 RX ADMIN — Medication 650 MILLIGRAM(S): at 21:58

## 2019-01-01 RX ADMIN — SODIUM CHLORIDE 3 MILLILITER(S): 9 INJECTION INTRAMUSCULAR; INTRAVENOUS; SUBCUTANEOUS at 21:30

## 2019-01-01 RX ADMIN — CARVEDILOL PHOSPHATE 3.12 MILLIGRAM(S): 80 CAPSULE, EXTENDED RELEASE ORAL at 05:38

## 2019-01-01 RX ADMIN — FENTANYL CITRATE 30 MILLILITER(S): 50 INJECTION INTRAVENOUS at 07:43

## 2019-01-01 RX ADMIN — SODIUM CHLORIDE 3 MILLILITER(S): 9 INJECTION INTRAMUSCULAR; INTRAVENOUS; SUBCUTANEOUS at 22:24

## 2019-01-01 RX ADMIN — Medication 40 MILLIGRAM(S): at 06:00

## 2019-01-01 RX ADMIN — ALBUTEROL 2 PUFF(S): 90 AEROSOL, METERED ORAL at 20:24

## 2019-01-01 RX ADMIN — HYDROMORPHONE HYDROCHLORIDE 0.5 MILLIGRAM(S): 2 INJECTION INTRAMUSCULAR; INTRAVENOUS; SUBCUTANEOUS at 17:28

## 2019-01-01 RX ADMIN — PIPERACILLIN AND TAZOBACTAM 25 GRAM(S): 4; .5 INJECTION, POWDER, LYOPHILIZED, FOR SOLUTION INTRAVENOUS at 21:16

## 2019-01-01 RX ADMIN — PIPERACILLIN AND TAZOBACTAM 25 GRAM(S): 4; .5 INJECTION, POWDER, LYOPHILIZED, FOR SOLUTION INTRAVENOUS at 05:29

## 2019-01-01 RX ADMIN — OXYCODONE AND ACETAMINOPHEN 1 TABLET(S): 5; 325 TABLET ORAL at 22:38

## 2019-01-01 RX ADMIN — Medication 100 MILLIGRAM(S): at 14:36

## 2019-01-01 RX ADMIN — MONTELUKAST 10 MILLIGRAM(S): 4 TABLET, CHEWABLE ORAL at 11:24

## 2019-01-01 RX ADMIN — PIPERACILLIN AND TAZOBACTAM 25 GRAM(S): 4; .5 INJECTION, POWDER, LYOPHILIZED, FOR SOLUTION INTRAVENOUS at 05:19

## 2019-01-01 RX ADMIN — Medication 10 MILLIGRAM(S): at 05:12

## 2019-01-01 RX ADMIN — OXYCODONE AND ACETAMINOPHEN 2 TABLET(S): 5; 325 TABLET ORAL at 03:20

## 2019-01-01 RX ADMIN — SODIUM CHLORIDE 3 MILLILITER(S): 9 INJECTION INTRAMUSCULAR; INTRAVENOUS; SUBCUTANEOUS at 06:09

## 2019-01-01 RX ADMIN — Medication 25 MILLIGRAM(S): at 10:00

## 2019-01-01 RX ADMIN — Medication 650 MILLIGRAM(S): at 17:47

## 2019-01-01 RX ADMIN — SPIRONOLACTONE 25 MILLIGRAM(S): 25 TABLET, FILM COATED ORAL at 13:52

## 2019-01-01 RX ADMIN — POLYETHYLENE GLYCOL 3350 17 GRAM(S): 17 POWDER, FOR SOLUTION ORAL at 11:32

## 2019-01-01 RX ADMIN — PIPERACILLIN AND TAZOBACTAM 25 GRAM(S): 4; .5 INJECTION, POWDER, LYOPHILIZED, FOR SOLUTION INTRAVENOUS at 15:36

## 2019-01-01 RX ADMIN — SODIUM CHLORIDE 50 MILLILITER(S): 9 INJECTION INTRAMUSCULAR; INTRAVENOUS; SUBCUTANEOUS at 12:56

## 2019-01-01 RX ADMIN — Medication 81 MILLIGRAM(S): at 18:33

## 2019-01-01 RX ADMIN — SODIUM CHLORIDE 3 MILLILITER(S): 9 INJECTION INTRAMUSCULAR; INTRAVENOUS; SUBCUTANEOUS at 05:29

## 2019-01-01 RX ADMIN — MONTELUKAST 10 MILLIGRAM(S): 4 TABLET, CHEWABLE ORAL at 10:40

## 2019-01-01 RX ADMIN — PANTOPRAZOLE SODIUM 40 MILLIGRAM(S): 20 TABLET, DELAYED RELEASE ORAL at 05:25

## 2019-01-01 RX ADMIN — CARVEDILOL PHOSPHATE 3.12 MILLIGRAM(S): 80 CAPSULE, EXTENDED RELEASE ORAL at 06:34

## 2019-01-01 RX ADMIN — ALBUTEROL 2 PUFF(S): 90 AEROSOL, METERED ORAL at 15:38

## 2019-01-01 RX ADMIN — PANTOPRAZOLE SODIUM 40 MILLIGRAM(S): 20 TABLET, DELAYED RELEASE ORAL at 06:57

## 2019-01-01 RX ADMIN — SODIUM CHLORIDE 3 MILLILITER(S): 9 INJECTION INTRAMUSCULAR; INTRAVENOUS; SUBCUTANEOUS at 22:02

## 2019-01-01 RX ADMIN — Medication 3 MILLILITER(S): at 20:24

## 2019-01-01 RX ADMIN — SERTRALINE 25 MILLIGRAM(S): 25 TABLET, FILM COATED ORAL at 09:04

## 2019-01-01 RX ADMIN — Medication 250 MILLIGRAM(S): at 05:19

## 2019-01-01 RX ADMIN — Medication 100 MILLIGRAM(S): at 21:14

## 2019-01-01 RX ADMIN — Medication 250 MILLIGRAM(S): at 06:05

## 2019-01-01 RX ADMIN — TRAMADOL HYDROCHLORIDE 50 MILLIGRAM(S): 50 TABLET ORAL at 10:00

## 2019-01-01 RX ADMIN — SPIRONOLACTONE 25 MILLIGRAM(S): 25 TABLET, FILM COATED ORAL at 06:34

## 2019-01-01 RX ADMIN — Medication 40 MILLIGRAM(S): at 16:59

## 2019-01-01 RX ADMIN — Medication 10 MILLIGRAM(S): at 06:21

## 2019-01-01 RX ADMIN — ENOXAPARIN SODIUM 40 MILLIGRAM(S): 100 INJECTION SUBCUTANEOUS at 21:53

## 2019-01-01 RX ADMIN — Medication 100 MILLIGRAM(S): at 22:23

## 2019-01-01 RX ADMIN — PIPERACILLIN AND TAZOBACTAM 25 GRAM(S): 4; .5 INJECTION, POWDER, LYOPHILIZED, FOR SOLUTION INTRAVENOUS at 06:50

## 2019-01-01 RX ADMIN — ENOXAPARIN SODIUM 40 MILLIGRAM(S): 100 INJECTION SUBCUTANEOUS at 22:55

## 2019-01-01 RX ADMIN — CARVEDILOL PHOSPHATE 3.12 MILLIGRAM(S): 80 CAPSULE, EXTENDED RELEASE ORAL at 10:40

## 2019-01-01 RX ADMIN — ATORVASTATIN CALCIUM 20 MILLIGRAM(S): 80 TABLET, FILM COATED ORAL at 21:43

## 2019-01-01 RX ADMIN — LOSARTAN POTASSIUM 25 MILLIGRAM(S): 100 TABLET, FILM COATED ORAL at 06:20

## 2019-01-01 RX ADMIN — LOSARTAN POTASSIUM 25 MILLIGRAM(S): 100 TABLET, FILM COATED ORAL at 05:29

## 2019-01-01 RX ADMIN — FENTANYL CITRATE 30 MILLILITER(S): 50 INJECTION INTRAVENOUS at 16:16

## 2019-01-01 RX ADMIN — SODIUM CHLORIDE 3 MILLILITER(S): 9 INJECTION INTRAMUSCULAR; INTRAVENOUS; SUBCUTANEOUS at 22:22

## 2019-01-01 RX ADMIN — SODIUM CHLORIDE 3 MILLILITER(S): 9 INJECTION INTRAMUSCULAR; INTRAVENOUS; SUBCUTANEOUS at 11:50

## 2019-01-01 RX ADMIN — Medication 100 MILLIGRAM(S): at 05:45

## 2019-01-01 RX ADMIN — MONTELUKAST 10 MILLIGRAM(S): 4 TABLET, CHEWABLE ORAL at 22:59

## 2019-01-01 RX ADMIN — SERTRALINE 25 MILLIGRAM(S): 25 TABLET, FILM COATED ORAL at 09:41

## 2019-01-01 RX ADMIN — OXYCODONE HYDROCHLORIDE 15 MILLIGRAM(S): 5 TABLET ORAL at 18:13

## 2019-01-01 RX ADMIN — Medication 81 MILLIGRAM(S): at 11:58

## 2019-01-01 RX ADMIN — ATORVASTATIN CALCIUM 20 MILLIGRAM(S): 80 TABLET, FILM COATED ORAL at 22:32

## 2019-01-01 RX ADMIN — Medication 10 MILLIGRAM(S): at 06:38

## 2019-01-01 RX ADMIN — Medication 166.67 MILLIGRAM(S): at 22:28

## 2019-01-01 RX ADMIN — Medication 100 MILLIGRAM(S): at 21:03

## 2019-01-01 RX ADMIN — Medication 81 MILLIGRAM(S): at 11:52

## 2019-01-01 RX ADMIN — SODIUM CHLORIDE 3 MILLILITER(S): 9 INJECTION INTRAMUSCULAR; INTRAVENOUS; SUBCUTANEOUS at 21:32

## 2019-01-01 RX ADMIN — Medication 250 MILLIGRAM(S): at 06:34

## 2019-01-01 RX ADMIN — SODIUM CHLORIDE 3 MILLILITER(S): 9 INJECTION INTRAMUSCULAR; INTRAVENOUS; SUBCUTANEOUS at 13:00

## 2019-01-01 RX ADMIN — ATORVASTATIN CALCIUM 20 MILLIGRAM(S): 80 TABLET, FILM COATED ORAL at 21:02

## 2019-01-01 RX ADMIN — PIPERACILLIN AND TAZOBACTAM 25 GRAM(S): 4; .5 INJECTION, POWDER, LYOPHILIZED, FOR SOLUTION INTRAVENOUS at 21:53

## 2019-01-01 RX ADMIN — CARVEDILOL PHOSPHATE 3.12 MILLIGRAM(S): 80 CAPSULE, EXTENDED RELEASE ORAL at 05:18

## 2019-01-01 RX ADMIN — Medication 125 MILLIGRAM(S): at 10:49

## 2019-01-01 RX ADMIN — Medication 3 MILLILITER(S): at 20:52

## 2019-01-01 RX ADMIN — TRAMADOL HYDROCHLORIDE 50 MILLIGRAM(S): 50 TABLET ORAL at 19:54

## 2019-01-01 RX ADMIN — PANTOPRAZOLE SODIUM 40 MILLIGRAM(S): 20 TABLET, DELAYED RELEASE ORAL at 12:04

## 2019-01-01 RX ADMIN — Medication 3 MILLILITER(S): at 08:17

## 2019-01-01 RX ADMIN — SODIUM CHLORIDE 3 MILLILITER(S): 9 INJECTION INTRAMUSCULAR; INTRAVENOUS; SUBCUTANEOUS at 07:33

## 2019-01-01 RX ADMIN — MONTELUKAST 10 MILLIGRAM(S): 4 TABLET, CHEWABLE ORAL at 10:07

## 2019-01-01 RX ADMIN — OXYCODONE AND ACETAMINOPHEN 2 TABLET(S): 5; 325 TABLET ORAL at 12:30

## 2019-01-01 RX ADMIN — Medication 650 MILLIGRAM(S): at 01:39

## 2019-01-01 RX ADMIN — CARVEDILOL PHOSPHATE 3.12 MILLIGRAM(S): 80 CAPSULE, EXTENDED RELEASE ORAL at 05:10

## 2019-01-01 RX ADMIN — Medication 3 MILLILITER(S): at 03:44

## 2019-01-01 RX ADMIN — Medication 250 MILLIGRAM(S): at 17:57

## 2019-01-01 RX ADMIN — SERTRALINE 25 MILLIGRAM(S): 25 TABLET, FILM COATED ORAL at 13:13

## 2019-01-01 RX ADMIN — Medication 40 MILLIGRAM(S): at 05:38

## 2019-01-01 RX ADMIN — Medication 10 MILLIGRAM(S): at 05:18

## 2019-01-01 RX ADMIN — CARVEDILOL PHOSPHATE 3.12 MILLIGRAM(S): 80 CAPSULE, EXTENDED RELEASE ORAL at 06:17

## 2019-01-01 RX ADMIN — Medication 100 MILLIGRAM(S): at 15:23

## 2019-01-01 RX ADMIN — PIPERACILLIN AND TAZOBACTAM 25 GRAM(S): 4; .5 INJECTION, POWDER, LYOPHILIZED, FOR SOLUTION INTRAVENOUS at 06:34

## 2019-01-01 RX ADMIN — FENTANYL CITRATE 25 MICROGRAM(S): 50 INJECTION INTRAVENOUS at 16:10

## 2019-01-01 RX ADMIN — CARVEDILOL PHOSPHATE 3.12 MILLIGRAM(S): 80 CAPSULE, EXTENDED RELEASE ORAL at 06:05

## 2019-01-01 RX ADMIN — Medication 10 MILLIGRAM(S): at 05:11

## 2019-01-01 RX ADMIN — Medication 250 MILLIGRAM(S): at 06:20

## 2019-01-01 RX ADMIN — SERTRALINE 25 MILLIGRAM(S): 25 TABLET, FILM COATED ORAL at 09:45

## 2019-01-01 RX ADMIN — Medication 250 MILLIGRAM(S): at 17:15

## 2019-01-01 RX ADMIN — SODIUM CHLORIDE 3 MILLILITER(S): 9 INJECTION INTRAMUSCULAR; INTRAVENOUS; SUBCUTANEOUS at 14:23

## 2019-01-01 RX ADMIN — PIPERACILLIN AND TAZOBACTAM 25 GRAM(S): 4; .5 INJECTION, POWDER, LYOPHILIZED, FOR SOLUTION INTRAVENOUS at 06:36

## 2019-01-01 RX ADMIN — ATORVASTATIN CALCIUM 20 MILLIGRAM(S): 80 TABLET, FILM COATED ORAL at 22:07

## 2019-01-01 RX ADMIN — SODIUM CHLORIDE 3 MILLILITER(S): 9 INJECTION INTRAMUSCULAR; INTRAVENOUS; SUBCUTANEOUS at 13:28

## 2019-01-01 RX ADMIN — Medication 81 MILLIGRAM(S): at 11:50

## 2019-01-01 RX ADMIN — Medication 3 MILLILITER(S): at 09:46

## 2019-01-01 RX ADMIN — PIPERACILLIN AND TAZOBACTAM 25 GRAM(S): 4; .5 INJECTION, POWDER, LYOPHILIZED, FOR SOLUTION INTRAVENOUS at 18:24

## 2019-01-01 RX ADMIN — LOSARTAN POTASSIUM 50 MILLIGRAM(S): 100 TABLET, FILM COATED ORAL at 05:33

## 2019-01-01 RX ADMIN — Medication 3 MILLILITER(S): at 20:10

## 2019-01-01 RX ADMIN — ATORVASTATIN CALCIUM 40 MILLIGRAM(S): 80 TABLET, FILM COATED ORAL at 22:01

## 2019-01-01 RX ADMIN — Medication 250 MILLIGRAM(S): at 18:45

## 2019-01-01 RX ADMIN — Medication 100 MILLIGRAM(S): at 21:22

## 2019-01-01 RX ADMIN — Medication 3 MILLILITER(S): at 09:31

## 2019-01-01 RX ADMIN — Medication 3 MILLILITER(S): at 20:06

## 2019-01-01 RX ADMIN — LOSARTAN POTASSIUM 25 MILLIGRAM(S): 100 TABLET, FILM COATED ORAL at 05:18

## 2019-01-01 RX ADMIN — Medication 40 MILLIEQUIVALENT(S): at 11:21

## 2019-01-01 RX ADMIN — Medication 40 MILLIGRAM(S): at 13:52

## 2019-01-01 RX ADMIN — ATORVASTATIN CALCIUM 20 MILLIGRAM(S): 80 TABLET, FILM COATED ORAL at 21:15

## 2019-01-01 RX ADMIN — PIPERACILLIN AND TAZOBACTAM 25 GRAM(S): 4; .5 INJECTION, POWDER, LYOPHILIZED, FOR SOLUTION INTRAVENOUS at 23:02

## 2019-01-01 RX ADMIN — Medication 650 MILLIGRAM(S): at 17:52

## 2019-01-01 RX ADMIN — PANTOPRAZOLE SODIUM 40 MILLIGRAM(S): 20 TABLET, DELAYED RELEASE ORAL at 18:25

## 2019-01-01 RX ADMIN — Medication 3 MILLILITER(S): at 20:37

## 2019-01-01 RX ADMIN — Medication 100 MILLIGRAM(S): at 21:34

## 2019-01-01 RX ADMIN — CARVEDILOL PHOSPHATE 3.12 MILLIGRAM(S): 80 CAPSULE, EXTENDED RELEASE ORAL at 05:14

## 2019-01-01 RX ADMIN — Medication 166.67 MILLIGRAM(S): at 08:42

## 2019-01-01 RX ADMIN — Medication 1000 MILLIGRAM(S): at 16:25

## 2019-01-01 RX ADMIN — SENNA PLUS 2 TABLET(S): 8.6 TABLET ORAL at 21:20

## 2019-01-01 RX ADMIN — CARVEDILOL PHOSPHATE 3.12 MILLIGRAM(S): 80 CAPSULE, EXTENDED RELEASE ORAL at 05:16

## 2019-01-01 RX ADMIN — Medication 3 MILLILITER(S): at 20:08

## 2019-01-01 RX ADMIN — ATORVASTATIN CALCIUM 40 MILLIGRAM(S): 80 TABLET, FILM COATED ORAL at 22:24

## 2019-01-01 RX ADMIN — Medication 650 MILLIGRAM(S): at 02:00

## 2019-01-01 RX ADMIN — LOSARTAN POTASSIUM 50 MILLIGRAM(S): 100 TABLET, FILM COATED ORAL at 05:21

## 2019-01-01 RX ADMIN — Medication 40 MILLIGRAM(S): at 17:25

## 2019-01-01 RX ADMIN — Medication 3 MILLILITER(S): at 02:26

## 2019-01-01 RX ADMIN — Medication 3 MILLILITER(S): at 08:38

## 2019-01-01 RX ADMIN — PIPERACILLIN AND TAZOBACTAM 25 GRAM(S): 4; .5 INJECTION, POWDER, LYOPHILIZED, FOR SOLUTION INTRAVENOUS at 06:03

## 2019-01-01 RX ADMIN — Medication 650 MILLIGRAM(S): at 02:15

## 2019-01-01 RX ADMIN — Medication 3 MILLILITER(S): at 15:16

## 2019-01-01 RX ADMIN — MONTELUKAST 10 MILLIGRAM(S): 4 TABLET, CHEWABLE ORAL at 15:23

## 2019-01-01 RX ADMIN — SERTRALINE 25 MILLIGRAM(S): 25 TABLET, FILM COATED ORAL at 11:42

## 2019-01-01 RX ADMIN — CARVEDILOL PHOSPHATE 3.12 MILLIGRAM(S): 80 CAPSULE, EXTENDED RELEASE ORAL at 18:08

## 2019-01-01 RX ADMIN — Medication 650 MILLIGRAM(S): at 03:18

## 2019-01-01 RX ADMIN — ENOXAPARIN SODIUM 40 MILLIGRAM(S): 100 INJECTION SUBCUTANEOUS at 22:24

## 2019-01-01 RX ADMIN — Medication 3 MILLILITER(S): at 20:43

## 2019-01-01 RX ADMIN — SODIUM CHLORIDE 3 MILLILITER(S): 9 INJECTION INTRAMUSCULAR; INTRAVENOUS; SUBCUTANEOUS at 22:11

## 2019-01-01 RX ADMIN — ENOXAPARIN SODIUM 40 MILLIGRAM(S): 100 INJECTION SUBCUTANEOUS at 22:21

## 2019-01-01 RX ADMIN — PIPERACILLIN AND TAZOBACTAM 25 GRAM(S): 4; .5 INJECTION, POWDER, LYOPHILIZED, FOR SOLUTION INTRAVENOUS at 22:23

## 2019-01-01 RX ADMIN — Medication 100 MILLIGRAM(S): at 06:57

## 2019-01-01 RX ADMIN — Medication 40 MILLIGRAM(S): at 05:05

## 2019-01-01 RX ADMIN — Medication 166.67 MILLIGRAM(S): at 13:52

## 2019-01-01 RX ADMIN — ATORVASTATIN CALCIUM 20 MILLIGRAM(S): 80 TABLET, FILM COATED ORAL at 21:03

## 2019-01-01 RX ADMIN — OXYCODONE AND ACETAMINOPHEN 1 TABLET(S): 5; 325 TABLET ORAL at 17:44

## 2019-01-01 RX ADMIN — HEPARIN SODIUM 5000 UNIT(S): 5000 INJECTION INTRAVENOUS; SUBCUTANEOUS at 13:33

## 2019-01-01 RX ADMIN — ATORVASTATIN CALCIUM 40 MILLIGRAM(S): 80 TABLET, FILM COATED ORAL at 22:23

## 2019-01-01 RX ADMIN — ATORVASTATIN CALCIUM 20 MILLIGRAM(S): 80 TABLET, FILM COATED ORAL at 21:17

## 2019-01-01 RX ADMIN — Medication 100 MILLIGRAM(S): at 05:01

## 2019-01-01 RX ADMIN — SODIUM CHLORIDE 3 MILLILITER(S): 9 INJECTION INTRAMUSCULAR; INTRAVENOUS; SUBCUTANEOUS at 12:12

## 2019-01-01 RX ADMIN — SODIUM CHLORIDE 3 MILLILITER(S): 9 INJECTION INTRAMUSCULAR; INTRAVENOUS; SUBCUTANEOUS at 06:36

## 2019-01-01 RX ADMIN — Medication 40 MILLIGRAM(S): at 05:19

## 2019-01-01 RX ADMIN — SERTRALINE 25 MILLIGRAM(S): 25 TABLET, FILM COATED ORAL at 12:44

## 2019-01-01 RX ADMIN — Medication 81 MILLIGRAM(S): at 13:28

## 2019-01-01 RX ADMIN — TRAMADOL HYDROCHLORIDE 50 MILLIGRAM(S): 50 TABLET ORAL at 21:27

## 2019-01-01 RX ADMIN — Medication 5 MILLIGRAM(S): at 22:23

## 2019-01-01 RX ADMIN — SPIRONOLACTONE 25 MILLIGRAM(S): 25 TABLET, FILM COATED ORAL at 06:05

## 2019-01-01 RX ADMIN — Medication 50 MILLIGRAM(S): at 06:01

## 2019-01-01 RX ADMIN — Medication 250 MILLIGRAM(S): at 06:09

## 2019-01-01 RX ADMIN — Medication 50 MILLIGRAM(S): at 22:37

## 2019-01-01 RX ADMIN — ATORVASTATIN CALCIUM 20 MILLIGRAM(S): 80 TABLET, FILM COATED ORAL at 23:20

## 2019-01-01 RX ADMIN — PIPERACILLIN AND TAZOBACTAM 25 GRAM(S): 4; .5 INJECTION, POWDER, LYOPHILIZED, FOR SOLUTION INTRAVENOUS at 06:13

## 2019-01-01 RX ADMIN — Medication 100 MILLIGRAM(S): at 13:34

## 2019-01-01 RX ADMIN — Medication 3 MILLILITER(S): at 21:53

## 2019-01-01 RX ADMIN — Medication 250 MILLIGRAM(S): at 17:10

## 2019-01-01 RX ADMIN — MONTELUKAST 10 MILLIGRAM(S): 4 TABLET, CHEWABLE ORAL at 12:44

## 2019-01-01 RX ADMIN — ATORVASTATIN CALCIUM 20 MILLIGRAM(S): 80 TABLET, FILM COATED ORAL at 21:56

## 2019-01-01 RX ADMIN — MONTELUKAST 10 MILLIGRAM(S): 4 TABLET, CHEWABLE ORAL at 12:13

## 2019-01-01 RX ADMIN — Medication 3 MILLILITER(S): at 02:21

## 2019-01-01 RX ADMIN — SPIRONOLACTONE 25 MILLIGRAM(S): 25 TABLET, FILM COATED ORAL at 05:55

## 2019-01-01 RX ADMIN — SERTRALINE 25 MILLIGRAM(S): 25 TABLET, FILM COATED ORAL at 13:34

## 2019-01-01 RX ADMIN — Medication 650 MILLIGRAM(S): at 22:50

## 2019-01-01 RX ADMIN — MONTELUKAST 10 MILLIGRAM(S): 4 TABLET, CHEWABLE ORAL at 11:59

## 2019-01-01 RX ADMIN — SODIUM CHLORIDE 3 MILLILITER(S): 9 INJECTION INTRAMUSCULAR; INTRAVENOUS; SUBCUTANEOUS at 07:04

## 2019-01-01 RX ADMIN — Medication 3 MILLILITER(S): at 15:05

## 2019-01-01 RX ADMIN — Medication 100 MILLIGRAM(S): at 14:59

## 2019-01-01 RX ADMIN — MONTELUKAST 10 MILLIGRAM(S): 4 TABLET, CHEWABLE ORAL at 13:45

## 2019-01-01 RX ADMIN — OXYCODONE HYDROCHLORIDE 5 MILLIGRAM(S): 5 TABLET ORAL at 05:00

## 2019-01-01 RX ADMIN — Medication 101.6 MILLIGRAM(S): at 12:39

## 2019-01-01 RX ADMIN — MONTELUKAST 10 MILLIGRAM(S): 4 TABLET, CHEWABLE ORAL at 12:48

## 2019-01-01 RX ADMIN — Medication 3 MILLILITER(S): at 15:00

## 2019-01-01 RX ADMIN — Medication 3 MILLILITER(S): at 20:34

## 2019-01-01 RX ADMIN — PANTOPRAZOLE SODIUM 40 MILLIGRAM(S): 20 TABLET, DELAYED RELEASE ORAL at 12:29

## 2019-01-01 RX ADMIN — Medication 3 MILLILITER(S): at 15:31

## 2019-01-01 RX ADMIN — PANTOPRAZOLE SODIUM 40 MILLIGRAM(S): 20 TABLET, DELAYED RELEASE ORAL at 05:12

## 2019-01-01 RX ADMIN — Medication 100 MILLIGRAM(S): at 12:50

## 2019-01-01 RX ADMIN — Medication 100 MILLIGRAM(S): at 12:33

## 2019-01-01 RX ADMIN — Medication 40 MILLIGRAM(S): at 06:16

## 2019-01-01 RX ADMIN — PANTOPRAZOLE SODIUM 40 MILLIGRAM(S): 20 TABLET, DELAYED RELEASE ORAL at 06:06

## 2019-01-01 RX ADMIN — SODIUM CHLORIDE 3 MILLILITER(S): 9 INJECTION INTRAMUSCULAR; INTRAVENOUS; SUBCUTANEOUS at 06:06

## 2019-01-01 RX ADMIN — Medication 100 MILLIGRAM(S): at 22:26

## 2019-01-01 RX ADMIN — Medication 50 GRAM(S): at 11:21

## 2019-01-01 RX ADMIN — SENNA PLUS 2 TABLET(S): 8.6 TABLET ORAL at 21:07

## 2019-01-01 RX ADMIN — LOSARTAN POTASSIUM 25 MILLIGRAM(S): 100 TABLET, FILM COATED ORAL at 05:01

## 2019-01-01 RX ADMIN — Medication 3 MILLILITER(S): at 09:51

## 2019-01-01 RX ADMIN — SODIUM CHLORIDE 3 MILLILITER(S): 9 INJECTION INTRAMUSCULAR; INTRAVENOUS; SUBCUTANEOUS at 22:12

## 2019-01-01 RX ADMIN — SODIUM CHLORIDE 3 MILLILITER(S): 9 INJECTION INTRAMUSCULAR; INTRAVENOUS; SUBCUTANEOUS at 06:29

## 2019-01-01 RX ADMIN — Medication 3 MILLILITER(S): at 08:55

## 2019-01-01 RX ADMIN — PIPERACILLIN AND TAZOBACTAM 25 GRAM(S): 4; .5 INJECTION, POWDER, LYOPHILIZED, FOR SOLUTION INTRAVENOUS at 05:11

## 2019-01-01 RX ADMIN — Medication 250 MILLIGRAM(S): at 05:18

## 2019-01-01 RX ADMIN — CARVEDILOL PHOSPHATE 3.12 MILLIGRAM(S): 80 CAPSULE, EXTENDED RELEASE ORAL at 05:33

## 2019-01-01 RX ADMIN — Medication 3 MILLILITER(S): at 20:05

## 2019-01-01 RX ADMIN — CEFTRIAXONE 100 GRAM(S): 500 INJECTION, POWDER, FOR SOLUTION INTRAMUSCULAR; INTRAVENOUS at 09:32

## 2019-01-01 RX ADMIN — TRAMADOL HYDROCHLORIDE 50 MILLIGRAM(S): 50 TABLET ORAL at 15:40

## 2019-01-01 RX ADMIN — Medication 975 MILLIGRAM(S): at 18:45

## 2019-01-01 RX ADMIN — OXYCODONE HYDROCHLORIDE 10 MILLIGRAM(S): 5 TABLET ORAL at 07:20

## 2019-01-01 RX ADMIN — PIPERACILLIN AND TAZOBACTAM 25 GRAM(S): 4; .5 INJECTION, POWDER, LYOPHILIZED, FOR SOLUTION INTRAVENOUS at 05:34

## 2019-01-01 RX ADMIN — OXYCODONE HYDROCHLORIDE 15 MILLIGRAM(S): 5 TABLET ORAL at 06:30

## 2019-01-01 RX ADMIN — TRAMADOL HYDROCHLORIDE 25 MILLIGRAM(S): 50 TABLET ORAL at 12:42

## 2019-01-01 RX ADMIN — Medication 20 MILLIGRAM(S): at 05:33

## 2019-01-01 RX ADMIN — TRAMADOL HYDROCHLORIDE 50 MILLIGRAM(S): 50 TABLET ORAL at 22:45

## 2019-01-01 RX ADMIN — SODIUM CHLORIDE 3 MILLILITER(S): 9 INJECTION INTRAMUSCULAR; INTRAVENOUS; SUBCUTANEOUS at 05:49

## 2019-01-01 RX ADMIN — SODIUM CHLORIDE 20 MILLILITER(S): 9 INJECTION INTRAMUSCULAR; INTRAVENOUS; SUBCUTANEOUS at 21:36

## 2019-01-01 RX ADMIN — Medication 100 MILLIGRAM(S): at 21:52

## 2019-01-01 RX ADMIN — SODIUM CHLORIDE 3 MILLILITER(S): 9 INJECTION INTRAMUSCULAR; INTRAVENOUS; SUBCUTANEOUS at 13:29

## 2019-01-01 RX ADMIN — SODIUM CHLORIDE 3 MILLILITER(S): 9 INJECTION INTRAMUSCULAR; INTRAVENOUS; SUBCUTANEOUS at 13:04

## 2019-01-01 RX ADMIN — Medication 3 MILLILITER(S): at 08:57

## 2019-01-01 RX ADMIN — PANTOPRAZOLE SODIUM 40 MILLIGRAM(S): 20 TABLET, DELAYED RELEASE ORAL at 06:09

## 2019-01-01 RX ADMIN — HYDROMORPHONE HYDROCHLORIDE 0.25 MILLIGRAM(S): 2 INJECTION INTRAMUSCULAR; INTRAVENOUS; SUBCUTANEOUS at 22:08

## 2019-01-01 RX ADMIN — SODIUM CHLORIDE 3 MILLILITER(S): 9 INJECTION INTRAMUSCULAR; INTRAVENOUS; SUBCUTANEOUS at 05:26

## 2019-01-01 RX ADMIN — Medication 100 MILLIGRAM(S): at 22:25

## 2019-01-01 RX ADMIN — PIPERACILLIN AND TAZOBACTAM 25 GRAM(S): 4; .5 INJECTION, POWDER, LYOPHILIZED, FOR SOLUTION INTRAVENOUS at 23:00

## 2019-01-01 RX ADMIN — LOSARTAN POTASSIUM 50 MILLIGRAM(S): 100 TABLET, FILM COATED ORAL at 06:03

## 2019-01-01 RX ADMIN — SERTRALINE 25 MILLIGRAM(S): 25 TABLET, FILM COATED ORAL at 20:45

## 2019-01-01 RX ADMIN — TIOTROPIUM BROMIDE 1 CAPSULE(S): 18 CAPSULE ORAL; RESPIRATORY (INHALATION) at 09:24

## 2019-01-01 RX ADMIN — MONTELUKAST 10 MILLIGRAM(S): 4 TABLET, CHEWABLE ORAL at 14:05

## 2019-01-01 RX ADMIN — SENNA PLUS 2 TABLET(S): 8.6 TABLET ORAL at 22:07

## 2019-01-01 RX ADMIN — TRAMADOL HYDROCHLORIDE 50 MILLIGRAM(S): 50 TABLET ORAL at 05:27

## 2019-01-01 RX ADMIN — Medication 10 MILLIGRAM(S): at 21:13

## 2019-01-01 RX ADMIN — SERTRALINE 25 MILLIGRAM(S): 25 TABLET, FILM COATED ORAL at 14:41

## 2019-01-01 RX ADMIN — Medication 3 MILLILITER(S): at 09:22

## 2019-01-01 RX ADMIN — Medication 250 MILLIGRAM(S): at 05:54

## 2019-01-01 RX ADMIN — TRAMADOL HYDROCHLORIDE 25 MILLIGRAM(S): 50 TABLET ORAL at 00:02

## 2019-01-01 RX ADMIN — SERTRALINE 25 MILLIGRAM(S): 25 TABLET, FILM COATED ORAL at 13:27

## 2019-01-01 RX ADMIN — Medication 100 MILLIGRAM(S): at 05:54

## 2019-01-01 RX ADMIN — MONTELUKAST 10 MILLIGRAM(S): 4 TABLET, CHEWABLE ORAL at 13:32

## 2019-01-01 RX ADMIN — ENOXAPARIN SODIUM 40 MILLIGRAM(S): 100 INJECTION SUBCUTANEOUS at 21:08

## 2019-01-01 RX ADMIN — LIDOCAINE 1 PATCH: 4 CREAM TOPICAL at 22:30

## 2019-01-01 RX ADMIN — Medication 10 MILLIGRAM(S): at 06:09

## 2019-01-01 RX ADMIN — SERTRALINE 25 MILLIGRAM(S): 25 TABLET, FILM COATED ORAL at 11:54

## 2019-01-01 RX ADMIN — SODIUM CHLORIDE 3 MILLILITER(S): 9 INJECTION INTRAMUSCULAR; INTRAVENOUS; SUBCUTANEOUS at 14:49

## 2019-01-01 RX ADMIN — Medication 650 MILLIGRAM(S): at 01:15

## 2019-01-01 RX ADMIN — Medication 3 MILLILITER(S): at 15:37

## 2019-01-01 RX ADMIN — PIPERACILLIN AND TAZOBACTAM 25 GRAM(S): 4; .5 INJECTION, POWDER, LYOPHILIZED, FOR SOLUTION INTRAVENOUS at 08:53

## 2019-01-01 RX ADMIN — Medication 101.6 MILLIGRAM(S): at 17:27

## 2019-01-01 RX ADMIN — Medication 10 MILLIGRAM(S): at 20:38

## 2019-01-01 RX ADMIN — LOSARTAN POTASSIUM 100 MILLIGRAM(S): 100 TABLET, FILM COATED ORAL at 05:08

## 2019-01-01 RX ADMIN — SODIUM CHLORIDE 3 MILLILITER(S): 9 INJECTION INTRAMUSCULAR; INTRAVENOUS; SUBCUTANEOUS at 07:00

## 2019-01-01 RX ADMIN — SPIRONOLACTONE 25 MILLIGRAM(S): 25 TABLET, FILM COATED ORAL at 05:38

## 2019-01-01 RX ADMIN — LOSARTAN POTASSIUM 25 MILLIGRAM(S): 100 TABLET, FILM COATED ORAL at 06:17

## 2019-01-01 RX ADMIN — Medication 650 MILLIGRAM(S): at 23:43

## 2019-01-01 RX ADMIN — PIPERACILLIN AND TAZOBACTAM 25 GRAM(S): 4; .5 INJECTION, POWDER, LYOPHILIZED, FOR SOLUTION INTRAVENOUS at 22:46

## 2019-01-01 RX ADMIN — PANTOPRAZOLE SODIUM 40 MILLIGRAM(S): 20 TABLET, DELAYED RELEASE ORAL at 05:33

## 2019-01-01 RX ADMIN — SERTRALINE 25 MILLIGRAM(S): 25 TABLET, FILM COATED ORAL at 11:59

## 2019-01-01 RX ADMIN — Medication 100 MILLIGRAM(S): at 06:11

## 2019-01-01 RX ADMIN — SENNA PLUS 2 TABLET(S): 8.6 TABLET ORAL at 21:03

## 2019-01-01 RX ADMIN — SODIUM CHLORIDE 3 MILLILITER(S): 9 INJECTION INTRAMUSCULAR; INTRAVENOUS; SUBCUTANEOUS at 06:05

## 2019-01-01 RX ADMIN — TRAMADOL HYDROCHLORIDE 50 MILLIGRAM(S): 50 TABLET ORAL at 09:45

## 2019-01-01 RX ADMIN — ATORVASTATIN CALCIUM 20 MILLIGRAM(S): 80 TABLET, FILM COATED ORAL at 21:45

## 2019-01-01 RX ADMIN — ENOXAPARIN SODIUM 40 MILLIGRAM(S): 100 INJECTION SUBCUTANEOUS at 22:07

## 2019-01-01 RX ADMIN — HEPARIN SODIUM 5000 UNIT(S): 5000 INJECTION INTRAVENOUS; SUBCUTANEOUS at 13:52

## 2019-01-01 RX ADMIN — Medication 101.6 MILLIGRAM(S): at 14:50

## 2019-01-01 RX ADMIN — CEFTRIAXONE 100 GRAM(S): 500 INJECTION, POWDER, FOR SOLUTION INTRAMUSCULAR; INTRAVENOUS at 05:50

## 2019-01-01 RX ADMIN — Medication 3 MILLILITER(S): at 20:50

## 2019-01-01 RX ADMIN — Medication 3 MILLILITER(S): at 14:39

## 2019-01-01 RX ADMIN — PANTOPRAZOLE SODIUM 40 MILLIGRAM(S): 20 TABLET, DELAYED RELEASE ORAL at 05:29

## 2019-01-01 RX ADMIN — Medication 100 MILLIGRAM(S): at 06:34

## 2019-01-01 RX ADMIN — Medication 40 MILLIEQUIVALENT(S): at 10:14

## 2019-01-01 RX ADMIN — Medication 650 MILLIGRAM(S): at 11:52

## 2019-01-01 RX ADMIN — ATORVASTATIN CALCIUM 40 MILLIGRAM(S): 80 TABLET, FILM COATED ORAL at 22:58

## 2019-01-01 RX ADMIN — Medication 650 MILLIGRAM(S): at 23:15

## 2019-01-01 RX ADMIN — Medication 3 MILLILITER(S): at 08:26

## 2019-01-01 RX ADMIN — Medication 3 MILLILITER(S): at 03:20

## 2019-01-01 RX ADMIN — PIPERACILLIN AND TAZOBACTAM 25 GRAM(S): 4; .5 INJECTION, POWDER, LYOPHILIZED, FOR SOLUTION INTRAVENOUS at 06:15

## 2019-01-01 RX ADMIN — MONTELUKAST 10 MILLIGRAM(S): 4 TABLET, CHEWABLE ORAL at 17:05

## 2019-01-01 RX ADMIN — MONTELUKAST 10 MILLIGRAM(S): 4 TABLET, CHEWABLE ORAL at 22:12

## 2019-01-01 RX ADMIN — SENNA PLUS 2 TABLET(S): 8.6 TABLET ORAL at 22:59

## 2019-01-01 RX ADMIN — CEFTRIAXONE 100 MILLIGRAM(S): 500 INJECTION, POWDER, FOR SOLUTION INTRAMUSCULAR; INTRAVENOUS at 15:10

## 2019-01-01 RX ADMIN — PIPERACILLIN AND TAZOBACTAM 200 GRAM(S): 4; .5 INJECTION, POWDER, LYOPHILIZED, FOR SOLUTION INTRAVENOUS at 18:01

## 2019-01-01 RX ADMIN — PIPERACILLIN AND TAZOBACTAM 25 GRAM(S): 4; .5 INJECTION, POWDER, LYOPHILIZED, FOR SOLUTION INTRAVENOUS at 21:36

## 2019-01-01 RX ADMIN — SPIRONOLACTONE 25 MILLIGRAM(S): 25 TABLET, FILM COATED ORAL at 06:20

## 2019-01-01 RX ADMIN — Medication 10 MILLIGRAM(S): at 06:17

## 2019-01-01 RX ADMIN — Medication 100 MILLIGRAM(S): at 05:34

## 2019-01-01 RX ADMIN — Medication 650 MILLIGRAM(S): at 22:04

## 2019-01-01 RX ADMIN — SODIUM CHLORIDE 3 MILLILITER(S): 9 INJECTION INTRAMUSCULAR; INTRAVENOUS; SUBCUTANEOUS at 21:17

## 2019-01-01 RX ADMIN — Medication 250 MILLIGRAM(S): at 17:05

## 2019-01-01 RX ADMIN — SERTRALINE 25 MILLIGRAM(S): 25 TABLET, FILM COATED ORAL at 10:40

## 2019-01-01 RX ADMIN — PANTOPRAZOLE SODIUM 40 MILLIGRAM(S): 20 TABLET, DELAYED RELEASE ORAL at 05:16

## 2019-01-01 RX ADMIN — Medication 100 MILLIGRAM(S): at 06:05

## 2019-01-01 RX ADMIN — Medication 5 MILLIGRAM(S): at 23:05

## 2019-01-01 RX ADMIN — SODIUM POLYSTYRENE SULFONATE 15 GRAM(S): 4.1 POWDER, FOR SUSPENSION ORAL at 09:58

## 2019-01-01 RX ADMIN — Medication 3 MILLILITER(S): at 02:04

## 2019-01-01 RX ADMIN — HYDROMORPHONE HYDROCHLORIDE 0.25 MILLIGRAM(S): 2 INJECTION INTRAMUSCULAR; INTRAVENOUS; SUBCUTANEOUS at 13:53

## 2019-01-01 RX ADMIN — Medication 40 MILLIGRAM(S): at 07:11

## 2019-01-01 RX ADMIN — PIPERACILLIN AND TAZOBACTAM 25 GRAM(S): 4; .5 INJECTION, POWDER, LYOPHILIZED, FOR SOLUTION INTRAVENOUS at 21:19

## 2019-01-01 RX ADMIN — Medication 3 MILLILITER(S): at 01:07

## 2019-01-01 RX ADMIN — SODIUM CHLORIDE 3 MILLILITER(S): 9 INJECTION INTRAMUSCULAR; INTRAVENOUS; SUBCUTANEOUS at 05:59

## 2019-01-01 RX ADMIN — Medication 100 MILLIGRAM(S): at 17:04

## 2019-01-01 RX ADMIN — SERTRALINE 25 MILLIGRAM(S): 25 TABLET, FILM COATED ORAL at 13:50

## 2019-01-01 RX ADMIN — Medication 3 MILLILITER(S): at 03:47

## 2019-01-01 RX ADMIN — CEFTRIAXONE 100 MILLIGRAM(S): 500 INJECTION, POWDER, FOR SOLUTION INTRAMUSCULAR; INTRAVENOUS at 17:10

## 2019-01-01 RX ADMIN — Medication 0.25 MILLIGRAM(S): at 00:43

## 2019-01-01 RX ADMIN — Medication 100 MILLIGRAM(S): at 05:21

## 2019-01-01 RX ADMIN — Medication 100 MILLIGRAM(S): at 22:06

## 2019-01-01 RX ADMIN — PIPERACILLIN AND TAZOBACTAM 25 GRAM(S): 4; .5 INJECTION, POWDER, LYOPHILIZED, FOR SOLUTION INTRAVENOUS at 12:27

## 2019-01-01 RX ADMIN — Medication 3 MILLILITER(S): at 14:45

## 2019-01-01 RX ADMIN — ENOXAPARIN SODIUM 40 MILLIGRAM(S): 100 INJECTION SUBCUTANEOUS at 22:11

## 2019-01-01 RX ADMIN — Medication 3 MILLILITER(S): at 16:15

## 2019-01-01 RX ADMIN — HYDROMORPHONE HYDROCHLORIDE 0.5 MILLIGRAM(S): 2 INJECTION INTRAMUSCULAR; INTRAVENOUS; SUBCUTANEOUS at 10:55

## 2019-01-01 RX ADMIN — TRAMADOL HYDROCHLORIDE 50 MILLIGRAM(S): 50 TABLET ORAL at 04:30

## 2019-01-01 RX ADMIN — ENOXAPARIN SODIUM 40 MILLIGRAM(S): 100 INJECTION SUBCUTANEOUS at 17:41

## 2019-01-01 RX ADMIN — LOSARTAN POTASSIUM 100 MILLIGRAM(S): 100 TABLET, FILM COATED ORAL at 06:11

## 2019-01-01 RX ADMIN — SPIRONOLACTONE 25 MILLIGRAM(S): 25 TABLET, FILM COATED ORAL at 05:54

## 2019-01-01 RX ADMIN — CARVEDILOL PHOSPHATE 3.12 MILLIGRAM(S): 80 CAPSULE, EXTENDED RELEASE ORAL at 06:18

## 2019-01-01 RX ADMIN — Medication 81 MILLIGRAM(S): at 12:50

## 2019-01-01 RX ADMIN — CARVEDILOL PHOSPHATE 3.12 MILLIGRAM(S): 80 CAPSULE, EXTENDED RELEASE ORAL at 17:04

## 2019-01-01 RX ADMIN — MONTELUKAST 10 MILLIGRAM(S): 4 TABLET, CHEWABLE ORAL at 11:26

## 2019-01-01 RX ADMIN — TRAMADOL HYDROCHLORIDE 50 MILLIGRAM(S): 50 TABLET ORAL at 15:09

## 2019-01-01 RX ADMIN — SPIRONOLACTONE 25 MILLIGRAM(S): 25 TABLET, FILM COATED ORAL at 05:08

## 2019-01-01 RX ADMIN — PANTOPRAZOLE SODIUM 40 MILLIGRAM(S): 20 TABLET, DELAYED RELEASE ORAL at 12:33

## 2019-01-01 RX ADMIN — Medication 40 MILLIGRAM(S): at 05:50

## 2019-01-01 RX ADMIN — Medication 100 MILLIGRAM(S): at 05:09

## 2019-01-01 RX ADMIN — SPIRONOLACTONE 25 MILLIGRAM(S): 25 TABLET, FILM COATED ORAL at 05:37

## 2019-01-01 RX ADMIN — Medication 250 MILLIGRAM(S): at 17:25

## 2019-01-01 RX ADMIN — Medication 0.5 MILLIGRAM(S): at 23:50

## 2019-01-01 RX ADMIN — CEFTRIAXONE 100 MILLIGRAM(S): 500 INJECTION, POWDER, FOR SOLUTION INTRAMUSCULAR; INTRAVENOUS at 12:51

## 2019-01-01 RX ADMIN — Medication 3 MILLILITER(S): at 10:46

## 2019-01-01 RX ADMIN — Medication 3 MILLILITER(S): at 15:19

## 2019-01-01 RX ADMIN — Medication 100 MILLIGRAM(S): at 05:19

## 2019-01-01 RX ADMIN — CARVEDILOL PHOSPHATE 3.12 MILLIGRAM(S): 80 CAPSULE, EXTENDED RELEASE ORAL at 05:19

## 2019-01-01 RX ADMIN — SODIUM CHLORIDE 3 MILLILITER(S): 9 INJECTION INTRAMUSCULAR; INTRAVENOUS; SUBCUTANEOUS at 05:34

## 2019-01-01 RX ADMIN — Medication 100 MILLIGRAM(S): at 15:44

## 2019-01-01 RX ADMIN — Medication 650 MILLIGRAM(S): at 14:07

## 2019-01-01 RX ADMIN — SERTRALINE 25 MILLIGRAM(S): 25 TABLET, FILM COATED ORAL at 11:52

## 2019-01-01 RX ADMIN — MONTELUKAST 10 MILLIGRAM(S): 4 TABLET, CHEWABLE ORAL at 12:50

## 2019-01-01 RX ADMIN — PANTOPRAZOLE SODIUM 40 MILLIGRAM(S): 20 TABLET, DELAYED RELEASE ORAL at 10:54

## 2019-01-01 RX ADMIN — PANTOPRAZOLE SODIUM 40 MILLIGRAM(S): 20 TABLET, DELAYED RELEASE ORAL at 11:31

## 2019-01-01 RX ADMIN — SENNA PLUS 2 TABLET(S): 8.6 TABLET ORAL at 21:30

## 2019-01-01 RX ADMIN — Medication 81 MILLIGRAM(S): at 10:54

## 2019-01-01 RX ADMIN — Medication 250 MILLIGRAM(S): at 17:16

## 2019-01-01 RX ADMIN — ATORVASTATIN CALCIUM 40 MILLIGRAM(S): 80 TABLET, FILM COATED ORAL at 21:14

## 2019-01-01 RX ADMIN — SENNA PLUS 2 TABLET(S): 8.6 TABLET ORAL at 21:44

## 2019-01-01 RX ADMIN — ENOXAPARIN SODIUM 40 MILLIGRAM(S): 100 INJECTION SUBCUTANEOUS at 22:26

## 2019-01-01 RX ADMIN — Medication 0.5 MILLIGRAM(S): at 23:03

## 2019-01-01 RX ADMIN — Medication 650 MILLIGRAM(S): at 06:50

## 2019-01-01 RX ADMIN — Medication 15 MILLIGRAM(S): at 01:47

## 2019-01-01 RX ADMIN — CARVEDILOL PHOSPHATE 3.12 MILLIGRAM(S): 80 CAPSULE, EXTENDED RELEASE ORAL at 17:11

## 2019-01-01 RX ADMIN — Medication 650 MILLIGRAM(S): at 16:56

## 2019-01-01 RX ADMIN — SODIUM CHLORIDE 3 MILLILITER(S): 9 INJECTION INTRAMUSCULAR; INTRAVENOUS; SUBCUTANEOUS at 23:14

## 2019-01-01 RX ADMIN — PANTOPRAZOLE SODIUM 40 MILLIGRAM(S): 20 TABLET, DELAYED RELEASE ORAL at 10:40

## 2019-01-01 RX ADMIN — SODIUM CHLORIDE 3 MILLILITER(S): 9 INJECTION INTRAMUSCULAR; INTRAVENOUS; SUBCUTANEOUS at 22:30

## 2019-01-01 RX ADMIN — Medication 3 MILLILITER(S): at 21:21

## 2019-01-01 RX ADMIN — Medication 250 MILLIGRAM(S): at 05:13

## 2019-01-01 RX ADMIN — Medication 10 MILLILITER(S): at 14:48

## 2019-01-01 RX ADMIN — LIDOCAINE 1 PATCH: 4 CREAM TOPICAL at 13:58

## 2019-01-01 RX ADMIN — Medication 81 MILLIGRAM(S): at 10:08

## 2019-01-01 RX ADMIN — Medication 3 MILLILITER(S): at 20:42

## 2019-01-01 RX ADMIN — Medication 650 MILLIGRAM(S): at 17:04

## 2019-01-01 RX ADMIN — LOSARTAN POTASSIUM 25 MILLIGRAM(S): 100 TABLET, FILM COATED ORAL at 05:19

## 2019-01-01 RX ADMIN — Medication 100 MILLIGRAM(S): at 21:20

## 2019-01-01 RX ADMIN — PIPERACILLIN AND TAZOBACTAM 25 GRAM(S): 4; .5 INJECTION, POWDER, LYOPHILIZED, FOR SOLUTION INTRAVENOUS at 21:03

## 2019-01-01 RX ADMIN — Medication 3 MILLILITER(S): at 02:57

## 2019-01-01 RX ADMIN — Medication 40 MILLIGRAM(S): at 05:47

## 2019-01-01 RX ADMIN — Medication 250 MILLIGRAM(S): at 11:33

## 2019-01-01 RX ADMIN — OXYCODONE AND ACETAMINOPHEN 2 TABLET(S): 5; 325 TABLET ORAL at 11:37

## 2019-01-01 RX ADMIN — Medication 975 MILLIGRAM(S): at 13:59

## 2019-01-01 RX ADMIN — Medication 3 MILLILITER(S): at 03:07

## 2019-01-01 RX ADMIN — Medication 650 MILLIGRAM(S): at 13:00

## 2019-01-01 RX ADMIN — Medication 81 MILLIGRAM(S): at 10:04

## 2019-01-01 RX ADMIN — PANTOPRAZOLE SODIUM 40 MILLIGRAM(S): 20 TABLET, DELAYED RELEASE ORAL at 05:56

## 2019-01-01 RX ADMIN — Medication 650 MILLIGRAM(S): at 23:00

## 2019-01-01 RX ADMIN — Medication 81 MILLIGRAM(S): at 11:51

## 2019-01-01 RX ADMIN — Medication 250 MILLIGRAM(S): at 06:38

## 2019-01-01 RX ADMIN — SERTRALINE 25 MILLIGRAM(S): 25 TABLET, FILM COATED ORAL at 11:39

## 2019-01-01 RX ADMIN — Medication 250 MILLIGRAM(S): at 05:29

## 2019-01-01 RX ADMIN — ENOXAPARIN SODIUM 40 MILLIGRAM(S): 100 INJECTION SUBCUTANEOUS at 21:30

## 2019-01-01 RX ADMIN — ATORVASTATIN CALCIUM 20 MILLIGRAM(S): 80 TABLET, FILM COATED ORAL at 22:46

## 2019-01-01 RX ADMIN — TRAMADOL HYDROCHLORIDE 50 MILLIGRAM(S): 50 TABLET ORAL at 20:30

## 2019-01-01 RX ADMIN — SODIUM CHLORIDE 3 MILLILITER(S): 9 INJECTION INTRAMUSCULAR; INTRAVENOUS; SUBCUTANEOUS at 13:15

## 2019-01-01 RX ADMIN — Medication 3 MILLILITER(S): at 03:45

## 2019-01-01 RX ADMIN — ATORVASTATIN CALCIUM 20 MILLIGRAM(S): 80 TABLET, FILM COATED ORAL at 21:07

## 2019-01-01 RX ADMIN — Medication 100 MILLIGRAM(S): at 14:41

## 2019-01-01 RX ADMIN — ATORVASTATIN CALCIUM 40 MILLIGRAM(S): 80 TABLET, FILM COATED ORAL at 21:00

## 2019-01-01 RX ADMIN — TRAMADOL HYDROCHLORIDE 50 MILLIGRAM(S): 50 TABLET ORAL at 21:56

## 2019-01-01 RX ADMIN — Medication 10 MILLIGRAM(S): at 20:11

## 2019-01-01 RX ADMIN — MONTELUKAST 10 MILLIGRAM(S): 4 TABLET, CHEWABLE ORAL at 21:43

## 2019-01-01 RX ADMIN — SENNA PLUS 2 TABLET(S): 8.6 TABLET ORAL at 22:12

## 2019-01-01 RX ADMIN — Medication 100 MILLIGRAM(S): at 12:14

## 2019-01-01 RX ADMIN — Medication 600 MILLIGRAM(S): at 01:36

## 2019-01-01 RX ADMIN — ALBUTEROL 2 PUFF(S): 90 AEROSOL, METERED ORAL at 03:07

## 2019-01-01 RX ADMIN — Medication 81 MILLIGRAM(S): at 14:05

## 2019-01-01 RX ADMIN — TRAMADOL HYDROCHLORIDE 50 MILLIGRAM(S): 50 TABLET ORAL at 07:00

## 2019-01-01 RX ADMIN — MONTELUKAST 10 MILLIGRAM(S): 4 TABLET, CHEWABLE ORAL at 08:52

## 2019-01-01 RX ADMIN — MONTELUKAST 10 MILLIGRAM(S): 4 TABLET, CHEWABLE ORAL at 13:39

## 2019-01-01 RX ADMIN — CARVEDILOL PHOSPHATE 3.12 MILLIGRAM(S): 80 CAPSULE, EXTENDED RELEASE ORAL at 15:10

## 2019-01-01 RX ADMIN — SODIUM CHLORIDE 3 MILLILITER(S): 9 INJECTION INTRAMUSCULAR; INTRAVENOUS; SUBCUTANEOUS at 05:14

## 2019-01-01 RX ADMIN — Medication 3 MILLILITER(S): at 15:09

## 2019-01-01 RX ADMIN — Medication 3 MILLILITER(S): at 03:55

## 2019-01-01 RX ADMIN — ATORVASTATIN CALCIUM 20 MILLIGRAM(S): 80 TABLET, FILM COATED ORAL at 23:13

## 2019-01-01 RX ADMIN — PIPERACILLIN AND TAZOBACTAM 25 GRAM(S): 4; .5 INJECTION, POWDER, LYOPHILIZED, FOR SOLUTION INTRAVENOUS at 09:03

## 2019-01-01 RX ADMIN — Medication 40 MILLIGRAM(S): at 10:29

## 2019-01-01 RX ADMIN — Medication 3 MILLILITER(S): at 03:24

## 2019-01-01 RX ADMIN — Medication 3 MILLILITER(S): at 15:33

## 2019-01-01 RX ADMIN — CARVEDILOL PHOSPHATE 3.12 MILLIGRAM(S): 80 CAPSULE, EXTENDED RELEASE ORAL at 17:57

## 2019-01-01 RX ADMIN — PIPERACILLIN AND TAZOBACTAM 25 GRAM(S): 4; .5 INJECTION, POWDER, LYOPHILIZED, FOR SOLUTION INTRAVENOUS at 15:06

## 2019-01-01 RX ADMIN — Medication 650 MILLIGRAM(S): at 06:31

## 2019-01-01 RX ADMIN — SODIUM CHLORIDE 3 MILLILITER(S): 9 INJECTION INTRAMUSCULAR; INTRAVENOUS; SUBCUTANEOUS at 14:32

## 2019-01-01 RX ADMIN — Medication 3 MILLILITER(S): at 09:45

## 2019-01-01 RX ADMIN — Medication 250 MILLIGRAM(S): at 17:38

## 2019-01-01 RX ADMIN — Medication 250 MILLIGRAM(S): at 05:31

## 2019-01-01 RX ADMIN — AZITHROMYCIN 255 MILLIGRAM(S): 500 TABLET, FILM COATED ORAL at 10:56

## 2019-01-01 RX ADMIN — TIOTROPIUM BROMIDE 1 CAPSULE(S): 18 CAPSULE ORAL; RESPIRATORY (INHALATION) at 07:25

## 2019-01-01 RX ADMIN — SODIUM CHLORIDE 3 MILLILITER(S): 9 INJECTION INTRAMUSCULAR; INTRAVENOUS; SUBCUTANEOUS at 14:07

## 2019-01-01 RX ADMIN — SERTRALINE 25 MILLIGRAM(S): 25 TABLET, FILM COATED ORAL at 06:39

## 2019-01-01 RX ADMIN — PIPERACILLIN AND TAZOBACTAM 25 GRAM(S): 4; .5 INJECTION, POWDER, LYOPHILIZED, FOR SOLUTION INTRAVENOUS at 05:13

## 2019-01-01 RX ADMIN — Medication 100 MILLIGRAM(S): at 21:00

## 2019-01-01 RX ADMIN — ALBUTEROL 2.5 MILLIGRAM(S): 90 AEROSOL, METERED ORAL at 14:23

## 2019-01-01 RX ADMIN — OXYCODONE AND ACETAMINOPHEN 2 TABLET(S): 5; 325 TABLET ORAL at 21:20

## 2019-01-01 RX ADMIN — ENOXAPARIN SODIUM 40 MILLIGRAM(S): 100 INJECTION SUBCUTANEOUS at 21:17

## 2019-01-01 RX ADMIN — SODIUM CHLORIDE 3 MILLILITER(S): 9 INJECTION INTRAMUSCULAR; INTRAVENOUS; SUBCUTANEOUS at 21:29

## 2019-01-01 RX ADMIN — Medication 3 MILLILITER(S): at 21:18

## 2019-01-01 RX ADMIN — LIDOCAINE 1 PATCH: 4 CREAM TOPICAL at 01:52

## 2019-01-01 RX ADMIN — HEPARIN SODIUM 5000 UNIT(S): 5000 INJECTION INTRAVENOUS; SUBCUTANEOUS at 21:37

## 2019-01-01 RX ADMIN — Medication 400 MILLIGRAM(S): at 16:00

## 2019-01-01 RX ADMIN — MONTELUKAST 10 MILLIGRAM(S): 4 TABLET, CHEWABLE ORAL at 10:04

## 2019-01-01 RX ADMIN — Medication 250 MILLIGRAM(S): at 06:15

## 2019-01-01 RX ADMIN — PANTOPRAZOLE SODIUM 40 MILLIGRAM(S): 20 TABLET, DELAYED RELEASE ORAL at 05:23

## 2019-01-01 RX ADMIN — ENOXAPARIN SODIUM 40 MILLIGRAM(S): 100 INJECTION SUBCUTANEOUS at 11:21

## 2019-01-01 RX ADMIN — PANTOPRAZOLE SODIUM 40 MILLIGRAM(S): 20 TABLET, DELAYED RELEASE ORAL at 13:24

## 2019-01-01 RX ADMIN — OXYCODONE AND ACETAMINOPHEN 2 TABLET(S): 5; 325 TABLET ORAL at 18:05

## 2019-01-01 RX ADMIN — Medication 100 MILLIGRAM(S): at 06:17

## 2019-01-01 RX ADMIN — Medication 100 MILLIGRAM(S): at 22:12

## 2019-01-01 RX ADMIN — PIPERACILLIN AND TAZOBACTAM 25 GRAM(S): 4; .5 INJECTION, POWDER, LYOPHILIZED, FOR SOLUTION INTRAVENOUS at 06:57

## 2019-01-01 RX ADMIN — CARVEDILOL PHOSPHATE 3.12 MILLIGRAM(S): 80 CAPSULE, EXTENDED RELEASE ORAL at 17:41

## 2019-01-01 RX ADMIN — SODIUM CHLORIDE 3 MILLILITER(S): 9 INJECTION INTRAMUSCULAR; INTRAVENOUS; SUBCUTANEOUS at 08:38

## 2019-01-01 RX ADMIN — CARVEDILOL PHOSPHATE 3.12 MILLIGRAM(S): 80 CAPSULE, EXTENDED RELEASE ORAL at 05:46

## 2019-01-01 RX ADMIN — HYDROMORPHONE HYDROCHLORIDE 0.4 MILLIGRAM(S): 2 INJECTION INTRAMUSCULAR; INTRAVENOUS; SUBCUTANEOUS at 14:03

## 2019-01-01 RX ADMIN — ENOXAPARIN SODIUM 40 MILLIGRAM(S): 100 INJECTION SUBCUTANEOUS at 21:59

## 2019-01-01 RX ADMIN — CARVEDILOL PHOSPHATE 3.12 MILLIGRAM(S): 80 CAPSULE, EXTENDED RELEASE ORAL at 17:42

## 2019-01-01 RX ADMIN — Medication 100 MILLIGRAM(S): at 22:24

## 2019-01-01 RX ADMIN — Medication 3 MILLILITER(S): at 20:11

## 2019-01-01 RX ADMIN — SODIUM CHLORIDE 3 MILLILITER(S): 9 INJECTION INTRAMUSCULAR; INTRAVENOUS; SUBCUTANEOUS at 21:23

## 2019-01-01 RX ADMIN — Medication 3 MILLILITER(S): at 08:54

## 2019-01-01 RX ADMIN — Medication 40 MILLIGRAM(S): at 17:50

## 2019-01-01 RX ADMIN — Medication 3 MILLILITER(S): at 09:42

## 2019-01-01 RX ADMIN — ENOXAPARIN SODIUM 40 MILLIGRAM(S): 100 INJECTION SUBCUTANEOUS at 21:14

## 2019-01-01 RX ADMIN — Medication 975 MILLIGRAM(S): at 18:33

## 2019-01-01 RX ADMIN — Medication 20 MILLIGRAM(S): at 05:18

## 2019-01-01 RX ADMIN — LIDOCAINE 1 PATCH: 4 CREAM TOPICAL at 22:29

## 2019-01-01 RX ADMIN — Medication 10 MILLIGRAM(S): at 06:34

## 2019-01-01 RX ADMIN — Medication 100 MILLIGRAM(S): at 23:20

## 2019-01-01 RX ADMIN — MONTELUKAST 10 MILLIGRAM(S): 4 TABLET, CHEWABLE ORAL at 12:33

## 2019-01-01 RX ADMIN — SENNA PLUS 2 TABLET(S): 8.6 TABLET ORAL at 21:57

## 2019-01-01 RX ADMIN — PIPERACILLIN AND TAZOBACTAM 25 GRAM(S): 4; .5 INJECTION, POWDER, LYOPHILIZED, FOR SOLUTION INTRAVENOUS at 05:55

## 2019-01-01 RX ADMIN — Medication 3 MILLILITER(S): at 20:02

## 2019-01-01 RX ADMIN — PANTOPRAZOLE SODIUM 40 MILLIGRAM(S): 20 TABLET, DELAYED RELEASE ORAL at 05:54

## 2019-01-01 RX ADMIN — Medication 40 MILLIGRAM(S): at 06:06

## 2019-01-01 RX ADMIN — FENTANYL CITRATE 25 MICROGRAM(S): 50 INJECTION INTRAVENOUS at 16:15

## 2019-01-01 RX ADMIN — Medication 650 MILLIGRAM(S): at 04:00

## 2019-01-01 RX ADMIN — Medication 650 MILLIGRAM(S): at 01:20

## 2019-01-01 RX ADMIN — CARVEDILOL PHOSPHATE 3.12 MILLIGRAM(S): 80 CAPSULE, EXTENDED RELEASE ORAL at 18:33

## 2019-01-01 RX ADMIN — ATORVASTATIN CALCIUM 40 MILLIGRAM(S): 80 TABLET, FILM COATED ORAL at 21:19

## 2019-01-01 RX ADMIN — Medication 101.6 MILLIGRAM(S): at 00:08

## 2019-01-01 RX ADMIN — Medication 100 MILLIGRAM(S): at 06:13

## 2019-01-01 RX ADMIN — Medication 20 MILLIGRAM(S): at 00:02

## 2019-01-01 RX ADMIN — ATORVASTATIN CALCIUM 20 MILLIGRAM(S): 80 TABLET, FILM COATED ORAL at 21:08

## 2019-01-01 RX ADMIN — SERTRALINE 25 MILLIGRAM(S): 25 TABLET, FILM COATED ORAL at 13:59

## 2019-01-01 RX ADMIN — LOSARTAN POTASSIUM 25 MILLIGRAM(S): 100 TABLET, FILM COATED ORAL at 05:36

## 2019-01-01 RX ADMIN — PANTOPRAZOLE SODIUM 40 MILLIGRAM(S): 20 TABLET, DELAYED RELEASE ORAL at 06:18

## 2019-01-01 RX ADMIN — Medication 10 MILLIGRAM(S): at 05:20

## 2019-01-01 RX ADMIN — TRAMADOL HYDROCHLORIDE 50 MILLIGRAM(S): 50 TABLET ORAL at 02:42

## 2019-01-01 RX ADMIN — Medication 15 MILLIGRAM(S): at 02:00

## 2019-01-01 RX ADMIN — Medication 166.67 MILLIGRAM(S): at 21:14

## 2019-01-01 RX ADMIN — MONTELUKAST 10 MILLIGRAM(S): 4 TABLET, CHEWABLE ORAL at 14:03

## 2019-01-01 RX ADMIN — OXYCODONE AND ACETAMINOPHEN 1 TABLET(S): 5; 325 TABLET ORAL at 08:26

## 2019-01-01 RX ADMIN — LIDOCAINE 1 PATCH: 4 CREAM TOPICAL at 10:04

## 2019-01-01 RX ADMIN — SPIRONOLACTONE 25 MILLIGRAM(S): 25 TABLET, FILM COATED ORAL at 06:22

## 2019-01-01 RX ADMIN — Medication 3 MILLILITER(S): at 20:40

## 2019-01-01 RX ADMIN — Medication 10 MILLIGRAM(S): at 05:33

## 2019-01-01 RX ADMIN — Medication 40 MILLIGRAM(S): at 05:36

## 2019-01-01 RX ADMIN — ENOXAPARIN SODIUM 40 MILLIGRAM(S): 100 INJECTION SUBCUTANEOUS at 21:44

## 2019-01-01 RX ADMIN — Medication 100 MILLIGRAM(S): at 05:18

## 2019-01-01 RX ADMIN — LOSARTAN POTASSIUM 50 MILLIGRAM(S): 100 TABLET, FILM COATED ORAL at 05:35

## 2019-01-01 RX ADMIN — ENOXAPARIN SODIUM 40 MILLIGRAM(S): 100 INJECTION SUBCUTANEOUS at 11:25

## 2019-01-01 RX ADMIN — Medication 650 MILLIGRAM(S): at 03:57

## 2019-01-01 RX ADMIN — Medication 3 MILLILITER(S): at 03:39

## 2019-01-01 RX ADMIN — LOSARTAN POTASSIUM 50 MILLIGRAM(S): 100 TABLET, FILM COATED ORAL at 06:01

## 2019-01-01 RX ADMIN — SODIUM CHLORIDE 3 MILLILITER(S): 9 INJECTION INTRAMUSCULAR; INTRAVENOUS; SUBCUTANEOUS at 11:42

## 2019-01-01 RX ADMIN — Medication 3 MILLILITER(S): at 07:05

## 2019-01-01 RX ADMIN — Medication 10 MILLIGRAM(S): at 05:26

## 2019-01-01 RX ADMIN — LIDOCAINE 1 PATCH: 4 CREAM TOPICAL at 19:50

## 2019-01-01 RX ADMIN — OXYCODONE AND ACETAMINOPHEN 2 TABLET(S): 5; 325 TABLET ORAL at 03:24

## 2019-01-01 RX ADMIN — OXYCODONE HYDROCHLORIDE 10 MILLIGRAM(S): 5 TABLET ORAL at 13:38

## 2019-01-01 RX ADMIN — PIPERACILLIN AND TAZOBACTAM 200 GRAM(S): 4; .5 INJECTION, POWDER, LYOPHILIZED, FOR SOLUTION INTRAVENOUS at 11:10

## 2019-01-01 RX ADMIN — Medication 650 MILLIGRAM(S): at 01:50

## 2019-01-01 RX ADMIN — Medication 250 MILLIGRAM(S): at 05:26

## 2019-01-01 RX ADMIN — FENTANYL CITRATE 30 MILLILITER(S): 50 INJECTION INTRAVENOUS at 07:19

## 2019-01-01 RX ADMIN — LOSARTAN POTASSIUM 25 MILLIGRAM(S): 100 TABLET, FILM COATED ORAL at 16:18

## 2019-01-01 RX ADMIN — Medication 166.67 MILLIGRAM(S): at 18:48

## 2019-01-01 RX ADMIN — Medication 1 MILLIGRAM(S): at 08:15

## 2019-01-01 RX ADMIN — Medication 3 MILLILITER(S): at 08:18

## 2019-01-01 RX ADMIN — SODIUM CHLORIDE 3 MILLILITER(S): 9 INJECTION INTRAMUSCULAR; INTRAVENOUS; SUBCUTANEOUS at 12:48

## 2019-01-01 RX ADMIN — SPIRONOLACTONE 25 MILLIGRAM(S): 25 TABLET, FILM COATED ORAL at 05:29

## 2019-01-01 RX ADMIN — Medication 20 MILLIGRAM(S): at 18:51

## 2019-01-01 RX ADMIN — PIPERACILLIN AND TAZOBACTAM 25 GRAM(S): 4; .5 INJECTION, POWDER, LYOPHILIZED, FOR SOLUTION INTRAVENOUS at 14:36

## 2019-01-01 RX ADMIN — Medication 250 MILLIGRAM(S): at 18:35

## 2019-01-01 RX ADMIN — PIPERACILLIN AND TAZOBACTAM 25 GRAM(S): 4; .5 INJECTION, POWDER, LYOPHILIZED, FOR SOLUTION INTRAVENOUS at 23:59

## 2019-01-01 RX ADMIN — Medication 3 MILLILITER(S): at 21:15

## 2019-01-01 RX ADMIN — SENNA PLUS 2 TABLET(S): 8.6 TABLET ORAL at 21:34

## 2019-01-01 RX ADMIN — ATORVASTATIN CALCIUM 40 MILLIGRAM(S): 80 TABLET, FILM COATED ORAL at 21:18

## 2019-01-01 RX ADMIN — Medication 650 MILLIGRAM(S): at 07:01

## 2019-01-01 RX ADMIN — LIDOCAINE 1 PATCH: 4 CREAM TOPICAL at 10:07

## 2019-01-01 RX ADMIN — Medication 3 MILLILITER(S): at 15:15

## 2019-01-01 RX ADMIN — Medication 250 MILLIGRAM(S): at 07:06

## 2019-01-01 RX ADMIN — Medication 3 MILLILITER(S): at 16:01

## 2019-01-01 RX ADMIN — SODIUM CHLORIDE 3 MILLILITER(S): 9 INJECTION INTRAMUSCULAR; INTRAVENOUS; SUBCUTANEOUS at 21:55

## 2019-01-01 RX ADMIN — FENTANYL CITRATE 30 MILLILITER(S): 50 INJECTION INTRAVENOUS at 12:24

## 2019-01-01 RX ADMIN — Medication 100 MILLIGRAM(S): at 21:08

## 2019-01-01 RX ADMIN — LIDOCAINE 1 PATCH: 4 CREAM TOPICAL at 19:30

## 2019-01-01 RX ADMIN — ALBUTEROL 2 PUFF(S): 90 AEROSOL, METERED ORAL at 08:53

## 2019-01-01 RX ADMIN — ENOXAPARIN SODIUM 40 MILLIGRAM(S): 100 INJECTION SUBCUTANEOUS at 18:46

## 2019-01-01 RX ADMIN — SODIUM CHLORIDE 3 MILLILITER(S): 9 INJECTION INTRAMUSCULAR; INTRAVENOUS; SUBCUTANEOUS at 10:41

## 2019-01-01 RX ADMIN — AZITHROMYCIN 255 MILLIGRAM(S): 500 TABLET, FILM COATED ORAL at 13:23

## 2019-01-01 RX ADMIN — Medication 650 MILLIGRAM(S): at 05:00

## 2019-01-01 RX ADMIN — Medication 650 MILLIGRAM(S): at 22:40

## 2019-01-01 RX ADMIN — CARVEDILOL PHOSPHATE 3.12 MILLIGRAM(S): 80 CAPSULE, EXTENDED RELEASE ORAL at 21:13

## 2019-01-01 RX ADMIN — PIPERACILLIN AND TAZOBACTAM 25 GRAM(S): 4; .5 INJECTION, POWDER, LYOPHILIZED, FOR SOLUTION INTRAVENOUS at 13:34

## 2019-01-01 RX ADMIN — Medication 10 MILLIGRAM(S): at 05:21

## 2019-01-01 RX ADMIN — Medication 10 MILLIGRAM(S): at 05:29

## 2019-01-01 RX ADMIN — Medication 3 MILLILITER(S): at 14:58

## 2019-01-01 RX ADMIN — Medication 250 MILLIGRAM(S): at 15:10

## 2019-01-01 RX ADMIN — Medication 100 MILLIGRAM(S): at 05:33

## 2019-01-01 RX ADMIN — ENOXAPARIN SODIUM 40 MILLIGRAM(S): 100 INJECTION SUBCUTANEOUS at 22:12

## 2019-01-01 RX ADMIN — Medication 3 MILLILITER(S): at 02:43

## 2019-01-01 RX ADMIN — MONTELUKAST 10 MILLIGRAM(S): 4 TABLET, CHEWABLE ORAL at 23:13

## 2019-01-01 RX ADMIN — OXYCODONE AND ACETAMINOPHEN 1 TABLET(S): 5; 325 TABLET ORAL at 00:38

## 2019-01-01 RX ADMIN — CARVEDILOL PHOSPHATE 3.12 MILLIGRAM(S): 80 CAPSULE, EXTENDED RELEASE ORAL at 16:58

## 2019-01-01 RX ADMIN — Medication 3 MILLILITER(S): at 15:40

## 2019-01-01 RX ADMIN — CEFTRIAXONE 100 MILLIGRAM(S): 500 INJECTION, POWDER, FOR SOLUTION INTRAMUSCULAR; INTRAVENOUS at 10:07

## 2019-01-01 RX ADMIN — Medication 100 MILLIGRAM(S): at 22:01

## 2019-01-01 RX ADMIN — Medication 81 MILLIGRAM(S): at 13:47

## 2019-01-01 RX ADMIN — PIPERACILLIN AND TAZOBACTAM 25 GRAM(S): 4; .5 INJECTION, POWDER, LYOPHILIZED, FOR SOLUTION INTRAVENOUS at 13:13

## 2019-01-01 RX ADMIN — PIPERACILLIN AND TAZOBACTAM 25 GRAM(S): 4; .5 INJECTION, POWDER, LYOPHILIZED, FOR SOLUTION INTRAVENOUS at 00:33

## 2019-01-01 RX ADMIN — PIPERACILLIN AND TAZOBACTAM 25 GRAM(S): 4; .5 INJECTION, POWDER, LYOPHILIZED, FOR SOLUTION INTRAVENOUS at 21:15

## 2019-01-01 RX ADMIN — MONTELUKAST 10 MILLIGRAM(S): 4 TABLET, CHEWABLE ORAL at 12:25

## 2019-01-01 RX ADMIN — Medication 166.67 MILLIGRAM(S): at 05:14

## 2019-01-01 RX ADMIN — SODIUM CHLORIDE 3 MILLILITER(S): 9 INJECTION INTRAMUSCULAR; INTRAVENOUS; SUBCUTANEOUS at 06:23

## 2019-01-01 RX ADMIN — Medication 3 MILLILITER(S): at 00:47

## 2019-01-01 RX ADMIN — Medication 10 MILLIGRAM(S): at 06:18

## 2019-01-01 RX ADMIN — SENNA PLUS 2 TABLET(S): 8.6 TABLET ORAL at 21:17

## 2019-01-01 RX ADMIN — Medication 100 MILLIGRAM(S): at 05:55

## 2019-01-01 RX ADMIN — ACETAZOLAMIDE 110 MILLIGRAM(S): 250 TABLET ORAL at 10:55

## 2019-01-01 RX ADMIN — Medication 100 MILLIGRAM(S): at 21:59

## 2019-01-01 RX ADMIN — HYDROMORPHONE HYDROCHLORIDE 0.5 MILLIGRAM(S): 2 INJECTION INTRAMUSCULAR; INTRAVENOUS; SUBCUTANEOUS at 10:00

## 2019-01-01 RX ADMIN — Medication 650 MILLIGRAM(S): at 02:06

## 2019-01-01 RX ADMIN — CARVEDILOL PHOSPHATE 3.12 MILLIGRAM(S): 80 CAPSULE, EXTENDED RELEASE ORAL at 05:54

## 2019-01-01 RX ADMIN — SERTRALINE 25 MILLIGRAM(S): 25 TABLET, FILM COATED ORAL at 09:12

## 2019-01-01 RX ADMIN — Medication 20 MILLIEQUIVALENT(S): at 13:28

## 2019-01-01 RX ADMIN — Medication 650 MILLIGRAM(S): at 11:23

## 2019-01-01 RX ADMIN — Medication 3 MILLILITER(S): at 02:41

## 2019-01-01 RX ADMIN — Medication 100 MILLIGRAM(S): at 13:45

## 2019-01-01 RX ADMIN — Medication 3 MILLILITER(S): at 03:19

## 2019-01-01 RX ADMIN — Medication 40 MILLIGRAM(S): at 05:16

## 2019-01-01 RX ADMIN — TRAMADOL HYDROCHLORIDE 25 MILLIGRAM(S): 50 TABLET ORAL at 11:42

## 2019-01-01 RX ADMIN — PANTOPRAZOLE SODIUM 40 MILLIGRAM(S): 20 TABLET, DELAYED RELEASE ORAL at 05:09

## 2019-01-01 RX ADMIN — LOSARTAN POTASSIUM 50 MILLIGRAM(S): 100 TABLET, FILM COATED ORAL at 05:50

## 2019-01-01 RX ADMIN — HEPARIN SODIUM 5000 UNIT(S): 5000 INJECTION INTRAVENOUS; SUBCUTANEOUS at 05:18

## 2019-01-01 RX ADMIN — Medication 25 MILLIGRAM(S): at 10:04

## 2019-01-01 RX ADMIN — LOSARTAN POTASSIUM 100 MILLIGRAM(S): 100 TABLET, FILM COATED ORAL at 21:13

## 2019-01-01 RX ADMIN — Medication 650 MILLIGRAM(S): at 00:23

## 2019-01-01 RX ADMIN — OXYCODONE AND ACETAMINOPHEN 1 TABLET(S): 5; 325 TABLET ORAL at 14:54

## 2019-01-01 RX ADMIN — HYDROMORPHONE HYDROCHLORIDE 0.5 MILLIGRAM(S): 2 INJECTION INTRAMUSCULAR; INTRAVENOUS; SUBCUTANEOUS at 12:38

## 2019-01-01 RX ADMIN — Medication 975 MILLIGRAM(S): at 05:39

## 2019-01-01 RX ADMIN — SODIUM CHLORIDE 3 MILLILITER(S): 9 INJECTION INTRAMUSCULAR; INTRAVENOUS; SUBCUTANEOUS at 05:15

## 2019-01-01 RX ADMIN — Medication 101.6 MILLIGRAM(S): at 18:32

## 2019-01-01 RX ADMIN — Medication 100 MILLIGRAM(S): at 21:43

## 2019-01-01 RX ADMIN — Medication 3 MILLILITER(S): at 20:59

## 2019-01-01 RX ADMIN — SPIRONOLACTONE 25 MILLIGRAM(S): 25 TABLET, FILM COATED ORAL at 05:21

## 2019-01-01 RX ADMIN — SODIUM CHLORIDE 3 MILLILITER(S): 9 INJECTION INTRAMUSCULAR; INTRAVENOUS; SUBCUTANEOUS at 09:58

## 2019-01-01 RX ADMIN — PIPERACILLIN AND TAZOBACTAM 200 GRAM(S): 4; .5 INJECTION, POWDER, LYOPHILIZED, FOR SOLUTION INTRAVENOUS at 06:11

## 2019-01-01 RX ADMIN — CARVEDILOL PHOSPHATE 3.12 MILLIGRAM(S): 80 CAPSULE, EXTENDED RELEASE ORAL at 17:05

## 2019-01-01 RX ADMIN — LOSARTAN POTASSIUM 25 MILLIGRAM(S): 100 TABLET, FILM COATED ORAL at 05:49

## 2019-01-01 RX ADMIN — PIPERACILLIN AND TAZOBACTAM 25 GRAM(S): 4; .5 INJECTION, POWDER, LYOPHILIZED, FOR SOLUTION INTRAVENOUS at 15:55

## 2019-01-01 RX ADMIN — PANTOPRAZOLE SODIUM 40 MILLIGRAM(S): 20 TABLET, DELAYED RELEASE ORAL at 12:01

## 2019-01-01 RX ADMIN — Medication 100 MILLIGRAM(S): at 15:39

## 2019-01-01 RX ADMIN — Medication 650 MILLIGRAM(S): at 09:48

## 2019-01-01 RX ADMIN — Medication 81 MILLIGRAM(S): at 18:58

## 2019-01-01 RX ADMIN — CEFTRIAXONE 100 GRAM(S): 500 INJECTION, POWDER, FOR SOLUTION INTRAMUSCULAR; INTRAVENOUS at 10:38

## 2019-01-01 RX ADMIN — Medication 650 MILLIGRAM(S): at 11:50

## 2019-01-01 RX ADMIN — LOSARTAN POTASSIUM 25 MILLIGRAM(S): 100 TABLET, FILM COATED ORAL at 05:20

## 2019-01-01 RX ADMIN — PIPERACILLIN AND TAZOBACTAM 25 GRAM(S): 4; .5 INJECTION, POWDER, LYOPHILIZED, FOR SOLUTION INTRAVENOUS at 14:41

## 2019-01-01 RX ADMIN — Medication 250 MILLIGRAM(S): at 18:24

## 2019-01-01 RX ADMIN — ATORVASTATIN CALCIUM 20 MILLIGRAM(S): 80 TABLET, FILM COATED ORAL at 22:12

## 2019-01-01 RX ADMIN — SODIUM CHLORIDE 3 MILLILITER(S): 9 INJECTION INTRAMUSCULAR; INTRAVENOUS; SUBCUTANEOUS at 13:48

## 2019-01-01 RX ADMIN — Medication 100 MILLIGRAM(S): at 22:55

## 2019-01-01 RX ADMIN — ATORVASTATIN CALCIUM 40 MILLIGRAM(S): 80 TABLET, FILM COATED ORAL at 20:47

## 2019-01-01 RX ADMIN — ENOXAPARIN SODIUM 40 MILLIGRAM(S): 100 INJECTION SUBCUTANEOUS at 21:33

## 2019-01-01 RX ADMIN — Medication 250 MILLIGRAM(S): at 17:04

## 2019-01-01 RX ADMIN — CARVEDILOL PHOSPHATE 3.12 MILLIGRAM(S): 80 CAPSULE, EXTENDED RELEASE ORAL at 18:35

## 2019-01-01 RX ADMIN — Medication 81 MILLIGRAM(S): at 10:40

## 2019-01-01 RX ADMIN — ENOXAPARIN SODIUM 40 MILLIGRAM(S): 100 INJECTION SUBCUTANEOUS at 18:37

## 2019-01-01 RX ADMIN — Medication 3 MILLILITER(S): at 03:43

## 2019-01-01 RX ADMIN — ATORVASTATIN CALCIUM 20 MILLIGRAM(S): 80 TABLET, FILM COATED ORAL at 21:14

## 2019-01-01 RX ADMIN — PIPERACILLIN AND TAZOBACTAM 25 GRAM(S): 4; .5 INJECTION, POWDER, LYOPHILIZED, FOR SOLUTION INTRAVENOUS at 15:08

## 2019-01-01 RX ADMIN — Medication 650 MILLIGRAM(S): at 01:06

## 2019-01-01 RX ADMIN — TIOTROPIUM BROMIDE 1 CAPSULE(S): 18 CAPSULE ORAL; RESPIRATORY (INHALATION) at 08:59

## 2019-01-01 RX ADMIN — Medication 3 MILLILITER(S): at 15:49

## 2019-01-01 RX ADMIN — Medication 20 MILLIGRAM(S): at 05:37

## 2019-01-01 RX ADMIN — Medication 3 MILLILITER(S): at 08:35

## 2019-01-01 RX ADMIN — SODIUM CHLORIDE 50 MILLILITER(S): 9 INJECTION INTRAMUSCULAR; INTRAVENOUS; SUBCUTANEOUS at 18:15

## 2019-01-01 RX ADMIN — SODIUM CHLORIDE 3 MILLILITER(S): 9 INJECTION INTRAMUSCULAR; INTRAVENOUS; SUBCUTANEOUS at 13:01

## 2019-01-01 RX ADMIN — Medication 40 MILLIGRAM(S): at 13:16

## 2019-01-01 RX ADMIN — CARVEDILOL PHOSPHATE 3.12 MILLIGRAM(S): 80 CAPSULE, EXTENDED RELEASE ORAL at 17:12

## 2019-01-01 RX ADMIN — PIPERACILLIN AND TAZOBACTAM 25 GRAM(S): 4; .5 INJECTION, POWDER, LYOPHILIZED, FOR SOLUTION INTRAVENOUS at 17:23

## 2019-01-01 RX ADMIN — SODIUM CHLORIDE 3 MILLILITER(S): 9 INJECTION INTRAMUSCULAR; INTRAVENOUS; SUBCUTANEOUS at 21:37

## 2019-01-01 RX ADMIN — PANTOPRAZOLE SODIUM 40 MILLIGRAM(S): 20 TABLET, DELAYED RELEASE ORAL at 09:57

## 2019-01-01 RX ADMIN — Medication 250 MILLIGRAM(S): at 05:37

## 2019-01-01 RX ADMIN — Medication 20 MILLIGRAM(S): at 04:10

## 2019-01-01 RX ADMIN — ALBUTEROL 2 PUFF(S): 90 AEROSOL, METERED ORAL at 20:45

## 2019-01-01 RX ADMIN — Medication 3 MILLILITER(S): at 21:14

## 2019-01-01 RX ADMIN — SPIRONOLACTONE 25 MILLIGRAM(S): 25 TABLET, FILM COATED ORAL at 11:52

## 2019-01-01 RX ADMIN — CARVEDILOL PHOSPHATE 3.12 MILLIGRAM(S): 80 CAPSULE, EXTENDED RELEASE ORAL at 05:29

## 2019-01-01 RX ADMIN — SENNA PLUS 2 TABLET(S): 8.6 TABLET ORAL at 21:47

## 2019-01-01 RX ADMIN — Medication 3 MILLILITER(S): at 09:01

## 2019-01-01 RX ADMIN — PIPERACILLIN AND TAZOBACTAM 25 GRAM(S): 4; .5 INJECTION, POWDER, LYOPHILIZED, FOR SOLUTION INTRAVENOUS at 13:35

## 2019-01-01 RX ADMIN — PANTOPRAZOLE SODIUM 40 MILLIGRAM(S): 20 TABLET, DELAYED RELEASE ORAL at 15:24

## 2019-01-01 RX ADMIN — SPIRONOLACTONE 25 MILLIGRAM(S): 25 TABLET, FILM COATED ORAL at 06:38

## 2019-01-01 RX ADMIN — SODIUM CHLORIDE 3 MILLILITER(S): 9 INJECTION INTRAMUSCULAR; INTRAVENOUS; SUBCUTANEOUS at 21:13

## 2019-01-01 RX ADMIN — LOSARTAN POTASSIUM 50 MILLIGRAM(S): 100 TABLET, FILM COATED ORAL at 05:46

## 2019-01-01 RX ADMIN — PANTOPRAZOLE SODIUM 40 MILLIGRAM(S): 20 TABLET, DELAYED RELEASE ORAL at 11:59

## 2019-01-01 RX ADMIN — SODIUM CHLORIDE 50 MILLILITER(S): 9 INJECTION INTRAMUSCULAR; INTRAVENOUS; SUBCUTANEOUS at 15:43

## 2019-01-01 RX ADMIN — SPIRONOLACTONE 25 MILLIGRAM(S): 25 TABLET, FILM COATED ORAL at 04:11

## 2019-01-01 RX ADMIN — Medication 3 MILLILITER(S): at 10:06

## 2019-01-01 RX ADMIN — MONTELUKAST 10 MILLIGRAM(S): 4 TABLET, CHEWABLE ORAL at 09:12

## 2019-01-01 RX ADMIN — Medication 0.25 MILLIGRAM(S): at 22:55

## 2019-01-01 RX ADMIN — LIDOCAINE 1 PATCH: 4 CREAM TOPICAL at 00:48

## 2019-01-01 RX ADMIN — LOSARTAN POTASSIUM 100 MILLIGRAM(S): 100 TABLET, FILM COATED ORAL at 06:21

## 2019-01-01 RX ADMIN — PANTOPRAZOLE SODIUM 40 MILLIGRAM(S): 20 TABLET, DELAYED RELEASE ORAL at 05:14

## 2019-01-01 RX ADMIN — Medication 3 MILLILITER(S): at 10:50

## 2019-01-01 RX ADMIN — Medication 3 MILLILITER(S): at 20:38

## 2019-01-01 RX ADMIN — Medication 10 MILLIGRAM(S): at 04:12

## 2019-01-01 RX ADMIN — Medication 10 MILLIGRAM(S): at 05:37

## 2019-01-01 RX ADMIN — SODIUM CHLORIDE 3 MILLILITER(S): 9 INJECTION INTRAMUSCULAR; INTRAVENOUS; SUBCUTANEOUS at 13:46

## 2019-01-01 RX ADMIN — OXYCODONE AND ACETAMINOPHEN 2 TABLET(S): 5; 325 TABLET ORAL at 17:10

## 2019-01-01 RX ADMIN — PANTOPRAZOLE SODIUM 40 MILLIGRAM(S): 20 TABLET, DELAYED RELEASE ORAL at 05:18

## 2019-01-01 RX ADMIN — Medication 3 MILLILITER(S): at 15:07

## 2019-01-01 RX ADMIN — CARVEDILOL PHOSPHATE 3.12 MILLIGRAM(S): 80 CAPSULE, EXTENDED RELEASE ORAL at 18:59

## 2019-01-01 RX ADMIN — SENNA PLUS 2 TABLET(S): 8.6 TABLET ORAL at 22:25

## 2019-01-01 RX ADMIN — PIPERACILLIN AND TAZOBACTAM 25 GRAM(S): 4; .5 INJECTION, POWDER, LYOPHILIZED, FOR SOLUTION INTRAVENOUS at 13:33

## 2019-01-01 RX ADMIN — PANTOPRAZOLE SODIUM 40 MILLIGRAM(S): 20 TABLET, DELAYED RELEASE ORAL at 08:52

## 2019-01-01 RX ADMIN — LOSARTAN POTASSIUM 25 MILLIGRAM(S): 100 TABLET, FILM COATED ORAL at 06:38

## 2019-01-01 RX ADMIN — Medication 3 MILLILITER(S): at 08:52

## 2019-01-01 RX ADMIN — Medication 100 MILLIGRAM(S): at 16:33

## 2019-01-01 RX ADMIN — MONTELUKAST 10 MILLIGRAM(S): 4 TABLET, CHEWABLE ORAL at 10:54

## 2019-01-01 RX ADMIN — Medication 650 MILLIGRAM(S): at 22:05

## 2019-01-01 RX ADMIN — Medication 3 MILLILITER(S): at 03:21

## 2019-01-01 RX ADMIN — MONTELUKAST 10 MILLIGRAM(S): 4 TABLET, CHEWABLE ORAL at 12:29

## 2019-01-01 RX ADMIN — Medication 650 MILLIGRAM(S): at 00:14

## 2019-01-01 RX ADMIN — Medication 20 MILLIGRAM(S): at 05:12

## 2019-01-01 RX ADMIN — CARVEDILOL PHOSPHATE 3.12 MILLIGRAM(S): 80 CAPSULE, EXTENDED RELEASE ORAL at 06:20

## 2019-01-01 RX ADMIN — Medication 3 MILLILITER(S): at 14:49

## 2019-01-01 RX ADMIN — PANTOPRAZOLE SODIUM 40 MILLIGRAM(S): 20 TABLET, DELAYED RELEASE ORAL at 06:34

## 2019-01-01 RX ADMIN — TRAMADOL HYDROCHLORIDE 50 MILLIGRAM(S): 50 TABLET ORAL at 21:17

## 2019-01-01 RX ADMIN — HEPARIN SODIUM 5000 UNIT(S): 5000 INJECTION INTRAVENOUS; SUBCUTANEOUS at 17:23

## 2019-01-01 RX ADMIN — CARVEDILOL PHOSPHATE 3.12 MILLIGRAM(S): 80 CAPSULE, EXTENDED RELEASE ORAL at 05:55

## 2019-01-01 RX ADMIN — SODIUM CHLORIDE 3 MILLILITER(S): 9 INJECTION INTRAMUSCULAR; INTRAVENOUS; SUBCUTANEOUS at 21:22

## 2019-01-01 RX ADMIN — Medication 3 MILLILITER(S): at 08:15

## 2019-01-01 RX ADMIN — Medication 3 MILLILITER(S): at 14:54

## 2019-01-01 RX ADMIN — ENOXAPARIN SODIUM 40 MILLIGRAM(S): 100 INJECTION SUBCUTANEOUS at 21:45

## 2019-01-01 RX ADMIN — Medication 650 MILLIGRAM(S): at 09:18

## 2019-01-01 RX ADMIN — SODIUM CHLORIDE 3 MILLILITER(S): 9 INJECTION INTRAMUSCULAR; INTRAVENOUS; SUBCUTANEOUS at 21:09

## 2019-01-01 RX ADMIN — Medication 400 MILLIGRAM(S): at 23:41

## 2019-01-01 RX ADMIN — Medication 250 MILLIGRAM(S): at 17:01

## 2019-01-01 RX ADMIN — ALBUTEROL 2 PUFF(S): 90 AEROSOL, METERED ORAL at 14:59

## 2019-01-01 RX ADMIN — PIPERACILLIN AND TAZOBACTAM 25 GRAM(S): 4; .5 INJECTION, POWDER, LYOPHILIZED, FOR SOLUTION INTRAVENOUS at 05:50

## 2019-01-01 RX ADMIN — MONTELUKAST 10 MILLIGRAM(S): 4 TABLET, CHEWABLE ORAL at 11:49

## 2019-01-01 RX ADMIN — PIPERACILLIN AND TAZOBACTAM 25 GRAM(S): 4; .5 INJECTION, POWDER, LYOPHILIZED, FOR SOLUTION INTRAVENOUS at 16:32

## 2019-01-01 RX ADMIN — MONTELUKAST 10 MILLIGRAM(S): 4 TABLET, CHEWABLE ORAL at 17:57

## 2019-01-01 RX ADMIN — MONTELUKAST 10 MILLIGRAM(S): 4 TABLET, CHEWABLE ORAL at 20:45

## 2019-01-01 RX ADMIN — SPIRONOLACTONE 25 MILLIGRAM(S): 25 TABLET, FILM COATED ORAL at 05:16

## 2019-01-01 RX ADMIN — PIPERACILLIN AND TAZOBACTAM 25 GRAM(S): 4; .5 INJECTION, POWDER, LYOPHILIZED, FOR SOLUTION INTRAVENOUS at 02:47

## 2019-01-01 RX ADMIN — SERTRALINE 25 MILLIGRAM(S): 25 TABLET, FILM COATED ORAL at 22:59

## 2019-01-01 RX ADMIN — AZITHROMYCIN 255 MILLIGRAM(S): 500 TABLET, FILM COATED ORAL at 12:30

## 2019-01-01 RX ADMIN — PIPERACILLIN AND TAZOBACTAM 200 GRAM(S): 4; .5 INJECTION, POWDER, LYOPHILIZED, FOR SOLUTION INTRAVENOUS at 19:22

## 2019-01-01 RX ADMIN — SODIUM CHLORIDE 3 MILLILITER(S): 9 INJECTION INTRAMUSCULAR; INTRAVENOUS; SUBCUTANEOUS at 21:02

## 2019-01-01 RX ADMIN — ATORVASTATIN CALCIUM 40 MILLIGRAM(S): 80 TABLET, FILM COATED ORAL at 21:44

## 2019-01-01 RX ADMIN — Medication 0.25 MILLIGRAM(S): at 23:47

## 2019-01-01 RX ADMIN — OXYCODONE AND ACETAMINOPHEN 1 TABLET(S): 5; 325 TABLET ORAL at 00:10

## 2019-01-01 RX ADMIN — PANTOPRAZOLE SODIUM 40 MILLIGRAM(S): 20 TABLET, DELAYED RELEASE ORAL at 11:20

## 2019-01-01 RX ADMIN — ENOXAPARIN SODIUM 40 MILLIGRAM(S): 100 INJECTION SUBCUTANEOUS at 22:23

## 2019-01-01 RX ADMIN — OXYCODONE HYDROCHLORIDE 15 MILLIGRAM(S): 5 TABLET ORAL at 18:46

## 2019-01-01 RX ADMIN — SPIRONOLACTONE 25 MILLIGRAM(S): 25 TABLET, FILM COATED ORAL at 06:57

## 2019-01-01 RX ADMIN — Medication 3 MILLILITER(S): at 09:04

## 2019-01-01 RX ADMIN — PIPERACILLIN AND TAZOBACTAM 25 GRAM(S): 4; .5 INJECTION, POWDER, LYOPHILIZED, FOR SOLUTION INTRAVENOUS at 05:37

## 2019-01-01 RX ADMIN — Medication 650 MILLIGRAM(S): at 14:37

## 2019-01-01 RX ADMIN — FENTANYL CITRATE 30 MILLILITER(S): 50 INJECTION INTRAVENOUS at 19:23

## 2019-01-01 RX ADMIN — CARVEDILOL PHOSPHATE 3.12 MILLIGRAM(S): 80 CAPSULE, EXTENDED RELEASE ORAL at 06:21

## 2019-01-01 RX ADMIN — ATORVASTATIN CALCIUM 40 MILLIGRAM(S): 80 TABLET, FILM COATED ORAL at 21:21

## 2019-01-01 RX ADMIN — AZITHROMYCIN 255 MILLIGRAM(S): 500 TABLET, FILM COATED ORAL at 09:58

## 2019-01-01 RX ADMIN — OXYCODONE HYDROCHLORIDE 10 MILLIGRAM(S): 5 TABLET ORAL at 14:38

## 2019-01-01 RX ADMIN — Medication 100 MILLIGRAM(S): at 06:00

## 2019-01-01 RX ADMIN — ENOXAPARIN SODIUM 40 MILLIGRAM(S): 100 INJECTION SUBCUTANEOUS at 21:20

## 2019-01-01 RX ADMIN — SPIRONOLACTONE 25 MILLIGRAM(S): 25 TABLET, FILM COATED ORAL at 05:33

## 2019-01-01 RX ADMIN — POLYETHYLENE GLYCOL 3350 17 GRAM(S): 17 POWDER, FOR SOLUTION ORAL at 11:52

## 2019-01-01 RX ADMIN — HEPARIN SODIUM 5000 UNIT(S): 5000 INJECTION INTRAVENOUS; SUBCUTANEOUS at 07:02

## 2019-01-01 RX ADMIN — Medication 3 MILLILITER(S): at 07:53

## 2019-01-01 RX ADMIN — CARVEDILOL PHOSPHATE 3.12 MILLIGRAM(S): 80 CAPSULE, EXTENDED RELEASE ORAL at 05:13

## 2019-01-01 RX ADMIN — PIPERACILLIN AND TAZOBACTAM 25 GRAM(S): 4; .5 INJECTION, POWDER, LYOPHILIZED, FOR SOLUTION INTRAVENOUS at 22:32

## 2019-01-01 RX ADMIN — ATORVASTATIN CALCIUM 40 MILLIGRAM(S): 80 TABLET, FILM COATED ORAL at 21:34

## 2019-01-01 RX ADMIN — SODIUM CHLORIDE 3 MILLILITER(S): 9 INJECTION INTRAMUSCULAR; INTRAVENOUS; SUBCUTANEOUS at 06:08

## 2019-01-01 RX ADMIN — Medication 3 MILLILITER(S): at 09:48

## 2019-01-01 RX ADMIN — PANTOPRAZOLE SODIUM 40 MILLIGRAM(S): 20 TABLET, DELAYED RELEASE ORAL at 05:08

## 2019-01-01 RX ADMIN — OXYCODONE AND ACETAMINOPHEN 1 TABLET(S): 5; 325 TABLET ORAL at 18:30

## 2019-01-01 RX ADMIN — Medication 81 MILLIGRAM(S): at 12:47

## 2019-01-01 RX ADMIN — Medication 10 MILLIGRAM(S): at 06:57

## 2019-01-01 RX ADMIN — HYDROMORPHONE HYDROCHLORIDE 0.25 MILLIGRAM(S): 2 INJECTION INTRAMUSCULAR; INTRAVENOUS; SUBCUTANEOUS at 22:38

## 2019-01-01 RX ADMIN — PIPERACILLIN AND TAZOBACTAM 3.38 GRAM(S): 4; .5 INJECTION, POWDER, LYOPHILIZED, FOR SOLUTION INTRAVENOUS at 06:15

## 2019-01-01 RX ADMIN — PIPERACILLIN AND TAZOBACTAM 25 GRAM(S): 4; .5 INJECTION, POWDER, LYOPHILIZED, FOR SOLUTION INTRAVENOUS at 05:35

## 2019-01-01 RX ADMIN — SENNA PLUS 2 TABLET(S): 8.6 TABLET ORAL at 22:20

## 2019-01-01 RX ADMIN — PANTOPRAZOLE SODIUM 40 MILLIGRAM(S): 20 TABLET, DELAYED RELEASE ORAL at 05:19

## 2019-01-01 RX ADMIN — TRAMADOL HYDROCHLORIDE 50 MILLIGRAM(S): 50 TABLET ORAL at 22:03

## 2019-01-01 RX ADMIN — SENNA PLUS 2 TABLET(S): 8.6 TABLET ORAL at 21:14

## 2019-01-01 RX ADMIN — ALBUTEROL 2 PUFF(S): 90 AEROSOL, METERED ORAL at 20:41

## 2019-01-01 RX ADMIN — HEPARIN SODIUM 5000 UNIT(S): 5000 INJECTION INTRAVENOUS; SUBCUTANEOUS at 05:16

## 2019-01-01 RX ADMIN — ATORVASTATIN CALCIUM 20 MILLIGRAM(S): 80 TABLET, FILM COATED ORAL at 21:59

## 2019-01-01 RX ADMIN — Medication 650 MILLIGRAM(S): at 16:47

## 2019-01-01 RX ADMIN — Medication 100 MILLIGRAM(S): at 13:28

## 2019-01-01 RX ADMIN — Medication 3 MILLILITER(S): at 15:30

## 2019-01-01 RX ADMIN — Medication 40 MILLIGRAM(S): at 06:24

## 2019-01-01 RX ADMIN — Medication 100 MILLIGRAM(S): at 11:58

## 2019-01-01 RX ADMIN — SPIRONOLACTONE 25 MILLIGRAM(S): 25 TABLET, FILM COATED ORAL at 05:05

## 2019-01-01 RX ADMIN — LIDOCAINE 1 PATCH: 4 CREAM TOPICAL at 22:33

## 2019-01-01 RX ADMIN — Medication 3 MILLILITER(S): at 03:12

## 2019-01-01 RX ADMIN — Medication 100 MILLIGRAM(S): at 13:39

## 2019-01-01 RX ADMIN — Medication 650 MILLIGRAM(S): at 07:36

## 2019-01-01 RX ADMIN — LOSARTAN POTASSIUM 50 MILLIGRAM(S): 100 TABLET, FILM COATED ORAL at 05:29

## 2019-01-01 RX ADMIN — Medication 3 MILLILITER(S): at 02:23

## 2019-01-01 RX ADMIN — SPIRONOLACTONE 25 MILLIGRAM(S): 25 TABLET, FILM COATED ORAL at 22:59

## 2019-01-01 RX ADMIN — PANTOPRAZOLE SODIUM 40 MILLIGRAM(S): 20 TABLET, DELAYED RELEASE ORAL at 18:57

## 2019-01-01 RX ADMIN — Medication 100 MILLIGRAM(S): at 22:51

## 2019-01-01 RX ADMIN — SENNA PLUS 2 TABLET(S): 8.6 TABLET ORAL at 21:00

## 2019-01-01 RX ADMIN — Medication 100 MILLIGRAM(S): at 06:39

## 2019-01-01 RX ADMIN — LOSARTAN POTASSIUM 25 MILLIGRAM(S): 100 TABLET, FILM COATED ORAL at 06:00

## 2019-01-01 RX ADMIN — SODIUM CHLORIDE 3 MILLILITER(S): 9 INJECTION INTRAMUSCULAR; INTRAVENOUS; SUBCUTANEOUS at 05:22

## 2019-01-01 RX ADMIN — ATORVASTATIN CALCIUM 40 MILLIGRAM(S): 80 TABLET, FILM COATED ORAL at 20:28

## 2019-01-01 RX ADMIN — OXYCODONE HYDROCHLORIDE 10 MILLIGRAM(S): 5 TABLET ORAL at 06:22

## 2019-01-01 RX ADMIN — MONTELUKAST 10 MILLIGRAM(S): 4 TABLET, CHEWABLE ORAL at 13:27

## 2019-01-01 RX ADMIN — PIPERACILLIN AND TAZOBACTAM 25 GRAM(S): 4; .5 INJECTION, POWDER, LYOPHILIZED, FOR SOLUTION INTRAVENOUS at 17:16

## 2019-01-01 RX ADMIN — Medication 3 MILLILITER(S): at 09:06

## 2019-01-01 RX ADMIN — SERTRALINE 25 MILLIGRAM(S): 25 TABLET, FILM COATED ORAL at 11:21

## 2019-01-01 RX ADMIN — Medication 250 MILLIGRAM(S): at 17:12

## 2019-01-01 RX ADMIN — TRAMADOL HYDROCHLORIDE 50 MILLIGRAM(S): 50 TABLET ORAL at 22:07

## 2019-01-01 RX ADMIN — Medication 650 MILLIGRAM(S): at 01:43

## 2019-01-01 RX ADMIN — LOSARTAN POTASSIUM 25 MILLIGRAM(S): 100 TABLET, FILM COATED ORAL at 10:29

## 2019-01-01 RX ADMIN — SODIUM CHLORIDE 3 MILLILITER(S): 9 INJECTION INTRAMUSCULAR; INTRAVENOUS; SUBCUTANEOUS at 14:24

## 2019-01-01 RX ADMIN — Medication 50 MILLIGRAM(S): at 21:07

## 2019-01-01 RX ADMIN — Medication 100 MILLIGRAM(S): at 06:04

## 2019-01-01 RX ADMIN — Medication 650 MILLIGRAM(S): at 18:15

## 2019-01-01 RX ADMIN — Medication 3 MILLILITER(S): at 20:36

## 2019-01-01 RX ADMIN — Medication 3 MILLILITER(S): at 09:23

## 2019-01-01 RX ADMIN — Medication 40 MILLIGRAM(S): at 05:49

## 2019-01-01 RX ADMIN — SENNA PLUS 2 TABLET(S): 8.6 TABLET ORAL at 22:24

## 2019-01-01 RX ADMIN — PANTOPRAZOLE SODIUM 40 MILLIGRAM(S): 20 TABLET, DELAYED RELEASE ORAL at 05:11

## 2019-01-01 RX ADMIN — ENOXAPARIN SODIUM 40 MILLIGRAM(S): 100 INJECTION SUBCUTANEOUS at 22:33

## 2019-01-01 RX ADMIN — CARVEDILOL PHOSPHATE 3.12 MILLIGRAM(S): 80 CAPSULE, EXTENDED RELEASE ORAL at 17:01

## 2019-01-01 RX ADMIN — MONTELUKAST 10 MILLIGRAM(S): 4 TABLET, CHEWABLE ORAL at 13:34

## 2019-01-01 RX ADMIN — Medication 975 MILLIGRAM(S): at 06:30

## 2019-01-01 RX ADMIN — Medication 0.25 MILLIGRAM(S): at 06:05

## 2019-01-01 RX ADMIN — CEFTRIAXONE 100 GRAM(S): 500 INJECTION, POWDER, FOR SOLUTION INTRAMUSCULAR; INTRAVENOUS at 13:23

## 2019-01-01 RX ADMIN — Medication 40 MILLIGRAM(S): at 05:26

## 2019-01-01 RX ADMIN — SERTRALINE 25 MILLIGRAM(S): 25 TABLET, FILM COATED ORAL at 09:18

## 2019-01-01 RX ADMIN — SODIUM CHLORIDE 3 MILLILITER(S): 9 INJECTION INTRAMUSCULAR; INTRAVENOUS; SUBCUTANEOUS at 05:10

## 2019-01-01 RX ADMIN — PIPERACILLIN AND TAZOBACTAM 25 GRAM(S): 4; .5 INJECTION, POWDER, LYOPHILIZED, FOR SOLUTION INTRAVENOUS at 13:00

## 2019-01-01 RX ADMIN — Medication 3 MILLILITER(S): at 03:34

## 2019-01-01 RX ADMIN — SODIUM CHLORIDE 3 MILLILITER(S): 9 INJECTION INTRAMUSCULAR; INTRAVENOUS; SUBCUTANEOUS at 22:21

## 2019-01-01 RX ADMIN — Medication 3 MILLILITER(S): at 15:58

## 2019-01-01 RX ADMIN — Medication 100 MILLIGRAM(S): at 14:04

## 2019-01-01 RX ADMIN — ALBUTEROL 2 PUFF(S): 90 AEROSOL, METERED ORAL at 03:14

## 2019-01-01 RX ADMIN — Medication 250 MILLIGRAM(S): at 06:24

## 2019-01-01 RX ADMIN — LOSARTAN POTASSIUM 50 MILLIGRAM(S): 100 TABLET, FILM COATED ORAL at 05:09

## 2019-01-01 RX ADMIN — ENOXAPARIN SODIUM 40 MILLIGRAM(S): 100 INJECTION SUBCUTANEOUS at 21:07

## 2019-01-01 RX ADMIN — Medication 3 MILLILITER(S): at 15:41

## 2019-01-01 RX ADMIN — Medication 100 MILLIGRAM(S): at 05:50

## 2019-01-01 RX ADMIN — Medication 3 MILLILITER(S): at 09:20

## 2019-01-01 RX ADMIN — Medication 40 MILLIGRAM(S): at 06:13

## 2019-01-01 RX ADMIN — PANTOPRAZOLE SODIUM 40 MILLIGRAM(S): 20 TABLET, DELAYED RELEASE ORAL at 05:46

## 2019-01-01 RX ADMIN — CARVEDILOL PHOSPHATE 3.12 MILLIGRAM(S): 80 CAPSULE, EXTENDED RELEASE ORAL at 06:38

## 2019-01-01 RX ADMIN — ALBUTEROL 2 PUFF(S): 90 AEROSOL, METERED ORAL at 03:31

## 2019-01-01 RX ADMIN — Medication 3 MILLILITER(S): at 03:27

## 2019-01-01 RX ADMIN — ALBUTEROL 2 PUFF(S): 90 AEROSOL, METERED ORAL at 14:22

## 2019-01-01 RX ADMIN — OXYCODONE AND ACETAMINOPHEN 1 TABLET(S): 5; 325 TABLET ORAL at 08:56

## 2019-01-01 RX ADMIN — CEFTRIAXONE 100 MILLIGRAM(S): 500 INJECTION, POWDER, FOR SOLUTION INTRAMUSCULAR; INTRAVENOUS at 12:41

## 2019-01-01 RX ADMIN — Medication 10 MILLIGRAM(S): at 05:54

## 2019-01-01 RX ADMIN — LOSARTAN POTASSIUM 25 MILLIGRAM(S): 100 TABLET, FILM COATED ORAL at 05:38

## 2019-01-01 RX ADMIN — Medication 60 MILLIGRAM(S): at 06:11

## 2019-01-01 RX ADMIN — Medication 3 MILLILITER(S): at 03:54

## 2019-01-01 RX ADMIN — Medication 3 MILLILITER(S): at 15:45

## 2019-01-01 RX ADMIN — Medication 3 MILLILITER(S): at 12:47

## 2019-01-01 RX ADMIN — Medication 40 MILLIGRAM(S): at 17:11

## 2019-01-01 RX ADMIN — PANTOPRAZOLE SODIUM 40 MILLIGRAM(S): 20 TABLET, DELAYED RELEASE ORAL at 13:28

## 2019-01-01 RX ADMIN — Medication 3 MILLILITER(S): at 20:15

## 2019-01-01 RX ADMIN — Medication 1000 MILLIGRAM(S): at 00:40

## 2019-01-01 RX ADMIN — MONTELUKAST 10 MILLIGRAM(S): 4 TABLET, CHEWABLE ORAL at 11:51

## 2019-01-01 RX ADMIN — ALBUTEROL 2 PUFF(S): 90 AEROSOL, METERED ORAL at 02:43

## 2019-01-01 RX ADMIN — Medication 650 MILLIGRAM(S): at 23:34

## 2019-01-01 RX ADMIN — LOSARTAN POTASSIUM 25 MILLIGRAM(S): 100 TABLET, FILM COATED ORAL at 06:12

## 2019-01-01 RX ADMIN — Medication 40 MILLIGRAM(S): at 06:57

## 2019-01-01 RX ADMIN — Medication 3 MILLILITER(S): at 20:47

## 2019-01-01 RX ADMIN — SODIUM CHLORIDE 3 MILLILITER(S): 9 INJECTION INTRAMUSCULAR; INTRAVENOUS; SUBCUTANEOUS at 22:45

## 2019-01-01 RX ADMIN — Medication 650 MILLIGRAM(S): at 06:16

## 2019-01-01 RX ADMIN — PIPERACILLIN AND TAZOBACTAM 25 GRAM(S): 4; .5 INJECTION, POWDER, LYOPHILIZED, FOR SOLUTION INTRAVENOUS at 13:46

## 2019-01-01 RX ADMIN — Medication 40 MILLIGRAM(S): at 05:54

## 2019-01-01 RX ADMIN — CEFTRIAXONE 100 MILLIGRAM(S): 500 INJECTION, POWDER, FOR SOLUTION INTRAMUSCULAR; INTRAVENOUS at 14:48

## 2019-01-01 RX ADMIN — Medication 3 MILLILITER(S): at 03:22

## 2019-01-01 RX ADMIN — Medication 650 MILLIGRAM(S): at 07:46

## 2019-01-01 RX ADMIN — Medication 0.4 MILLIGRAM(S): at 00:52

## 2019-01-01 RX ADMIN — Medication 81 MILLIGRAM(S): at 12:25

## 2019-01-01 RX ADMIN — PIPERACILLIN AND TAZOBACTAM 25 GRAM(S): 4; .5 INJECTION, POWDER, LYOPHILIZED, FOR SOLUTION INTRAVENOUS at 06:11

## 2019-01-01 RX ADMIN — Medication 650 MILLIGRAM(S): at 00:34

## 2019-01-01 RX ADMIN — SODIUM CHLORIDE 3 MILLILITER(S): 9 INJECTION INTRAMUSCULAR; INTRAVENOUS; SUBCUTANEOUS at 05:05

## 2019-01-01 RX ADMIN — Medication 10 MILLIGRAM(S): at 06:05

## 2019-01-01 RX ADMIN — Medication 250 MILLIGRAM(S): at 18:08

## 2019-01-01 RX ADMIN — SPIRONOLACTONE 25 MILLIGRAM(S): 25 TABLET, FILM COATED ORAL at 05:20

## 2019-01-01 RX ADMIN — SERTRALINE 25 MILLIGRAM(S): 25 TABLET, FILM COATED ORAL at 10:04

## 2019-01-01 RX ADMIN — ENOXAPARIN SODIUM 40 MILLIGRAM(S): 100 INJECTION SUBCUTANEOUS at 18:51

## 2019-01-01 RX ADMIN — Medication 100 MILLIGRAM(S): at 06:20

## 2019-01-01 RX ADMIN — Medication 100 MILLIGRAM(S): at 05:49

## 2019-01-01 RX ADMIN — CARVEDILOL PHOSPHATE 3.12 MILLIGRAM(S): 80 CAPSULE, EXTENDED RELEASE ORAL at 18:32

## 2019-01-01 RX ADMIN — PANTOPRAZOLE SODIUM 40 MILLIGRAM(S): 20 TABLET, DELAYED RELEASE ORAL at 05:38

## 2019-01-01 RX ADMIN — TRAMADOL HYDROCHLORIDE 50 MILLIGRAM(S): 50 TABLET ORAL at 03:42

## 2019-01-01 RX ADMIN — MONTELUKAST 10 MILLIGRAM(S): 4 TABLET, CHEWABLE ORAL at 11:52

## 2019-01-01 RX ADMIN — ATORVASTATIN CALCIUM 20 MILLIGRAM(S): 80 TABLET, FILM COATED ORAL at 20:26

## 2019-01-01 RX ADMIN — MONTELUKAST 10 MILLIGRAM(S): 4 TABLET, CHEWABLE ORAL at 09:41

## 2019-01-01 RX ADMIN — Medication 0.25 MILLIGRAM(S): at 12:05

## 2019-01-01 RX ADMIN — PIPERACILLIN AND TAZOBACTAM 25 GRAM(S): 4; .5 INJECTION, POWDER, LYOPHILIZED, FOR SOLUTION INTRAVENOUS at 01:06

## 2019-01-01 RX ADMIN — Medication 101.6 MILLIGRAM(S): at 23:21

## 2019-01-01 RX ADMIN — ALBUTEROL 2 PUFF(S): 90 AEROSOL, METERED ORAL at 07:25

## 2019-01-01 RX ADMIN — LOSARTAN POTASSIUM 50 MILLIGRAM(S): 100 TABLET, FILM COATED ORAL at 05:11

## 2019-01-01 RX ADMIN — SPIRONOLACTONE 25 MILLIGRAM(S): 25 TABLET, FILM COATED ORAL at 06:06

## 2019-01-01 RX ADMIN — Medication 81 MILLIGRAM(S): at 12:56

## 2019-01-01 RX ADMIN — PIPERACILLIN AND TAZOBACTAM 25 GRAM(S): 4; .5 INJECTION, POWDER, LYOPHILIZED, FOR SOLUTION INTRAVENOUS at 22:25

## 2019-01-01 RX ADMIN — CARVEDILOL PHOSPHATE 3.12 MILLIGRAM(S): 80 CAPSULE, EXTENDED RELEASE ORAL at 06:57

## 2019-01-01 RX ADMIN — Medication 81 MILLIGRAM(S): at 11:42

## 2019-01-01 RX ADMIN — Medication 3 MILLILITER(S): at 20:45

## 2019-01-01 RX ADMIN — Medication 650 MILLIGRAM(S): at 05:12

## 2019-01-01 RX ADMIN — PANTOPRAZOLE SODIUM 40 MILLIGRAM(S): 20 TABLET, DELAYED RELEASE ORAL at 11:23

## 2019-01-01 RX ADMIN — Medication 100 MILLIGRAM(S): at 11:21

## 2019-01-01 RX ADMIN — Medication 100 MILLIGRAM(S): at 13:14

## 2019-01-01 RX ADMIN — SERTRALINE 25 MILLIGRAM(S): 25 TABLET, FILM COATED ORAL at 18:09

## 2019-01-01 RX ADMIN — HYDROMORPHONE HYDROCHLORIDE 0.5 MILLIGRAM(S): 2 INJECTION INTRAMUSCULAR; INTRAVENOUS; SUBCUTANEOUS at 16:57

## 2019-01-01 RX ADMIN — ENOXAPARIN SODIUM 40 MILLIGRAM(S): 100 INJECTION SUBCUTANEOUS at 20:59

## 2019-01-01 RX ADMIN — FENTANYL CITRATE 25 MICROGRAM(S): 50 INJECTION INTRAVENOUS at 16:18

## 2019-01-01 RX ADMIN — Medication 100 MILLIGRAM(S): at 20:47

## 2019-01-01 RX ADMIN — ATORVASTATIN CALCIUM 20 MILLIGRAM(S): 80 TABLET, FILM COATED ORAL at 22:20

## 2019-01-01 RX ADMIN — Medication 3 MILLILITER(S): at 14:51

## 2019-01-01 RX ADMIN — CARVEDILOL PHOSPHATE 3.12 MILLIGRAM(S): 80 CAPSULE, EXTENDED RELEASE ORAL at 18:24

## 2019-01-01 RX ADMIN — ENOXAPARIN SODIUM 40 MILLIGRAM(S): 100 INJECTION SUBCUTANEOUS at 21:02

## 2019-01-01 RX ADMIN — PANTOPRAZOLE SODIUM 40 MILLIGRAM(S): 20 TABLET, DELAYED RELEASE ORAL at 12:53

## 2019-01-01 RX ADMIN — Medication 3 MILLILITER(S): at 02:22

## 2019-01-01 RX ADMIN — Medication 250 MILLIGRAM(S): at 06:06

## 2019-01-01 RX ADMIN — MONTELUKAST 10 MILLIGRAM(S): 4 TABLET, CHEWABLE ORAL at 13:59

## 2019-01-01 RX ADMIN — Medication 20 MILLIGRAM(S): at 05:11

## 2019-01-01 RX ADMIN — Medication 250 MILLIGRAM(S): at 05:55

## 2019-01-01 RX ADMIN — Medication 3 MILLILITER(S): at 03:15

## 2019-01-01 RX ADMIN — Medication 100 MILLIGRAM(S): at 05:35

## 2019-04-20 NOTE — ED ADULT TRIAGE NOTE - CHIEF COMPLAINT QUOTE
triage entered late patient requiring emergent care at time of arrival 10:40 am: Patient arrives to ED in acute respiratory distress spo2 94% on nonrebreather. Direct to critical care code team and physician at bedside for further care; rt called to bedside

## 2019-04-20 NOTE — ED ADULT NURSE NOTE - CAS TRG GENERAL AIRWAY, MLM
Patent
No vomiting/diarrhea/constipation. No eye drainage. No abdominal pain. No bleeding. No change in urination. No rhinorrhea. +rash. No extremity swelling or deformities. No change in mental status. No trouble breathing. No cough. No fever

## 2019-04-20 NOTE — ED PROVIDER NOTE - CLINICAL SUMMARY MEDICAL DECISION MAKING FREE TEXT BOX
Pt presents with respiratory distress, hypoxia, rhonchorous BS to RLL: suspect RLL pneumonia;  due to h/o COPD will trial on BIPAP:  code sepsis initiated;  will monitor closely for clinical improvement.

## 2019-04-20 NOTE — ED ADULT NURSE NOTE - NSIMPLEMENTINTERV_GEN_ALL_ED
Implemented All Fall with Harm Risk Interventions:  [place] to call system. Call bell, personal items and telephone within reach. Instruct patient to call for assistance. Room bathroom lighting operational. Non-slip footwear when patient is off stretcher. Physically safe environment: no spills, clutter or unnecessary equipment. Stretcher in lowest position, wheels locked, appropriate side rails in place. Provide visual cue, wrist band, yellow gown, etc. Monitor gait and stability. Monitor for mental status changes and reorient to person, place, and time. Review medications for side effects contributing to fall risk. Reinforce activity limits and safety measures with patient and family. Provide visual clues: red socks.

## 2019-04-20 NOTE — ED PROVIDER NOTE - CRITICAL CARE PROVIDED
additional history taking/direct patient care (not related to procedure)/conducted a detailed discussion of DNR status/interpretation of diagnostic studies/documentation/consult w/ pt's family directly relating to pts condition

## 2019-04-20 NOTE — ED ADULT NURSE NOTE - OBJECTIVE STATEMENT
Progressive shortness of breath for two weeks, patient became altered this morning.  Patient tachypneic, tachycardiac, patient hardly responsive. Code Sepsis called, No recent hospitalization

## 2019-04-20 NOTE — ED PROVIDER NOTE - OBJECTIVE STATEMENT
Pt is an 78yoM with COPD on PRN home O2, presenting with 2 weeks of cough/ productive cough, now w/ AMS this morning;  Per wife, he was c/o a "pulled muscle on the R flank , saw Dr. Casas on Tuesday ; took a neb treatment and prednisone this morning with no relief.  Per EMS pt was satting in 80s at home, intermittent when into ? Afib, and then return to sinus rhythm / Per family, pt has no advanced directives in place but they feel he would want to be full code.  Pt placed on BIPAP shortly after arrival , CXR and bedside US c/w RLL pneumonia; family notified that if pt does not improve, pt will require intubation./

## 2019-04-20 NOTE — H&P ADULT - HISTORY OF PRESENT ILLNESS
77 yo male, PMHx COPD, PPM, HTN, HLD, lumbar spinal stenosis, BIBA with progressive shortness of breath over the past few days. Patient notes general malaise for the past two weeks. Today, he complained to his wife of a "pulled muscle" on the right side of his back with increasing shortness of breath prompting him to call 911. His wife states today he sounded "gurgly" and was unable to catch his breath. Patient denies fever or chills, chest pain, n/v/d. Upon EMS arrival, patient had SpO2 83% started on 100% NRB with improvement to 96%. Upon arrival to ED, was found to have WBC 29.7, serum bicarb 9.0, anion gap 21, lactate 2.9. ABG 7.19/64/118/20, started on NIPPV. Patient was uncomfortable on BiPAP, transitioned to 40% ventimask. After 45 minutes of VM, repeat ABG 7.18/68/75/21 and patient was placed back on NIPPV. Afebrile, hemodynamically stable, sinus tachycardia HR low 100's. Patient was given 3 duonebs, Zosyn, Vancomycin, and Azithromycin empirically. CXR with dense RL consolidation and R pleural effusion. ICU consult was called for hypercapnic hypoxemic respiratory failure. A right thoracentesis was performed, drained ~250cc dark straw fluid. Patient was admitted to ICU for further management.

## 2019-04-20 NOTE — ED PROVIDER NOTE - PROGRESS NOTE DETAILS
Pt more awake; still responsive to verbal . Pt saturating 100% on BIPAP at FIO2 50% .  very arousable to voice. Pt more awake now; states bipap mask uncomfortable and wants to try without it. Pt taken off BIPAP and placed on NC; saturation 98% on 3.5 L;  Pt denies SOB at present; placed on VENTI mask 50% as pt mouth-breathing;  respiratory therapist notified. Pt saturating 99%-100 on venti mask;  will titrate down O2 ABG slightly worsened after discontinuation of BIPAP;  Pt still awake/ responsive; no significant change in respiratory status;  MICU consulted.

## 2019-04-20 NOTE — H&P ADULT - NEUROLOGICAL DETAILS
alert and oriented x 3/sensation intact/cranial nerves intact/responds to verbal commands/normal strength

## 2019-04-20 NOTE — H&P ADULT - ATTENDING COMMENTS
77 yo male with COPD, PPM, presented to ED with pneumonia and acute respiratory failure requiring bipap.  patient seen and examined in ED with PA.  Bedside thoracentesis performed, 300 ml of dark yellow, nonpurulent, nonbloody fluid drained and sent to lab.  Appears to be uncomplicated parapneumonic effusion secondary to pneumonia.    Of note patient initially expressed to me his wishes for DNR/DNI, MOLST form filled out, however later in the evening he told other staff that he wasn't DNR.

## 2019-04-20 NOTE — H&P ADULT - NSICDXPASTMEDICALHX_GEN_ALL_CORE_FT
PAST MEDICAL HISTORY:  COPD (chronic obstructive pulmonary disease)     H/O back injury     HLD (hyperlipidemia)     HTN (hypertension)     Stenosis of lumbosacral spine

## 2019-04-20 NOTE — ED ADULT NURSE REASSESSMENT NOTE - NS ED NURSE REASSESS COMMENT FT1
RT at bedside for repeat ABG, ABG resulted, pt placed on bipap at this time, fiO2 of 50%. Pt A+Ox4, arousable to voice. ST on monitor. Will continue to closely monitor.

## 2019-04-20 NOTE — ED ADULT NURSE NOTE - PMH
COPD (chronic obstructive pulmonary disease)    H/O back injury    HLD (hyperlipidemia)    HTN (hypertension)    Stenosis of lumbosacral spine

## 2019-04-20 NOTE — H&P ADULT - ASSESSMENT
77 yo male, PMHx COPD, PPM, HTN, HLD, lumbar spinal stenosis, admitted with acute hypercapnic hypoxemic respiratory failure secondary to sepsis pneumonia, pleural effusion, COPD exacerbation superimposed r/o CHF. 77 yo male, PMHx COPD, PPM, HTN, HLD, lumbar spinal stenosis, admitted with acute hypercapnic hypoxemic respiratory failure secondary to sepsis pneumonia, pleural effusion, COPD exacerbation superimposed r/o CHF, with metabolic anion gap acidosis likely related to lactemia.     - Continue NIPPV, f/u repeat ABG, augment BiPAP settings as needed to manipulate acid-base balance for goal pH >7.25. Wean FiO2 to maintain SpO2 >90%  - Bronchodilators  - Aggressive chest PT   - Once hypercapnea improves, will trial on HFNC if tolerated to assist with expectoration of thick secretions  - Thoracentesis performed in attempts to optimize respiratory status, f/u pleural studies  - Will start on Rocephin and Azithromycin to cover for CAP (last hospitalization 2 years ago). Check Legionella Ag and Pneumococcal  - RVP negative  - Trend lactate, leukocytosis, and fever curve  - Prerenal azotemia, metabolic acidosis, and lactemia, will give gentle IV fluid hydration  - Check TTE to r/o component of CHF      CRITICAL CARE TIME SPENT: 60 minutes assessing presenting problems of acute illness, which pose high probability of life threatening deterioration or end organ damage/dysfunction, as well as medical decision making including initiating plan of care, reviewing data, reviewing radiologic exams, discussing with multidisciplinary team, non-inclusive of procedures performed, discussing goals of care with patient and his wife.

## 2019-04-20 NOTE — PROCEDURE NOTE - NSPROCDETAILS_GEN_ALL_CORE
catheter inserted over needle/connection to syringe/ultrasound assessment of effusion (localization)/ultrasound assessment of effusion (size)/location identified, draped/prepped, sterile technique used, needle inserted/introduced

## 2019-04-20 NOTE — H&P ADULT - RS GEN PE MLT RESP DETAILS PC
diminished breath sounds, R/diminished breath sounds, L/rhonchi/airway patent/breath sounds equal/respirations non-labored

## 2019-04-20 NOTE — ED PROVIDER NOTE - CONSTITUTIONAL MENTATION
awake/Pt sleepy but arousable,conversive./alert/ALTERED LOC/oriented to person, place, time/situation

## 2019-04-21 NOTE — PROGRESS NOTE ADULT - SUBJECTIVE AND OBJECTIVE BOX
INTERVAL HPI/OVERNIGHT EVENTS: feels better today, breathing easier, off bipap    On Admission  19 (1d)  HPI:  79 yo male, PMHx COPD, PPM, HTN, HLD, lumbar spinal stenosis, BIBA with progressive shortness of breath over the past few days. Patient notes general malaise for the past two weeks. Today, he complained to his wife of a "pulled muscle" on the right side of his back with increasing shortness of breath prompting him to call 911. His wife states today he sounded "gurgly" and was unable to catch his breath. Patient denies fever or chills, chest pain, n/v/d. Upon EMS arrival, patient had SpO2 83% started on 100% NRB with improvement to 96%. Upon arrival to ED, was found to have WBC 29.7, serum bicarb 9.0, anion gap 21, lactate 2.9. ABG 7.19/64/118/20, started on NIPPV. Patient was uncomfortable on BiPAP, transitioned to 40% ventimask. After 45 minutes of VM, repeat ABG 7.18/68/75/21 and patient was placed back on NIPPV. Afebrile, hemodynamically stable, sinus tachycardia HR low 100's. Patient was given 3 duonebs, Zosyn, Vancomycin, and Azithromycin empirically. CXR with dense RL consolidation and R pleural effusion. ICU consult was called for hypercapnic hypoxemic respiratory failure. A right thoracentesis was performed, drained ~250cc dark straw fluid. Patient was admitted to ICU for further management. (2019 15:53)    PAST MEDICAL & SURGICAL HISTORY:  HLD (hyperlipidemia)  HTN (hypertension)  COPD (chronic obstructive pulmonary disease)  Stenosis of lumbosacral spine  H/O back injury  No significant past surgical history      Antimicrobial:  azithromycin  IVPB 500 milliGRAM(s) IV Intermittent every 24 hours  cefTRIAXone   IVPB 1 Gram(s) IV Intermittent every 24 hours    Cardiovascular:  losartan 25 milliGRAM(s) Oral daily    Pulmonary:  ALBUTerol/ipratropium for Nebulization 3 milliLiter(s) Nebulizer every 6 hours    Hematalogic:  enoxaparin Injectable 40 milliGRAM(s) SubCutaneous daily    Other:  atorvastatin 40 milliGRAM(s) Oral at bedtime  pantoprazole  Injectable 40 milliGRAM(s) IV Push daily  predniSONE   Tablet 40 milliGRAM(s) Oral daily  predniSONE   Tablet   Oral       Drug Dosing Weight  Height (cm): 185.42 (2019 17:52)  Weight (kg): 115.8 (2019 17:52)  BMI (kg/m2): 33.7 (2019 17:52)  BSA (m2): 2.39 (2019 17:52)    T(C): 36.4 (19 @ 08:36), Max: 36.8 (19 @ 00:03)  HR: 111 (19 @ 11:00)  BP: 138/65 (19 @ 11:00)  BP(mean): 93 (19 @ 11:00)  ABP: --  ABP(mean): --  RR: 26 (19 @ 11:00)  SpO2: 96% (19 @ 11:00)    ABG - ( 2019 21:43 )  pH, Arterial: 7.32  pH, Blood: x     /  pCO2: 52    /  pO2: 104   / HCO3: 24    / Base Excess: -0.7  /  SaO2: 99                     @ 07:01  -   @ 07:00  --------------------------------------------------------  IN: 0 mL / OUT: 670 mL / NET: -670 mL            PHYSICAL EXAM:    awake and alert, NAD  MMM  reg rhythm  bilat air entry, scattered rhonchi  abd soft nontender,  bilat edema    LABS:  CBC Full  -  ( 2019 07:14 )  WBC Count : 17.7 K/uL  RBC Count : 4.44 M/uL  Hemoglobin : 12.4 g/dL  Hematocrit : 39.5 %  Platelet Count - Automated : 173 K/uL  Mean Cell Volume : 89.0 fl  Mean Cell Hemoglobin : 27.9 pg  Mean Cell Hemoglobin Concentration : 31.4 g/dL  Auto Neutrophil # : 16.5 K/uL  Auto Lymphocyte # : 0.6 K/uL  Auto Monocyte # : 0.5 K/uL  Auto Eosinophil # : 0.0 K/uL  Auto Basophil # : x  Auto Neutrophil % : 93.5 %  Auto Lymphocyte % : 3.6 %  Auto Monocyte % : 2.6 %  Auto Eosinophil % : 0.0 %  Auto Basophil % : x        143  |  105  |  59.0<H>  ----------------------------<  127<H>  4.7   |  24.0  |  1.81<H>    Ca    9.1      2019 07:14  Phos  4.5       Mg     2.6         TPro  2.2<L>  /  Alb  <1.0<L>  /  TBili  <0.2<L>  /  DBili  x   /  AST  7   /  ALT  <5  /  AlkPhos  24<L>      PT/INR - ( 2019 11:14 )   PT: 13.6 sec;   INR: 1.18 ratio         PTT - ( 2019 11:14 )  PTT:33.0 sec  Urinalysis Basic - ( 2019 22:05 )    Color: Yellow / Appearance: Clear / S.025 / pH: x  Gluc: x / Ketone: Trace  / Bili: Negative / Urobili: Negative mg/dL   Blood: x / Protein: 30 mg/dL / Nitrite: Negative   Leuk Esterase: Trace / RBC: 0-2 /HPF / WBC 3-5   Sq Epi: x / Non Sq Epi: Few / Bacteria: Occasional          RADIOLOGY personally reviewed- cxr with right sided airspace opacity     GOALS OF CARE DISCUSSION WITH PATIENT/FAMILY/PROXY: patient and wife updated on plan of care and prognosis

## 2019-04-21 NOTE — PROGRESS NOTE ADULT - ASSESSMENT
79 yo male with hx of COPD, PPM, HTN, HLD, spinal stenosis, admitted with right lower lobe pneumonia and parapneumonic effusion, acute respiratory failure requiring bipap, SIMONE.    Today patient is doing better, able to tolerate being off bipap.

## 2019-04-21 NOTE — PROGRESS NOTE ADULT - SUBJECTIVE AND OBJECTIVE BOX
77 y/o male with PMH of COPD, PPM, HTN, HLD, lumbar spinal stenosis who came to the ED  with progressive shortness of breath over the past few days. In the ED, was found to have WBC 29.7, serum bicarb 9.0, anion gap 21, lactate 2.9. ABG 7.19/64/118/20, started on NIPPV. Patient was uncomfortable on BiPAP, transitioned to 40% ventimask. After 45 minutes of VM, repeat ABG 7.18/68/75/21 and patient was placed back on NIPPV. Afebrile, hemodynamically stable, sinus tachycardia HR low 100's. Patient was given 3 duonebs, Zosyn, Vancomycin, and Azithromycin empirically. CXR with dense RL consolidation and R pleural effusion.  A right thoracentesis was performed, drained ~250cc dark straw fluid. ICU consult was called for hypercapnic hypoxemic respiratory failure. Patient was admitted to ICU for further management. Patient respiratory symptoms improved; he is downgraded to medical floor.     Today, patient was seen and examined at bed side. No in acute distress.       PAST MEDICAL & SURGICAL HISTORY:  HLD (hyperlipidemia)  HTN (hypertension)  COPD (chronic obstructive pulmonary disease)  Stenosis of lumbosacral spine  H/O back injury  No significant past surgical history      Medications:   Azithromycin  IVPB 500 milliGRAM(s) IV Intermittent every 24 hours  CefTRIAXone   IVPB 1 Gram(s) IV Intermittent every 24 hours  Losartan 25 milliGRAM(s) Oral daily  ALBUTerol/ipratropium for Nebulization 3 milliLiter(s) Nebulizer every 6 hours  Enoxaparin Injectable 40 milliGRAM(s) SubCutaneous daily  Atorvastatin 40 milliGRAM(s) Oral at bedtime  Pantoprazole  Injectable 40 milliGRAM(s) IV Push daily  PredniSONE   Tablet 40 milliGRAM(s) Oral daily        PHYSICAL EXAMINATIO  VS:   General: awake and alert, NAD  Eyes:  Lungs:  Heart:   GI:   Ext:   Neuro:  Skin:   Msk:  Psych      LABS:  CBC Full  -  ( 2019 07:14 )  WBC Count : 17.7 K/uL  RBC Count : 4.44 M/uL  Hemoglobin : 12.4 g/dL  Hematocrit : 39.5 %  Platelet Count - Automated : 173 K/uL  Mean Cell Volume : 89.0 fl  Mean Cell Hemoglobin : 27.9 pg  Mean Cell Hemoglobin Concentration : 31.4 g/dL  Auto Neutrophil # : 16.5 K/uL  Auto Lymphocyte # : 0.6 K/uL  Auto Monocyte # : 0.5 K/uL  Auto Eosinophil # : 0.0 K/uL  Auto Basophil # : x  Auto Neutrophil % : 93.5 %  Auto Lymphocyte % : 3.6 %  Auto Monocyte % : 2.6 %  Auto Eosinophil % : 0.0 %  Auto Basophil % : x        143  |  105  |  59.0<H>  ----------------------------<  127<H>  4.7   |  24.0  |  1.81<H>    Ca    9.1      2019 07:14  Phos  4.5       Mg     2.6         TPro  2.2<L>  /  Alb  <1.0<L>  /  TBili  <0.2<L>  /  DBili  x   /  AST  7   /  ALT  <5  /  AlkPhos  24<L>      PT/INR - ( 2019 11:14 )   PT: 13.6 sec;   INR: 1.18 ratio         PTT - ( 2019 11:14 )  PTT:33.0 sec  Urinalysis Basic - ( 2019 22:05 )    Color: Yellow / Appearance: Clear / S.025 / pH: x  Gluc: x / Ketone: Trace  / Bili: Negative / Urobili: Negative mg/dL   Blood: x / Protein: 30 mg/dL / Nitrite: Negative   Leuk Esterase: Trace / RBC: 0-2 /HPF / WBC 3-5   Sq Epi: x / Non Sq Epi: Few / Bacteria: Occasional          RADIOLOGY personally reviewed- cxr with right sided airspace opacity     GOALS OF CARE DISCUSSION WITH PATIENT/FAMILY/PROXY: patient and wife updated on plan of care and prognosis 77 y/o male with PMH of COPD, PPM, HTN, HLD, lumbar spinal stenosis who came to the ED  with progressive shortness of breath over the past few days. In the ED, was found to have WBC 29.7, serum bicarb 9.0, anion gap 21, lactate 2.9. ABG 7.19/64/118/20, started on NIPPV. Patient was uncomfortable on BiPAP, transitioned to 40% ventimask. After 45 minutes of VM, repeat ABG 7.18/68/75/21 and patient was placed back on NIPPV. Afebrile, hemodynamically stable, sinus tachycardia HR low 100's. Patient was given 3 duonebs, Zosyn, Vancomycin, and Azithromycin empirically. CXR with dense RL consolidation and R pleural effusion.  A right thoracentesis was performed, drained ~250cc dark straw fluid. ICU consult was called for hypercapnic hypoxemic respiratory failure. Patient was admitted to ICU for further management. Patient respiratory symptoms improved; he is downgraded to medical floor.     Today, patient was seen and examined at bed side. No in acute distress. Patient decline the use of BiPAP stating it makes his breathing worse.       PAST MEDICAL & SURGICAL HISTORY:  HLD (hyperlipidemia)  HTN (hypertension)  COPD (chronic obstructive pulmonary disease)  Stenosis of lumbosacral spine  H/O back injury  No significant past surgical history      Medications:   Azithromycin  IVPB 500 milliGRAM(s) IV Intermittent every 24 hours  CefTRIAXone   IVPB 1 Gram(s) IV Intermittent every 24 hours  Losartan 25 milliGRAM(s) Oral daily  ALBUTerol/ipratropium for Nebulization 3 milliLiter(s) Nebulizer every 6 hours  Enoxaparin Injectable 40 milliGRAM(s) SubCutaneous daily  Atorvastatin 40 milliGRAM(s) Oral at bedtime  Pantoprazole  Injectable 40 milliGRAM(s) IV Push daily  PredniSONE   Tablet 40 milliGRAM(s) Oral daily        PHYSICAL EXAMINATIO  VS: Temp: 97.8; HR: 111; BP: 122/84  General: Elderly man, awake, alert not in acute distress  Eyes: PERRLA   Lungs: Respiratory non-labored, on O2 via NC, no wheeze   Heart: s1,s2; RRR  GI: increase abdominal girth, soft, non-tender, BS+  Ext: no edema, no calf tenderness  Neuro: awake, alert, oriented; respond to verbal command   Skin: normal color, warm   Msk: ROM intact  Psych: normal mood and affect       LABS:  CBC Full  -  ( 2019 07:14 )  WBC Count : 17.7 K/uL  RBC Count : 4.44 M/uL  Hemoglobin : 12.4 g/dL  Hematocrit : 39.5 %  Platelet Count - Automated : 173 K/uL  Mean Cell Volume : 89.0 fl  Mean Cell Hemoglobin : 27.9 pg  Mean Cell Hemoglobin Concentration : 31.4 g/dL  Auto Neutrophil # : 16.5 K/uL  Auto Lymphocyte # : 0.6 K/uL  Auto Monocyte # : 0.5 K/uL  Auto Eosinophil # : 0.0 K/uL  Auto Basophil # : x  Auto Neutrophil % : 93.5 %  Auto Lymphocyte % : 3.6 %  Auto Monocyte % : 2.6 %  Auto Eosinophil % : 0.0 %  Auto Basophil % : x    -    143  |  105  |  59.0<H>  ----------------------------<  127<H>  4.7   |  24.0  |  1.81<H>    Ca    9.1      2019 07:14  Phos  4.5     -  Mg     2.6     -    TPro  2.2<L>  /  Alb  <1.0<L>  /  TBili  <0.2<L>  /  DBili  x   /  AST  7   /  ALT  <5  /  AlkPhos  24<L>  -20    PT/INR - ( 2019 11:14 )   PT: 13.6 sec;   INR: 1.18 ratio         PTT - ( 2019 11:14 )  PTT:33.0 sec  Urinalysis Basic - ( 2019 22:05 )    Color: Yellow / Appearance: Clear / S.025 / pH: x  Gluc: x / Ketone: Trace  / Bili: Negative / Urobili: Negative mg/dL   Blood: x / Protein: 30 mg/dL / Nitrite: Negative   Leuk Esterase: Trace / RBC: 0-2 /HPF / WBC 3-5   Sq Epi: x / Non Sq Epi: Few / Bacteria: Occasional          RADIOLOGY personally reviewed- cxr with right sided airspace opacity     GOALS OF CARE DISCUSSION WITH PATIENT/FAMILY/PROXY: patient and wife updated on plan of care and prognosis

## 2019-04-21 NOTE — PROGRESS NOTE ADULT - ASSESSMENT
77 y/o male with PMH of COPD, PPM, HTN, HLD, lumbar spinal stenosis who came to the ED  with progressive shortness of breath over the past few days. In the ED, was found to have WBC 29.7, serum bicarb 9.0, anion gap 21, lactate 2.9. ABG 7.19/64/118/20, started on NIPPV. Patient was uncomfortable on BiPAP, transitioned to 40% ventimask. After 45 minutes of VM, repeat ABG 7.18/68/75/21 and patient was placed back on NIPPV. Afebrile, hemodynamically stable, sinus tachycardia HR low 100's. Patient was given 3 duonebs, Zosyn, Vancomycin, and Azithromycin empirically. CXR with dense RL consolidation and R pleural effusion.  A right thoracentesis was performed, drained ~250cc dark straw fluid. ICU consult was called for hypercapnic hypoxemic respiratory failure. Patient was admitted to ICU for further management. Patient respiratory symptoms improved; he is downgraded to medical floor.       Acute respiratory failure with hypoxia and hypercapnia  Resolving   Continue BiPAP HS as needed   O2 via NC as needed     Pneumonia.    Continue Rocephin and Azithromycin     Right pleural effusion   S/p thoracentesis   Follow up sample report     COPD  Continue Prednisone taper    Duoneb    HTN/HLD  Continue Losartan 25mg  Atorvastatin 40mg     Supportive   DVT prophylaxis: Lovenox 40mg   Diet: DASH 79 y/o male with PMH of COPD, PPM, HTN, HLD, lumbar spinal stenosis who came to the ED  with progressive shortness of breath over the past few days. In the ED, was found to have WBC 29.7, serum bicarb 9.0, anion gap 21, lactate 2.9. ABG 7.19/64/118/20, started on NIPPV. Patient was uncomfortable on BiPAP, transitioned to 40% ventimask. After 45 minutes of VM, repeat ABG 7.18/68/75/21 and patient was placed back on NIPPV. Afebrile, hemodynamically stable, sinus tachycardia HR low 100's. Patient was given 3 duonebs, Zosyn, Vancomycin, and Azithromycin empirically. CXR with dense RL consolidation and R pleural effusion.  A right thoracentesis was performed, drained ~250cc dark straw fluid. ICU consult was called for hypercapnic hypoxemic respiratory failure. Patient was admitted to ICU for further management. Patient respiratory symptoms improved; he is downgraded to medical floor.       Acute respiratory failure with hypoxia and hypercapnia  Resolving   Continue BiPAP HS as needed   O2 via NC as needed     Pneumonia complicated by sepsis  Blood culture sent; follow up   Continue Rocephin and Azithromycin     Leukocytosis   WBC: 29.7--->17.7  Likely due to underlying pneumonia   Continue antibiotic     Right pleural effusion   S/p thoracentesis   Follow up sample report     SIMONE   Cr: 1.81   Likely due to sepsis   Monitor renal function    COPD  Continue Prednisone taper    Duoneb    HTN/HLD  Continue Losartan 25mg  Atorvastatin 40mg     Supportive   DVT prophylaxis: Lovenox 40mg   Diet: DASH

## 2019-04-22 NOTE — PROGRESS NOTE ADULT - SUBJECTIVE AND OBJECTIVE BOX
79 y/o male with PMH of COPD, PPM, HTN, HLD, lumbar spinal stenosis who came to the ED  with progressive shortness of breath over the past few days. In the ED, was found to have WBC 29.7, serum bicarb 9.0, anion gap 21, lactate 2.9. ABG 7.19/64/118/20, started on NIPPV. Patient was uncomfortable on BiPAP, transitioned to 40% ventimask. After 45 minutes of VM, repeat ABG 7.18/68/75/21 and patient was placed back on NIPPV. Afebrile, hemodynamically stable, sinus tachycardia HR low 100's. Patient was given 3 duonebs, Zosyn, Vancomycin, and Azithromycin empirically. CXR with dense RL consolidation and R pleural effusion.  A right thoracentesis was performed, drained ~250cc dark straw fluid. ICU consult was called for hypercapnic hypoxemic respiratory failure. Patient was admitted to ICU for further management. Patient respiratory symptoms improved; he is downgraded to medical floor on .     Today, patient was seen and examined at bed side. No in acute distress. Patient decline the use of BiPAP stating it makes his breathing worse. still SOB+. no CP/palpitation. ID cx was called    tele: 3 episodes of NSVT 3 beats with PVCs      PAST MEDICAL & SURGICAL HISTORY:  HLD (hyperlipidemia)  HTN (hypertension)  severe COPD (chronic obstructive pulmonary disease)  Stenosis of lumbosacral spine  H/O back injury  No significant past surgical history      MEDICATIONS  (STANDING):  ALBUTerol/ipratropium for Nebulization 3 milliLiter(s) Nebulizer every 6 hours  atorvastatin 40 milliGRAM(s) Oral at bedtime  azithromycin  IVPB 500 milliGRAM(s) IV Intermittent every 24 hours  cefTRIAXone   IVPB 1 Gram(s) IV Intermittent every 24 hours  enoxaparin Injectable 40 milliGRAM(s) SubCutaneous daily  losartan 25 milliGRAM(s) Oral daily  pantoprazole  Injectable 40 milliGRAM(s) IV Push daily  predniSONE   Tablet 40 milliGRAM(s) Oral daily  predniSONE   Tablet   Oral     MEDICATIONS  (PRN):  acetaminophen   Tablet .. 650 milliGRAM(s) Oral every 6 hours PRN Temp greater or equal to 38C (100.4F), Mild Pain (1 - 3)      Vital Signs Last 24 Hrs  T(C): 36.9 (2019 09:24), Max: 37 (2019 18:00)  T(F): 98.5 (2019 09:24), Max: 98.6 (2019 18:00)  HR: 105 (2019 09:24) (68 - 114)  BP: 127/87 (2019 09:24) (118/70 - 161/80)  BP(mean): 97 (2019 20:00) (86 - 99)  RR: 16 (2019 09:24) (16 - 36)  SpO2: 96% (:24) (93% - 98%)    CAPILLARY BLOOD GLUCOSE      I&O's Detail    2019 07:01  -  2019 07:00  --------------------------------------------------------  IN:    Oral Fluid: 480 mL    Solution: 50 mL    Solution: 250 mL  Total IN: 780 mL    OUT:    Voided: 1700 mL  Total OUT: 1700 mL    Total NET: -920 mL      2019 07:01  -  2019 14:21  --------------------------------------------------------  IN:    Oral Fluid: 760 mL  Total IN: 760 mL    OUT:    Voided: 500 mL  Total OUT: 500 mL    Total NET: 260 mL      GENERAL: Not in distress. Alert    HEENT:  Normocephalic and atraumatic.   NECK: Supple.  No JVD.    CARDIOVASCULAR: RRR S1, S2. No murmur/rubs/gallop  LUNGS: BLAE+reduced, no rales, no wheezing, no rhonchi.  resp not laboured  ABDOMEN: ND. Soft,  NT, no guarding / rebound / rigidity. BS normoactive. No CVA tenderness.    EXTREMITIES: no cyanosis, no clubbing, + edema.   SKIN: no rash.   NEUROLOGIC: AAO*3.strength is symmetric, sensation intact, speech fluent  PSYCHIATRIC: Calm.  No agitation.      LABS:                          12.6   18.4  )-----------( 195      ( 2019 05:51 )             39.4           144  |  106  |  62.0<H>  ----------------------------<  132<H>  5.1   |  26.0  |  1.38<H>    Ca    9.2      2019 05:51  Phos  4.5       Mg     2.6         Urinalysis Basic - ( 2019 22:05 )    Color: Yellow / Appearance: Clear / S.025 / pH: x  Gluc: x / Ketone: Trace  / Bili: Negative / Urobili: Negative mg/dL   Blood: x / Protein: 30 mg/dL / Nitrite: Negative   Leuk Esterase: Trace / RBC: 0-2 /HPF / WBC 3-5   Sq Epi: x / Non Sq Epi: Few / Bacteria: Occasional      Culture - Acid Fast - Body Fluid w/Smear (19 @ 16:57)    Specimen Source: .Body Fluid    Acid Fast Bacilli Smear:   No acid fast bacilli seen by fluorochrome stain    Culture - Urine (19 @ 22:04)    Specimen Source: .Urine    Culture Results:   No growth    Culture - Body Fluid with Gram Stain (19 @ 15:36)    Gram Stain:   Few White blood cells  Few Yeast    Specimen Source: .Body Fluid    Culture Results:   No growth at 1 day.  Culture in progress        RADIOLOGY personally reviewed- cxr with right sided airspace opacity

## 2019-04-22 NOTE — CONSULT NOTE ADULT - SUBJECTIVE AND OBJECTIVE BOX
[facility]  INFECTIOUS DISEASES AND INTERNAL MEDICINE at [place]  =======================================================  Onesimo Arauz MD  Diplomates American Board of Internal Medicine and Infectious Diseases  =======================================================    [id number]  JOHN CIFUENTES   This 79 yo male, PMHx COPD, PPM, HTN, HLD, lumbar spinal stenosis, BIBA with progressive shortness of breath over the past few days on 4/20/19.   Patient notes general malaise for the past two weeks. He complained to his wife of a "pulled muscle" on the right side of his back with increasing shortness of breath prompting him to call 911.    He had no fevers at home.  Reports that he was taking tramadol for a few days.   His wife states today he sounded "gurgly" and was unable to catch his breath.   Patient denies fever or chills, chest pain, n/v/d. Upon EMS arrival, patient had SpO2 83% started on 100% NRB with improvement to 96%. Upon arrival to ED, was found to have WBC 29.7, serum bicarb 9.0, anion gap 21, lactate 2.9. ABG 7.19/64/118/20, started on NIPPV.   Patient was uncomfortable on BiPAP, transitioned to 40% ventimask. After 45 minutes of VM, repeat ABG 7.18/68/75/21 and patient was placed back on NIPPV.   Afebrile, hemodynamically stable, sinus tachycardia HR low 100's. Patient was given 3 duonebs, Zosyn, Vancomycin, and Azithromycin empirically.   CXR with dense RL consolidation and R pleural effusion.   POST Thoracentesis, he had residual right infiltrate on CXR.   ICU consult was called for hypercapnic hypoxemic respiratory failure. A right thoracentesis was performed, drained ~250cc dark straw fluid. Patient was admitted to ICU for further management.     patient was downgraded to the medical service 4/21/19. We are consulted on 4/22/19 for management of pneumonia.         =======================================================  Past Medical & Surgical Hx:  =====================  PAST MEDICAL & SURGICAL HISTORY:  HLD (hyperlipidemia)  HTN (hypertension)  COPD (chronic obstructive pulmonary disease)  Stenosis of lumbosacral spine  H/O back injury  No significant past surgical history      Problem List:  ==========  HEALTH ISSUES - PROBLEM Dx:  Pneumonia: Pneumonia  Metabolic acidosis: Metabolic acidosis  COPD exacerbation: COPD exacerbation  Pleural effusion: Pleural effusion  Pneumonia, bacterial: Pneumonia, bacterial  Sepsis: Sepsis  Acute respiratory failure with hypoxia and hypercapnia: Acute respiratory failure with hypoxia and hypercapnia      Social Hx:  =======  no toxic habits currently  quit smoking 20 years ago  no drugs      FAMILY HISTORY:  no significant family history of immunosuppressive disorders in mother or father   =======================================================  REVIEW OF SYSTEMS:  as above  all other ROS negative  =======================================================  Allergies  No Known Allergies    Antibiotics:  azithromycin  IVPB 500 milliGRAM(s) IV Intermittent every 24 hours  cefTRIAXone   IVPB 1 Gram(s) IV Intermittent every 24 hours    Other medications:  ALBUTerol/ipratropium for Nebulization 3 milliLiter(s) Nebulizer every 6 hours  atorvastatin 40 milliGRAM(s) Oral at bedtime  enoxaparin Injectable 40 milliGRAM(s) SubCutaneous daily  losartan 25 milliGRAM(s) Oral daily  pantoprazole  Injectable 40 milliGRAM(s) IV Push daily  predniSONE   Tablet 40 milliGRAM(s) Oral daily  predniSONE   Tablet   Oral       azithromycin  IVPB   255 mL/Hr IV Intermittent (04-20-19 @ 12:30)   255 mL/Hr IV Intermittent (04-21-19 @ 10:56)   255 mL/Hr IV Intermittent (04-22-19 @ 13:23)    piperacillin/tazobactam IVPB.   200 mL/Hr IV Intermittent (04-20-19 @ 11:10)    cefTRIAXone   IVPB   100 mL/Hr IV Intermittent (04-21-19 @ 10:22)   100 mL/Hr IV Intermittent (04-22-19 @ 13:23)    vancomycin  IVPB   250 mL/Hr IV Intermittent (04-20-19 @ 11:33)      ======================================================  Physical Exam:  ============  T(F): 98.9 (22 Apr 2019 15:00), Max: 98.9 (22 Apr 2019 15:00)  HR: 86 (22 Apr 2019 15:23)  BP: 145/83 (22 Apr 2019 15:00)  RR: 16 (22 Apr 2019 15:00)  SpO2: 97% (22 Apr 2019 15:23) (93% - 100%)    General:  No acute distress.  Eye: Pupils are equal, round and reactive to light, Extraocular movements are intact, Normal conjunctiva.  HENT: Normocephalic, Oral mucosa is moist, No pharyngeal erythema, No sinus tenderness.  Neck: Supple, No lymphadenopathy.  Respiratory: Lungs WITH PROLONG EXP PHASE, DIMINISHED AIR ENTRY RIGHT BASE  Cardiovascular: Normal rate, Regular rhythm, Good pulses equal in all extremities, TRACE edema.  Gastrointestinal: Soft, Non-tender, Non-distended, Normal bowel sounds.  Genitourinary: No costovertebral angle tenderness.  Lymphatics: No lymphadenopathy neck,   Musculoskeletal: Normal range of motion, Normal strength.  Integumentary: No rash. BILATERAL CHANGES ON LOWER LEGS  Neurologic: Alert, Oriented, No focal deficits, Cranial Nerves II-XII are grossly intact.  Psychiatric: Appropriate mood & affect.    =======================================================  Labs:                        12.6   18.4  )-----------( 195      ( 22 Apr 2019 05:51 )             39.4       WBC Count: 18.4 K/uL (04-22-19 @ 05:51)  WBC Count: 17.7 K/uL (04-21-19 @ 07:14)  WBC Count: 29.7 K/uL (04-20-19 @ 11:14)      04-22    144  |  106  |  62.0<H>  ----------------------------<  132<H>  5.1   |  26.0  |  1.38<H>    Ca    9.2      22 Apr 2019 05:51  Phos  4.5     04-21  Mg     2.6     04-21      Culture - Acid Fast - Body Fluid w/Smear (collected 04-21-19 @ 16:57)  Source: .Body Fluid    Culture - Urine (collected 04-20-19 @ 22:04)  Source: .Urine  Final Report (04-22-19 @ 10:23):    No growth    Culture - Body Fluid with Gram Stain (collected 04-20-19 @ 15:36)  Source: .Body Fluid  Gram Stain (04-20-19 @ 19:57):    Few White blood cells    Few Yeast    Culture - Blood (collected 04-20-19 @ 11:19)  Source: .Blood    Culture - Blood (collected 04-20-19 @ 11:19)  Source: .Blood

## 2019-04-22 NOTE — GOALS OF CARE CONVERSATION - PERSONAL ADVANCE DIRECTIVE - CONVERSATION DETAILS
Sw met with patient to educate regarding palliative care services ans address symptom management concerns. Patient reports feeling fatigued but is sitting in bedside chair with no other complaints. Sw addressed coping with progressive illness -COPD. Patient reports lifestyle only recently changed with new to O2 about 2 months ago. Patient still reports being independent at home where he lives with his wife. Patient has completed MOLST as full code on chart.

## 2019-04-22 NOTE — PROGRESS NOTE ADULT - ASSESSMENT
79 y/o male with PMH of COPD, PPM, HTN, HLD, lumbar spinal stenosis who came to the ED  with progressive shortness of breath over the past few days. In the ED, was found to have WBC 29.7, serum bicarb 9.0, anion gap 21, lactate 2.9. ABG 7.19/64/118/20, started on NIPPV. Patient was uncomfortable on BiPAP, transitioned to 40% ventimask. After 45 minutes of VM, repeat ABG 7.18/68/75/21 and patient was placed back on NIPPV. Afebrile, hemodynamically stable, sinus tachycardia HR low 100's. Patient was given 3 duonebs, Zosyn, Vancomycin, and Azithromycin empirically. CXR with dense RL consolidation and R pleural effusion.  A right thoracentesis was performed, drained ~250cc dark straw fluid. ICU consult was called for hypercapnic hypoxemic respiratory failure. Patient was admitted to ICU for further management. Patient respiratory symptoms improved; he is downgraded to medical floor.       Acute respiratory failure with hypoxia and hypercapnia  Resolving   Continue BiPAP HS as needed   O2 via NC as needed   patient will be benefited fron NIV at home however he is refusing    Pneumonia complicated by sepsis and parapneumonic effusion, ?empyema  Blood culture sent; follow up .   pleural fluid analysis: no growth in one day. yeast+ possible contamination  neg AFB  send sputum cx, urine legionella  on Rocephin and Azithromycin. patient has episodes of NSVT and PVC. consider to switch Abx. will leave it on ID  ID cx called    Leukocytosis   WBC: 29.7--->17.7  Likely due to underlying pneumonia   Continue antibiotic     Right pleural effusion   S/p thoracentesis   Follow up final report    SIMONE : improving  Cr: 1.81   Likely due to sepsis   Monitor renal function    Severe COPD  Continue Prednisone taper    Duoneb  assess need for home oxygen before    HTN/HLD  Continue Losartan 25mg  Atorvastatin 40mg     Supportive   DVT prophylaxis: Lovenox 40mg   Diet: DASH    dispo: anticipates DC in 3-4 days. P eval appreciated: will need home PT/ supervision       GOALS OF CARE DISCUSSION WITH PATIENT/FAMILY/PROXY: patient and wife updated on plan of care and prognosis. patient wants "everything to be done" MOLST form completed and kept in chart. Full code

## 2019-04-22 NOTE — CONSULT NOTE ADULT - SUBJECTIVE AND OBJECTIVE BOX
PULMONARY CONSULT NOTE      [id number]    Patient is a 78y old  Male who presents with a chief complaint of Hypercapnic Respiratory Failure (2019 23:51)      INTERVAL HPI/OVERNIGHT EVENTS:77 yo male, PMHx COPD, PPM, HTN, HLD, lumbar spinal stenosis, BIBA with progressive shortness of breath over the past few days. Patient notes general malaise for the past two weeks. Today, he complained to his wife of a "pulled muscle" on the right side of his back with increasing shortness of breath prompting him to call 911. His wife states today he sounded "gurgly" and was unable to catch his breath. Patient denies fever or chills, chest pain, n/v/d. Upon EMS arrival, patient had SpO2 83% started on 100% NRB with improvement to 96%. Upon arrival to ED, was found to have WBC 29.7, serum bicarb 9.0, anion gap 21, lactate 2.9. ABG 7.19/64/118/20, started on NIPPV. Patient was uncomfortable on BiPAP, transitioned to 40% ventimask. After 45 minutes of VM, repeat ABG 7.18/68/75/21 and patient was placed back on NIPPV. Afebrile, hemodynamically stable, sinus tachycardia HR low 100's. Patient was given 3 duonebs, Zosyn, Vancomycin, and Azithromycin empirically. CXR with dense RL consolidation and R pleural effusion. ICU consult was called for hypercapnic hypoxemic respiratory failure. A right thoracentesis was performed, drained ~250cc dark straw fluid. Patient was admitted to ICU for further management. Stabilized and sent to floor.    Pt known to us with severe COPD on home O2, nebs.  Generally does well at home.  Currently feeling better.  No CP.    MEDICATIONS  (STANDING):  ALBUTerol/ipratropium for Nebulization 3 milliLiter(s) Nebulizer every 6 hours  atorvastatin 40 milliGRAM(s) Oral at bedtime  azithromycin  IVPB 500 milliGRAM(s) IV Intermittent every 24 hours  cefTRIAXone   IVPB 1 Gram(s) IV Intermittent every 24 hours  enoxaparin Injectable 40 milliGRAM(s) SubCutaneous daily  losartan 25 milliGRAM(s) Oral daily  pantoprazole  Injectable 40 milliGRAM(s) IV Push daily  predniSONE   Tablet 40 milliGRAM(s) Oral daily  predniSONE   Tablet   Oral       MEDICATIONS  (PRN):  acetaminophen   Tablet .. 650 milliGRAM(s) Oral every 6 hours PRN Temp greater or equal to 38C (100.4F), Mild Pain (1 - 3)      Allergies    No Known Allergies    Intolerances        PAST MEDICAL & SURGICAL HISTORY:  HLD (hyperlipidemia)  HTN (hypertension)  COPD (chronic obstructive pulmonary disease)  Stenosis of lumbosacral spine  H/O back injury  No significant past surgical history      FAMILY HISTORY:      SOCIAL HISTORY  Smoking History: former    REVIEW OF SYSTEMS:    CONSTITUTIONAL:  As per HPI.    HEENT:  Eyes:  No diplopia or blurred vision. ENT:  No earache, sore throat or runny nose.    CARDIOVASCULAR:  No pressure, squeezing, tightness, heaviness or aching about the chest; no palpitations.    RESPIRATORY:  Per HPI    GASTROINTESTINAL:  No nausea, vomiting or diarrhea.    GENITOURINARY:  No dysuria, frequency or urgency.    MUSCULOSKELETAL:  No joint pains    SKIN:  No new lesions.    NEUROLOGIC:  No paresthesias, fasciculations, seizures or weakness.    PSYCHIATRIC:  No disorder of thought or mood.    ENDOCRINE:  No heat or cold intolerance, polyuria or polydipsia.    HEMATOLOGICAL:  No easy bruising or bleeding.     Vital Signs Last 24 Hrs  T(C): 36.6 (2019 05:32), Max: 37 (2019 18:00)  T(F): 97.8 (2019 05:32), Max: 98.6 (2019 18:00)  HR: 88 (2019 08:27) (68 - 118)  BP: 148/76 (2019 05:32) (109/56 - 161/80)  BP(mean): 97 (2019 20:00) (77 - 106)  RR: 24 (2019 05:32) (24 - 36)  SpO2: 98% (2019 08:27) (93% - 98%)    PHYSICAL EXAMINATION:    GENERAL: The patient is a well-developed, well-nourished __WM___in no apparent distress.     HEENT: Head is normocephalic and atraumatic. Extraocular muscles are intact. Mucous membranes are moist.     NECK: Supple.     LUNGS:Diminished bs, scattered exp wheezes.  Dullness Rt base.  HEART: Regular rate and rhythm without murmur.    ABDOMEN: Soft, nontender, and nondistended.  No hepatosplenomegaly is noted.    EXTREMITIES: Without any cyanosis, clubbing, rash, lesions or edema.    NEUROLOGIC: Grossly intact.    SKIN: No ulceration or induration present.      LABS:                        12.6   18.4  )-----------( 195      ( 2019 05:51 )             39.4     04-    144  |  106  |  62.0<H>  ----------------------------<  132<H>  5.1   |  26.0  |  1.38<H>    Ca    9.2      2019 05:51  Phos  4.5     -  Mg     2.6     -    TPro  2.2<L>  /  Alb  <1.0<L>  /  TBili  <0.2<L>  /  DBili  x   /  AST  7   /  ALT  <5  /  AlkPhos  24<L>  20    PT/INR - ( 2019 11:14 )   PT: 13.6 sec;   INR: 1.18 ratio         PTT - ( 2019 11:14 )  PTT:33.0 sec  Urinalysis Basic - ( 2019 22:05 )    Color: Yellow / Appearance: Clear / S.025 / pH: x  Gluc: x / Ketone: Trace  / Bili: Negative / Urobili: Negative mg/dL   Blood: x / Protein: 30 mg/dL / Nitrite: Negative   Leuk Esterase: Trace / RBC: 0-2 /HPF / WBC 3-5   Sq Epi: x / Non Sq Epi: Few / Bacteria: Occasional      ABG - ( 2019 21:43 )  pH, Arterial: 7.32  pH, Blood: x     /  pCO2: 52    /  pO2: 104   / HCO3: 24    / Base Excess: -0.7  /  SaO2: 99    (BiPAP_                  Serum Pro-Brain Natriuretic Peptide: 2829 pg/mL (19 @ 11:15)          MICROBIOLOGY:Urinalysis + Microscopic Examination (19 @ 22:05)    Urine Appearance: Clear    Urobilinogen: Negative mg/dL    Specific Gravity: 1.025    Protein, Urine: 30 mg/dL    Nitrite: Negative    Ketone - Urine: Trace    Bilirubin: Negative    pH Urine: 5.0    Leukocyte Esterase Concentration: Trace    Color: Yellow    Glucose Qualitative, Urine: Negative mg/dL    Blood, Urine: Trace    Red Blood Cell - Urine: 0-2 /HPF    White Blood Cell - Urine: 3-5    Epithelial Cells: Few    Bacteria: Occasional    Culture - Body Fluid with Gram Stain (.19 @ 15:36)    Gram Stain:   Few White blood cells  Few Yeast    Specimen Source: .Body Fluid    Culture Results:   No growth at 1 day.  Culture in progress        Protein Total, Fluid (.19 @ 14:03)    Protein Total, Fluid: 4.3: Reference Ranges have NOT been established for analytes in body fluids  because of variability in body fluid composition.  The  has not determined the efficacy of this test when  performed on fluid specimens. The performance characteristics of this  test were determined by [facility]. g/dL  Lactate Dehydrogenase, Fluid (.19 @ 14:03)    Lactate Dehydrogenase, Fluid: 715: Reference Ranges have NOT been established for analytes in body fluids  because of variability in body fluid composition.  The  has not determined the efficacy of this test when  performed on fluid specimens. The performance characteristics of this  test were determined by [facility]. U/L    Glucose, Fluid (.19 @ 14:03)    Glucose, Fluid: 125: Reference Ranges have NOT  been established for analytes in body fluids  because of variability in body fluid composition.  The  has not determined the efficacy of this test when  performed on fluid specimens. The performance characteristics of this  test were determined by [facility]. mg/dL    pH, Fluid (..19 @ 15:36)    pH, Fluid: 8.0  Albumin, Fluid (..19 @ 14:03)    Albumin, Fluid: 2.5: Reference Ranges have NOT been established for analytes in body fluids  because of variability in body fluid composition.  The  has not determined the efficacy of this test when  performed on fluid specimens. The performance characteristics of this  test were determined by [facility]. g/dL    Cell Count, Body Fluid (.20.19 @ 15:36)    Fluid Type: Pleural    Body Fluid Appearance: Cloudy    BF Lymphocytes: 5 %    Monocyte/Macrophage Count - Body Fluid: 18 %    Fluid Segmented Granulocytes: 77 %    Fluid Source: Pleural Fluid    Tube Type: Tube 3    Color - Body Fluid: Yellow    Total Nucleated Cell Count, Body Fluid: 2068 /uL    Total RBC Count: 1906 /uL        Fluid Source: Pleural Fluid      RADIOLOGY & ADDITIONAL STUDIES:< from: Xray Chest 1 View-PORTABLE IMMEDIATE (19 @ 18:20) >    IMPRESSION:   Residual RIGHT lower lobe airspace consolidation and/or   effusion. No pneumothorax..            < end of copied text >  < from: Xray Chest 1 View-PORTABLE IMMEDIATE (19 @ 11:28) >    IMPRESSION:   Right effusion/infiltrate..      < end of copied text >  < from: TTE Echo Complete w/Doppler (19 @ 10:38) >    Summary:   1. Technically difficult study.   2. Endocardial visualization was enhanced with intravenous echo contrast.   3. Normal left ventricular internal cavity size.   4. Mildly decreased global left ventricular systolic function.   5. Left ventricular ejection fraction, by visual estimation, is 45 to   50%.   6. There is mild left ventricular hypertrophy.   7. The mitral in-flow pattern reveals no discernable A-wave, therefore   no comment on diastolic function can be made.   8. There is mild aortic root calcification.   9. Peak transaortic gradient equals 37.1 mmHg, mean transaortic gradient   equals 20.6 mmHg, the calculated aortic valve area equals 0.98 cm² by the   continuity equation consistent with moderate aortic stenosis.  10. Thickening and calcification of the anterior and posterior mitral   valve leaflets.    < end of copied text >

## 2019-04-23 NOTE — PROGRESS NOTE ADULT - ASSESSMENT
79 y/o male with PMH of COPD, PPM, HTN, HLD, lumbar spinal stenosis who came to the ED  with progressive shortness of breath over the past few days. In the ED, was found to have WBC 29.7, serum bicarb 9.0, anion gap 21, lactate 2.9. ABG 7.19/64/118/20, started on NIPPV. Patient was uncomfortable on BiPAP, transitioned to 40% ventimask. After 45 minutes of VM, repeat ABG 7.18/68/75/21 and patient was placed back on NIPPV. Afebrile, hemodynamically stable, sinus tachycardia HR low 100's. Patient was given 3 duonebs, Zosyn, Vancomycin, and Azithromycin empirically. CXR with dense RL consolidation and R pleural effusion.  A right thoracentesis was performed, drained ~250cc dark straw fluid. ICU consult was called for hypercapnic hypoxemic respiratory failure. Patient was admitted to ICU for further management. Patient respiratory symptoms improved; he is downgraded to medical floor.       Acute respiratory failure with hypoxia and hypercapnia  Resolving   Continue BiPAP HS and as needed. patient is non-complaint with BIPAP  O2 via NC.   patient uses oxygen at home. but needs to be arranged again as per AXEL  patient will be benefited fron NIV at home however he is refusing    Pneumonia complicated by sepsis and parapneumonic effusion, no empyema  Blood culture neg so far .   pleural fluid analysis: no growth in one day, culture in progress. yeast+ possible contamination  neg AFB  pending sputum cx, urine legionella  on Rocephin and Azithromycin. patient has episodes of NSVT and PVC. . discussed with ID, suggested to DC Zithromax   ID cx appreciated    Leukocytosis   WBC: improving  Likely due to underlying pneumonia   Continue antibiotic     Right pleural effusion   S/p thoracentesis   Follow up final report    SIMONE : improving  Likely due to sepsis   Monitor renal function    Severe COPD  Continue Prednisone taper    Duoneb  arrange home ixygen. SW is aware    Multiple wide complex NSVT and PVC:   echo: mild low LVF  K 5.4  Kayexalate  EKG stat  repeat BMP and Mg in pm  DC zithromax  Cardio eval called    HTN/HLD  Continue Losartan 25mg  Atorvastatin 40mg     Supportive   DVT prophylaxis: Lovenox 40mg   Diet: DASH    dispo: anticipates DC in 48 - 72 hrs PT eval appreciated: will need home PT/ supervision       GOALS OF CARE DISCUSSION WITH PATIENT/FAMILY/PROXY: patient and wife updated on plan of care and prognosis. patient wants "everything to be done" MOLST form completed and kept in chart. Full code. palliative cx appreciated

## 2019-04-23 NOTE — PROGRESS NOTE ADULT - SUBJECTIVE AND OBJECTIVE BOX
PULMONARY PROGRESS NOTE      [id number]    Patient is a 78y old  Male who presents with a chief complaint of Hypercapnic Respiratory Failure (22 Apr 2019 17:44)      INTERVAL HPI/OVERNIGHT EVENTS:    MEDICATIONS  (STANDING):  ALBUTerol/ipratropium for Nebulization 3 milliLiter(s) Nebulizer every 6 hours  atorvastatin 40 milliGRAM(s) Oral at bedtime  azithromycin  IVPB 500 milliGRAM(s) IV Intermittent every 24 hours  cefTRIAXone   IVPB 1 Gram(s) IV Intermittent every 24 hours  docusate sodium 100 milliGRAM(s) Oral three times a day  enoxaparin Injectable 40 milliGRAM(s) SubCutaneous daily  losartan 25 milliGRAM(s) Oral daily  pantoprazole  Injectable 40 milliGRAM(s) IV Push daily  predniSONE   Tablet 40 milliGRAM(s) Oral daily  predniSONE   Tablet   Oral       MEDICATIONS  (PRN):  acetaminophen   Tablet .. 650 milliGRAM(s) Oral every 6 hours PRN Temp greater or equal to 38C (100.4F), Mild Pain (1 - 3)      Allergies    No Known Allergies    Intolerances        PAST MEDICAL & SURGICAL HISTORY:  HLD (hyperlipidemia)  HTN (hypertension)  COPD (chronic obstructive pulmonary disease)  Stenosis of lumbosacral spine  H/O back injury  No significant past surgical history      SOCIAL HISTORY  Smoking History:       REVIEW OF SYSTEMS:    CONSTITUTIONAL:  No distress    HEENT:  Eyes:  No diplopia or blurred vision. ENT:  No earache, sore throat or runny nose.    CARDIOVASCULAR:  No pressure, squeezing, tightness, heaviness or aching about the chest; no palpitations.    RESPIRATORY:  No cough, shortness of breath, PND or orthopnea. Mild SOBOE    GASTROINTESTINAL:  No nausea, vomiting or diarrhea.    GENITOURINARY:  No dysuria, frequency or urgency.    MUSCULOSKELETAL:  No joint pain    SKIN:  No new lesions.    NEUROLOGIC:  No paresthesias, fasciculations, seizures or weakness.    PSYCHIATRIC:  No disorder of thought or mood.    ENDOCRINE:  No heat or cold intolerance, polyuria or polydipsia.    HEMATOLOGICAL:  No easy bruising or bleeding.     Vital Signs Last 24 Hrs  T(C): 36.2 (23 Apr 2019 10:21), Max: 37.2 (22 Apr 2019 15:00)  T(F): 97.2 (23 Apr 2019 10:21), Max: 98.9 (22 Apr 2019 15:00)  HR: 116 (23 Apr 2019 10:21) (82 - 116)  BP: 150/77 (23 Apr 2019 10:21) (145/83 - 158/76)  BP(mean): --  RR: 20 (23 Apr 2019 10:21) (16 - 20)  SpO2: 95% (23 Apr 2019 10:21) (92% - 98%)    PHYSICAL EXAMINATION:    GENERAL: The patient is awake and alert in no apparent distress.     HEENT: Head is normocephalic and atraumatic. Extraocular muscles are intact. Mucous membranes are moist.    NECK: Supple.    LUNGS: Clear to auscultation without wheezing, rales or rhonchi; respirations unlabored    HEART: Regular rate and rhythm without murmur.    ABDOMEN: Soft, nontender, and nondistended.      EXTREMITIES: Without any cyanosis, clubbing, rash, lesions or edema.    NEUROLOGIC: Grossly intact.    SKIN: No ulceration or induration present.      LABS:                        13.1   14.6  )-----------( 215      ( 23 Apr 2019 06:29 )             41.7     04-23    148<H>  |  110<H>  |  52.0<H>  ----------------------------<  100  5.4<H>   |  27.0  |  1.17    Ca    9.5      23 Apr 2019 06:29  Phos  3.1     04-23    TPro  x   /  Alb  2.7<L>  /  TBili  x   /  DBili  x   /  AST  x   /  ALT  x   /  AlkPhos  x   04-23                Serum Pro-Brain Natriuretic Peptide: 2829 pg/mL (04-20-19 @ 11:15)          MICROBIOLOGY:    RADIOLOGY & ADDITIONAL STUDIES:< from: Xray Chest 1 View-PORTABLE IMMEDIATE (04.20.19 @ 18:20) >      INTERPRETATION:  Portable chest radiograph        CLINICAL INFORMATION:   Status post RIGHT thoracentesis. Follow-up.   Assess for pneumothorax.    TECHNIQUE:  Portable  AP view of the chest was obtained.    COMPARISON: 4/20/2019 available for review.    FINDINGS:   The lungs  show persistent RIGHT lower lobe airspace consolidation and/or   effusion noted diminished from prior exam without pneumothorax . LEFT   lung clear.. Prominent bronchovascular markings noted.         The  heart is enlarged in transverse diameter. No hilar mass. Trachea   midline.       . Cardiac device wire leads are within right atrium and right ventricle.    Visualized osseous structures are intact.        IMPRESSION:   Residual RIGHT lower lobe airspace consolidation and/or   effusion. No pneumothorax..                < end of copied text >

## 2019-04-23 NOTE — PROGRESS NOTE ADULT - ASSESSMENT
77 yo male, PMHx COPD, PPM, HTN, HLD, lumbar spinal stenosis, BIBA with progressive shortness of breath over the past few days on 4/20/19.    RL consolidation and R pleural effusion.     Likely Pneumonia/ CAP with right parapneumonic effusion      Cultures negative so far.  He is sitting OOB with no significant respir distress.    Agree with Antibiotics as confirmed by ID for Rx of right sided pneumonitis

## 2019-04-23 NOTE — PROGRESS NOTE ADULT - SUBJECTIVE AND OBJECTIVE BOX
[facility]  INFECTIOUS DISEASES AND INTERNAL MEDICINE at [place]  =======================================================  Onesimo Arauz MD  Diplomates American Board of Internal Medicine and Infectious Diseases  =======================================================    [id number]  JOHN CIFUENTES   follow up for: right side parapneumonic effusion, right sided PNA  patient seen and examined.     no interval fevers.  feels still with SOB  labs reviewed  cx in process, NGTD    ===================================================  REVIEW OF SYSTEMS:  as above  all other ROS negative    =======================================================  Allergies    No Known Allergies    Intolerances          Antibiotics:  azithromycin  IVPB 500 milliGRAM(s) IV Intermittent every 24 hours  cefTRIAXone   IVPB 1 Gram(s) IV Intermittent every 24 hours    Other medications:  ALBUTerol/ipratropium for Nebulization 3 milliLiter(s) Nebulizer every 6 hours  atorvastatin 40 milliGRAM(s) Oral at bedtime  docusate sodium 100 milliGRAM(s) Oral three times a day  enoxaparin Injectable 40 milliGRAM(s) SubCutaneous daily  losartan 25 milliGRAM(s) Oral daily  pantoprazole  Injectable 40 milliGRAM(s) IV Push daily  predniSONE   Tablet 40 milliGRAM(s) Oral daily  predniSONE   Tablet   Oral         ======================================================  Physical Exam:  ============  T(F): 97.2 (23 Apr 2019 10:21), Max: 98.9 (22 Apr 2019 15:00)  HR: 116 (23 Apr 2019 10:21)  BP: 150/77 (23 Apr 2019 10:21)  RR: 20 (23 Apr 2019 10:21)  SpO2: 95% (23 Apr 2019 10:21) (92% - 98%)    General:  No acute distress.  Eye: Pupils are equal, round and reactive to light, Extraocular movements are intact, Normal conjunctiva.  HENT: Normocephalic, Oral mucosa is moist, No pharyngeal erythema, No sinus tenderness.  Neck: Supple, No lymphadenopathy.  Respiratory: Lungs WITH PROLONG EXP PHASE, DIMINISHED AIR ENTRY RIGHT BASE  Cardiovascular: Normal rate, Regular rhythm, Good pulses equal in all extremities, TRACE edema.  Gastrointestinal: Soft, Non-tender, Non-distended, Normal bowel sounds.  Genitourinary: No costovertebral angle tenderness.  Lymphatics: No lymphadenopathy neck,   Musculoskeletal: Normal range of motion, Normal strength.  Integumentary: No rash. BILATERAL CHANGES ON LOWER LEGS  Neurologic: Alert, Oriented, No focal deficits, Cranial Nerves II-XII are grossly intact.  Psychiatric: Appropriate mood & affect.    =======================================================  Labs:                        13.1   14.6  )-----------( 215      ( 23 Apr 2019 06:29 )             41.7       WBC Count: 14.6 K/uL (04-23-19 @ 06:29)  WBC Count: 18.4 K/uL (04-22-19 @ 05:51)  WBC Count: 17.7 K/uL (04-21-19 @ 07:14)  WBC Count: 29.7 K/uL (04-20-19 @ 11:14)      04-23    148<H>  |  110<H>  |  52.0<H>  ----------------------------<  100  5.4<H>   |  27.0  |  1.17    Ca    9.5      23 Apr 2019 06:29  Phos  3.1     04-23    TPro  x   /  Alb  2.7<L>  /  TBili  x   /  DBili  x   /  AST  x   /  ALT  x   /  AlkPhos  x   04-23      Culture - Acid Fast - Body Fluid w/Smear (collected 04-21-19 @ 16:57)  Source: .Body Fluid    Culture - Urine (collected 04-20-19 @ 22:04)  Source: .Urine  Final Report (04-22-19 @ 10:23):    No growth    Culture - Body Fluid with Gram Stain (collected 04-20-19 @ 15:36)  Source: .Body Fluid  Gram Stain (04-20-19 @ 19:57):    Few White blood cells    Few Yeast    Culture - Blood (collected 04-20-19 @ 11:19)  Source: .Blood    Culture - Blood (collected 04-20-19 @ 11:19)  Source: .Blood

## 2019-04-23 NOTE — PROGRESS NOTE ADULT - ASSESSMENT
This 79 yo male, PMHx COPD, PPM, HTN, HLD, lumbar spinal stenosis, BIBA with progressive shortness of breath over the past few days on 4/20/19.    RL consolidation and R pleural effusion.     Likely Pneumonia/ CAP with right parapneumonic effusion    - continue Azithromycin  for a 5 day course  and Ceftriaxone for a 7 day course.     thoracentesis fluid with no growth.   - will follow up on cultures.    WBC elevation, overall down trending, at 14K as of 4/23/19, will trend      - follow up all outstanding cultures  - trend temperature and WBC curve  - repeat cultures from blood and all sources if febrile.

## 2019-04-23 NOTE — CONSULT NOTE ADULT - SUBJECTIVE AND OBJECTIVE BOX
HPI:  77 yo male, PMHx COPD, PPM, HTN, HLD, lumbar spinal stenosis, BIBA with progressive malaise, shortness of breath a few days prior to admission. Upon EMS arrival, patient had SpO2 83% started on 100% NRB with improvement to 96%. Upon arrival to ED, was found to have WBC 29.7, serum bicarb 9.0, anion gap 21, lactate 2.9. ABG 7.19/64/118/20, started on NIPPV. Patient was uncomfortable on BiPAP, transitioned to 40% ventimask. After 45 minutes of VM, repeat ABG 7.18/68/75/21 and patient was placed back on NIPPV.  Started on duonebs, Zosyn, Vancomycin, and Azithromycin empirically. CXR with dense RLL consolidation and R pleural effusion. Pt to ICU for hypercapnic/hypoxemic respiratory failure. A right thoracentesis was performed, drained ~250cc dark straw fluid. Patient was admitted to ICU for further management.    PERTINENT PM/SXH:   HLD (hyperlipidemia)  HTN (hypertension)  COPD (chronic obstructive pulmonary disease)  Stenosis of lumbosacral spine  H/O back injury    No significant past surgical history  No significant past surgical history    FAMILY HISTORY:    ITEMS NOT CHECKED ARE NOT PRESENT    SOCIAL HISTORY:   Significant other/partner:  [x ]  Children:  [x ]  Jainism/Spirituality: Amish  Substance hx:  [x ]   Tobacco hx: quit  [ ]   Alcohol hx: [ ]   Home Opioid hx:  [ ] I-Stop Reference No:  Living Situation: [x ]Home  [ ]Long term care  [ ]Rehab [ ]Other    ADVANCE DIRECTIVES:    DNR no  MOLST  [x ] pt is full code    Living Will  [ ]   DECISION MAKER(s):  [ ] Health Care Proxy(s)  [x ] Surrogate(s)  [ ] Guardian           Name(s): Phone Number(s): hugo [phone]    BASELINE (I)ADL(s) (prior to admission):  Mobile: [ ]Total  [x ] Moderate [ ]Dependent    Allergies  No Known Allergies    Intolerances    MEDICATIONS  (STANDING):  ALBUTerol/ipratropium for Nebulization 3 milliLiter(s) Nebulizer every 6 hours  atorvastatin 40 milliGRAM(s) Oral at bedtime  azithromycin  IVPB 500 milliGRAM(s) IV Intermittent every 24 hours  cefTRIAXone   IVPB 1 Gram(s) IV Intermittent every 24 hours  docusate sodium 100 milliGRAM(s) Oral three times a day  enoxaparin Injectable 40 milliGRAM(s) SubCutaneous daily  losartan 25 milliGRAM(s) Oral daily  pantoprazole  Injectable 40 milliGRAM(s) IV Push daily  predniSONE   Tablet 40 milliGRAM(s) Oral daily  predniSONE   Tablet   Oral     MEDICATIONS  (PRN):  acetaminophen   Tablet .. 650 milliGRAM(s) Oral every 6 hours PRN Temp greater or equal to 38C (100.4F), Mild Pain (1 - 3)    PRESENT SYMPTOMS: [ ]Unable to obtain due to poor mentation   Source if other than patient:  [ ]Family   [ ]Team     Pain (Impact on QOL):  0  Location -         Minimal acceptable level (0-10 scale):                    Aggravating factors -  Quality -  Radiation -  Severity (0-10 scale) -    Timing -    PAIN AD Score:     http://geriatrictoolkit.[facility]/cog/painad.pdf (press ctrl +  left click to view)    Dyspnea:                           [ ]Mild [ ]Moderate [ ]Severe  Anxiety:                             [ ]Mild [ ]Moderate [ ]Severe  Fatigue:                             [ ]Mild [ ]Moderate [ ]Severe  Nausea:                             [ ]Mild [ ]Moderate [ ]Severe  Loss of appetite:              [ ]Mild [ ]Moderate [ ]Severe  Constipation:                    [ ]Mild [ ]Moderate [ ]Severe    Other Symptoms:  [ ]All other review of systems negative     Karnofsky Performance Score/Palliative Performance Status Version 2:       40-50  %    http://palliative.info/resource_material/[email or web address].pdf    PHYSICAL EXAM:  Vital Signs Last 24 Hrs  T(C): 37.2 (23 Apr 2019 05:56), Max: 37.2 (22 Apr 2019 15:00)  T(F): 98.9 (23 Apr 2019 05:56), Max: 98.9 (22 Apr 2019 15:00)  HR: 101 (23 Apr 2019 08:26) (82 - 101)  BP: 158/76 (23 Apr 2019 05:56) (145/83 - 158/76)  BP(mean): --  RR: 18 (23 Apr 2019 05:56) (16 - 18)  SpO2: 92% (23 Apr 2019 08:26) (92% - 98%) I&O's Summary    22 Apr 2019 07:01  -  23 Apr 2019 07:00  --------------------------------------------------------  IN: 1060 mL / OUT: 1750 mL / NET: -690 mL    23 Apr 2019 07:01  -  23 Apr 2019 10:58  --------------------------------------------------------  IN: 0 mL / OUT: 200 mL / NET: -200 mL    GENERAL:  [x ]Alert  [x ]Oriented x 3  [ ]Lethargic  [ ]Cachexia  [ ]Unarousable  [x ]Verbal  [ ]Non-Verbal    Behavioral:   [ ] Anxiety  [ ] Delirium [ ] Agitation [ ] Other    HEENT:  [x ]Normal   [ ]Dry mouth   [ ]ET Tube/Trach  [ ]Oral lesions    PULMONARY:   [ ]Clear [x ]Tachypnea  [ ]Audible excessive secretions  distant breath sounds  [ ]Rhonchi        [ ]Right [ ]Left [ ]Bilateral  [ ]Crackles        [ ]Right [ ]Left [ ]Bilateral  [ ]Wheezing     [ ]Right [ ]Left [ ]Bilateral    CARDIOVASCULAR:    [ ]Regular [ ]Irregular [x ]Tachy  [ ]Ras [ ]Murmur [ ]Other    GASTROINTESTINAL:  [x ]Soft  [ x]Distended   [x ]+BS  [x ]Non tender [ ]Tender  [ ]PEG [ ]OGT/ NGT  Last BM:     GENITOURINARY:  [x ]Normal [ ] Incontinent   [ ]Oliguria/Anuria   [ ]Lizarraga    MUSCULOSKELETAL:   [ ]Normal   [x ]Weakness  [ ]Bed/Wheelchair bound [ ]Edema    NEUROLOGIC:   [x ]No focal deficits  [ ] Cognitive impairment  [ ] Dysphagia [ ]Dysarthria [ ] Paresis [ ]Other     SKIN:   [ ]Normal   [ ]Pressure ulcer(s)  [ ]Rash    CRITICAL CARE:  [ ] Shock Present  [ ] Septic [ ]Cardiogenic [ ]Neurologic [ ]Hypovolemic  [ ]  Vasopressors [ ]  Inotropes   [x  ] Respiratory failure present  [x ] Acute on chronic Chronic [ ] Hypoxic  [ ] Hypercarbic [ ] Other  [ ] Other organ failure     LABS:                        13.1   14.6  )-----------( 215      ( 23 Apr 2019 06:29 )             41.7   04-23    148<H>  |  110<H>  |  52.0<H>  ----------------------------<  100  5.4<H>   |  27.0  |  1.17    Ca    9.5      23 Apr 2019 06:29  Phos  3.1     04-23    TPro  x   /  Alb  2.7<L>  /  TBili  x   /  DBili  x   /  AST  x   /  ALT  x   /  AlkPhos  x   04-23        RADIOLOGY & ADDITIONAL STUDIES:    PROTEIN CALORIE MALNUTRITION PRESENT: [ ] Yes [ ] No  [ ] PPSV2 < or = to 30% [ ] significant weight loss  [ ] poor nutritional intake [ ] catabolic state [ ] anasarca     Albumin, Serum: 2.7 g/dL (04-23-19 @ 06:29)  Artificial Nutrition [ ]     REFERRALS:   [ ]Chaplaincy  [ ] Hospice  [ ]Child Life  [ ]Social Work  [ ]Case management [ ]Holistic Therapy   Goals of Care Discussion Document: Goals of Care Conversation - Personal Advance Directive [WALTER Payne] (04-22-19 @ 15:06)

## 2019-04-23 NOTE — CONSULT NOTE ADULT - PROBLEM SELECTOR RECOMMENDATION 2
progression of COPD  increase in 02 requirements  c/w medical management as per primary team
- Tele, no true NSVT shown, frequent PVCs  - [place] ([employer]) PPM interrogation requested.  If no significant arrhythmic events patient may follow up as out patient.

## 2019-04-23 NOTE — CONSULT NOTE ADULT - SUBJECTIVE AND OBJECTIVE BOX
[place] [facility]                                                        Office: [address] [address]                                                       Telephone: [phone]. Fax:[phone]                                                              CARDIOLOGY CONSULTATION NOTE                                                                                             Consult requested by:      PCP    Primary Cardiologist.    Reason for Consultation:     History obtained by: Patient and medical record     obtained: No    Chief complaint:    Patient is a 78y old  Male who presents with a chief complaint of Hypercapnic Respiratory Failure (23 Apr 2019 13:14)      HPI:  77 yo male, PMHx COPD, PPM, HTN, HLD, lumbar spinal stenosis, BIBA with progressive shortness of breath over the past few days. Patient notes general malaise for the past two weeks. Today, he complained to his wife of a "pulled muscle" on the right side of his back with increasing shortness of breath prompting him to call 911. His wife states today he sounded "gurgly" and was unable to catch his breath. Patient denies fever or chills, chest pain, n/v/d. Upon EMS arrival, patient had SpO2 83% started on 100% NRB with improvement to 96%. Upon arrival to ED, was found to have WBC 29.7, serum bicarb 9.0, anion gap 21, lactate 2.9. ABG 7.19/64/118/20, started on NIPPV. Patient was uncomfortable on BiPAP, transitioned to 40% ventimask. After 45 minutes of VM, repeat ABG 7.18/68/75/21 and patient was placed back on NIPPV. Afebrile, hemodynamically stable, sinus tachycardia HR low 100's. Patient was given 3 duonebs, Zosyn, Vancomycin, and Azithromycin empirically. CXR with dense RL consolidation and R pleural effusion. ICU consult was called for hypercapnic hypoxemic respiratory failure. A right thoracentesis was performed, drained ~250cc dark straw fluid. Patient was admitted to ICU for further management. (20 Apr 2019 15:53)        ALLERGIES: Allergies    No Known Allergies    Intolerances          CURRENT MEDICATIONS:  MEDICATIONS  (STANDING):  ALBUTerol/ipratropium for Nebulization 3 milliLiter(s) Nebulizer every 6 hours  atorvastatin 40 milliGRAM(s) Oral at bedtime  cefTRIAXone   IVPB 1 Gram(s) IV Intermittent every 24 hours  docusate sodium 100 milliGRAM(s) Oral three times a day  enoxaparin Injectable 40 milliGRAM(s) SubCutaneous daily  losartan 25 milliGRAM(s) Oral daily  pantoprazole  Injectable 40 milliGRAM(s) IV Push daily  predniSONE   Tablet 40 milliGRAM(s) Oral daily  predniSONE   Tablet   Oral       HOME MEDICATIONS:  Home Medications:  albuterol:  inhaled  (20 Apr 2019 11:07)  ibuprofen 800 mg oral tablet: 1 tab(s) orally 3 times a day (20 Apr 2019 15:16)  Lipitor:  orally  (20 Apr 2019 11:07)  losartan:  orally  (20 Apr 2019 11:07)  predniSONE 10 mg oral tablet: 1 tab(s) orally once a day (20 Apr 2019 15:16)  Singulair:  orally  (20 Apr 2019 11:07)  traMADol: 50  orally 2 times a day (20 Apr 2019 15:16)  Zanaflex:  orally  (20 Apr 2019 11:07)  Zithromax 250 mg oral tablet: orally 3 times a day (20 Apr 2019 15:16)    PAST MEDICAL HISTORY  HLD (hyperlipidemia)  HTN (hypertension)  COPD (chronic obstructive pulmonary disease)  Stenosis of lumbosacral spine  H/O back injury      PAST SURGICAL HISTORY  No significant past surgical history  No significant past surgical history      FAMILY HISTORY:      SOCIAL HISTORY:       CIGARETTES:       ALCOHOL    DRUG ABUSE      REVIEW OF SYSTEMS:  CONSTITUTIONAL:  as per HPI  HEENT:  Eyes:  No diplopia or blurred vision. ENT:  No earache, sore throat or runny nose.  CARDIOVASCULAR:  No pressure, squeezing, strangling, tightness, heaviness or aching about the chest, neck, axilla or epigastrium.  RESPIRATORY:  No cough, shortness of breath, PND or orthopnea.  GASTROINTESTINAL:  No nausea, vomiting or diarrhea.  GENITOURINARY:  No dysuria, frequency or urgency. No Blood in urine  MUSCULOSKELETAL:  no joint aches, no muscle pain  SKIN:  No change in skin, hair or nails.  NEUROLOGIC:  No paresthesias, fasciculations, seizures or weakness.  PSYCHIATRIC:  No disorder of thought or mood.  ENDOCRINE:  No heat or cold intolerance, polyuria or polydipsia.  HEMATOLOGICAL:  No easy bruising or bleeding.           	        Vital Signs Last 24 Hrs  T(C): 36.2 (04-23-19 @ 10:21), Max: 37.2 (04-22-19 @ 15:00)  T(F): 97.2 (04-23-19 @ 10:21), Max: 98.9 (04-22-19 @ 15:00)  HR: 116 (04-23-19 @ 10:21) (82 - 116)  BP: 150/77 (04-23-19 @ 10:21) (145/83 - 158/76)  BP(mean): --  RR: 20 (04-23-19 @ 10:21) (16 - 20)  SpO2: 95% (04-23-19 @ 10:21) (92% - 98%)    PHYSICAL EXAM:  Constitutional: Comfortable . No acute distress.   HEENT: Atraumatic and normcephalic , neck is supple . no JVD. Unremarkable  CNS: Alert and awake, Grossly nonfocal.  Lymph Nodes: Cervical : Not palpable.  Respiratory: Bilateral clear breath sounds.  Cardiovascular: Normal S1S2. . No murmur/rubs or gallop.  Gastrointestinal: Soft non-tender and non distended . +Bowel sounds.   Extremities: No leg edema.   Psychiatric: Calm . no agitation.  Skin: No skin rash.    Intake and output:   04-22 @ 07:01  -  04-23 @ 07:00  --------------------------------------------------------  IN: 1060 mL / OUT: 1750 mL / NET: -690 mL    04-23 @ 07:01  -  04-23 @ 14:33  --------------------------------------------------------  IN: 240 mL / OUT: 425 mL / NET: -185 mL        LABS:                        13.1   14.6  )-----------( 215      ( 23 Apr 2019 06:29 )             41.7     04-23    144  |  105  |  46.0<H>  ----------------------------<  201<H>  5.0   |  29.0  |  1.00    Ca    9.2      23 Apr 2019 13:59  Phos  3.1     04-23  Mg     2.6     04-23    TPro  x   /  Alb  2.7<L>  /  TBili  x   /  DBili  x   /  AST  x   /  ALT  x   /  AlkPhos  x   04-23      ;p-BNP=Serum Pro-Brain Natriuretic Peptide: 2829 pg/mL (04-20 @ 11:15)        LIVER FUNCTIONS - ( 23 Apr 2019 06:29 )  Alb: 2.7 g/dL / Pro: x     / ALK PHOS: x     / ALT: x     / AST: x     / GGT: x           INTERPRETATION OF TELEMETRY: Reviewed by me.   ECG: Reviewed by me.     RADIOLOGY & ADDITIONAL STUDIES/ECHO/CARDIAC CATH: [place] [facility]                                                        Office: [address] [address]                                                       Telephone: [phone]. Fax:[phone]                                                              CARDIOLOGY CONSULTATION NOTE                                                                                             Consult requested by:  Dr. Colbert    PCP    Primary Cardiologist. Dr. Casas    Reason for Consultation: SOB, NSVT    History obtained by: Patient and medical record     obtained: No    Chief complaint:    Patient is a 78y old  Male who presents with a chief complaint of Hypercapnic Respiratory Failure (23 Apr 2019 13:14)      HPI:  79 yo male, PMHx COPD, PPM, HTN, HLD, lumbar spinal stenosis, BIBA with progressive shortness of breath over the past few days. Patient notes general malaise for the past two weeks. Today, he complained to his wife of a "pulled muscle" on the right side of his back with increasing shortness of breath prompting him to call 911. His wife states today he sounded "gurgly" and was unable to catch his breath. Patient denies fever or chills, chest pain, n/v/d. Upon EMS arrival, patient had SpO2 83% started on 100% NRB with improvement to 96%. Upon arrival to ED, was found to have WBC 29.7, serum bicarb 9.0, anion gap 21, lactate 2.9. ABG 7.19/64/118/20, started on NIPPV. Patient was uncomfortable on BiPAP, transitioned to 40% ventimask. After 45 minutes of VM, repeat ABG 7.18/68/75/21 and patient was placed back on NIPPV. Afebrile, hemodynamically stable, sinus tachycardia HR low 100's. Patient was given 3 duonebs, Zosyn, Vancomycin, and Azithromycin empirically. CXR with dense RL consolidation and R pleural effusion. ICU consult was called for hypercapnic hypoxemic respiratory failure. A right thoracentesis was performed, drained ~250cc dark straw fluid. Patient was admitted to ICU for further management. (20 Apr 2019 15:53)  Appreciate above, confirmed accuracy with patient.  Consult called for SOB and ?NSVT.  Patient with PMH HTN, COPD/Emphysema, PNA, HLD, PPM for SSS, HFrEF 45%.  Patient reports since friday increased SOB, general malaise, mild non-productive cough, similar to how he felt with last pneumonia.  Denies chest pain/pressure, palpitations, irregular and/or rapid heart beat, syncope/near syncope, dizziness, orthopnea, PND, edema, n/v/d, hematuria, or hematochezia.        ALLERGIES: Allergies    No Known Allergies    Intolerances          CURRENT MEDICATIONS:  MEDICATIONS  (STANDING):  ALBUTerol/ipratropium for Nebulization 3 milliLiter(s) Nebulizer every 6 hours  atorvastatin 40 milliGRAM(s) Oral at bedtime  cefTRIAXone   IVPB 1 Gram(s) IV Intermittent every 24 hours  docusate sodium 100 milliGRAM(s) Oral three times a day  enoxaparin Injectable 40 milliGRAM(s) SubCutaneous daily  losartan 25 milliGRAM(s) Oral daily  pantoprazole  Injectable 40 milliGRAM(s) IV Push daily  predniSONE   Tablet 40 milliGRAM(s) Oral daily  predniSONE   Tablet   Oral       HOME MEDICATIONS:  Home Medications:  albuterol:  inhaled  (20 Apr 2019 11:07)  ibuprofen 800 mg oral tablet: 1 tab(s) orally 3 times a day (20 Apr 2019 15:16)  Lipitor:  orally  (20 Apr 2019 11:07)  losartan:  orally  (20 Apr 2019 11:07)  predniSONE 10 mg oral tablet: 1 tab(s) orally once a day (20 Apr 2019 15:16)  Singulair:  orally  (20 Apr 2019 11:07)  traMADol: 50  orally 2 times a day (20 Apr 2019 15:16)  Zanaflex:  orally  (20 Apr 2019 11:07)  Zithromax 250 mg oral tablet: orally 3 times a day (20 Apr 2019 15:16)    PAST MEDICAL HISTORY  HLD (hyperlipidemia)  HTN (hypertension)  COPD (chronic obstructive pulmonary disease)  Stenosis of lumbosacral spine  H/O back injury      PAST SURGICAL HISTORY  No significant past surgical history  No significant past surgical history      FAMILY HISTORY:     SOCIAL HISTORY:     CIGARETTES:       ALCOHOL    DRUG ABUSE      REVIEW OF SYSTEMS:  CONSTITUTIONAL:  as per HPI  HEENT:  Eyes:  No diplopia or blurred vision. ENT:  No earache, sore throat or runny nose.  CARDIOVASCULAR:  as per HPI  RESPIRATORY:  as per HPI  GASTROINTESTINAL:  No nausea, vomiting or diarrhea.  GENITOURINARY:  No dysuria, frequency or urgency. No Blood in urine  MUSCULOSKELETAL:  no joint aches, no muscle pain  SKIN:  No change in skin, hair or nails.  NEUROLOGIC:  No paresthesias, fasciculations, seizures or weakness.  PSYCHIATRIC:  No disorder of thought or mood.  ENDOCRINE:  No heat or cold intolerance, polyuria or polydipsia.  HEMATOLOGICAL:  No easy bruising or bleeding.         Vital Signs Last 24 Hrs  T(C): 36.2 (04-23-19 @ 10:21), Max: 37.2 (04-22-19 @ 15:00)  T(F): 97.2 (04-23-19 @ 10:21), Max: 98.9 (04-22-19 @ 15:00)  HR: 116 (04-23-19 @ 10:21) (82 - 116)  BP: 150/77 (04-23-19 @ 10:21) (145/83 - 158/76)  BP(mean): --  RR: 20 (04-23-19 @ 10:21) (16 - 20)  SpO2: 95% (04-23-19 @ 10:21) (92% - 98%)    PHYSICAL EXAM:  Constitutional: Comfortable . No acute distress.   HEENT: Atraumatic and normocephalic , neck is supple . no JVD. Unremarkable  CNS: Alert and awake, Grossly nonfocal.  Lymph Nodes: Cervical : Not palpable.  Respiratory: diminished.  Cardiovascular: Normal S1S2. . No murmur/rubs or gallop.  Gastrointestinal: Soft non-tender and non distended . +Bowel sounds.   Extremities: No leg edema.   Psychiatric: Calm . no agitation.  Skin: No skin rash.    Intake and output:   04-22 @ 07:01  -  04-23 @ 07:00  --------------------------------------------------------  IN: 1060 mL / OUT: 1750 mL / NET: -690 mL    04-23 @ 07:01  -  04-23 @ 14:33  --------------------------------------------------------  IN: 240 mL / OUT: 425 mL / NET: -185 mL        LABS:                        13.1   14.6  )-----------( 215      ( 23 Apr 2019 06:29 )             41.7     04-23    144  |  105  |  46.0<H>  ----------------------------<  201<H>  5.0   |  29.0  |  1.00    Ca    9.2      23 Apr 2019 13:59  Phos  3.1     04-23  Mg     2.6     04-23    TPro  x   /  Alb  2.7<L>  /  TBili  x   /  DBili  x   /  AST  x   /  ALT  x   /  AlkPhos  x   04-23      ;p-BNP=Serum Pro-Brain Natriuretic Peptide: 2829 pg/mL (04-20 @ 11:15)        LIVER FUNCTIONS - ( 23 Apr 2019 06:29 )  Alb: 2.7 g/dL / Pro: x     / ALK PHOS: x     / ALT: x     / AST: x     / GGT: x           INTERPRETATION OF TELEMETRY: Reviewed by me.   ECG: Reviewed by me.     RADIOLOGY & ADDITIONAL STUDIES/ECHO/CARDIAC CATH:   < from: TTE Echo Complete w/Doppler (04.21.19 @ 10:38) >  Summary:   1. Technically difficult study.   2. Endocardial visualization was enhanced with intravenous echo contrast.   3. Normal left ventricular internal cavity size.   4. Mildly decreased global left ventricular systolic function.   5. Left ventricular ejection fraction, by visual estimation, is 45 to   50%.   6. There is mild left ventricular hypertrophy.   7. The mitral in-flow pattern reveals no discernable A-wave, therefore   no comment on diastolic function can be made.   8. There is mild aortic root calcification.   9. Peak transaortic gradient equals 37.1 mmHg, mean transaortic gradient   equals 20.6 mmHg, the calculated aortic valve area equals 0.98 cm² by the   continuity equation consistent with moderate aortic stenosis.  10. Thickening and calcification of the anterior and posterior mitral   valve leaflets.    < end of copied text >  < from: Xray Chest 1 View-PORTABLE IMMEDIATE (04.20.19 @ 18:20) >  EXAM:  XR CHEST PORTABLE IMMED 1V                          PROCEDURE DATE:  04/20/2019          INTERPRETATION:  Portable chest radiograph        CLINICAL INFORMATION:   Status post RIGHT thoracentesis. Follow-up.   Assess for pneumothorax.    TECHNIQUE:  Portable  AP view of the chest was obtained.    COMPARISON: 4/20/2019 available for review.    FINDINGS:   The lungs  show persistent RIGHT lower lobe airspace consolidation and/or   effusion noted diminished from prior exam without pneumothorax . LEFT   lung clear.. Prominent bronchovascular markings noted.         The  heart is enlarged in transverse diameter. No hilar mass. Trachea   midline.       . Cardiac device wire leads are within right atrium and right ventricle.    Visualized osseous structures are intact.        IMPRESSION:   Residual RIGHT lower lobe airspace consolidation and/or   effusion. No pneumothorax..            < end of copied text >

## 2019-04-23 NOTE — PROGRESS NOTE ADULT - SUBJECTIVE AND OBJECTIVE BOX
77 y/o male with PMH of COPD, PPM, HTN, HLD, lumbar spinal stenosis who came to the ED  with progressive shortness of breath over the past few days. In the ED, was found to have WBC 29.7, serum bicarb 9.0, anion gap 21, lactate 2.9. ABG 7.19/64/118/20, started on NIPPV. Patient was uncomfortable on BiPAP, transitioned to 40% ventimask. After 45 minutes of VM, repeat ABG 7.18/68/75/21 and patient was placed back on NIPPV. Afebrile, hemodynamically stable, sinus tachycardia HR low 100's. Patient was given 3 duonebs, Zosyn, Vancomycin, and Azithromycin empirically. CXR with dense RL consolidation and R pleural effusion.  A right thoracentesis was performed, drained ~250cc dark straw fluid. ICU consult was called for hypercapnic hypoxemic respiratory failure. Patient was admitted to ICU for further management. Patient respiratory symptoms improved; he is downgraded to medical floor on .     Interval: patient was seen and examined at bed side. No in acute distress. still SOB+. no CP/palpitation. reports not feeing well today, feels tired. seen by ID    tele: multiple wide complex NSVT and PVCs, increased compared to yesterday. cardiology eval was called      PAST MEDICAL & SURGICAL HISTORY:  HLD (hyperlipidemia)  HTN (hypertension)  severe COPD (chronic obstructive pulmonary disease)  Stenosis of lumbosacral spine  H/O back injury  No significant past surgical history    MEDICATIONS  (STANDING):  ALBUTerol/ipratropium for Nebulization 3 milliLiter(s) Nebulizer every 6 hours  atorvastatin 40 milliGRAM(s) Oral at bedtime  azithromycin  IVPB 500 milliGRAM(s) IV Intermittent every 24 hours  cefTRIAXone   IVPB 1 Gram(s) IV Intermittent every 24 hours  docusate sodium 100 milliGRAM(s) Oral three times a day  enoxaparin Injectable 40 milliGRAM(s) SubCutaneous daily  losartan 25 milliGRAM(s) Oral daily  pantoprazole  Injectable 40 milliGRAM(s) IV Push daily  predniSONE   Tablet 40 milliGRAM(s) Oral daily  predniSONE   Tablet   Oral     MEDICATIONS  (PRN):  acetaminophen   Tablet .. 650 milliGRAM(s) Oral every 6 hours PRN Temp greater or equal to 38C (100.4F), Mild Pain (1 - 3)        Vital Signs Last 24 Hrs  T(C): 36.9 (2019 09:24), Max: 37 (2019 18:00)  T(F): 98.5 (2019 09:24), Max: 98.6 (2019 18:00)  HR: 105 (2019 09:24) (68 - 114)  BP: 127/87 (2019 09:24) (118/70 - 161/80)  BP(mean): 97 (2019 20:00) (86 - 99)  RR: 16 (2019 09:24) (16 - 36)  SpO2: 96% (2019 09:24) (93% - 98%)    Vital Signs Last 24 Hrs  T(C): 36.2 (2019 10:21), Max: 37.2 (2019 15:00)  T(F): 97.2 (2019 10:21), Max: 98.9 (2019 15:00)  HR: 116 (2019 10:21) (82 - 116)  BP: 150/77 (2019 10:21) (145/83 - 158/76)  BP(mean): --  RR: 20 (2019 10:21) (16 - 20)  SpO2: 95% (2019 10:21) (92% - 98%)    CAPILLARY BLOOD GLUCOSE      I&O's Detail    2019 07:01  -  2019 07:00  --------------------------------------------------------  IN:    Oral Fluid: 1060 mL  Total IN: 1060 mL    OUT:    Voided: 1750 mL  Total OUT: 1750 mL    Total NET: -690 mL      2019 07:01  -  2019 13:20  --------------------------------------------------------  IN:    Oral Fluid: 240 mL  Total IN: 240 mL    OUT:    Voided: 425 mL  Total OUT: 425 mL    Total NET: -185 mL      GENERAL: Not in distress. Alert    HEENT:  Normocephalic and atraumatic.   NECK: Supple.  No JVD.    CARDIOVASCULAR: irregular S1, S2. No murmur/rubs/gallop  LUNGS: BLAE+reduced, no rales, no wheezing, no rhonchi.  resp not laboured  ABDOMEN: ND. Soft,  NT, no guarding / rebound / rigidity.     EXTREMITIES: no cyanosis, no clubbing, + edema getting better.   SKIN: no rash.   NEUROLOGIC: AAO*3. grossly intact  PSYCHIATRIC: Calm.  No agitation.      LABS:                          13.1   14.6  )-----------( 215      ( 2019 06:29 )             41.7         148<H>  |  110<H>  |  52.0<H>  ----------------------------<  100  5.4<H>   |  27.0  |  1.17    Ca    9.5      2019 06:29  Phos  3.1         TPro  x   /  Alb  2.7<L>  /  TBili  x   /  DBili  x   /  AST  x   /  ALT  x   /  AlkPhos  x          Urinalysis Basic - ( 2019 22:05 )    Color: Yellow / Appearance: Clear / S.025 / pH: x  Gluc: x / Ketone: Trace  / Bili: Negative / Urobili: Negative mg/dL   Blood: x / Protein: 30 mg/dL / Nitrite: Negative   Leuk Esterase: Trace / RBC: 0-2 /HPF / WBC 3-5   Sq Epi: x / Non Sq Epi: Few / Bacteria: Occasional      Culture - Acid Fast - Body Fluid w/Smear (19 @ 16:57)    Specimen Source: .Body Fluid    Acid Fast Bacilli Smear:   No acid fast bacilli seen by fluorochrome stain    Culture - Urine (19 @ 22:04)    Specimen Source: .Urine    Culture Results:   No growth    Culture - Body Fluid with Gram Stain (19 @ 15:36)    Gram Stain:   Few White blood cells  Few Yeast    Specimen Source: .Body Fluid    Culture Results:   No growth at 1 day.  Culture in progress    Culture - Blood (19 @ 11:19)    Specimen Source: .Blood    Culture Results:   No growth at 48 hours    Culture - Blood (19 @ 11:19)    Specimen Source: .Blood    Culture Results:   No growth at 48 hours        < from: Xray Chest 1 View-PORTABLE IMMEDIATE (19 @ 18:20) >      IMPRESSION:   Residual RIGHT lower lobe airspace consolidation and/or   effusion. No pneumothorax..         < end of copied text >

## 2019-04-24 NOTE — PROGRESS NOTE ADULT - SUBJECTIVE AND OBJECTIVE BOX
JOHN CIFUENTES Patient is a 78y old  Male who presents with a chief complaint of Hypercapnic Respiratory Failure (23 Apr 2019 14:30)     HPI:  77 yo male, PMHx COPD, PPM, HTN, HLD, lumbar spinal stenosis, BIBA with progressive shortness of breath over the past few days. Patient notes general malaise for the past two weeks. Today, he complained to his wife of a "pulled muscle" on the right side of his back with increasing shortness of breath prompting him to call 911. His wife states today he sounded "gurgly" and was unable to catch his breath. Patient denies fever or chills, chest pain, n/v/d. Upon EMS arrival, patient had SpO2 83% started on 100% NRB with improvement to 96%. Upon arrival to ED, was found to have WBC 29.7, serum bicarb 9.0, anion gap 21, lactate 2.9. ABG 7.19/64/118/20, started on NIPPV. Patient was uncomfortable on BiPAP, transitioned to 40% ventimask. After 45 minutes of VM, repeat ABG 7.18/68/75/21 and patient was placed back on NIPPV. Afebrile, hemodynamically stable, sinus tachycardia HR low 100's. Patient was given 3 duonebs, Zosyn, Vancomycin, and Azithromycin empirically. CXR with dense RL consolidation and R pleural effusion. ICU consult was called for hypercapnic hypoxemic respiratory failure. A right thoracentesis was performed, drained ~250cc dark straw fluid. Patient was admitted to ICU for further management. (20 Apr 2019 15:53)    The patient was seen and evaluated   The patient is in no acute distress.  Denied any fever, abdominal pain, fever, dysuria, cough, edema   Complains of SOB, worse for past day and also has a lot of anxiety franklin with BIPAP    I&O's Summary    23 Apr 2019 07:01  -  24 Apr 2019 07:00  --------------------------------------------------------  IN: 480 mL / OUT: 1875 mL / NET: -1395 mL      Allergies    No Known Allergies    Intolerances      HEALTH ISSUES - PROBLEM Dx:  Aortic stenosis, moderate: Aortic stenosis, moderate  Arrhythmia: Arrhythmia  Dyspnea: Dyspnea  Advanced care planning/counseling discussion: Advanced care planning/counseling discussion  Encounter for palliative care: Encounter for palliative care  Pneumonia of right lower lobe due to infectious organism: Pneumonia of right lower lobe due to infectious organism  Acute on chronic respiratory failure with hypoxia: Acute on chronic respiratory failure with hypoxia  HTN (hypertension): HTN (hypertension)  Pneumonia: Pneumonia  Metabolic acidosis: Metabolic acidosis  COPD exacerbation: COPD exacerbation  Pleural effusion: Pleural effusion  Pneumonia, bacterial: Pneumonia, bacterial  Sepsis: Sepsis  Acute respiratory failure with hypoxia and hypercapnia: Acute respiratory failure with hypoxia and hypercapnia        PAST MEDICAL & SURGICAL HISTORY:  HLD (hyperlipidemia)  HTN (hypertension)  COPD (chronic obstructive pulmonary disease)  Stenosis of lumbosacral spine  H/O back injury  No significant past surgical history          Vital Signs Last 24 Hrs  T(C): 36.6 (24 Apr 2019 10:11), Max: 36.8 (23 Apr 2019 15:28)  T(F): 97.8 (24 Apr 2019 10:11), Max: 98.2 (23 Apr 2019 15:28)  HR: 82 (24 Apr 2019 10:11) (67 - 97)  BP: 127/68 (24 Apr 2019 10:11) (127/68 - 152/80)  BP(mean): --  RR: 18 (24 Apr 2019 10:11) (18 - 18)  SpO2: 95% (24 Apr 2019 10:11) (94% - 100%)T(C): 36.6 (04-24-19 @ 10:11), Max: 36.8 (04-23-19 @ 15:28)  HR: 82 (04-24-19 @ 10:11) (67 - 97)  BP: 127/68 (04-24-19 @ 10:11) (127/68 - 152/80)  RR: 18 (04-24-19 @ 10:11) (18 - 18)  SpO2: 95% (04-24-19 @ 10:11) (94% - 100%)  Wt(kg): --    PHYSICAL EXAM:    GENERAL: NAD,  HEAD:  Atraumatic, Normocephalic  EYES: EOMI, PERR, conjunctiva and sclera clear  ENMT:  Moist mucous membranes,  No lesions  NERVOUS SYSTEM:  Alert & Oriented X3,  Moves upper and lower extremities; CNS-II-XII  CHEST/LUNG: Clear to auscultation bilaterally wheezing,   HEART: Regular rate and rhythm; No murmurs,   ABDOMEN: Soft, Nontender, Nondistended; Bowel sounds present  EXTREMITIES:  Peripheral Pulses, No  cyanosis, or edema  Psychiatry- mood and affect appropriate Insight and judgement intact     acetaminophen   Tablet .. 650 milliGRAM(s) Oral every 6 hours PRN  ALBUTerol/ipratropium for Nebulization 3 milliLiter(s) Nebulizer every 6 hours  ALPRAZolam 0.5 milliGRAM(s) Oral two times a day PRN  atorvastatin 40 milliGRAM(s) Oral at bedtime  cefTRIAXone   IVPB 1 Gram(s) IV Intermittent every 24 hours  docusate sodium 100 milliGRAM(s) Oral three times a day  enoxaparin Injectable 40 milliGRAM(s) SubCutaneous daily  losartan 25 milliGRAM(s) Oral daily  methylPREDNISolone sodium succinate Injectable 40 milliGRAM(s) IV Push every 6 hours  pantoprazole  Injectable 40 milliGRAM(s) IV Push daily      LABS:                          12.5   11.4  )-----------( 204      ( 24 Apr 2019 06:37 )             41.2     04-24    148<H>  |  106  |  41.0<H>  ----------------------------<  79  4.3   |  31.0<H>  |  0.93    Ca    9.2      24 Apr 2019 06:37  Phos  3.1     04-23  Mg     2.6     04-24    TPro  x   /  Alb  2.7<L>  /  TBili  x   /  DBili  x   /  AST  x   /  ALT  x   /  AlkPhos  x   04-23    LIVER FUNCTIONS - ( 23 Apr 2019 06:29 )  Alb: 2.7 g/dL / Pro: x     / ALK PHOS: x     / ALT: x     / AST: x     / GGT: x                   CAPILLARY BLOOD GLUCOSE          RADIOLOGY & ADDITIONAL TESTS:      Consultant notes reviewed    Case discussed with consultant/provider/ family /patient

## 2019-04-24 NOTE — PROGRESS NOTE ADULT - ASSESSMENT
A/P:  77 yo male, PMHx COPD, PPM, HTN, HLD, lumbar spinal stenosis, BIBA with progressive shortness of breath over the past few days. Patient notes general malaise for the past two weeks. Today, he complained to his wife of a "pulled muscle" on the right side of his back with increasing shortness of breath prompting him to call 911. His wife states today he sounded "gurgly" and was unable to catch his breath. Patient denies fever or chills, chest pain, n/v/d. Upon EMS arrival, patient had SpO2 83% started on 100% NRB with improvement to 96%. Upon arrival to ED, was found to have WBC 29.7, serum bicarb 9.0, anion gap 21, lactate 2.9. ABG 7.19/64/118/20, started on NIPPV. Patient was uncomfortable on BiPAP, transitioned to 40% ventimask. After 45 minutes of VM, repeat ABG 7.18/68/75/21 and patient was placed back on NIPPV. Afebrile, hemodynamically stable, sinus tachycardia HR low 100's. Patient was given 3 duonebs, Zosyn, Vancomycin, and Azithromycin empirically. CXR with dense RL consolidation and R pleural effusion. ICU consult was called for hypercapnic hypoxemic respiratory failure. A right thoracentesis was performed, drained ~250cc dark straw fluid. Patient was admitted to ICU for further management. (20 Apr 2019 15:53)  Appreciate above, confirmed accuracy with patient.  Consult called for SOB and ?NSVT.  Patient with PMH HTN, COPD/Emphysema, PNA, HLD, PPM for SSS, HFrEF 45%.  Patient reports since friday increased SOB, general malaise, mild non-productive cough, similar to how he felt with last pneumonia.   Patient PPM interrogation revealed episodes of Nonsustained VT,asymptomatic.       Problem/Recommendation - 1:  Problem: Dyspnea. Recommendation: - multifactorial, likely due to Pneumonia complicated by COPD, HFrEF  - c/w current medical management.     Problem/Recommendation - 2:  ·  Problem: Arrhythmia. - NSVT  - Patient needs Cardiac Cath risks and benefits discussed with the patient and his wife who was at bedside  - Patient is trying to get his last Cath in [place], we have in the office to get the report and it was unsuccessful.  - Tentatively for Cath on Friday.   - Not able to use Beta blockers because of COPD   Problem/Recommendation - 3:  ·  Problem: Aortic stenosis, moderate.   - For Right and Left heart Cath on Friday    .   Patients wife at bedside.  will follow

## 2019-04-24 NOTE — DIETITIAN INITIAL EVALUATION ADULT. - SIGNS/SYMPTOMS
as evidenced by <50% PO intake >5 days, generalized edema as evidenced by <50% PO intake >5 days, 1+ generalized, and 2+ BL LE edema

## 2019-04-24 NOTE — PROGRESS NOTE ADULT - ASSESSMENT
Patient without subjective improvement.  Exam consistent with continued effusion.  Echo with evidence of significant AS and decreased LV>?if element of CHF.   Renal function is improving  Not tolerating BIPAP    Plan:  1.If CXR without significant change>will need CT and evaluate for drainage of effusion  2.Decrease steroids  3.Continue bronchodilators/abx

## 2019-04-24 NOTE — DIETITIAN INITIAL EVALUATION ADULT. - OTHER INFO
Pt admitted with COPD exacerbation with septic pneumonia. Pt reports he eats about 25% of his trays but mostly picks at his food. Pt open to a nutrition supplement (Ensure Enlive). Pt admitted with COPD exacerbation with septic pneumonia. Pt reports he eats about 25% of his trays but mostly picks at his food. Pt open to a nutrition supplement (Ensure Enlive). Pt w/o c/o N/V/D/C. Pt admitted with COPD exacerbation with septic pneumonia. Pt reports he eats about 25% of his trays but mostly picks at his food. Pt open to a nutrition supplement (Ensure Enlive). Pt w/o c/o N/V/D/C. Noted with 1+ generalized and 2+ BL LE edema.

## 2019-04-24 NOTE — DIETITIAN INITIAL EVALUATION ADULT. - NS FNS SUPPLEMENTS
Ensure® Enlive®/BID BID to optimize po intake and provide an additional 350 kcal, 20g protein per serving/Ensure® Enlive®

## 2019-04-24 NOTE — PROGRESS NOTE ADULT - SUBJECTIVE AND OBJECTIVE BOX
PULMONARY PROGRESS NOTE      [id number]    Patient is a 78y old  Male who presents with a chief complaint of Hypercapnic Respiratory Failure (2019 13:18)      INTERVAL HPI/OVERNIGHT EVENTS:  Patient feels no better  No productive cough  Not using BIPAP>can't tolerate  Repeat CXR pending    MEDICATIONS  (STANDING):  ALBUTerol/ipratropium for Nebulization 3 milliLiter(s) Nebulizer every 6 hours  atorvastatin 40 milliGRAM(s) Oral at bedtime  cefTRIAXone   IVPB 1 Gram(s) IV Intermittent every 24 hours  docusate sodium 100 milliGRAM(s) Oral three times a day  enoxaparin Injectable 40 milliGRAM(s) SubCutaneous daily  losartan 25 milliGRAM(s) Oral daily  methylPREDNISolone sodium succinate Injectable 40 milliGRAM(s) IV Push every 6 hours  pantoprazole  Injectable 40 milliGRAM(s) IV Push daily      MEDICATIONS  (PRN):  acetaminophen   Tablet .. 650 milliGRAM(s) Oral every 6 hours PRN Temp greater or equal to 38C (100.4F), Mild Pain (1 - 3)  ALPRAZolam 0.5 milliGRAM(s) Oral two times a day PRN anxiety , especially with BIPAP      Allergies    No Known Allergies    Intolerances        PAST MEDICAL & SURGICAL HISTORY:  HLD (hyperlipidemia)  HTN (hypertension)  COPD (chronic obstructive pulmonary disease)  Stenosis of lumbosacral spine  H/O back injury  No significant past surgical history      SOCIAL HISTORY  Smoking History:       REVIEW OF SYSTEMS:    CONSTITUTIONAL:  No distress    HEENT:  Eyes:  No diplopia or blurred vision. ENT:  No earache, sore throat or runny nose.    CARDIOVASCULAR:  No pressure, squeezing, tightness, heaviness or aching about the chest; no palpitations.    RESPIRATORY:  Per HPI    GASTROINTESTINAL:  No nausea, vomiting or diarrhea.    GENITOURINARY:  No dysuria, frequency or urgency.    MUSCULOSKELETAL:  No joint pain    SKIN:  No new lesions.    NEUROLOGIC:  No paresthesias, fasciculations, seizures or weakness.    PSYCHIATRIC:  No disorder of thought or mood.    ENDOCRINE:  No heat or cold intolerance, polyuria or polydipsia.    HEMATOLOGICAL:  No easy bruising or bleeding.     Vital Signs Last 24 Hrs  T(C): 36.6 (2019 10:11), Max: 36.8 (2019 15:28)  T(F): 97.8 (2019 10:11), Max: 98.2 (2019 15:28)  HR: 82 (2019 10:11) (67 - 97)  BP: 127/68 (2019 10:11) (127/68 - 152/80)  BP(mean): --  RR: 18 (2019 10:11) (18 - 18)  SpO2: 95% (2019 10:11) (94% - 100%)    PHYSICAL EXAMINATION:    GENERAL: The patient is awake and alert in no apparent distress.     HEENT: Head is normocephalic and atraumatic. Extraocular muscles are intact. Mucous membranes are moist.    NECK: Supple.    LUNGS: Clear to auscultation without wheezing, rales or rhonchi; respirations unlabored; decreased right to 1/3 up    HEART: Regular rate and rhythm without murmur.    ABDOMEN: Soft, nontender, and nondistended.      EXTREMITIES: Without any cyanosis, clubbing, rash, lesions or edema.    NEUROLOGIC: Grossly intact.    SKIN: No ulceration or induration present.      LABS:                        12.5   11.4  )-----------( 204      ( 2019 06:37 )             41.2     04-24    148<H>  |  106  |  41.0<H>  ----------------------------<  79  4.3   |  31.0<H>  |  0.93    Ca    9.2      2019 06:37  Phos  3.1     -  Mg     2.6     -24    TPro  x   /  Alb  2.7<L>  /  TBili  x   /  DBili  x   /  AST  x   /  ALT  x   /  AlkPhos  x   -            < from: TTE Echo Complete w/Doppler (19 @ 10:38) >  EXAM:  ECHO TRANSTHORACIC COMP W DOPP      PROCEDURE DATE:  2019   .      INTERPRETATION:  REPORT:    TRANSTHORACIC ECHOCARDIOGRAM REPORT         Patient Name:   JOHN CIFUENTES Patient Location: Inpatient  Medical Rec #:  [id number]       Accession #:      [id number]  Account #:                     Height:           46.5 in 118.0 cm  YOB: 1940     Weight:           407.8 lb 185.00 kg  Patient Age:    78 years       BSA:              2.10 m²  Patient Gender: M              BP:               109/56 mmHg       Date of Exam:        2019 10:38:54 AM  Sonographer:         Louis Zepeda Jr  Referring Physician: Edward Salcido MD    Procedure:     2D Echo/Doppler/Color Doppler Complete.  Indications:   Chronic combined systolic (congestive) and diastolic   (congestive)                 heart failure - I50.42  Diagnosis:     Chronic combined systolic (congestive) and diastolic   (congestive)                 heart failure - I50.42  Study Details: Technically difficult study.Study quality was adversely   affected                 due to lung interference.         2D AND M-MODE MEASUREMENTS (normal ranges within parentheses):  Left                 Normal   Aorta/Left            Normal  Ventricle:                    Atrium:  IVSd (2D):    0.92  (0.7-1.1) Aortic Root  3.26 cm (2.4-3.7)                 cm             (2D):  LVPWd (2D):   1.16  (0.7-1.1) Left Atrium  3.58 cm (1.9-4.0)                 cm             (2D):  LVIDd (2D):   4.48  (3.4-5.7) LA Volume     25.4               cm             Index         ml/m²  LVIDs (2D):   3.55            Right Ventricle:                 cm             TAPSE:           1.90 cm  LV FS (2D):   20.8   (>25%)                  %  Relative Wall 0.52   (<0.42)  Thickness    LV DIASTOLIC FUNCTION:  MV Peak E: 1.60 m/s Decel Time: 220 msec    SPECTRAL DOPPLER ANALYSIS (where applicable):  Mitral Valve:  MV P1/2 Time: 63.80 msec  MV Area, PHT: 3.45 cm²    Aortic Valve: AoV Max Evangelist: 3.05 m/s AoV Peak P.1 mmHg AoV Mean P.6 mmHg    LVOT Vmax: 0.90 m/s LVOT VTI: 0.196 m LVOT Diameter: 1.99 cm    AoV Area, Vmax: 0.92 cm² AoV Area, VTI: 0.98 cm² AoV Area, Vmn:  Ao VTI: 0.621  Tricuspid Valve and PA/RV Systolic Pressure: TR Max Velocity: 1.99 m/s RA   Pressure: 8 mmHg RVSP/PASP: 23.8 mmHg       PHYSICIAN INTERPRETATION:  Left Ventricle: Endocardial visualization was enhanced with intravenous   echo contrast. The left ventricular internal cavity size is normal. There   is mild left ventricular hypertrophy involving the posterior wall. The   LVH involves posterior walls.  Global LV systolic function was mildly decreased. Left ventricular   ejection fraction, by visual estimation, is 45 to 50%. The mitral in-flow   pattern reveals no discernable A-wave, therefore nocomment on diastolic   function can be made.  Right Ventricle: The right ventricle was not well visualized. TV S' 0.1   m/s.  Left Atrium: The left atrium is normal in size.  Right Atrium: The right atrium is normal in size.  Pericardium: There is noevidence of pericardial effusion.  Mitral Valve: Thickening and calcification of the anterior and posterior   mitral valve leaflets. Trace mitral valve regurgitation is seen.  Tricuspid Valve: Trivial tricuspid regurgitation is visualized.  Aortic Valve: Peak transaortic gradient equals 37.1 mmHg, mean   transaortic gradient equals 20.6 mmHg, the calculated aortic valve area   equals 0.98 cm² by the continuity equation consistent with moderate   aortic stenosis. Trivial aortic valve regurgitationis seen.  Pulmonic Valve: The pulmonic valve was not well visualized. Trace   pulmonic valve regurgitation.  Aorta: The aortic root is normal in size and structure. There is mild   aortic root calcification.  Pulmonary Artery: The pulmonary artery isnot well seen.  Venous: The inferior vena cava was dilated, with respiratory size   variation greater than 50%.  Additional Comments: A pacer wire is visualized in the right atrium and   right ventricle.       Summary:   1. Technically difficult study.   2. Endocardial visualization was enhanced with intravenous echo contrast.   3. Normal left ventricular internal cavity size.   4. Mildly decreased global left ventricular systolic function.   5. Left ventricular ejection fraction, by visual estimation, is 45 to   50%.   6. There is mild left ventricular hypertrophy.   7. The mitral in-flow pattern reveals no discernable A-wave, therefore   no comment on diastolic function can be made.   8. There is mild aortic root calcification.   9. Peak transaortic gradient equals 37.1 mmHg, mean transaortic gradient   equals 20.6 mmHg, the calculated aortic valve area equals 0.98 cm² by the   continuity equation consistent with moderate aortic stenosis.  10. Thickening and calcification of the anterior and posterior mitral   valve leaflets.    Andrse Saravia MD Electronically signed on 2019 at 4:50:47 PM              < end of copied text >              MICROBIOLOGY:    RADIOLOGY & ADDITIONAL STUDIES:

## 2019-04-24 NOTE — PROGRESS NOTE ADULT - ASSESSMENT
79 y/o male with PMH of COPD, PPM, HTN, HLD, lumbar spinal stenosis who came to the ED  with progressive shortness of breath over the past few days. In the ED, was found to have WBC 29.7, serum bicarb 9.0, anion gap 21, lactate 2.9. ABG 7.19/64/118/20, started on NIPPV. Patient was uncomfortable on BiPAP, transitioned to 40% Venti-mask After 45 minutes of VM, repeat ABG 7.18/68/75/21 and patient was placed back on NIPPV. Afebrile, hemodynamically stable, sinus tachycardia HR low 100's. Patient was given 3 duonebs, Zosyn, Vancomycin, and Azithromycin empirically. CXR with dense RL consolidation and R pleural effusion.  A right thoracentesis was performed, drained ~250cc dark straw fluid. ICU consult was called for hypercapnic hypoxemic respiratory failure. Patient was admitted to ICU for further management.     Patient respiratory symptoms had improved; he is now complaining of worsening SOB again .- check CXR - discussed with Dr Casas- possible cath ON friday        Acute respiratory failure with hypoxia and hypercapnia  Continue BiPAP HS and as needed. patient is non-complaint with BIPAP- added xanax for anxiety  O2 via NC.   patient uses oxygen at home. but needs to be arranged again as per SW  patient will be benefit from BIPAP has been anxious about it abut is agreeable to taking Xanax prior to it -NIV at home however he is refusing    Pneumonia complicated by sepsis and parapneumonic effusion, no empyema  Blood culture neg so far .   pleural fluid analysis: no growth in one day, culture in progress. yeast+ possible contamination  neg AFB  pending sputum cx, urine legionella  on Rocephin - discussed with ID, suggested to DC Zithromax   ID  appreciated    Leukocytosis   WBC: improving  Likely due to underlying pneumonia   Continue antibiotic     Right pleural effusion   S/p thoracentesis   Follow up final report    SIMONE : improved -normal  was Likely due to sepsis   Monitor renal function    Severe COPD- with exacerbation started ON IV solumedrol today - taper as tolerated   Duoneb every six hours   arrange home oxygen. SW is aware    Multiple wide complex NSVT and PVC:   echo: mild low LVF  K 5.4  Kayexalate  EKG stat  repeat BMP and Mg in pm  DC zithromax  Cardio eval called    HTN/HLD  Continue Losartan 25mg  Atorvastatin 40mg     Supportive   DVT prophylaxis: Lovenox 40mg   Diet: DASH    dispo: anticipates DC in 48 - 72 hrs PT eval appreciated: will need home PT/ supervision       GOALS OF CARE - Full code. palliative  appreciated

## 2019-04-24 NOTE — PROGRESS NOTE ADULT - SUBJECTIVE AND OBJECTIVE BOX
[facility]                                                        Office: [address] [address]                                                       Telephone: [phone]. Fax:[phone]                                                                             PROGRESS NOTE    Subjective:  Patient still has difficulty breathing.     Review of symptoms:   Cardiac:  No chest pain. No dyspnea. No palpitations.  Respiratory: + cough. + dyspnea  Gastrointestinal: No diarrhea. No abdominal pain. No bleeding.   Neuro: No focal neuro complaints.      	  Vitals:  T(C): 36.6 (04-24-19 @ 10:11), Max: 36.6 (04-24-19 @ 05:16)  HR: 82 (04-24-19 @ 10:11) (67 - 97)  BP: 127/68 (04-24-19 @ 10:11) (127/68 - 152/80)  RR: 18 (04-24-19 @ 10:11) (18 - 18)  SpO2: 95% (04-24-19 @ 10:11) (94% - 100%)  Wt(kg): --  I&O's Summary    23 Apr 2019 07:01  -  24 Apr 2019 07:00  --------------------------------------------------------  IN: 480 mL / OUT: 1875 mL / NET: -1395 mL      Weight (kg): 114.6 (04-23 @ 05:56)    PHYSICAL EXAM:  Appearance: Comfortable. No acute distress  HEENT:  Head and neck: Atraumatic. Normocephalic. , Neck is supple. No JVD,   Neurologic: Alert and awake, Grossly nonfocal.   Lymphatic: No cervical lymphadenopathy  Cardiovascular: Normal S1 S2, No murmurs. No JVD,   Respiratory: Bilateral distant breath sounds, mild wheezing noted  Gastrointestinal:  Soft, Non-tender, + BS  Lower Extremities: No edema  Psychiatry: Patient is calm. No agitation.  Skin: No rashes.    CURRENT MEDICATIONS:    MEDICATIONS  (STANDING):  ALBUTerol/ipratropium for Nebulization 3 milliLiter(s) Nebulizer every 6 hours  atorvastatin 40 milliGRAM(s) Oral at bedtime  cefTRIAXone   IVPB 1 Gram(s) IV Intermittent every 24 hours  docusate sodium 100 milliGRAM(s) Oral three times a day  enoxaparin Injectable 40 milliGRAM(s) SubCutaneous daily  losartan 25 milliGRAM(s) Oral daily  methylPREDNISolone sodium succinate Injectable 40 milliGRAM(s) IV Push two times a day  pantoprazole  Injectable 40 milliGRAM(s) IV Push daily      LABS:	 	                          12.5   11.4  )-----------( 204      ( 24 Apr 2019 06:37 )             41.2     04-24    148<H>  |  106  |  41.0<H>  ----------------------------<  79  4.3   |  31.0<H>  |  0.93    Ca    9.2      24 Apr 2019 06:37  Phos  3.1     04-23  Mg     2.6     04-24    TPro  x   /  Alb  2.7<L>  /  TBili  x   /  DBili  x   /  AST  x   /  ALT  x   /  AlkPhos  x   04-23    proBNP: Serum Pro-Brain Natriuretic Peptide: 2829 pg/mL (04-20 @ 11:15)    Lipid Profile:       LIVER FUNCTIONS - ( 23 Apr 2019 06:29 )  Alb: 2.7 g/dL / Pro: x     / ALK PHOS: x     / ALT: x     / AST: x     / GGT: x             TELEMETRY: Reviewed  - No significant arrhythmias    ECG:    < from: TTE Echo Complete w/Doppler (04.21.19 @ 10:38) >  Summary:   1. Technically difficult study.   2. Endocardial visualization was enhanced with intravenous echo contrast.   3. Normal left ventricular internal cavity size.   4. Mildly decreased global left ventricular systolic function.   5. Left ventricular ejection fraction, by visual estimation, is 45 to   50%.   6. There is mild left ventricular hypertrophy.   7. The mitral in-flow pattern reveals no discernable A-wave, therefore   no comment on diastolic function can be made.   8. There is mild aortic root calcification.   9. Peak transaortic gradient equals 37.1 mmHg, mean transaortic gradient   equals 20.6 mmHg, the calculated aortic valve area equals 0.98 cm² by the   continuity equation consistent with moderate aortic stenosis.  10. Thickening and calcification of the anterior and posterior mitral   valve leaflets.    Andres Saravia MD Electronically signed on 4/21/2019 at 4:50:47 PM    < end of copied text >

## 2019-04-24 NOTE — DIETITIAN INITIAL EVALUATION ADULT. - PERTINENT LABORATORY DATA
04-24 Na148 mmol/L<H> Glu 79 mg/dL K+ 4.3 mmol/L Cr  0.93 mg/dL BUN 41.0 mg/dL<H> Phos n/a   Alb n/a   PAB n/a

## 2019-04-24 NOTE — CHART NOTE - NSCHARTNOTEFT_GEN_A_CORE
Upon Nutritional Assessment by the Registered Dietitian your patient was determined to meet criteria / has evidence of the following diagnosis/diagnoses:          [ ]  Mild Protein Calorie Malnutrition        [x]  Moderate Protein Calorie Malnutrition        [ ] Severe Protein Calorie Malnutrition        [ ] Unspecified Protein Calorie Malnutrition        [ ] Underweight / BMI <19        [ ] Morbid Obesity / BMI > 40    Pt presents with malnutrition (moderate, acute) related to inadequate protein energy intake in the setting of septic pneumonia and COPD as evidenced by <50% PO intake >5 days, 1+ generalized, and 2+ BL LE edema.      Findings as based on:  •  Comprehensive nutrition assessment and consultation  •  Calorie counts (nutrient intake analysis)  •  Food acceptance and intake status from observations by staff  •  Follow up  •  Patient education  •  Intervention secondary to interdisciplinary rounds  •   concerns      Treatment:    The following has been recommended:    1) Continue diet as tolerated.  2) Add Ensure Enlive BID to optimize po intake and provide an additional 350 kcal, 20g protein per serving.  3) Rx: MVI daily.      PROVIDER Section:     By signing this assessment you are acknowledging and agree with the diagnosis/diagnoses assigned by the Registered Dietitian    Comments:

## 2019-04-25 NOTE — CONSULT NOTE ADULT - ATTENDING COMMENTS
Pt seen and imaging reviewed.  Loc rt effusion.  Will ask IR for thora/pig but may require VATS if cant clear.
Patient seen and examined by me.  I have discussed my recommendation with the PA which are outlined above.  If patients PPM interrogation does not show any arrhythmias, will follow the patient in the office.  Will sign off  .

## 2019-04-25 NOTE — PROGRESS NOTE ADULT - SUBJECTIVE AND OBJECTIVE BOX
PULMONARY PROGRESS NOTE      [id number]    Patient is a 78y old  Male who presents with a chief complaint of Hypercapnic Respiratory Failure (25 Apr 2019 12:20)      INTERVAL HPI/OVERNIGHT EVENTS:    MEDICATIONS  (STANDING):  ALBUTerol/ipratropium for Nebulization 3 milliLiter(s) Nebulizer every 6 hours  atorvastatin 40 milliGRAM(s) Oral at bedtime  docusate sodium 100 milliGRAM(s) Oral three times a day  enoxaparin Injectable 40 milliGRAM(s) SubCutaneous daily  losartan 25 milliGRAM(s) Oral daily  methylPREDNISolone sodium succinate Injectable 40 milliGRAM(s) IV Push two times a day  pantoprazole  Injectable 40 milliGRAM(s) IV Push daily      MEDICATIONS  (PRN):  acetaminophen   Tablet .. 650 milliGRAM(s) Oral every 6 hours PRN Temp greater or equal to 38C (100.4F), Mild Pain (1 - 3)  ALPRAZolam 0.5 milliGRAM(s) Oral two times a day PRN anxiety , especially with BIPAP      Allergies    No Known Allergies    Intolerances        PAST MEDICAL & SURGICAL HISTORY:  HLD (hyperlipidemia)  HTN (hypertension)  COPD (chronic obstructive pulmonary disease)  Stenosis of lumbosacral spine  H/O back injury  No significant past surgical history      SOCIAL HISTORY  Smoking History:       REVIEW OF SYSTEMS:    CONSTITUTIONAL:  No distress    HEENT:  Eyes:  No diplopia or blurred vision. ENT:  No earache, sore throat or runny nose.    CARDIOVASCULAR:  No pressure, squeezing, tightness, heaviness or aching about the chest; no palpitations.    RESPIRATORY:  No cough, shortness of breath, PND or orthopnea. Mild SOBOE    GASTROINTESTINAL:  No nausea, vomiting or diarrhea.    GENITOURINARY:  No dysuria, frequency or urgency.    NEUROLOGIC:  No paresthesias, fasciculations, seizures or weakness.    PSYCHIATRIC:  No disorder of thought or mood.    Vital Signs Last 24 Hrs  T(C): 36.6 (25 Apr 2019 09:28), Max: 36.7 (24 Apr 2019 15:00)  T(F): 97.9 (25 Apr 2019 09:28), Max: 98.1 (24 Apr 2019 15:00)  HR: 82 (25 Apr 2019 09:28) (67 - 99)  BP: 147/83 (25 Apr 2019 09:28) (142/60 - 158/86)  BP(mean): --  RR: 22 (25 Apr 2019 13:09) (18 - 22)  SpO2: 84% (25 Apr 2019 13:09) (84% - 100%)    PHYSICAL EXAMINATION:    GENERAL: The patient is awake and alert in no apparent distress.     HEENT: Head is normocephalic and atraumatic. Extraocular muscles are intact. Mucous membranes are moist.    NECK: Supple.    LUNGS: Clear to auscultation without wheezing, rales or rhonchi; respirations unlabored    HEART: Regular rate and rhythm without murmur.    ABDOMEN: Soft, nontender, and nondistended.      EXTREMITIES: Without any cyanosis, clubbing, rash, lesions or edema.    NEUROLOGIC: Grossly intact.    LABS:                        12.9   13.3  )-----------( 199      ( 25 Apr 2019 02:34 )             41.6     04-25    149<H>  |  106  |  35.0<H>  ----------------------------<  133<H>  5.2   |  35.0<H>  |  0.92    Ca    9.5      25 Apr 2019 02:34  Mg     2.4     04-25    TPro  6.6  /  Alb  2.8<L>  /  TBili  0.3<L>  /  DBili  x   /  AST  11  /  ALT  10  /  AlkPhos  65  04-25                        MICROBIOLOGY:    RADIOLOGY & ADDITIONAL STUDIES:  CXR reviewed and compared PULMONARY PROGRESS NOTE      [id number]    Patient is a 78y old  Male who presents with a chief complaint of Hypercapnic Respiratory Failure (25 Apr 2019 12:20)      INTERVAL HPI/OVERNIGHT EVENTS:  sitting up in chair  no chest pain or purulent sputum  not on cpap at home  no change in rersp status  MEDICATIONS  (STANDING):  ALBUTerol/ipratropium for Nebulization 3 milliLiter(s) Nebulizer every 6 hours  atorvastatin 40 milliGRAM(s) Oral at bedtime  docusate sodium 100 milliGRAM(s) Oral three times a day  enoxaparin Injectable 40 milliGRAM(s) SubCutaneous daily  losartan 25 milliGRAM(s) Oral daily  methylPREDNISolone sodium succinate Injectable 40 milliGRAM(s) IV Push two times a day  pantoprazole  Injectable 40 milliGRAM(s) IV Push daily      MEDICATIONS  (PRN):  acetaminophen   Tablet .. 650 milliGRAM(s) Oral every 6 hours PRN Temp greater or equal to 38C (100.4F), Mild Pain (1 - 3)  ALPRAZolam 0.5 milliGRAM(s) Oral two times a day PRN anxiety , especially with BIPAP      Allergies    No Known Allergies    Intolerances        PAST MEDICAL & SURGICAL HISTORY:  HLD (hyperlipidemia)  HTN (hypertension)  COPD (chronic obstructive pulmonary disease)  Stenosis of lumbosacral spine  H/O back injury  No significant past surgical history      SOCIAL HISTORY  Smoking History:       REVIEW OF SYSTEMS:    CONSTITUTIONAL:  No distress    HEENT:  Eyes:  No diplopia or blurred vision. ENT:  No earache, sore throat or runny nose.    CARDIOVASCULAR:  No pressure, squeezing, tightness, heaviness or aching about the chest; no palpitations.    RESPIRATORY:  see hpi    GASTROINTESTINAL:  No nausea, vomiting or diarrhea.    GENITOURINARY:  No dysuria, frequency or urgency.    NEUROLOGIC:  No paresthesias, fasciculations, seizures or weakness.    PSYCHIATRIC:  No disorder of thought or mood.    Vital Signs Last 24 Hrs  T(C): 36.6 (25 Apr 2019 09:28), Max: 36.7 (24 Apr 2019 15:00)  T(F): 97.9 (25 Apr 2019 09:28), Max: 98.1 (24 Apr 2019 15:00)  HR: 82 (25 Apr 2019 09:28) (67 - 99)  BP: 147/83 (25 Apr 2019 09:28) (142/60 - 158/86)  BP(mean): --  RR: 22 (25 Apr 2019 13:09) (18 - 22)  SpO2: 84% (25 Apr 2019 13:09) (84% - 100%)    PHYSICAL EXAMINATION:    GENERAL: The patient is awake and alert in no apparent distress.     HEENT: Head is normocephalic and atraumatic. Extraocular muscles are intact. Mucous membranes are moist.    NECK: Supple.    LUNGS: diminished  air entry bilat without wheezing, rales or rhonchi; respirations unlabored    HEART: Regular rate and rhythm without murmur.    ABDOMEN: Soft, nontender, and nondistended.      EXTREMITIES: With 1-2 plus r edema.    NEUROLOGIC: Grossly intact.    LABS:                        12.9   13.3  )-----------( 199      ( 25 Apr 2019 02:34 )             41.6     04-25    149<H>  |  106  |  35.0<H>  ----------------------------<  133<H>  5.2   |  35.0<H>  |  0.92    Ca    9.5      25 Apr 2019 02:34  Mg     2.4     04-25    TPro  6.6  /  Alb  2.8<L>  /  TBili  0.3<L>  /  DBili  x   /  AST  11  /  ALT  10  /  AlkPhos  65  04-25                        MICROBIOLOGY:    RADIOLOGY & ADDITIONAL STUDIES:  CXR reviewed and compared

## 2019-04-25 NOTE — CONSULT NOTE ADULT - REASON FOR ADMISSION
Hypercapnic Respiratory Failure

## 2019-04-25 NOTE — PROGRESS NOTE ADULT - ASSESSMENT
This 77 yo male, PMHx COPD, PPM, HTN, HLD, lumbar spinal stenosis, BIBA with progressive shortness of breath over the past few days on 4/20/19.    RL consolidation and R pleural effusion.     Likely Pneumonia/ CAP with right parapneumonic effusion    - Completed course of Azithromycin   to complete 7 day course effective antibiotics. Ceftriaxone ends on 4/26/18  if leaving today, can CHANGE ceftriaxone --> VANTIN    thoracentesis fluid with no growth.       no contraindications to discharge to community from ID standpoint

## 2019-04-25 NOTE — PROGRESS NOTE ADULT - SUBJECTIVE AND OBJECTIVE BOX
[facility]  INFECTIOUS DISEASES AND INTERNAL MEDICINE at [place]  =======================================================  Onesimo Arauz MD  Diplomates American Board of Internal Medicine and Infectious Diseases  =======================================================    [id number]  JOHN CIFUENTES   follow up for: right side parapneumonic effusion, right sided PNA  patient seen and examined.     no interval fevers.    less SOB  breathing better    ===================================================  REVIEW OF SYSTEMS:  as above  all other ROS negative    =======================================================  Allergies    No Known Allergies    Intolerances      Antibiotics:  cefTRIAXone   IVPB 1 Gram(s) IV Intermittent every 24 hours          ======================================================  Physical Exam:  ============  T(F): 97.5 (25 Apr 2019 05:42), Max: 98.2 (23 Apr 2019 15:28)  HR: 70 (25 Apr 2019 05:42)  BP: 158/68 (25 Apr 2019 05:42)  RR: 18 (25 Apr 2019 05:42)  SpO2: 99% (25 Apr 2019 05:42) (94% - 100%)    General:  No acute distress.  Eye: Pupils are equal, round and reactive to light, Extraocular movements are intact, Normal conjunctiva.  HENT: Normocephalic, Oral mucosa is moist, No pharyngeal erythema, No sinus tenderness.  Neck: Supple, No lymphadenopathy.  Respiratory: Lungs WITH PROLONG EXP PHASE, diminished aeration right base  Cardiovascular: Normal rate, Regular rhythm, Good pulses equal in all extremities, TRACE edema.  Gastrointestinal: Soft, Non-tender, Non-distended, Normal bowel sounds.  Genitourinary: No costovertebral angle tenderness.  Lymphatics: No lymphadenopathy neck,   Musculoskeletal: Normal range of motion, Normal strength.  Integumentary: No rash. BILATERAL CHANGES ON LOWER LEGS  Neurologic: Alert, Oriented, No focal deficits, Cranial Nerves II-XII are grossly intact.  Psychiatric: Appropriate mood & affect.    =======================================================  Labs:                        12.9   13.3  )-----------( 199      ( 25 Apr 2019 02:34 )             41.6       WBC Count: 13.3 K/uL (04-25-19 @ 02:34)  WBC Count: 11.4 K/uL (04-24-19 @ 06:37)  WBC Count: 14.6 K/uL (04-23-19 @ 06:29)  WBC Count: 18.4 K/uL (04-22-19 @ 05:51)  WBC Count: 17.7 K/uL (04-21-19 @ 07:14)  WBC Count: 29.7 K/uL (04-20-19 @ 11:14)      04-25    149<H>  |  106  |  35.0<H>  ----------------------------<  133<H>  5.2   |  35.0<H>  |  0.92    Ca    9.5      25 Apr 2019 02:34  Mg     2.4     04-25    TPro  6.6  /  Alb  2.8<L>  /  TBili  0.3<L>  /  DBili  x   /  AST  11  /  ALT  10  /  AlkPhos  65  04-25      Culture - Acid Fast - Body Fluid w/Smear (collected 04-21-19 @ 16:57)  Source: .Body Fluid    Culture - Urine (collected 04-20-19 @ 22:04)  Source: .Urine  Final Report (04-22-19 @ 10:23):    No growth    Culture - Body Fluid with Gram Stain (collected 04-20-19 @ 15:36)  Source: .Body Fluid  Gram Stain (04-20-19 @ 19:57):    Few White blood cells    Few Yeast    Culture - Blood (collected 04-20-19 @ 11:19)  Source: .Blood    Culture - Blood (collected 04-20-19 @ 11:19)  Source: .Blood

## 2019-04-25 NOTE — PROGRESS NOTE ADULT - ASSESSMENT
79 y/o male with PMH of COPD, PPM, HTN, HLD, lumbar spinal stenosis who came to the ED  with progressive shortness of breath over the past few days. In the ED, was found to have WBC 29.7, serum bicarb 9.0, anion gap 21, lactate 2.9. ABG 7.19/64/118/20, started on NIPPV. Patient was uncomfortable on BiPAP, transitioned to 40% Venti-mask After 45 minutes of VM, repeat ABG 7.18/68/75/21 and patient was placed back on NIPPV. Afebrile, hemodynamically stable, sinus tachycardia HR low 100's. Patient was given 3 duonebs, Zosyn, Vancomycin, and Azithromycin empirically. CXR with dense RL consolidation and R pleural effusion.  A right thoracentesis was performed, drained ~250cc dark straw fluid. ICU consult was called for hypercapnic hypoxemic respiratory failure. Patient was admitted to ICU for further management.  Patient respiratory symptoms had improved, downgraded. Pt is noted to have multiple PVC, seen by cardiology, s/p interrogation positive for NSVT, schedule for cath on 04/26    A/P      Acute respiratory failure with hypoxia and hypercapnia due to severe COPD - improving  appreciate pulmo input - c/w oxygen NC during day, Bipap qhs and prn  c/w Duoneb, taper steroid per pulmonary  will probably need home oxygen      Pneumonia complicated by sepsis and parapneumonic effusion, no empyema  appreciate ID and Pulmonary input   completed Azithromycin, c/w Rocephin -  end date 04/26  repeat chest xray -  Right pleural effusion and additional opacity at the right lung base,   either atelectasis or pneumonia, slightly increased since 4/20/2019.   per Pulmo getting CT chest if showed large effusion may need thoracentesis    NSVT - Patient PPM interrogation revealed episodes of Nonsustained VT,asymptomat  appreciate cardiology - schedule for cath tomorrow  make sure K and mag normal    Right pleural effusion   S/p thoracentesis   Follow up final report    SIMONE : improved -normal  Monitor renal function    HTN/HLD  Continue Losartan 25mg  Atorvastatin 40mg     Supportive   DVT prophylaxis: Lovenox 40mg   Diet: DASH      GOALS OF CARE - Full code. palliative  appreciated 79 y/o male with PMH of COPD, PPM, HTN, HLD, lumbar spinal stenosis who came to the ED  with progressive shortness of breath over the past few days. In the ED, was found to have WBC 29.7, serum bicarb 9.0, anion gap 21, lactate 2.9. ABG 7.19/64/118/20, started on NIPPV. Patient was uncomfortable on BiPAP, transitioned to 40% Venti-mask After 45 minutes of VM, repeat ABG 7.18/68/75/21 and patient was placed back on NIPPV. Afebrile, hemodynamically stable, sinus tachycardia HR low 100's. Patient was given 3 duonebs, Zosyn, Vancomycin, and Azithromycin empirically. CXR with dense RL consolidation and R pleural effusion.  A right thoracentesis was performed, drained ~250cc dark straw fluid. ICU consult was called for hypercapnic hypoxemic respiratory failure. Patient was admitted to ICU for further management.  Patient respiratory symptoms had improved, downgraded. Pt is noted to have multiple PVC, seen by cardiology, s/p interrogation positive for NSVT, schedule for cath on 04/26    A/P      Acute respiratory failure with hypoxia and hypercapnia due to severe COPD - improving  appreciate pulmo input - c/w oxygen NC during day, Bipap qhs and prn  c/w Duoneb, taper steroid per pulmonary  will probably need home oxygen      Pneumonia complicated by sepsis and parapneumonic effusion, no empyema  appreciate ID and Pulmonary input   completed Azithromycin, c/w Rocephin -  end date 04/26  repeat chest xray -  Right pleural effusion and additional opacity at the right lung base,   either atelectasis or pneumonia, slightly increased since 4/20/2019.   per Pulmo getting CT chest if showed large effusion may need thoracentesis    NSVT - Patient PPM interrogation revealed episodes of Nonsustained VT,asymptomat  appreciate cardiology - schedule for cath tomorrow  Keep K >4, mag >2    Right pleural effusion   S/p thoracentesis   repeat chest xray -  Right pleural effusion and additional opacity at the right lung base,   either atelectasis or pneumonia, slightly increased since 4/20/2019.   per Pulmo getting CT chest if showed large effusion may need thoracentesis    SIMONE : improved -normal  Monitor renal function    HTN/HLD  Continue Losartan 25mg  Atorvastatin 40mg     Supportive   DVT prophylaxis: Lovenox 40mg   Diet: DASH      GOALS OF CARE - Full code. palliative  appreciated

## 2019-04-25 NOTE — CONSULT NOTE ADULT - ASSESSMENT
78M former smoker, COPD, PNA with loculated pleural effusion.
79 yo M with spinal stenosis, end stage COPD on home O2 with decrease exercise tolerance and declining functional status.  Pt tp MICU with acute on chronic respiratory failure. Pt found to have RLL PNA on ABX.  S/p thoacentesis.  Palliative care called for GOC.
Imp--RLL pneumonia with parapneumonic effusion.  Doubt empyema with nl glu.  Yeast in gm stain may be artifact.  Severe COPD  Pt clinically improving.    Plan--continue abx.Nebs, O2 pred.  Lower O2 as gautam  f/u cxr until completion.  If pl fluid cultures turn + may need further drainage.
This 79 yo male, PMHx COPD, PPM, HTN, HLD, lumbar spinal stenosis, BIBA with progressive shortness of breath over the past few days on 4/20/19.    RL consolidation and R pleural effusion.     Likely Pneumonia/ CAP with right parapneumonic effusion      - continue Azithromycin  for a 5 day course  and Ceftriaxone for a 7 day course.     thoracentesis fluid with no growth.   - follow up on cultures.    WBC elevation, overall down trending.        - follow up all outstanding cultures  - trend temperature and WBC curve  - repeat cultures from blood and all sources if febrile.
A/P:  79 yo male, PMHx COPD, PPM, HTN, HLD, lumbar spinal stenosis, BIBA with progressive shortness of breath over the past few days. Patient notes general malaise for the past two weeks. Today, he complained to his wife of a "pulled muscle" on the right side of his back with increasing shortness of breath prompting him to call 911. His wife states today he sounded "gurgly" and was unable to catch his breath. Patient denies fever or chills, chest pain, n/v/d. Upon EMS arrival, patient had SpO2 83% started on 100% NRB with improvement to 96%. Upon arrival to ED, was found to have WBC 29.7, serum bicarb 9.0, anion gap 21, lactate 2.9. ABG 7.19/64/118/20, started on NIPPV. Patient was uncomfortable on BiPAP, transitioned to 40% ventimask. After 45 minutes of VM, repeat ABG 7.18/68/75/21 and patient was placed back on NIPPV. Afebrile, hemodynamically stable, sinus tachycardia HR low 100's. Patient was given 3 duonebs, Zosyn, Vancomycin, and Azithromycin empirically. CXR with dense RL consolidation and R pleural effusion. ICU consult was called for hypercapnic hypoxemic respiratory failure. A right thoracentesis was performed, drained ~250cc dark straw fluid. Patient was admitted to ICU for further management. (20 Apr 2019 15:53)  Appreciate above, confirmed accuracy with patient.  Consult called for SOB and ?NSVT.  Patient with PMH HTN, COPD/Emphysema, PNA, HLD, PPM for SSS, HFrEF 45%.  Patient reports since friday increased SOB, general malaise, mild non-productive cough, similar to how he felt with last pneumonia.

## 2019-04-25 NOTE — CONSULT NOTE ADULT - SUBJECTIVE AND OBJECTIVE BOX
History of Present Illness:  HPI:  79 yo male, PMHx COPD, PPM, HTN, HLD, lumbar spinal stenosis, BIBA with progressive shortness of breath over the past few days. Patient notes general malaise for the past two weeks. Today, he complained to his wife of a "pulled muscle" on the right side of his back with increasing shortness of breath prompting him to call 911. His wife states today he sounded "gurgly" and was unable to catch his breath. Patient denies fever or chills, chest pain, n/v/d. Upon EMS arrival, patient had SpO2 83% started on 100% NRB with improvement to 96%. Upon arrival to ED, was found to have WBC 29.7, serum bicarb 9.0, anion gap 21, lactate 2.9. ABG 7.19/64/118/20, started on NIPPV. Patient was uncomfortable on BiPAP, transitioned to 40% ventimask. After 45 minutes of VM, repeat ABG 7.18/68/75/21 and patient was placed back on NIPPV. Afebrile, hemodynamically stable, sinus tachycardia HR low 100's. Patient was given 3 duonebs, Zosyn, Vancomycin, and Azithromycin empirically. CXR with dense RL consolidation and R pleural effusion. ICU consult was called for hypercapnic hypoxemic respiratory failure. A right thoracentesis was performed, drained ~250cc dark straw fluid. Patient was admitted to ICU for further management. (20 Apr 2019 15:53)      Current Subjective Assessment: "I feel a little better today but its still hard to breath if I move too much" Patient reported a 3-4 days of progressive SOB on exertion which was worse than his baseline SOB d/t COPD. He did have an occasional cough and was recently treated for a pneumonia. He feels better with the oxygen supplement but had never needed it before. Patient today found to have a loculated effusion on CXR and was referred to CT surgery for intervention.    Past Medical History  HLD (hyperlipidemia)  HTN (hypertension)  COPD (chronic obstructive pulmonary disease)  Stenosis of lumbosacral spine  H/O back injury      Past Surgical History  Benign lumbar tumor removed      MEDICATIONS  (STANDING):  ALBUTerol/ipratropium for Nebulization 3 milliLiter(s) Nebulizer every 6 hours  atorvastatin 40 milliGRAM(s) Oral at bedtime  docusate sodium 100 milliGRAM(s) Oral three times a day  enoxaparin Injectable 40 milliGRAM(s) SubCutaneous daily  losartan 25 milliGRAM(s) Oral daily  methylPREDNISolone sodium succinate Injectable 40 milliGRAM(s) IV Push two times a day  pantoprazole  Injectable 40 milliGRAM(s) IV Push daily    MEDICATIONS  (PRN):  acetaminophen   Tablet .. 650 milliGRAM(s) Oral every 6 hours PRN Temp greater or equal to 38C (100.4F), Mild Pain (1 - 3)  ALPRAZolam 0.5 milliGRAM(s) Oral two times a day PRN anxiety , especially with BIPAP        Allergies: No Known Allergies      SOCIAL HISTORY:  Smoker: [ x] Yes  [ ] No        PACK YEARS:  > 1ppd x 40 yrs , quit 20 years ago                       WHEN QUIT?  ETOH use: [ ] Yes  [ x] No              FREQUENCY / QUANTITY:  Ilicit Drug use:  [ ] Yes  [ x] No  Occupation: retired HVAC  Live with: wife, daughter and granddaughter live upstairs (daughter with recent dx of NHL)  Assist device use: no    PMD: Yessenia ([place])  Referring Cardiologist: Yessenia ([place])    Relevant Family History  FAMILY HISTORY: no significant past family history per patient      Review of Systems  GENERAL:  Fevers[] chills[] sweats[] fatigue[x] weight loss[] weight gain []                                      [ ] NEGATIVE  NEURO:  parathesias[] seizures []  syncope []  confusion []                                                                [x ] NEGATIVE                 EYES: glasses[]  blurry vision[]  discharge[] pain[] glaucoma []                                                           [x ] NEGATIVE                 ENMT:  difficulty hearing []  vertigo[]  dysphagia[] epistaxis[] recent dental work []                     [ x] NEGATIVE                 CV:  chest pain[] palpitations[] LEGER [x] diaphoresis [] edema[]                                                           [ ] NEGATIVE                                 RESPIRATORY:  wheezing[] SOB[x] cough [x a little] sputum[] hemoptysis[]                                                   [ ] NEGATIVE               GI:  nausea[]  vomiting []  diarrhea[] constipation [] melena []                                                          [x ] NEGATIVE            : hematuria[ ]  dysuria[ ] urgency[] incontinence[]                                                                          [ x] NEGATIVE                    MUSKULOSKELETAL:  arthritis[ ]  joint swelling [ ] muscle weakness [ ]                                            [x ] NEGATIVE                     SKIN/BREAST:  rash[ ] itching [ ]  hair loss[ ] masses[ ]                                                                          [x ] NEGATIVE                     PSYCH:  dementia [ ] depression [ ] anxiety[ ]                                                                                          [ x] NEGATIVE                        HEME/LYMPH:  bruises easily[ ] enlarged lymph nodes[ ] tender lymph nodes[ ]                           [x ] NEGATIVE                      ENDOCRINE:  cold intolerance[ ] heat intolerance[ ] polydipsia[ ]                                                      [x ] NEGATIVE                        Telemetry: AF    PHYSICAL EXAM  Vital Signs Last 24 Hrs  T(C): 36.4 (25 Apr 2019 15:14), Max: 36.7 (24 Apr 2019 21:07)  T(F): 97.5 (25 Apr 2019 15:14), Max: 98 (24 Apr 2019 21:07)  HR: 78 (25 Apr 2019 15:14) (67 - 99)  BP: 132/77 (25 Apr 2019 15:14) (132/77 - 158/68)  BP(mean): --  RR: 20 (25 Apr 2019 15:14) (18 - 22)  SpO2: 95% (25 Apr 2019 15:14) (84% - 100%)    General: Well nourished, well developed, no acute distress.                                                         Neuro: Normal exam oriented to person/place & time with no focal motor or sensory  deficits.                    Eyes: Normal exam of conjunctiva & lids, pupils equally reactive.   ENT: Normal exam of nasal/oral mucosa with absence of cyanosis.   Neck: Normal exam of jugular veins, trachea & thyroid.   Chest: right base diminished,                                                                        CV:  Auscultation: normal [x ] S3[ ] S4[ ] Irregular [ ] Rub[ ] Clicks[ ]  Murmurs none:[ ]systolic [x ]  diastolic [ ] holosystolic [ ]  Carotids: No Bruits[x ] Other____________ Abdominal Aorta: normal [ ] nonpalpable[ ]                                                                         GI: Normal exam of abdomen, liver & spleen with no noted masses or tenderness.                                                                                              Extremities: Normal no evidence of cyanosis or deformity Edema: none[x ]trace[ ]1+[ ]2+[ ]3+[ ]4+[ ]  Lower Extremity Pulses: Right[+ ] Left[ +]Varicosities[ ]  SKIN : Normal exam to inspection & palpation.                                                           LABS:                        12.9   13.3  )-----------( 199      ( 25 Apr 2019 02:34 )             41.6     04-25    149<H>  |  106  |  35.0<H>  ----------------------------<  133<H>  5.2   |  35.0<H>  |  0.92    Ca    9.5      25 Apr 2019 02:34  Mg     2.4     04-25    TPro  6.6  /  Alb  2.8<L>  /  TBili  0.3<L>  /  DBili  x   /  AST  11  /  ALT  10  /  AlkPhos  65  04-25          < from: CT Chest No Cont (04.25.19 @ 16:36) >    IMPRESSION:     Loculated right pleural effusion. Small foci of air within the effusion   may be secondary to recent intervention. If there has been no recent   intervention, this could indicate empyema..     < end of copied text >

## 2019-04-25 NOTE — PROGRESS NOTE ADULT - ASSESSMENT
A/P:  77 yo male, PMHx COPD, PPM, HTN, HLD, lumbar spinal stenosis, BIBA with progressive shortness of breath over the past few days. Patient notes general malaise for the past two weeks. Today, he complained to his wife of a "pulled muscle" on the right side of his back with increasing shortness of breath prompting him to call 911. His wife states today he sounded "gurgly" and was unable to catch his breath. Patient denies fever or chills, chest pain, n/v/d. Upon EMS arrival, patient had SpO2 83% started on 100% NRB with improvement to 96%. Upon arrival to ED, was found to have WBC 29.7, serum bicarb 9.0, anion gap 21, lactate 2.9. ABG 7.19/64/118/20, started on NIPPV. Patient was uncomfortable on BiPAP, transitioned to 40% ventimask. After 45 minutes of VM, repeat ABG 7.18/68/75/21 and patient was placed back on NIPPV. Afebrile, hemodynamically stable, sinus tachycardia HR low 100's. Patient was given 3 duonebs, Zosyn, Vancomycin, and Azithromycin empirically. CXR with dense RL consolidation and R pleural effusion. ICU consult was called for hypercapnic hypoxemic respiratory failure. A right thoracentesis was performed, drained ~250cc dark straw fluid. Patient was admitted to ICU for further management. (20 Apr 2019 15:53)  Appreciate above, confirmed accuracy with patient.  Consult called for SOB and ?NSVT.  Patient with PMH HTN, COPD/Emphysema, PNA, HLD, PPM for SSS, HFrEF 45%.  Patient reports since friday increased SOB, general malaise, mild non-productive cough, similar to how he felt with last pneumonia.   Patient PPM interrogation revealed episodes of Nonsustained VT,asymptomatic.       Problem/Recommendation - 1:  Problem: Dyspnea. Recommendation: - multifactorial, likely due to Pneumonia complicated by COPD, HFrEF  - c/w current medical management.     Problem/Recommendation - 2:  ·  Problem: Arrhythmia. - NSVT  - Patient needs Cardiac Cath risks and benefits discussed with the patient and his wife who was at bedside  - Patient is trying to get his last Cath in [place], we have in the office to get the report and it was unsuccessful.  - For Right and Left heart Cath.   - Not able to use Beta blockers because of COPD   Problem/Recommendation - 3:  ·  Problem: Aortic stenosis, moderate.   - For Right and Left heart Cath on Friday    will follow

## 2019-04-25 NOTE — CONSULT NOTE ADULT - PROBLEM SELECTOR RECOMMENDATION 9
2/2 to progressive COPD  recent decline in functional status.  Pt reports he is very sedentary due to the inability to breath with any type of movement
No drainage at this time per Dr Dominguez  Continue antibiotics  May need decortication  Will follow.
- multifactorial, likely due to Pneumonia complicated by COPD, HFrEF  - c/w current medical management

## 2019-04-25 NOTE — PROGRESS NOTE ADULT - SUBJECTIVE AND OBJECTIVE BOX
[place] [facility]                                                        Office: [address] [address]                                                       Telephone: [phone]. Fax:[phone]                                                                             PROGRESS NOTE    Subjective:  Patient is feeling OK today     Review of symptoms:   Cardiac:  No chest pain. No dyspnea. No palpitations.  Respiratory: + cough. + dyspnea  Gastrointestinal: No diarrhea. No abdominal pain. No bleeding.   Neuro: No focal neuro complaints.      	Vital Signs Last 24 Hrs  T(C): 36.4 (25 Apr 2019 15:14), Max: 36.7 (24 Apr 2019 21:07)  T(F): 97.5 (25 Apr 2019 15:14), Max: 98 (24 Apr 2019 21:07)  HR: 78 (25 Apr 2019 15:14) (67 - 99)  BP: 132/77 (25 Apr 2019 15:14) (132/77 - 158/68)  BP(mean): --  RR: 20 (25 Apr 2019 15:14) (18 - 22)  SpO2: 95% (25 Apr 2019 15:14) (84% - 100%)    Weight (kg): 114.6 (04-23 @ 05:56)    PHYSICAL EXAM:  Appearance: Comfortable. No acute distress  HEENT:  Head and neck: Atraumatic. Normocephalic. , Neck is supple. No JVD,   Neurologic: Alert and awake, Grossly nonfocal.   Lymphatic: No cervical lymphadenopathy  Cardiovascular: Normal S1 S2, No murmurs. No JVD,   Respiratory: Bilateral distant breath sounds, mild wheezing noted  Gastrointestinal:  Soft, Non-tender, + BS  Lower Extremities: No edema  Psychiatry: Patient is calm. No agitation.  Skin: No rashes.    CURRENT MEDICATIONS:    MEDICATIONS  (STANDING):  MEDICATIONS  (STANDING):  ALBUTerol/ipratropium for Nebulization 3 milliLiter(s) Nebulizer every 6 hours  atorvastatin 40 milliGRAM(s) Oral at bedtime  docusate sodium 100 milliGRAM(s) Oral three times a day  enoxaparin Injectable 40 milliGRAM(s) SubCutaneous daily  losartan 25 milliGRAM(s) Oral daily  methylPREDNISolone sodium succinate Injectable 40 milliGRAM(s) IV Push two times a day  pantoprazole  Injectable 40 milliGRAM(s) IV Push daily                            12.9   13.3  )-----------( 199      ( 25 Apr 2019 02:34 )             41.6   N=x    ; L=x        25 Apr 2019 02:34    149    |  106    |  35.0   ----------------------------<  133    5.2     |  35.0   |  0.92     Ca    9.5        25 Apr 2019 02:34  Mg     2.4       25 Apr 2019 02:34    TPro  6.6    /  Alb  2.8    /  TBili  0.3    /  DBili  x      /  AST  11     /  ALT  10     /  AlkPhos  65     25 Apr 2019 02:34      Hepatic panel: 25 Apr 2019 02:34  6.6   | 2.8                            0.3   | 0.3  /x                              11    | 10                                /65   \par                                 TPro  x   /  Alb  2.7<L>  /  TBili  x   /  DBili  x   /  AST  x   /  ALT  x   /  AlkPhos  x   04-23    proBNP: Serum Pro-Brain Natriuretic Peptide: 2829 pg/mL (04-20 @ 11:15)    Lipid Profile:       LIVER FUNCTIONS - ( 23 Apr 2019 06:29 )  Alb: 2.7 g/dL / Pro: x     / ALK PHOS: x     / ALT: x     / AST: x     / GGT: x             TELEMETRY: Reviewed  - No significant arrhythmias    ECG:    < from: TTE Echo Complete w/Doppler (04.21.19 @ 10:38) >  Summary:   1. Technically difficult study.   2. Endocardial visualization was enhanced with intravenous echo contrast.   3. Normal left ventricular internal cavity size.   4. Mildly decreased global left ventricular systolic function.   5. Left ventricular ejection fraction, by visual estimation, is 45 to   50%.   6. There is mild left ventricular hypertrophy.   7. The mitral in-flow pattern reveals no discernable A-wave, therefore   no comment on diastolic function can be made.   8. There is mild aortic root calcification.   9. Peak transaortic gradient equals 37.1 mmHg, mean transaortic gradient   equals 20.6 mmHg, the calculated aortic valve area equals 0.98 cm² by the   continuity equation consistent with moderate aortic stenosis.  10. Thickening and calcification of the anterior and posterior mitral   valve leaflets.    Andres Saravia MD Electronically signed on 4/21/2019 at 4:50:47 PM    < end of copied text >

## 2019-04-25 NOTE — PROGRESS NOTE ADULT - ASSESSMENT
COPD, obesity, severe AS  RLL pneumonia with parapneumonic effusion, not empyema  appears  loculated by CXR and worsening, c/w complicated effusion  needs CT scan chest  T surgery input for potential drainage pending CT scan  abx per ID  remains on nocturnal bipap, needs abg in am to see if qualifies for trilogy  abx per ID, wean medrol, continue nebs

## 2019-04-25 NOTE — PROGRESS NOTE ADULT - SUBJECTIVE AND OBJECTIVE BOX
Internal Medicine Hospitalist - Dr. Zohreh CIFUENTES    [id number]    78y      Male    Patient is a 78y old  Male who presents with a chief complaint of Hypercapnic Respiratory Failure (24 Apr 2019 15:35)    INTERVAL HPI/ OVERNIGHT EVENTS: Patient is seen and examined, improving SOB, on NC 3L, denied chest pain, abd. pain, nausea and vomiting    REVIEW OF SYSTEMS:    Denied fever, chills, abd. pain, nausea, vomiting, chest pain, headache, dizziness    PHYSICAL EXAM:    Vital Signs Last 24 Hrs  T(C): 36.6 (25 Apr 2019 09:28), Max: 36.7 (24 Apr 2019 15:00)  T(F): 97.9 (25 Apr 2019 09:28), Max: 98.1 (24 Apr 2019 15:00)  HR: 82 (25 Apr 2019 09:28) (67 - 99)  BP: 147/83 (25 Apr 2019 09:28) (142/60 - 158/86)  BP(mean): --  RR: 18 (25 Apr 2019 09:28) (18 - 18)  SpO2: 98% (25 Apr 2019 09:28) (97% - 100%)    GENERAL: NAD  CHEST/LUNG: decrease breath sounds over bases  HEART: S1S2+ audible  ABDOMEN: Soft, Nontender, Nondistended; Bowel sounds present  EXTREMITIES:  no edema  CNS: AAO X 3  Psychiatry: normal mood    LABS:                        12.9   13.3  )-----------( 199      ( 25 Apr 2019 02:34 )             41.6     04-25    149<H>  |  106  |  35.0<H>  ----------------------------<  133<H>  5.2   |  35.0<H>  |  0.92    Ca    9.5      25 Apr 2019 02:34  Mg     2.4     04-25    TPro  6.6  /  Alb  2.8<L>  /  TBili  0.3<L>  /  DBili  x   /  AST  11  /  ALT  10  /  AlkPhos  65  04-25            MEDICATIONS  (STANDING):  ALBUTerol/ipratropium for Nebulization 3 milliLiter(s) Nebulizer every 6 hours  atorvastatin 40 milliGRAM(s) Oral at bedtime  docusate sodium 100 milliGRAM(s) Oral three times a day  enoxaparin Injectable 40 milliGRAM(s) SubCutaneous daily  losartan 25 milliGRAM(s) Oral daily  methylPREDNISolone sodium succinate Injectable 40 milliGRAM(s) IV Push two times a day  pantoprazole  Injectable 40 milliGRAM(s) IV Push daily    MEDICATIONS  (PRN):  acetaminophen   Tablet .. 650 milliGRAM(s) Oral every 6 hours PRN Temp greater or equal to 38C (100.4F), Mild Pain (1 - 3)  ALPRAZolam 0.5 milliGRAM(s) Oral two times a day PRN anxiety , especially with BIPAP      RADIOLOGY & ADDITIONAL TEST    < from: Xray Chest 1 View-PORTABLE IMMEDIATE (04.24.19 @ 14:24) >  Impression:    Right pleural effusion and additional opacity at the right lung base,   either atelectasis or pneumonia, slightly increased since 4/20/2019.     < end of copied text >

## 2019-04-26 NOTE — PROGRESS NOTE ADULT - SUBJECTIVE AND OBJECTIVE BOX
s/p R&L cardiac catheterization via RFB and RRA  Verbal report to be finalized with [employer] stated depressed EF, elevated PAP due to moderate PH, and CHING 1.46, non obstructive CAD  Tolerated procedure well  Seen  post procedure by Dr. Bruner who will d/w Dr Casas   Nursing to remove TR band and RBV sheath    REVIEW OF SYSTEMS:  Denies SOB, CP, NV, HA, dizziness, palpitations, site pain    PHYSICAL EXAM: A&Ox3 NAD Skin warm and dry  NEURO: Speech intact +gag +swallow Tongue midline HOWARD  NECK: No JVD, trachea midline. Eupneic  HEART:   PULMONARY:  Diminished kristian w/3L NC O2   ABDOMEN: Soft nontender X4 +BS Vdg  EXTREMITIES: Rt Radial site: Rt radial pulse + w/pulse ox on right index finger SaO2>95% RUE w/oneurovascular deficit. Capillary refill <3 sec  RBV site: No bleed, hematoma, pain, ecchymosis or swelling s/p R&L cardiac catheterization via RFB and RRA  VENTRICLES: Global left ventricular function was severely depressed. EF  estimated was 30 %.  VALVES: AORTIC VALVE: There was moderate aortic stenosis.  CORONARY VESSELS: The coronary circulation is left dominant.  LM:   --  LM: Normal.  LAD:   --  LAD: Normal. The vessel was normal sized, not calcified, and not  tortuous. Angiography showed minor luminal irregularities with no flow  limiting lesions. There was a discrete 30 % stenosis at the ostium of the  vessel segment. The lesion was without evidence of thrombus. There was  PACO grade 3 flow through the vessel (brisk flow).  CX:   --  Circumflex: Normal. The vessel was normal sized, not calcified,  and not tortuous. Angiography showed minor luminal irregularities with no  flow limiting lesions.  RI:   --  Ramus intermedius: Normal. The vessel was normal sized, not  calcified, and not tortuous. Angiography showed minor luminal  irregularities with no flow limiting lesions.  RCA:   --  RCA: Normal. The vessel was normal sized, not calcified, and not  tortuous. Angiography showed minor luminal irregularities with no flow  limiting lesions.  COMPLICATIONS: There were no complications. No complications occurred  during the cath lab visit.  DIAGNOSTIC IMPRESSIONS: There is mild irregularity of the coronary anatomy.  Left ventricular function is abnormal.  LVEF=30%  Moderate Elevation of Right Heart Pressures:  RA=19 mmHg; RV=48/23 mmHg  Moderate Pulmonary Hypertension=56/31 - 37 mmHg  Normal CO (6.04 L/min) and CI (2.55 L/min/m2)  Moderate Aortic Stenosis (CHING=1.46 and AVG=17 mmHg)  Normal Ascending Aorta  DIAGNOSTIC RECOMMENDATIONS: The patient should continue with the present  medications. Patient management should include aggressive medical therapy,  close monitoring of BUN and creatinine, resumption of all previous  activities in 2 days, and a cardiac rehabilitation program. Medical  management is recommended.    Tolerated procedure well  Seen  post procedure by Dr. Bruner who will d/w Dr Casas   Nursing to remove TR band and RBV sheath    REVIEW OF SYSTEMS:  Denies SOB, CP, NV, HA, dizziness, palpitations, site pain    PHYSICAL EXAM: A&Ox3 NAD Skin warm and dry  NEURO: Speech intact +gag +swallow Tongue midline HOWARD  NECK: No JVD, trachea midline. Eupneic  HEART:   PULMONARY:  Diminished kristian w/3L NC O2   ABDOMEN: Soft nontender X4 +BS Vdg  EXTREMITIES: Rt Radial site: Rt radial pulse + w/pulse ox on right index finger SaO2>95% RUE w/oneurovascular deficit. Capillary refill <3 sec  RBV site: No bleed, hematoma, pain, ecchymosis or swelling s/p R&L cardiac catheterization via RFB and RRA  VENTRICLES: Global left ventricular function was severely depressed. EF  estimated was 30 %.  VALVES: AORTIC VALVE: There was moderate aortic stenosis.  CORONARY VESSELS: The coronary circulation is left dominant.  LM:   --  LM: Normal.  LAD:   --  LAD: Normal. The vessel was normal sized, not calcified, and not  tortuous. Angiography showed minor luminal irregularities with no flow  limiting lesions. There was a discrete 30 % stenosis at the ostium of the  vessel segment. The lesion was without evidence of thrombus. There was  PACO grade 3 flow through the vessel (brisk flow).  CX:   --  Circumflex: Normal. The vessel was normal sized, not calcified,  and not tortuous. Angiography showed minor luminal irregularities with no  flow limiting lesions.  RI:   --  Ramus intermedius: Normal. The vessel was normal sized, not  calcified, and not tortuous. Angiography showed minor luminal  irregularities with no flow limiting lesions.  RCA:   --  RCA: Normal. The vessel was normal sized, not calcified, and not  tortuous. Angiography showed minor luminal irregularities with no flow  limiting lesions.  COMPLICATIONS: There were no complications. No complications occurred  during the cath lab visit.  DIAGNOSTIC IMPRESSIONS: There is mild irregularity of the coronary anatomy.  Left ventricular function is abnormal.  LVEF=30%  Moderate Elevation of Right Heart Pressures:  RA=19 mmHg; RV=48/23 mmHg  Moderate Pulmonary Hypertension=56/31 - 37 mmHg  Normal CO (6.04 L/min) and CI (2.55 L/min/m2)  Moderate Aortic Stenosis (CHING=1.46 and AVG=17 mmHg)  Normal Ascending Aorta  DIAGNOSTIC RECOMMENDATIONS: The patient should continue with the present  medications. Patient management should include aggressive medical therapy,  close monitoring of BUN and creatinine, resumption of all previous  activities in 2 days, and a cardiac rehabilitation program. Medical  management is recommended.    Tolerated procedure well  Seen  post procedure by Dr. Bruner who will d/w Dr Casas   Nursing to remove TR band and RBV sheath    REVIEW OF SYSTEMS:  Denies SOB, CP, NV, HA, dizziness, palpitations, site pain    PHYSICAL EXAM: A&Ox3 NAD Skin warm and dry  NEURO: Speech intact +gag +swallow Tongue midline HOWARD  NECK: No JVD, trachea midline. Eupneic  HEART:   PULMONARY:  Diminished kristian w/3L NC O2   ABDOMEN: Soft nontender X4 +BS Vdg  EXTREMITIES: Rt Radial site: Rt radial pulse + w/pulse ox on right index finger SaO2>95% RUE w/oneurovascular deficit. Capillary refill <3 sec  RBV site: No bleed, hematoma, pain, ecchymosis or swelling     Rt buttock with stage I decubiti noted Skin intact but reddened  Protocol ordered

## 2019-04-26 NOTE — DISCHARGE NOTE PROVIDER - HOSPITAL COURSE
s/p R&L cardiac catheterization via RBV and RFA    CONTRAST GIVEN: Omnipaque 92 ml.    MEDICATIONS GIVEN: Fentanyl, 50 mcg, IV. Verapamil (Isoptin, Calan,    Covera), 5 mg, IA. Heparin, 3000 units, IA. 0.9NS, 200 ml, IV.    VENTRICLES: Global left ventricular function was severely depressed. EF    estimated was 30 %.    VALVES: AORTIC VALVE: There was moderate aortic stenosis.    CORONARY VESSELS: The coronary circulation is left dominant.    LM:   --  LM: Normal.    LAD:   --  LAD: Normal. The vessel was normal sized, not calcified, and not    tortuous. Angiography showed minor luminal irregularities with no flow    limiting lesions. There was a discrete 30 % stenosis at the ostium of the    vessel segment. The lesion was without evidence of thrombus. There was    PACO grade 3 flow through the vessel (brisk flow).    CX:   --  Circumflex: Normal. The vessel was normal sized, not calcified,    and not tortuous. Angiography showed minor luminal irregularities with no    flow limiting lesions.    RI:   --  Ramus intermedius: Normal. The vessel was normal sized, not    calcified, and not tortuous. Angiography showed minor luminal    irregularities with no flow limiting lesions.    RCA:   --  RCA: Normal. The vessel was normal sized, not calcified, and not    tortuous. Angiography showed minor luminal irregularities with no flow    limiting lesions.    COMPLICATIONS: There were no complications. No complications occurred    during the cath lab visit.    DIAGNOSTIC IMPRESSIONS: There is mild irregularity of the coronary anatomy.    Left ventricular function is abnormal.    LVEF=30%    Moderate Elevation of Right Heart Pressures:    RA=19 mmHg; RV=48/23 mmHg    Moderate Pulmonary Hypertension=56/31 - 37 mmHg    Normal CO (6.04 L/min) and CI (2.55 L/min/m2)    Moderate Aortic Stenosis (CHING=1.46 and AVG=17 mmHg)    Normal Ascending Aorta    DIAGNOSTIC RECOMMENDATIONS: The patient should continue with the present    medications. Patient management should include aggressive medical therapy,    close monitoring of BUN and creatinine, resumption of all previous    activities in 2 days, and a cardiac rehabilitation program. Medical    management is recommended. 78  year old male with a PMH former smoker, COPD, PNA with loculated pleural effusion. s/p Pigtail  on 4/26 in IR. Right VATS decortication on 4/30. Chest tube #2 air leak > now appears resolved. on 5/3 both chest tubes dislodged.

## 2019-04-26 NOTE — PROGRESS NOTE ADULT - PROBLEM SELECTOR PLAN 1
RRA/RBV site care w/instructions to pt  Dr Dominguez and Dr Castillo to follow  Dr Casas for cardiology

## 2019-04-26 NOTE — PROGRESS NOTE ADULT - SUBJECTIVE AND OBJECTIVE BOX
[facility]                                                        Office: [address] [address]                                                       Telephone: [phone]. Fax:[phone]                                                                             PROGRESS NOTE    Subjective:  Patient states that his breathing is better since admission, feels tired     Review of symptoms:  +fatigue  Cardiac:  No chest pain. No dyspnea. No palpitations.  Respiratory: + cough. improved dyspnea  Gastrointestinal: No diarrhea. No abdominal pain. No bleeding.   Neuro: No focal neuro complaints.      	Vital Signs Last 24 Hrs  ICU Vital Signs Last 24 Hrs  T(C): 37 (26 Apr 2019 09:28), Max: 37 (26 Apr 2019 09:28)  T(F): 98.6 (26 Apr 2019 09:28), Max: 98.6 (26 Apr 2019 09:28)  HR: 66 (26 Apr 2019 12:41) (66 - 85)  BP: 121/64 (26 Apr 2019 12:41) (121/64 - 172/80)  BP(mean): --  ABP: --  ABP(mean): --  RR: 22 (26 Apr 2019 12:41) (16 - 22)  SpO2: 98% (26 Apr 2019 12:41) (94% - 100%)    PHYSICAL EXAM:  Appearance: Comfortable. No acute distress  HEENT:  Head and neck: Atraumatic. Normocephalic. , Neck is supple. No JVD,   Neurologic: Alert and awake, Grossly nonfocal.   Lymphatic: No cervical lymphadenopathy  Cardiovascular: Normal S1 S2, 1-2/6 Syst murmurs. No JVD,   Respiratory: Bilateral distant breath sounds,   Gastrointestinal:  Soft, Non-tender, + BS  Lower Extremities: No edema  Psychiatry: Patient is calm. No agitation.  Skin: No rashes.    CURRENT MEDICATIONS:    MEDICATIONS  (STANDING):  MEDICATIONS  (STANDING):  MEDICATIONS  (STANDING):  ALBUTerol/ipratropium for Nebulization 3 milliLiter(s) Nebulizer every 6 hours  atorvastatin 40 milliGRAM(s) Oral at bedtime  docusate sodium 100 milliGRAM(s) Oral three times a day  losartan 25 milliGRAM(s) Oral daily  pantoprazole  Injectable 40 milliGRAM(s) IV Push daily  sodium chloride 0.9%. 150 milliLiter(s) (50 mL/Hr) IV Continuous <Continuous>                                                13.3   16.4  )-----------( 194      ( 26 Apr 2019 06:09 )             43.1   N=x    ; L=x        26 Apr 2019 06:09    146    |  102    |  41.0   ----------------------------<  116    5.4     |  33.0   |  0.84     Ca    9.2        26 Apr 2019 06:09  Mg     2.4       25 Apr 2019 02:34    TPro  6.6    /  Alb  2.8    /  TBili  0.3    /  DBili  x      /  AST  11     /  ALT  10     /  AlkPhos  65     25 Apr 2019 02:34      Hepatic panel: 25 Apr 2019 02:34  6.6   | 2.8                            0.3   | 0.3  /x                              11    | 10                                /65   \par                                 Hepatic panel: 25 Apr 2019 02:34  6.6   | 2.8                            0.3   | 0.3  /x                              11    | 10                                /65   \par                                 TPro  x   /  Alb  2.7<L>  /  TBili  x   /  DBili  x   /  AST  x   /  ALT  x   /  AlkPhos  x   04-23    proBNP: Serum Pro-Brain Natriuretic Peptide: 2829 pg/mL (04-20 @ 11:15)    Lipid Profile:       LIVER FUNCTIONS - ( 23 Apr 2019 06:29 )  Alb: 2.7 g/dL / Pro: x     / ALK PHOS: x     / ALT: x     / AST: x     / GGT: x             TELEMETRY: Reviewed  - No significant arrhythmias    ECG:    < from: TTE Echo Complete w/Doppler (04.21.19 @ 10:38) >  Summary:   1. Technically difficult study.   2. Endocardial visualization was enhanced with intravenous echo contrast.   3. Normal left ventricular internal cavity size.   4. Mildly decreased global left ventricular systolic function.   5. Left ventricular ejection fraction, by visual estimation, is 45 to   50%.   6. There is mild left ventricular hypertrophy.   7. The mitral in-flow pattern reveals no discernable A-wave, therefore   no comment on diastolic function can be made.   8. There is mild aortic root calcification.   9. Peak transaortic gradient equals 37.1 mmHg, mean transaortic gradient   equals 20.6 mmHg, the calculated aortic valve area equals 0.98 cm² by the   continuity equation consistent with moderate aortic stenosis.  10. Thickening and calcification of the anterior and posterior mitral   valve leaflets.    Andres Saravia MD Electronically signed on 4/21/2019 at 4:50:47 PM    < end of copied text >

## 2019-04-26 NOTE — DISCHARGE NOTE PROVIDER - CARE PROVIDER_API CALL
Liseth Casas)  Cardiology; Cardiovascular Disease; Internal Medicine  [address] [place] [address]  Phone: [phone]  Fax: [phone]  Follow Up Time: Liseth Casas)  Cardiology; Cardiovascular Disease; Internal Medicine  [address] [place] [address]  Phone: [phone]  Fax: [phone]  Follow Up Time:     Cam Dominguez)  Surgery; Thoracic Surgery  [address] [place] [address]  Phone: [phone]  Fax: [phone]  Follow Up Time: 1 week

## 2019-04-26 NOTE — PROGRESS NOTE ADULT - ASSESSMENT
Patient with complex, loculated right effusion secondary to pneumonia. Still unknown if this represents empyema as no fluid for glucose or pH result available.  WBC's noted in fluid.  At this point surgical drainage advisable.     Plan:  1.Thoracic surgery aware  2.Continue bronchodilators  3.Titrate O2  4.If develops fever>reinstitute antibiotics.

## 2019-04-26 NOTE — PROGRESS NOTE ADULT - SUBJECTIVE AND OBJECTIVE BOX
NPO>4 hours  IR had drainage of pleural effusion via Rt posterior Pigtail to closed drainage  Pre-Op, Post-Op and Procedure Selector:  ·  PRE-OP DIAGNOSIS:  Pleural effusion, right 26-Apr-2019 12:19:08  Tin Acuna  ·  POST-OP DIAGNOSIS:  Pleural effusion, right 26-Apr-2019 12:19:23  Tin Acuna  ·  PROCEDURES:  Insertion of right chest tube with ultrasound guidance 26-Apr-2019 12:18:57  Tin Acuna      Operative Findings:  · Operative Findings	right 8 fr pigtail catheter placed.  30 cc debris laden yellow fluid drained.	  ASA 3  Mallampati 2   GFR>60  BR 1.0%     Problem/Recommendation - 2:  ·  Problem: Arrhythmia. - NSVT  - Patient needs Cardiac Cath risks and benefits discussed with the patient and his wife who was at bedside  - Patient is trying to get his last Cath in [place], we have in the office to get the report and it was unsuccessful.  - For Right and Left heart Cath.   - Not able to use Beta blockers because of COPD   Problem/Recommendation - 3:  ·  Problem: Aortic stenosis, moderate.   - For Right and Left heart Cath on Friday        REVIEW OF SYSTEMS:  Denies SOB, CP, NV, HA, dizziness, palpitations, site pain  PHYSICAL EXAM: A&Ox3 NAD Skin warm and dry  NEURO: Speech intact +gag +swallow Tongue midline HOWARD  NECK: No JVD, trachea midline. Eupneic  CHEST:  Rt posterior pigtail to sealed drainage no noted drainage  HEART: SR /PACs S1S2  PULMONARY:  CTA kristian 3L NC in place moist non productive cough noted  ABDOMEN: Soft nontender X4 +BS NPO>4 hours  IR had drainage of pleural effusion via Rt posterior Pigtail to closed drainage  Pre-Op, Post-Op and Procedure Selector:  ·  PRE-OP DIAGNOSIS:  Pleural effusion, right 26-Apr-2019 12:19:08  Tin Acuna  ·  POST-OP DIAGNOSIS:  Pleural effusion, right 26-Apr-2019 12:19:23  Tin Acuna  ·  PROCEDURES:  Insertion of right chest tube with ultrasound guidance 26-Apr-2019 12:18:57  Tin Acuna      Operative Findings:  · Operative Findings	right 8 fr pigtail catheter placed.  30 cc debris laden yellow fluid drained.	  ASA 3  Mallampati 2   GFR>60  BR 1.0%     Problem/Recommendation - 2:  ·  Problem: Arrhythmia. - NSVT  - Patient needs Cardiac Cath risks and benefits discussed with the patient and his wife who was at bedside  - Patient is trying to get his last Cath in [place], we have in the office to get the report and it was unsuccessful.  - For Right and Left heart Cath.   - Not able to use Beta blockers because of COPD   Problem/Recommendation - 3:  ·  Problem: Aortic stenosis, moderate.   - For Right and Left heart Cath on Friday        REVIEW OF SYSTEMS:  Denies SOB, CP, NV, HA, dizziness, palpitations, site pain  PHYSICAL EXAM: A&Ox3 NAD Skin warm and dry  NEURO: Speech intact +gag +swallow Tongue midline HOWARD  NECK: No JVD, trachea midline. Eupneic  CHEST:  Rt posterior pigtail to sealed drainage no noted drainage  HEART: SR /PACs S1S2  PULMONARY:  CTA jimmy 3L NC in place moist non productive cough noted  ABDOMEN: Soft nontender X4 +BS   EXT Jimmy edema 1+

## 2019-04-26 NOTE — PROGRESS NOTE ADULT - ASSESSMENT
77 y/o male with PMH of COPD, PPM, HTN, HLD, lumbar spinal stenosis who came to the ED  with progressive shortness of breath over the past few days. In the ED, was found to have WBC 29.7, serum bicarb 9.0, anion gap 21, lactate 2.9. ABG 7.19/64/118/20, started on NIPPV. Patient was uncomfortable on BiPAP, transitioned to 40% Venti-mask After 45 minutes of VM, repeat ABG 7.18/68/75/21 and patient was placed back on NIPPV. Afebrile, hemodynamically stable, sinus tachycardia HR low 100's. Patient was given 3 duonebs, Zosyn, Vancomycin, and Azithromycin empirically. CXR with dense RL consolidation and R pleural effusion.  A right thoracentesis was performed, drained ~250cc dark straw fluid. ICU consult was called for hypercapnic hypoxemic respiratory failure. Patient was admitted to ICU for further management.  Patient respiratory symptoms had improved, downgraded. CT chest Loculated right pleural effusion. Small foci of air within the effusion  may be secondary to recent intervention,  IR consulted - s/p pigtail 04/26.   appreciate Ct surgery input - IR consulted - s/p pigtail 04/26Pt is noted to have multiple PVC, seen by cardiology, s/p interrogation positive for NSVT, schedule for cath today.     A/P      Acute respiratory failure with hypoxia and hypercapnia due to severe COPD - improving  appreciate pulmo input - c/w oxygen NC during day, Bipap qhs and prn  c/w Duoneb, switch IV solumedrol to Po prednisone in am   will probably need home oxygen      Pneumonia complicated by sepsis and parapneumonic effusion, no empyema  appreciate ID and Pulmonary input   completed Azithromycin and Rocephin -  end date 04/26  CT chest Loculated right pleural effusion. Small foci of air within the effusion  may be secondary to recent intervention. If there has been no recent  intervention, this could indicate empyema..   appreciate Ct surgery input - IR consulted - s/p pigtail 04/26      NSVT - Patient PPM interrogation revealed episodes of Nonsustained VT,asymptomat  appreciate cardiology - schedule for cath today  Keep K >4, mag >2    Right pleural effusion   CT chest Loculated right pleural effusion. Small foci of air within the effusion  may be secondary to recent intervention. If there has been no recent  intervention, this could indicate empyema..   appreciate Ct surgery input - IR consulted - s/p pigtail 04/26    SIMONE : improved -normal  Monitor renal function    HTN/HLD  Continue Losartan 25mg  Atorvastatin 40mg     Supportive   DVT prophylaxis: Lovenox 40mg   Diet: DASH      GOALS OF CARE - Full code. palliative  appreciated

## 2019-04-26 NOTE — DISCHARGE NOTE PROVIDER - NSDCFUADDAPPT_GEN_ALL_CORE_FT
ANNA Casas outpt 1-2 weeks Follow up with Dr Casas in 1-2 weeks  Follow up with Dr. Dominguez in 1 week

## 2019-04-26 NOTE — DISCHARGE NOTE PROVIDER - NSDCPNSUBOBJ_GEN_ALL_CORE
Subjective: "Any idea what time I will be going home today?"  Pt. OOB to chair, denies any SOB or chest pain, NAD noted.        VITAL SIGNS    Vital Signs Last 24 Hrs    T(C): 36.4 (19 @ 05:29), Max: 36.7 (19 @ 15:48)    T(F): 97.5 (19 @ 05:29), Max: 98 (19 @ 15:48)    HR: 65 (19 @ 05:29) (65 - 85)    BP: 127/68 (19 @ 05:29) (99/60 - 127/68)    RR: 18 (19 @ 05:29) (18 - 18)    SpO2: 98% (19 @ 05:29) (94% - 99%)  on (O2)                  Telemetry: Sinus Rhythm      LVEF:         MEDICATIONS    acetaminophen   Tablet .. 650 milliGRAM(s) Oral every 6 hours PRN    ALBUTerol/ipratropium for Nebulization 3 milliLiter(s) Nebulizer every 6 hours    ALPRAZolam 0.25 milliGRAM(s) Oral every 8 hours PRN    atorvastatin 40 milliGRAM(s) Oral at bedtime    docusate sodium 100 milliGRAM(s) Oral three times a day    enoxaparin Injectable 40 milliGRAM(s) SubCutaneous daily    losartan 50 milliGRAM(s) Oral daily    melatonin 5 milliGRAM(s) Oral at bedtime    montelukast 10 milliGRAM(s) Oral daily    oxyCODONE    5 mG/acetaminophen 325 mG 1 Tablet(s) Oral every 4 hours PRN    oxyCODONE    5 mG/acetaminophen 325 mG 2 Tablet(s) Oral every 4 hours PRN    pantoprazole  Injectable 40 milliGRAM(s) IV Push daily    piperacillin/tazobactam IVPB. 3.375 Gram(s) IV Intermittent every 8 hours    polyethylene glycol 3350 17 Gram(s) Oral daily    senna 2 Tablet(s) Oral at bedtime    sodium chloride 0.9% lock flush 3 milliLiter(s) IV Push every 8 hours    sodium chloride 0.9%. 1000 milliLiter(s) IV Continuous <Continuous>            PHYSICAL EXAM    General: well nourished, well developed, no acute distress    Neurology: alert and oriented x 3, nonfocal, no gross deficits    Respiratory: clear to auscultation bilaterally    CV: regular rate and rhythm, normal S1, S2    Abdomen: soft, nontender, nondistended, positive bowel sounds, last bowel movement     Extremities: warm, well perfused. no edema. + DP pulses    Incisions: midline sternal incision, + mepilex, c/d/i. sternum stable.    Chest tubes:     Epicardial Wires:    > EPM (settings) / isolated        I&O's Detail        04 May 2019 07:01  -  05 May 2019 07:00    --------------------------------------------------------    IN:    Total IN: 0 mL        OUT:      Voided: 1175 mL    Total OUT: 1175 mL        Total NET: -1175 mL                    Weights:    Daily       Daily Weight in k.3 (05 May 2019 00:00)    Admit Wt: Drug Dosing Weight    Height (cm): 185.42 (2019 12:51)    Weight (kg): 111.1 (2019 12:51)    BMI (kg/m2): 32.3 (2019 12:51)    BSA (m2): 2.35 (2019 12:51)        LABS    -        147<H>  |  101  |  37.0<H>    ----------------------------<  94    3.7   |  36.0<H>  |  1.28        Ca    8.8      05 May 2019 05:52                                           12.1     12.0  )-----------( 182      ( 05 May 2019 05:52 )               40.7                            CAPILLARY BLOOD GLUCOSE                         Today's CXR:        Today's EKG:        PAST MEDICAL & SURGICAL HISTORY:    HLD (hyperlipidemia)    HTN (hypertension)    COPD (chronic obstructive pulmonary disease)    Stenosis of lumbosacral spine    H/O back injury    No significant past surgical history             ASSESSMENT    78y Male was admitted on (Date) from (home/other facility) with        Preop course:        On (Date), pt underwent Bronchoscopy, flexible, adult    Total decortication of right lung    Insertion of right chest tube with ultrasound guidance            Postoperative Course/Issues: Subjective: "Any idea what time I will be going home today?"  Pt. OOB to chair, denies any SOB or chest pain, NAD noted.        VITAL SIGNS    Vital Signs Last 24 Hrs    T(C): 36.4 (19 @ 05:29), Max: 36.7 (19 @ 15:48)    T(F): 97.5 (19 @ 05:29), Max: 98 (19 @ 15:48)    HR: 65 (19 @ 05:29) (65 - 85)    BP: 127/68 (19 @ 05:29) (99/60 - 127/68)    RR: 18 (19 @ 05:29) (18 - 18)    SpO2: 98% (19 @ 05:29) (94% - 99%)  on (O2)                  Telemetry: Sinus Rhythm          MEDICATIONS    acetaminophen   Tablet .. 650 milliGRAM(s) Oral every 6 hours PRN    ALBUTerol/ipratropium for Nebulization 3 milliLiter(s) Nebulizer every 6 hours    ALPRAZolam 0.25 milliGRAM(s) Oral every 8 hours PRN    atorvastatin 40 milliGRAM(s) Oral at bedtime    docusate sodium 100 milliGRAM(s) Oral three times a day    enoxaparin Injectable 40 milliGRAM(s) SubCutaneous daily    losartan 50 milliGRAM(s) Oral daily    melatonin 5 milliGRAM(s) Oral at bedtime    montelukast 10 milliGRAM(s) Oral daily    oxyCODONE    5 mG/acetaminophen 325 mG 1 Tablet(s) Oral every 4 hours PRN    oxyCODONE    5 mG/acetaminophen 325 mG 2 Tablet(s) Oral every 4 hours PRN    pantoprazole  Injectable 40 milliGRAM(s) IV Push daily    piperacillin/tazobactam IVPB. 3.375 Gram(s) IV Intermittent every 8 hours    polyethylene glycol 3350 17 Gram(s) Oral daily    senna 2 Tablet(s) Oral at bedtime    sodium chloride 0.9% lock flush 3 milliLiter(s) IV Push every 8 hours    sodium chloride 0.9%. 1000 milliLiter(s) IV Continuous <Continuous>            PHYSICAL EXAM    General: well nourished, well developed, no acute distress    Neurology: alert and oriented x 3, nonfocal, no gross deficits    Respiratory: clear to auscultation bilaterally    CV: regular rate and rhythm, normal S1, S2    Abdomen: soft, nontender, nondistended, positive bowel sounds, last bowel movement     Extremities: warm, well perfused. no edema. + DP pulses    Incisions: midline sternal incision, + mepilex, c/d/i. sternum stable.    Chest tubes:     Epicardial Wires:    > EPM (settings) / isolated        I&O's Detail        04 May 2019 07:01  -  05 May 2019 07:00    --------------------------------------------------------    IN:    Total IN: 0 mL        OUT:      Voided: 1175 mL    Total OUT: 1175 mL        Total NET: -1175 mL                    Weights:    Daily       Daily Weight in k.3 (05 May 2019 00:00)    Admit Wt: Drug Dosing Weight    Height (cm): 185.42 (2019 12:51)    Weight (kg): 111.1 (2019 12:51)    BMI (kg/m2): 32.3 (2019 12:51)    BSA (m2): 2.35 (2019 12:51)        LABS    05-        147<H>  |  101  |  37.0<H>    ----------------------------<  94    3.7   |  36.0<H>  |  1.28        Ca    8.8      05 May 2019 05:52                                           12.1     12.0  )-----------( 182      ( 05 May 2019 05:52 )               40.7                            CAPILLARY BLOOD GLUCOSE                         Today's CXR:        Today's EKG:        PAST MEDICAL & SURGICAL HISTORY:    HLD (hyperlipidemia)    HTN (hypertension)    COPD (chronic obstructive pulmonary disease)    Stenosis of lumbosacral spine    H/O back injury    No significant past surgical history             ASSESSMENT    78y Male was admitted on (Date) from (home/other facility) with        Preop course:        On (Date), pt underwent Bronchoscopy, flexible, adult    Total decortication of right lung    Insertion of right chest tube with ultrasound guidance            Postoperative Course/Issues: Subjective: "You have to ask my wife about the oxygen at home."  Pt. OOB to chair, denies any SOB or chest pain.  SPO2 87% at rest on room air.  Pt. reports having supplemental oxygen which was prescribed by Dr. Schneider.        VITAL SIGNS    Vital Signs Last 24 Hrs    T(C): 36.6 (19 @ 10:35), Max: 37.1 (19 @ 11:42)    T(F): 97.8 (19 @ 10:35), Max: 98.8 (19 @ 11:42)    HR: 85 (19 @ 10:35) (46 - 95)    BP: 100/55 (19 @ 10:35) (100/55 - 152/86)    RR: 20 (19 @ 10:35) (16 - 20)    SpO2: 97% (19 @ 10:35) (85% - 99%)  on (O2)                  Telemetry:  Sinus rhythm    LVEF: 35%        MEDICATIONS    acetaminophen   Tablet .. 650 milliGRAM(s) Oral every 6 hours PRN    ALBUTerol/ipratropium for Nebulization 3 milliLiter(s) Nebulizer every 6 hours    ALPRAZolam 0.25 milliGRAM(s) Oral every 8 hours PRN    atorvastatin 40 milliGRAM(s) Oral at bedtime    docusate sodium 100 milliGRAM(s) Oral three times a day    enoxaparin Injectable 40 milliGRAM(s) SubCutaneous daily    losartan 50 milliGRAM(s) Oral daily    melatonin 5 milliGRAM(s) Oral at bedtime    montelukast 10 milliGRAM(s) Oral daily    oxyCODONE    5 mG/acetaminophen 325 mG 1 Tablet(s) Oral every 4 hours PRN    oxyCODONE    5 mG/acetaminophen 325 mG 2 Tablet(s) Oral every 4 hours PRN    pantoprazole    Tablet 40 milliGRAM(s) Oral before breakfast    polyethylene glycol 3350 17 Gram(s) Oral daily    predniSONE   Tablet 10 milliGRAM(s) Oral once    senna 2 Tablet(s) Oral at bedtime    sodium chloride 0.9% lock flush 3 milliLiter(s) IV Push every 8 hours            PHYSICAL EXAM    General: well nourished, well developed, no acute distress    Neurology: alert and oriented x 3, nonfocal, no gross deficits    Respiratory: Diminished at the right base.     CV: regular rate and rhythm, normal S1, S2    Abdomen: soft, nontender, nondistended, positive bowel sounds, last bowel movement 19.    Extremities: warm, well perfused. Trace edema x2BLE. + DP pulses    Incisions: Right thoracotomy incision CDI with suture in place, no drainage noted.            I&O's Detail        05 May 2019 07:01  -  06 May 2019 07:00    --------------------------------------------------------    IN:      Solution: 200 mL    Total IN: 200 mL        OUT:      Voided: 1200 mL    Total OUT: 1200 mL        Total NET: -1000 mL                    Weights:    Daily       Daily Weight in k.5 (06 May 2019 01:00)    Admit Wt: Drug Dosing Weight    Height (cm): 185.42 (2019 12:51)    Weight (kg): 111.1 (2019 12:51)    BMI (kg/m2): 32.3 (2019 12:51)    BSA (m2): 2.35 (2019 12:51)        LABS            143  |  97<L>  |  33.0<H>    ----------------------------<  90    4.1   |  37.0<H>  |  0.87        Ca    8.7      06 May 2019 08:02    Phos  2.8     -    Mg     2.2     -        TPro  6.0<L>  /  Alb  2.8<L>  /  TBili  0.3<L>  /  DBili  x   /  AST  17  /  ALT  15  /  AlkPhos  60                                         11.6     12.2  )-----------( 192      ( 06 May 2019 08:02 )               38.8                        Bilirubin Total, Serum: 0.3 mg/dL ( @ 08:02)        CAPILLARY BLOOD GLUCOSE                         Today's CXR:< from: Xray Chest 1 View- PORTABLE-Routine (19 @ 06:45) >        EXAM:  XR CHEST PORTABLE ROUTINE 1V                              PROCEDURE DATE:  2019                  INTERPRETATION:  TECHNIQUE: Single portable view of the chest.        COMPARISON: 2019        CLINICAL HISTORY: s/p VATS        FINDINGS:        Single frontal view of the chest demonstrates small right lower lobe     effusion/atelectasis, unchanged. The cardiomediastinal silhouette is     enlarged. No acute osseous abnormalities. Overlying EKG leads and wires     are noted. Left-sided dual-chamber pacemaker        IMPRESSION: No interval change.        SURY CATES M.D., ATTENDING RADIOLOGIST    This document has been electronically signed. May  5 2019 12:07PM        < end of copied text >        PAST MEDICAL & SURGICAL HISTORY:    HLD (hyperlipidemia)    HTN (hypertension)    COPD (chronic obstructive pulmonary disease)    Stenosis of lumbosacral spine    H/O back injury    No significant past surgical history        78  year old male with a PMH former smoker, COPD, PNA with loculated pleural effusion. s/p Pigtail  on  in IR. Right VATS decortication on . Chest tube #2 air leak > now appears resolved. on 5/3 both chest tubes dislodged.        Problem/Plan - 1:    ·  Problem: Pleural effusion on right.  Plan: s/p Right VATS decortication on     Encourage the use of I/S, CDBE, chest PT, OOB to chair, daily PT, & duonebs    Unable to discharge to home today, home oxygen to be arranged tomorrow.    Continue Prednisone.    Follow up as out patient with Dr. Castillo.    Discharge to home when supplemental oxygen set up    Discussed plan with Dr. Dominguez & CTS in morning rounds.         Problem/Plan - 2:    ·  Problem: Pneumonia.  Plan: Zosyn completed today as per Dr. Dominguez    Maintain SPO2>90%, will discharge to home on supplemental home oxygen.    Continue Prednisone     Continue Duonebs.         Problem/Plan - 3:    ·  Problem: HTN (hypertension).  Plan: Continue DASh diet    Continue Cozaar.         Problem/Plan - 4:    ·  Problem: HLD (hyperlipidemia).  Plan: Continue DASH diet    Continue Lipitor.         Problem/Plan - 5:    ·  Problem: COPD (chronic obstructive pulmonary disease).  Plan: Continue Duonebs & montelukast    Continue Prednisone taper    Follow up as out patient with Dr. Luna.         Problem/Plan - 6:    Problem: Prophylactic measure. Plan: Continue Protonix for GI prophylaxis    Continue Lovenox for DVT prophylaxis.

## 2019-04-26 NOTE — CHART NOTE - NSCHARTNOTEFT_GEN_A_CORE
Vascular & Interventional Radiology Pre-Procedure Note    Procedure Name: ____right pigtail chest tube insertion___    HPI: 78y Male with ___right loculated pleural effusion____    Allergies:   Medications (Abx/Cardiac/Anticoagulation/Blood Products)    cefTRIAXone   IVPB: 100 mL/Hr IV Intermittent (04-25 @ 09:32)  cefTRIAXone   IVPB: 100 mL/Hr IV Intermittent (04-26 @ 05:50)  enoxaparin Injectable: 40 milliGRAM(s) SubCutaneous (04-25 @ 21:02)  labetalol Injectable: 10 milliGRAM(s) IV Push (04-25 @ 20:38)  losartan: 25 milliGRAM(s) Oral (04-26 @ 05:49)    Data:    T(C): 37  HR: 68  BP: 154/63  RR: 16  SpO2: 98%    Exam  General: ___wnl____  Chest: ___wnl____  Abdomen: __wnl____  Extremities: __wnl_____    -WBC 16.4 / HgB 13.3 / Hct 43.1 / Plt 194  -Na 146 / Cl 102 / BUN 41.0 / Glucose 116  -K 5.4 / CO2 33.0 / Cr 0.84  -ALT -- / Alk Phos -- / T.Bili --    Imaging: ___loculated right effusion____    Plan:   -78y Male presents for ____right chest tube insertion___  -Risks/Benefits/alternatives explained with the patient and/or healthcare proxy and witnessed informed consent obtained.

## 2019-04-26 NOTE — DISCHARGE NOTE PROVIDER - PROVIDER TOKENS
PROVIDER:[TOKEN:[[id number]]] PROVIDER:[TOKEN:[[id number]]],PROVIDER:[TOKEN:[[id number]],FOLLOWUP:[1 week]]

## 2019-04-26 NOTE — DISCHARGE NOTE PROVIDER - NSDCFUADDINST_GEN_ALL_CORE_FT
Restricted use with no heavy lifting of affected arm for 48 hours.  No submerging the arm in water for 48 hours.  You may start showering today.  Call your doctor for any bleeding, swelling, loss of sensation in the hand or fingers, or fingers turning blue.  If heavy bleeding or large lumps form, hold pressure at the spot and come to the Emergency Room.  REMOVE RIGHT BRACHIAL AND RADIAL DRESSING WITHIN 24 HOURS OF DISCHARGE

## 2019-04-26 NOTE — PROGRESS NOTE ADULT - SUBJECTIVE AND OBJECTIVE BOX
PULMONARY PROGRESS NOTE      [id number]    Patient is a 78y old  Male who presents with a chief complaint of Hypercapnic Respiratory Failure (26 Apr 2019 13:37)      INTERVAL HPI/OVERNIGHT EVENTS:  S/P cath >no CAD, moderate AS confirmed  Attempt at "tube" drainage unsuccessful with 30 cc of essentially debris removed  Remains with dyspnea  Doesn't tolerate NIV    MEDICATIONS  (STANDING):  acetaminophen  IVPB .. 1000 milliGRAM(s) IV Intermittent once  ALBUTerol/ipratropium for Nebulization 3 milliLiter(s) Nebulizer every 6 hours  atorvastatin 40 milliGRAM(s) Oral at bedtime  docusate sodium 100 milliGRAM(s) Oral three times a day  losartan 25 milliGRAM(s) Oral daily  pantoprazole  Injectable 40 milliGRAM(s) IV Push daily  sodium chloride 0.9%. 150 milliLiter(s) (50 mL/Hr) IV Continuous <Continuous>      MEDICATIONS  (PRN):  acetaminophen   Tablet .. 650 milliGRAM(s) Oral every 6 hours PRN Temp greater or equal to 38C (100.4F), Mild Pain (1 - 3)  ALPRAZolam 0.5 milliGRAM(s) Oral two times a day PRN anxiety , especially with BIPAP      Allergies    No Known Allergies    Intolerances        PAST MEDICAL & SURGICAL HISTORY:  HLD (hyperlipidemia)  HTN (hypertension)  COPD (chronic obstructive pulmonary disease)  Stenosis of lumbosacral spine  H/O back injury  No significant past surgical history      SOCIAL HISTORY  Smoking History:       REVIEW OF SYSTEMS:    CONSTITUTIONAL:  No distress    HEENT:  Eyes:  No diplopia or blurred vision. ENT:  No earache, sore throat or runny nose.    CARDIOVASCULAR:  No pressure, squeezing, tightness, heaviness or aching about the chest; no palpitations.    RESPIRATORY: Dry cough, dyspnea    GASTROINTESTINAL:  No nausea, vomiting or diarrhea.    GENITOURINARY:  No dysuria, frequency or urgency.    MUSCULOSKELETAL:  No joint pain    SKIN:  No new lesions.    NEUROLOGIC:  No paresthesias, fasciculations, seizures or weakness.    PSYCHIATRIC:  No disorder of thought or mood.    ENDOCRINE:  No heat or cold intolerance, polyuria or polydipsia.    HEMATOLOGICAL:  No easy bruising or bleeding.     Vital Signs Last 24 Hrs  T(C): 37 (26 Apr 2019 09:28), Max: 37 (26 Apr 2019 09:28)  T(F): 98.6 (26 Apr 2019 09:28), Max: 98.6 (26 Apr 2019 09:28)  HR: 67 (26 Apr 2019 15:31) (66 - 85)  BP: 140/81 (26 Apr 2019 15:31) (121/64 - 172/80)  BP(mean): --  RR: 22 (26 Apr 2019 15:31) (16 - 22)  SpO2: 98% (26 Apr 2019 15:31) (94% - 100%)    PHYSICAL EXAMINATION:    GENERAL: The patient is awake and alert in no apparent distress.     HEENT: Head is normocephalic and atraumatic. Extraocular muscles are intact. Mucous membranes are moist.    NECK: Supple.    LUNGS: Decreased right hemithorax otherwise clear    HEART: Regular rate and rhythm without murmur.    ABDOMEN: Soft, nontender, and nondistended.      EXTREMITIES: Without any cyanosis, clubbing, rash, lesions; trace edema.    NEUROLOGIC: Grossly intact.    SKIN: No ulceration or induration present.      LABS:                        13.3   16.4  )-----------( 194      ( 26 Apr 2019 06:09 )             43.1     04-26    146<H>  |  102  |  41.0<H>  ----------------------------<  116<H>  5.4<H>   |  33.0<H>  |  0.84    Ca    9.2      26 Apr 2019 06:09  Mg     2.4     04-25    TPro  6.6  /  Alb  2.8<L>  /  TBili  0.3<L>  /  DBili  x   /  AST  11  /  ALT  10  /  AlkPhos  65  04-25                        MICROBIOLOGY:    RADIOLOGY & ADDITIONAL STUDIES:

## 2019-04-26 NOTE — DISCHARGE NOTE PROVIDER - NSDCCPCAREPLAN_GEN_ALL_CORE_FT
PRINCIPAL DISCHARGE DIAGNOSIS  Diagnosis: Pleural effusion, right  Assessment and Plan of Treatment: s/p Right VATS decortication; Maintain SP2>90%, will require supplemental oxygen

## 2019-04-26 NOTE — PROGRESS NOTE ADULT - ASSESSMENT
A/P:  77 yo male, PMHx COPD, PPM, HTN, HLD, lumbar spinal stenosis, BIBA with progressive shortness of breath over the past few days. Patient notes general malaise for the past two weeks. Today, he complained to his wife of a "pulled muscle" on the right side of his back with increasing shortness of breath prompting him to call 911. His wife states today he sounded "gurgly" and was unable to catch his breath. Patient denies fever or chills, chest pain, n/v/d. Upon EMS arrival, patient had SpO2 83% started on 100% NRB with improvement to 96%. Upon arrival to ED, was found to have WBC 29.7, serum bicarb 9.0, anion gap 21, lactate 2.9. ABG 7.19/64/118/20, started on NIPPV. Patient was uncomfortable on BiPAP, transitioned to 40% ventimask. After 45 minutes of VM, repeat ABG 7.18/68/75/21 and patient was placed back on NIPPV. Afebrile, hemodynamically stable, sinus tachycardia HR low 100's. Patient was given 3 duonebs, Zosyn, Vancomycin, and Azithromycin empirically. CXR with dense RL consolidation and R pleural effusion. ICU consult was called for hypercapnic hypoxemic respiratory failure. A right thoracentesis was performed, drained ~250cc dark straw fluid. Patient was admitted to ICU for further management. (20 Apr 2019 15:53)  Appreciate above, confirmed accuracy with patient.  Consult called for SOB and ?NSVT.  Patient with PMH HTN, COPD/Emphysema, PNA, HLD, PPM for SSS, HFrEF 45%.  Patient reports since friday increased SOB, general malaise, mild non-productive cough, similar to how he felt with last pneumonia.   Patient PPM interrogation revealed episodes of Nonsustained VT,asymptomatic.       Problem/Recommendation - 1:  Problem: Dyspnea. Recommendation: - multifactorial, likely due to Pneumonia complicated by COPD, HFrEF  - c/w current medical management.     Problem/Recommendation - 2:  ·  Problem: Arrhythmia. - NSVT  - Patient needs Cardiac Cath - For Right and Left heart Cath.   - Not able to use Beta blockers because of COPD   Problem/Recommendation - 3:  ·  Problem: Aortic stenosis, moderate.   - For Right and Left heart Cath today      If Cath shows no significant CAD, will Sign off.    Patient to f/u with me as outpatient in the office

## 2019-04-26 NOTE — DISCHARGE NOTE PROVIDER - NSDCACTIVITY_GEN_ALL_CORE
Do not make important decisions/Stairs allowed/Walking - Outdoors allowed/Do not drive or operate machinery/No restrictions/Walking - Indoors allowed/No heavy lifting/straining

## 2019-04-26 NOTE — PROGRESS NOTE ADULT - SUBJECTIVE AND OBJECTIVE BOX
Internal Medicine Hospitalist - Dr. Zohreh CIFUENTES    [id number]    78y      Male    Patient is a 78y old  Male who presents with a chief complaint of Hypercapnic Respiratory Failure (25 Apr 2019 19:13)    INTERVAL HPI/ OVERNIGHT EVENTS: Patient is seen and examined, cont. to report mild SOB, afebrile, denied chest pain, abd. pain, nausea and vomiting     REVIEW OF SYSTEMS:    Denied fever, chills, abd. pain, nausea, vomiting, chest pain, headache, dizziness    PHYSICAL EXAM:    Vital Signs Last 24 Hrs  T(C): 37 (26 Apr 2019 09:28), Max: 37 (26 Apr 2019 09:28)  T(F): 98.6 (26 Apr 2019 09:28), Max: 98.6 (26 Apr 2019 09:28)  HR: 68 (26 Apr 2019 10:06) (68 - 85)  BP: 154/63 (26 Apr 2019 09:28) (132/77 - 172/80)  BP(mean): --  RR: 16 (26 Apr 2019 09:28) (16 - 22)  SpO2: 98% (26 Apr 2019 10:06) (84% - 100%)    GENERAL: NAD  HEENT: EOMI  Neck: supple  CHEST/LUNG: decrease breath sounds over R lung base  HEART: S1S2+ audible  ABDOMEN: Soft, Nontender, Nondistended; Bowel sounds present  EXTREMITIES:  no edema  CNS: AAO X 3  Psychiatry: normal mood    LABS:                        13.3   16.4  )-----------( 194      ( 26 Apr 2019 06:09 )             43.1     04-26    146<H>  |  102  |  41.0<H>  ----------------------------<  116<H>  5.4<H>   |  33.0<H>  |  0.84    Ca    9.2      26 Apr 2019 06:09  Mg     2.4     04-25    TPro  6.6  /  Alb  2.8<L>  /  TBili  0.3<L>  /  DBili  x   /  AST  11  /  ALT  10  /  AlkPhos  65  04-25            MEDICATIONS  (STANDING):  ALBUTerol/ipratropium for Nebulization 3 milliLiter(s) Nebulizer every 6 hours  atorvastatin 40 milliGRAM(s) Oral at bedtime  docusate sodium 100 milliGRAM(s) Oral three times a day  losartan 25 milliGRAM(s) Oral daily  pantoprazole  Injectable 40 milliGRAM(s) IV Push daily    MEDICATIONS  (PRN):  acetaminophen   Tablet .. 650 milliGRAM(s) Oral every 6 hours PRN Temp greater or equal to 38C (100.4F), Mild Pain (1 - 3)  ALPRAZolam 0.5 milliGRAM(s) Oral two times a day PRN anxiety , especially with BIPAP      RADIOLOGY & ADDITIONAL TEST    < from: CT Chest No Cont (04.25.19 @ 16:36) >  IMPRESSION:     Loculated right pleural effusion. Small foci of air within the effusion   may be secondary to recent intervention. If there has been no recent   intervention, this could indicate empyema..     < end of copied text >

## 2019-04-27 NOTE — PROGRESS NOTE ADULT - ASSESSMENT
Imp--COPD, loculated effusion.  Low lvef on cath. ?new  Plan--OK from pul POV for VATS.  d/w Dr. Dominguez.

## 2019-04-27 NOTE — PROGRESS NOTE ADULT - SUBJECTIVE AND OBJECTIVE BOX
JOHN CIFUENTES Male 78y [id number]    Patient is a 78y old  Male who presents with a chief complaint of Hypercapnic Respiratory Failure (27 Apr 2019 09:57)      On Service note:  Pt seen/ examined at bedside and charts reviewed, no over night event reported by night staff. Pt OOB to chair, Chest tube in place, reports breathing better, c/o constipation last BM 3-4 days ago.     Review of system:  No fever, chills, nausea, vomiting, headache, dizziness, chest pain, palpitation.      PHYSICAL EXAM:    Vital Signs Last 24 Hrs  T(C): 36.4 (27 Apr 2019 09:00), Max: 36.4 (26 Apr 2019 22:00)  T(F): 97.6 (27 Apr 2019 09:00), Max: 97.6 (26 Apr 2019 22:00)  HR: 72 (27 Apr 2019 09:00) (53 - 76)  BP: 141/68 (27 Apr 2019 09:00) (121/64 - 160/74)  BP(mean): --  RR: 18 (27 Apr 2019 09:00) (18 - 22)  SpO2: 99% (27 Apr 2019 09:00) (94% - 99%)    GENERAL: Pt lying comfortably, NAD.  CHEST/LUNG: Diminished breath sounds R>L, no wheezing or crackles. CT in place+  HEART: S1S2+, Regular rate and rhythm; No murmurs.  ABDOMEN: Soft, Nontender, Nondistended; Bowel sounds present.  Extremities: No LE edema, pulses  NEURO: AAOX3, no focal deficits, no motor r sensory loss.  PSYCH: normal mood.          MEDICATIONS  (STANDING):  ALBUTerol/ipratropium for Nebulization 3 milliLiter(s) Nebulizer every 6 hours  atorvastatin 40 milliGRAM(s) Oral at bedtime  docusate sodium 100 milliGRAM(s) Oral three times a day  losartan 25 milliGRAM(s) Oral daily  pantoprazole  Injectable 40 milliGRAM(s) IV Push daily  polyethylene glycol 3350 17 Gram(s) Oral daily  predniSONE   Tablet 60 milliGRAM(s) Oral daily  senna 2 Tablet(s) Oral at bedtime  sodium chloride 0.9%. 150 milliLiter(s) (50 mL/Hr) IV Continuous <Continuous>    MEDICATIONS  (PRN):  acetaminophen   Tablet .. 650 milliGRAM(s) Oral every 6 hours PRN Temp greater or equal to 38C (100.4F), Mild Pain (1 - 3)  ALPRAZolam 0.5 milliGRAM(s) Oral two times a day PRN anxiety , especially with BIPAP        Labs:  LABS:                        14.1   13.7  )-----------( 213      ( 27 Apr 2019 06:08 )             45.7     04-27    146<H>  |  100  |  40.0<H>  ----------------------------<  82  4.6   |  34.0<H>  |  1.00    Ca    9.2      27 Apr 2019 06:08  Mg     2.2     04-27

## 2019-04-27 NOTE — PROGRESS NOTE ADULT - ASSESSMENT
77 y/o male with PMH of COPD, PPM, HTN, HLD, lumbar spinal stenosis who came to the ED  with progressive shortness of breath over the past few days. In the ED, was found to have WBC 29.7, serum bicarb 9.0, anion gap 21, lactate 2.9. ABG 7.19/64/118/20, started on NIPPV. Patient was uncomfortable on BiPAP, transitioned to 40% Venti-mask After 45 minutes of VM, repeat ABG 7.18/68/75/21 and patient was placed back on NIPPV, CXR with dense RL consolidation and R pleural effusion, A right thoracentesis was performed, drained ~250cc dark straw fluid, empiric Abx given and Pt was admitted to MICU for hypercapnic hypoxemic respiratory failure. Patient respiratory symptoms had improved and downgraded to hospitalist service. Pt was followed by pulmonology,  CT chest showed Loculated right pleural effusion, small foci of air within the effusion may be secondary to recent intervention, IR consulted - s/p pigtail 04/26.   Off note, Pt was also noted to have multiple PVC, seen by cardiology, s/p interrogation positive for NSVT, underwent cardiac cath which showed normal coronaries, LVEF 30%.      >Acute respiratory failure with hypoxia and hypercapnia due to severe COPD:  - Clinically better, on NC O2.   - Pulmonary on board.   - C/w oxygen NC during day, BIPAP qhs and prn  - C/w Duoneb, Prednisone. Will probably need home oxygen      >Pneumonia complicated by sepsis and parapneumonic effusion:  - Completed Azithromycin and Rocephin -  end date 04/26  - ID signed off, pulmonary on board.   - CT chest Loculated right pleural effusion. Small foci of air within the effusion may be secondary to recent intervention. If there has been no recent  intervention, this could indicate empyema..   - S/p IR pigtail 04/26, manage per CT surgery  - CT surgery on board- May need VATS- defer to CT surgery.     >Right loculated effusion- Plan as above.     >NSVT:  - Patient PPM interrogation revealed episodes of Nonsustained VT, asymptomatic.   - S/p Cardiac cath with normal coronaries   - Keep K >4, mag >2    >Newly diagnosed systolic CHF:  - EF 30 % on cardiac cath.   - Coronaries normal  - Check TSH  - C/w ARBs, not using BB due to COPD.  - Cardio on bord.     >Moderate AS- Cardio f/u.   >SIMONE- Resolved.   >Hx HTN /HLD- Continue Losartan 25mg, Atorvastatin 40mg   >DVT ppx- SCD, Lovenox was on hold for pigtail and incase he needs CT surgical procedure.

## 2019-04-27 NOTE — PROGRESS NOTE ADULT - SUBJECTIVE AND OBJECTIVE BOX
PULMONARY PROGRESS NOTE      [id number]    Patient is a 78y old  Male who presents with a chief complaint of Hypercapnic Respiratory Failure (27 Apr 2019 09:05)      INTERVAL HPI/OVERNIGHT EVENTS:s/p cath. clean coronaries.  NAD. no drainage.    MEDICATIONS  (STANDING):  ALBUTerol/ipratropium for Nebulization 3 milliLiter(s) Nebulizer every 6 hours  atorvastatin 40 milliGRAM(s) Oral at bedtime  docusate sodium 100 milliGRAM(s) Oral three times a day  losartan 25 milliGRAM(s) Oral daily  pantoprazole  Injectable 40 milliGRAM(s) IV Push daily  predniSONE   Tablet 60 milliGRAM(s) Oral daily  sodium chloride 0.9%. 150 milliLiter(s) (50 mL/Hr) IV Continuous <Continuous>      MEDICATIONS  (PRN):  acetaminophen   Tablet .. 650 milliGRAM(s) Oral every 6 hours PRN Temp greater or equal to 38C (100.4F), Mild Pain (1 - 3)  ALPRAZolam 0.5 milliGRAM(s) Oral two times a day PRN anxiety , especially with BIPAP      Allergies    No Known Allergies    Intolerances        PAST MEDICAL & SURGICAL HISTORY:  HLD (hyperlipidemia)  HTN (hypertension)  COPD (chronic obstructive pulmonary disease)  Stenosis of lumbosacral spine  H/O back injury  No significant past surgical history      SOCIAL HISTORY  Smoking History:       REVIEW OF SYSTEMS:    CONSTITUTIONAL:  No distress    HEENT:  Eyes:  No diplopia or blurred vision. ENT:  No earache, sore throat or runny nose.    CARDIOVASCULAR:  No pressure, squeezing, tightness, heaviness or aching about the chest; no palpitations.    RESPIRATORY: per hpi    GASTROINTESTINAL:  No nausea, vomiting or diarrhea.    GENITOURINARY:  No dysuria, frequency or urgency.    MUSCULOSKELETAL:  No joint pain    SKIN:  No new lesions.    NEUROLOGIC:  No paresthesias, fasciculations, seizures or weakness.    PSYCHIATRIC:  No disorder of thought or mood.    ENDOCRINE:  No heat or cold intolerance, polyuria or polydipsia.    HEMATOLOGICAL:  No easy bruising or bleeding.     Vital Signs Last 24 Hrs  T(C): 36.4 (27 Apr 2019 05:00), Max: 36.4 (26 Apr 2019 22:00)  T(F): 97.6 (27 Apr 2019 05:00), Max: 97.6 (26 Apr 2019 22:00)  HR: 64 (27 Apr 2019 08:17) (53 - 76)  BP: 141/85 (27 Apr 2019 05:00) (121/64 - 160/74)  BP(mean): --  RR: 18 (27 Apr 2019 05:00) (18 - 22)  SpO2: 99% (27 Apr 2019 08:17) (94% - 99%)    PHYSICAL EXAMINATION:    GENERAL: The patient is awake and alert in no apparent distress.     HEENT: Head is normocephalic and atraumatic. Extraocular muscles are intact. Mucous membranes are moist.    NECK: Supple.    LUNGS: diminished bs, dullness Rt base    HEART: Regular rate and rhythm without murmur.    ABDOMEN: Soft, nontender, and nondistended.      EXTREMITIES: Without any cyanosis, clubbing, rash, lesions or edema.    NEUROLOGIC: Grossly intact.    SKIN: No ulceration or induration present.      LABS:                        14.1   13.7  )-----------( 213      ( 27 Apr 2019 06:08 )             45.7     04-27    146<H>  |  100  |  40.0<H>  ----------------------------<  82  4.6   |  34.0<H>  |  1.00    Ca    9.2      27 Apr 2019 06:08  Mg     2.2     04-27                          MICROBIOLOGY:    RADIOLOGY & ADDITIONAL STUDIES:< from: Xray Chest 1 View AP/PA. (04.26.19 @ 12:20) >    IMPRESSION:   Interval placement of right pigtail catheter and decrease in size of   right pleural effusion. Left lung is clear. Heart size is unremarkable.   No pneumothorax. Left chest wall permanent pacemaker.        < end of copied text >  < from: Cardiac Cath Lab - Adult (04.26.19 @ 13:54) >  DIAGNOSTIC IMPRESSIONS: There is mild irregularity of the coronary anatomy.  Left ventricular function is abnormal.  LVEF=30%  Moderate Elevation of Right Heart Pressures:  RA=19 mmHg; RV=48/23 mmHg  Moderate Pulmonary Hypertension=56/31 - 37 mmHg  Normal CO (6.04 L/min) and CI (2.55 L/min/m2)  Moderate Aortic Stenosis (CHING=1.46 and AVG=17 mmHg)  Normal Ascending Aorta  DIAGNOSTIC RECOMMENDATIONS: The patient should continue with the present  medications. Patient management should include aggressive medical therapy,  close monitoring of BUN and creatinine, resumption of all previous  activities in 2 days, and a cardiac rehabilitation program. Medical  management is recommended.  Prepared and signed by  Lopez Bruner MD    < end of copied text >

## 2019-04-27 NOTE — PROGRESS NOTE ADULT - PROBLEM SELECTOR PLAN 1
s/p pigtail placement   maintain CT today  May need a VATS decortication in the future  DW Dr Dominguez in am rounds

## 2019-04-27 NOTE — CHART NOTE - NSCHARTNOTEFT_GEN_A_CORE
Source: Patient    Current Diet: Nanomech    Patient reports appetite and intake improving     PO intake:  75%    Current Weight:   (4/27) 113kg   (4/21) 116kg    % Weight Change  -- noted 1+ BLLE edema     Pertinent Medications: MEDICATIONS  (STANDING):  ALBUTerol/ipratropium for Nebulization 3 milliLiter(s) Nebulizer every 6 hours  atorvastatin 40 milliGRAM(s) Oral at bedtime  docusate sodium 100 milliGRAM(s) Oral three times a day  losartan 25 milliGRAM(s) Oral daily  pantoprazole  Injectable 40 milliGRAM(s) IV Push daily  polyethylene glycol 3350 17 Gram(s) Oral daily  predniSONE   Tablet 60 milliGRAM(s) Oral daily  senna 2 Tablet(s) Oral at bedtime  sodium chloride 0.9%. 150 milliLiter(s) (50 mL/Hr) IV Continuous <Continuous>    MEDICATIONS  (PRN):  acetaminophen   Tablet .. 650 milliGRAM(s) Oral every 6 hours PRN Temp greater or equal to 38C (100.4F), Mild Pain (1 - 3)  ALPRAZolam 0.5 milliGRAM(s) Oral two times a day PRN anxiety , especially with BIPAP    Pertinent Labs: CBC Full  -  ( 27 Apr 2019 06:08 )  WBC Count : 13.7 K/uL  RBC Count : 5.00 M/uL  Hemoglobin : 14.1 g/dL  Hematocrit : 45.7 %  Platelet Count - Automated : 213 K/uL  Mean Cell Volume : 91.4 fl  Mean Cell Hemoglobin : 28.2 pg  Mean Cell Hemoglobin Concentration : 30.9 g/dL  Na 146, BUN 40     Skin: intact     Nutrition focused physical exam conducted - found signs of malnutrition [ x]absent [ ]present    Subcutaneous fat loss: [ ] Orbital fat pads region, [ ]Buccal fat region, [ ]Triceps region,  [ ]Ribs region    Muscle wasting: [ ]Temples region, [ ]Clavicle region, [ ]Shoulder region, [ ]Scapula region, [ ]Interosseous region,  [ ]thigh region, [ ]Calf region    Estimated Needs:   [ x] no change since previous assessment  [ ] recalculated:     Current Nutrition Diagnosis: (IMPROVING) Malnutrition; Moderate acute related to inadequate protein energy intake in the setting of septic pneumonia and COPD as evidenced by <50% PO intake >5 days, 1+ BLLE edema       Recommendations:   1. Rx: Ensure BID and MVI   2. Continue diet- encourage PO intake     Monitoring and Evaluation:   [x ] PO intake [x ] Tolerance to diet prescription [X] Weights  [X] Follow up per protocol [X] Labs:

## 2019-04-27 NOTE — PROGRESS NOTE ADULT - SUBJECTIVE AND OBJECTIVE BOX
Subjective:  Pt in chair NAD + Pigtail in place with minimal drainage     T(C): 36.7 (04-27-19 @ 15:00), Max: 36.7 (04-27-19 @ 15:00)  HR: 67 (04-27-19 @ 15:10) (53 - 82)  BP: 153/89 (04-27-19 @ 15:00) (137/76 - 160/74)    RR: 19 (04-27-19 @ 15:00) (18 - 20)  SpO2: 98% (04-27-19 @ 15:10) (94% - 99%)      CHEST TUBE:       Rt                        OUTPUT:   25cc  per 24 hours    AIR LEAKS:  [ ] YES [x ] NO          04-27    146<H>  |  100  |  40.0<H>  ----------------------------<  82  4.6   |  34.0<H>  |  1.00    Ca    9.2      27 Apr 2019 06:08  Mg     2.2     04-27                                 14.1   13.7  )-----------( 213      ( 27 Apr 2019 06:08 )             45.7                 CAPILLARY BLOOD GLUCOSE               CXR:  < from: Xray Chest 1 View AP/PA. (04.26.19 @ 12:20) >  Interval placement of right pigtail catheter and decrease in size of   right pleural effusion. Left lung is clear. Heart size is unremarkable.   No pneumothorax. Left chest wall permanent pacemaker.    < end of copied text >          Assessment  Neuro: Alert Awake NAD  Pulm: decreased at bases b/l   CV: RRR S1 S2   Abd:  soft NT ND + BS           Assessment:  78yMale    with PAST MEDICAL & SURGICAL HISTORY:  HLD (hyperlipidemia)  HTN (hypertension)  COPD (chronic obstructive pulmonary disease)  Stenosis of lumbosacral spine  H/O back injury  No significant past surgical history        Plan:

## 2019-04-27 NOTE — PROGRESS NOTE ADULT - SUBJECTIVE AND OBJECTIVE BOX
POST CATH CHECK          acetaminophen   Tablet .. 650 milliGRAM(s) Oral every 6 hours PRN  ALBUTerol/ipratropium for Nebulization 3 milliLiter(s) Nebulizer every 6 hours  ALPRAZolam 0.5 milliGRAM(s) Oral two times a day PRN  atorvastatin 40 milliGRAM(s) Oral at bedtime  docusate sodium 100 milliGRAM(s) Oral three times a day  losartan 25 milliGRAM(s) Oral daily  pantoprazole  Injectable 40 milliGRAM(s) IV Push daily  predniSONE   Tablet 60 milliGRAM(s) Oral daily  sodium chloride 0.9%. 150 milliLiter(s) IV Continuous <Continuous>        04-27    146<H>  |  100  |  40.0<H>  ----------------------------<  82  4.6   |  34.0<H>  |  1.00    Ca    9.2      27 Apr 2019 06:08  Mg     2.2     04-27                            14.1   13.7  )-----------( 213      ( 27 Apr 2019 06:08 )             45.7       T(C): 36.4 (04-27-19 @ 05:00), Max: 37 (04-26-19 @ 09:28)  HR: 64 (04-27-19 @ 08:17) (53 - 76)  BP: 141/85 (04-27-19 @ 05:00) (121/64 - 160/74)  RR: 18 (04-27-19 @ 05:00) (16 - 22)  SpO2: 99% (04-27-19 @ 08:17) (94% - 99%)      PE  Chest  Clear lung fields  Heart s1&s2 regular  Abd  soft active bowel sounds  EXT  No c/c/e  CATH SITE  Neuro  Alert oriented Non focal exam    A/P    ASHD s/p stent  Advised importance of taking antiplatelet agent on a regular basis  Low saturated fat diet discussed  Advised to follow up with Cardiologist within 2 weeks  Groin instructions given

## 2019-04-28 NOTE — PROGRESS NOTE ADULT - PROBLEM SELECTOR PLAN 1
s/p pigtail placement   On am CXR pigtail outside lung space> removed  Plan for possible VATS Tuesday as per Dr Dominguez  DW Dr Dominguez in am rounds

## 2019-04-28 NOTE — PROGRESS NOTE ADULT - ASSESSMENT
77 y/o male with PMH of COPD, PPM, HTN, HLD, lumbar spinal stenosis who came to the ED  with progressive shortness of breath over the past few days. In the ED, was found to have WBC 29.7, serum bicarb 9.0, anion gap 21, lactate 2.9. ABG 7.19/64/118/20, started on NIPPV. Patient was uncomfortable on BiPAP, transitioned to 40% Venti-mask After 45 minutes of VM, repeat ABG 7.18/68/75/21 and patient was placed back on NIPPV, CXR with dense RL consolidation and R pleural effusion, A right thoracentesis was performed, drained ~250cc dark straw fluid, empiric Abx given and Pt was admitted to MICU for hypercapnic hypoxemic respiratory failure. Patient respiratory symptoms had improved and downgraded to hospitalist service. Pt was followed by pulmonology, CT chest showed Loculated right pleural effusion, small foci of air within the effusion may be secondary to recent intervention, IR consulted - s/p pigtail 04/26 and was displaced on 4/28 hence removed by CT surgery. CT surgery planing for VATS.    Off note, Pt was also noted to have multiple PVC, seen by cardiology, s/p interrogation positive for NSVT, underwent cardiac cath which showed normal coronaries, LVEF 30%.      >Acute respiratory failure with hypoxia and hypercapnia due to severe COPD:  - Clinically better, on NC O2. On home o2 prn.    - Pulmonary on board.   - C/w oxygen NC during day, BIPAP qhs and prn  - C/w Duoneb, Prednisone.       >Pneumonia complicated by sepsis and parapneumonic effusion:  - Completed Azithromycin and Rocephin -  end date 04/26  - ID signed off, pulmonary on board.   - CT chest Loculated right pleural effusion. Small foci of air within the effusion may be secondary to recent intervention. If there has been no recent  intervention, this could indicate empyema..   - S/p IR pigtail 04/26, displaced on cxr 4/28 hence removed by CTS.   - CT surgery on board- Plan for possible VATS on Tuesday      >Right loculated effusion- Plan as above.     >NSVT:  - Patient PPM interrogation revealed episodes of Nonsustained VT, asymptomatic.   - S/p Cardiac cath with normal coronaries   - Keep K >4, mag >2    >Newly diagnosed systolic CHF:  - EF 30 % on cardiac cath.   - Coronaries normal  - Check TSH  - C/w ARBs/ Statins, not using BB due to COPD.  - Cardio on bord.     >Moderate AS- Cardio f/u.   >SIMONE- Resolved.   >Hx HTN /HLD- Continue Losartan 25mg, Atorvastatin 40mg   >DVT ppx- SCD, Lovenox was on hold for pigtail and in case he needs CT surgical procedure.

## 2019-04-28 NOTE — PROGRESS NOTE ADULT - SUBJECTIVE AND OBJECTIVE BOX
JOHN CIFUENTES Male 78y [id number]    Patient is a 78y old  Male who presents with a chief complaint of Hypercapnic Respiratory Failure (28 Apr 2019 09:25)      Subjective/objective:  Pt seen and examined at bedside, no over night event reported by night staff. Pt OOB to chair, He reports SOB little better. CXR this AM showed chest tubes is displaced in chest wall, CT surgery followed and CT removed by CTS.      Review of system:  No fever, chills, nausea, vomiting, headache, dizziness, chest pain, palpitation.      PHYSICAL EXAM:    Vital Signs Last 24 Hrs  T(C): 36.4 (28 Apr 2019 05:30), Max: 36.7 (27 Apr 2019 15:00)  T(F): 97.6 (28 Apr 2019 05:30), Max: 98.1 (27 Apr 2019 15:00)  HR: 71 (28 Apr 2019 09:51) (66 - 82)  BP: 142/70 (28 Apr 2019 05:30) (140/82 - 153/89)  BP(mean): --  RR: 18 (28 Apr 2019 05:30) (18 - 19)  SpO2: 98% (28 Apr 2019 09:51) (95% - 99%)    GENERAL: Pt lying comfortably, NAD.  CHEST/LUNG: Diminished breath sounds R>L, no wheezing or crackles. CT removed by CT surgery.  HEART: S1S2+, Regular rate and rhythm; No murmurs.  ABDOMEN: Soft, Nontender, Nondistended; Bowel sounds present.  Extremities: No LE edema, pulses  NEURO: AAOX3, no focal deficits, no motor r sensory loss.  PSYCH: normal mood.          MEDICATIONS  (STANDING):  ALBUTerol/ipratropium for Nebulization 3 milliLiter(s) Nebulizer every 6 hours  atorvastatin 40 milliGRAM(s) Oral at bedtime  docusate sodium 100 milliGRAM(s) Oral three times a day  losartan 25 milliGRAM(s) Oral daily  montelukast 10 milliGRAM(s) Oral daily  pantoprazole  Injectable 40 milliGRAM(s) IV Push daily  polyethylene glycol 3350 17 Gram(s) Oral daily  predniSONE   Tablet 60 milliGRAM(s) Oral daily  senna 2 Tablet(s) Oral at bedtime  sodium chloride 0.9%. 150 milliLiter(s) (50 mL/Hr) IV Continuous <Continuous>    MEDICATIONS  (PRN):  acetaminophen   Tablet .. 650 milliGRAM(s) Oral every 6 hours PRN Temp greater or equal to 38C (100.4F), Mild Pain (1 - 3)  ALPRAZolam 0.5 milliGRAM(s) Oral two times a day PRN anxiety , especially with BIPAP        Labs:  LABS:                        13.7   12.1  )-----------( 168      ( 28 Apr 2019 05:43 )             45.1     04-28    144  |  100  |  34.0<H>  ----------------------------<  85  4.3   |  35.0<H>  |  1.05    Ca    9.2      28 Apr 2019 05:43  Mg     2.2     04-27

## 2019-04-28 NOTE — PROGRESS NOTE ADULT - SUBJECTIVE AND OBJECTIVE BOX
Subjective:  "Think they told me I have to have surgery to fix my lungs"  OOB chair      V/S  T(C): 36.4 (04-28-19 @ 05:30), Max: 36.7 (04-27-19 @ 15:00)  HR: 77 (04-28-19 @ 05:30) (66 - 82)  BP: 142/70 (04-28-19 @ 05:30) (140/82 - 153/89)  RR: 18 (04-28-19 @ 05:30) (18 - 19)  SpO2: 95% (04-28-19 @ 05:30) (95% - 99%)                                                Tele:  SR 90s PACs    CHEST TUBE:       R post pigtail                    OUTPUT:    5  ml         per 24 hours     Drainage:    AIR LEAKS:  [ ] YES [ x] NO      MEDICATIONS  (STANDING):  ALBUTerol/ipratropium for Nebulization 3 milliLiter(s) Nebulizer every 6 hours  atorvastatin 40 milliGRAM(s) Oral at bedtime  docusate sodium 100 milliGRAM(s) Oral three times a day  losartan 25 milliGRAM(s) Oral daily  montelukast 10 milliGRAM(s) Oral daily  pantoprazole  Injectable 40 milliGRAM(s) IV Push daily  polyethylene glycol 3350 17 Gram(s) Oral daily  predniSONE   Tablet 60 milliGRAM(s) Oral daily  senna 2 Tablet(s) Oral at bedtime  sodium chloride 0.9%. 150 milliLiter(s) (50 mL/Hr) IV Continuous <Continuous>      04-28    144  |  100  |  34.0<H>  ----------------------------<  85  4.3   |  35.0<H>  |  1.05    Ca    9.2      28 Apr 2019 05:43  Mg     2.2     04-27                                 13.7   12.1  )-----------( 168      ( 28 Apr 2019 05:43 )             45.1                 CAPILLARY BLOOD GLUCOSE               CXR:< from: Xray Chest 1 View- PORTABLE-Routine (04.28.19 @ 05:29) >   Right-sided chest tube pigtail catheter now appears displaced   and is along the right lateral chest wall. Small right pleural effusion   is noted.   Other findings as noted above.    < end of copied text >        Physical Exam:    Neuro: alert, no deficits    Pulm: diminished R base  Supplemental O2 in place  R pigtail sutured at skin    CV: S1 S2  RRR    Abd:  soft non tender     Extremities: 1+ edema B/L lower extremities                  PAST MEDICAL & SURGICAL HISTORY:  HLD (hyperlipidemia)  HTN (hypertension)  COPD (chronic obstructive pulmonary disease)  Stenosis of lumbosacral spine  H/O back injury  No significant past surgical history

## 2019-04-29 NOTE — PROGRESS NOTE ADULT - SUBJECTIVE AND OBJECTIVE BOX
JOHN CIFUENTES Male 78y [id number]    Patient is a 78y old  Male who presents with a chief complaint of Hypercapnic Respiratory Failure (28 Apr 2019 10:14)      Subjective/objective:  Pt seen and examined at bedside, no over night event reported by night staff. Pt reports SOB better at rest but more worse on walking/ exertion, he has no other complaints. CT surgery plans for VATs tomorrow.     Review of system:  No fever, chills, nausea, vomiting, headache, dizziness, chest pain, SOB or palpitation.      PHYSICAL EXAM:    Vital Signs Last 24 Hrs  T(C): 36.6 (29 Apr 2019 06:08), Max: 36.7 (28 Apr 2019 15:00)  T(F): 97.9 (29 Apr 2019 06:08), Max: 98 (28 Apr 2019 15:00)  HR: 79 (29 Apr 2019 08:16) (71 - 88)  BP: 162/78 (29 Apr 2019 06:08) (123/63 - 162/78)  BP(mean): --  RR: 18 (29 Apr 2019 06:08) (18 - 18)  SpO2: 96% (29 Apr 2019 08:16) (96% - 100%)    GENERAL: Pt lying comfortably, NAD.  CHEST/LUNG: Diminished breath sounds R>L, no wheezing or crackles. CT removed by CT surgery.  HEART: S1S2+, Regular rate and rhythm; No murmurs.  ABDOMEN: Soft, Nontender, Nondistended; Bowel sounds present.  Extremities: No LE edema, pulses  NEURO: AAOX3, no focal deficits, no motor r sensory loss.  PSYCH: normal mood.      MEDICATIONS  (STANDING):  ALBUTerol/ipratropium for Nebulization 3 milliLiter(s) Nebulizer every 6 hours  atorvastatin 40 milliGRAM(s) Oral at bedtime  docusate sodium 100 milliGRAM(s) Oral three times a day  losartan 25 milliGRAM(s) Oral daily  montelukast 10 milliGRAM(s) Oral daily  pantoprazole  Injectable 40 milliGRAM(s) IV Push daily  polyethylene glycol 3350 17 Gram(s) Oral daily  predniSONE   Tablet 60 milliGRAM(s) Oral daily  senna 2 Tablet(s) Oral at bedtime  sodium chloride 0.9%. 150 milliLiter(s) (50 mL/Hr) IV Continuous <Continuous>    MEDICATIONS  (PRN):  acetaminophen   Tablet .. 650 milliGRAM(s) Oral every 6 hours PRN Temp greater or equal to 38C (100.4F), Mild Pain (1 - 3)  ALPRAZolam 0.5 milliGRAM(s) Oral two times a day PRN anxiety , especially with BIPAP        Labs:  LABS:                        12.5   11.4  )-----------( 171      ( 29 Apr 2019 06:08 )             42.0     04-29    146<H>  |  100  |  30.0<H>  ----------------------------<  83  4.3   |  38.0<H>  |  0.91    Ca    8.8      29 Apr 2019 06:08    TPro  6.2<L>  /  Alb  2.7<L>  /  TBili  0.3<L>  /  DBili  x   /  AST  20  /  ALT  15  /  AlkPhos  64  04-29    PT/INR - ( 29 Apr 2019 06:08 )   PT: 12.6 sec;   INR: 1.09 ratio         PTT - ( 29 Apr 2019 06:08 )  PTT:29.4 sec    LIVER FUNCTIONS - ( 29 Apr 2019 06:08 )  Alb: 2.7 g/dL / Pro: 6.2 g/dL / ALK PHOS: 64 U/L / ALT: 15 U/L / AST: 20 U/L / GGT: x

## 2019-04-29 NOTE — PROGRESS NOTE ADULT - SUBJECTIVE AND OBJECTIVE BOX
[facility]                                                        Office: [address] [place] [address]                                                       Telephone: [phone]. Fax:[phone]      CARDIOLOGY PROGRESS NOTE   ([facility]) - covering for Dr. Casas    Subjective: Patient seen and examined. SOB improved compared to admission.  Still with LEGER ambulating to the bathroom.  Awaiting VATs    ROS: No headache, no chest pain, no SOB, no palpitations, no dizziness, no nausea, no bleeding    Chronic Conditions:     CURRENT MEDICATIONS  losartan 25 milliGRAM(s) Oral daily      ALBUTerol/ipratropium for Nebulization 3 milliLiter(s) Nebulizer every 6 hours  montelukast 10 milliGRAM(s) Oral daily    acetaminophen   Tablet .. 650 milliGRAM(s) Oral every 6 hours PRN  ALPRAZolam 0.5 milliGRAM(s) Oral two times a day PRN    docusate sodium 100 milliGRAM(s) Oral three times a day  pantoprazole  Injectable 40 milliGRAM(s) IV Push daily  polyethylene glycol 3350 17 Gram(s) Oral daily  senna 2 Tablet(s) Oral at bedtime    atorvastatin 40 milliGRAM(s) Oral at bedtime    sodium chloride 0.9%. 150 milliLiter(s) IV Continuous <Continuous>    	  TELEMETRY:   Vitals:  T(C): 36.4 (04-29-19 @ 09:25), Max: 36.7 (04-28-19 @ 15:00)  HR: 93 (04-29-19 @ 09:25) (72 - 93)  BP: 130/59 (04-29-19 @ 09:25) (123/63 - 162/78)  RR: 16 (04-29-19 @ 09:25) (16 - 18)  SpO2: 96% (04-29-19 @ 09:25) (96% - 100%)  Wt(kg): --  I&O's Summary    28 Apr 2019 07:01  -  29 Apr 2019 07:00  --------------------------------------------------------  IN: 750 mL / OUT: 2025 mL / NET: -1275 mL    29 Apr 2019 07:01  -  29 Apr 2019 11:01  --------------------------------------------------------  IN: 260 mL / OUT: 125 mL / NET: 135 mL        Daily T(C): 36.4 (04-29-19 @ 09:25), Max: 36.7 (04-28-19 @ 15:00)  HR: 93 (04-29-19 @ 09:25) (72 - 93)  BP: 130/59 (04-29-19 @ 09:25) (123/63 - 162/78)  RR: 16 (04-29-19 @ 09:25) (16 - 18)  SpO2: 96% (04-29-19 @ 09:25) (96% - 100%)  Wt(kg): --    Daily     PHYSICAL EXAM:  Appearance: NAD	  HEENT:   Normal oral mucosa, PERRL, EOMI	  Lymphatic: No lymphadenopathy  Cardiovascular: Normal S1 S2, No JVD, III/VI systolic murmur best heard at the base, 1+ bilateral LE edema  Respiratory: decreased breath sounds throughout, crackles bibasilar  Psychiatry: A & O x 3, Mood & affect appropriate  Gastrointestinal:  Soft, Non-tender, + BS	  Skin: No rashes, No ecchymoses, No cyanosis  Neurologic: Non-focal  Extremities: Normal range of motion, No clubbing, cyanosis  Vascular: Peripheral pulses palpable 2+ bilaterally, warm      ECG (tracing reviewed by me):  	    DIAGNOSTIC TESTING:  Echocardiogram (images reviewed by me):   Catheterization: < from: Cardiac Cath Lab - Adult (04.26.19 @ 13:54) >  DIAGNOSTIC IMPRESSIONS: There is mild irregularity of the coronary anatomy.  Left ventricular function is abnormal.  LVEF=30%  Moderate Elevation of Right Heart Pressures:  RA=19 mmHg; RV=48/23 mmHg  Moderate Pulmonary Hypertension=56/31 - 37 mmHg  Normal CO (6.04 L/min) and CI (2.55 L/min/m2)  Moderate Aortic Stenosis (CHING=1.46 and AVG=17 mmHg)  Normal Ascending Aorta  DIAGNOSTIC RECOMMENDATIONS: The patient should continue with the present  medications. Patient management should include aggressive medical therapy,  close monitoring of BUN and creatinine, resumption of all previous  activities in 2 days, and a cardiac rehabilitation program. Medical  management is recommended.  Prepared and signed by  Lopez Bruner MD    < end of copied text >    Stress Test:    CXR (image reviewed by me):  OTHER: 	    LABS:	 	  CARDIAC MARKERS:                                  12.5   11.4  )-----------( 171      ( 29 Apr 2019 06:08 )             42.0     04-29    146<H>  |  100  |  30.0<H>  ----------------------------<  83  4.3   |  38.0<H>  |  0.91    Ca    8.8      29 Apr 2019 06:08    TPro  6.2<L>  /  Alb  2.7<L>  /  TBili  0.3<L>  /  DBili  x   /  AST  20  /  ALT  15  /  AlkPhos  64  04-29    BNP:   Lipid Profile:   HgA1c:   TSH:

## 2019-04-29 NOTE — PROGRESS NOTE ADULT - SUBJECTIVE AND OBJECTIVE BOX
Subjective:      V/S  T(C): 36.7 (04-29-19 @ 15:00), Max: 36.7 (04-29-19 @ 15:00)  HR: 79 (04-29-19 @ 15:00) (71 - 93)  BP: 110/68 (04-29-19 @ 15:00) (110/68 - 162/78)  RR: 16 (04-29-19 @ 15:00) (16 - 18)  SpO2: 97% (04-29-19 @ 15:00) (96% - 100%)                                             Tele:        MEDICATIONS  (STANDING):  ALBUTerol/ipratropium for Nebulization 3 milliLiter(s) Nebulizer every 6 hours  atorvastatin 40 milliGRAM(s) Oral at bedtime  docusate sodium 100 milliGRAM(s) Oral three times a day  losartan 50 milliGRAM(s) Oral daily  montelukast 10 milliGRAM(s) Oral daily  pantoprazole  Injectable 40 milliGRAM(s) IV Push daily  polyethylene glycol 3350 17 Gram(s) Oral daily  senna 2 Tablet(s) Oral at bedtime  sodium chloride 0.9%. 150 milliLiter(s) (50 mL/Hr) IV Continuous <Continuous>      04-29    146<H>  |  100  |  30.0<H>  ----------------------------<  83  4.3   |  38.0<H>  |  0.91    Ca    8.8      29 Apr 2019 06:08    TPro  6.2<L>  /  Alb  2.7<L>  /  TBili  0.3<L>  /  DBili  x   /  AST  20  /  ALT  15  /  AlkPhos  64  04-29                               12.5   11.4  )-----------( 171      ( 29 Apr 2019 06:08 )             42.0        PT/INR - ( 29 Apr 2019 06:08 )   PT: 12.6 sec;   INR: 1.09 ratio         PTT - ( 29 Apr 2019 06:08 )  PTT:29.4 sec          CXR: < from: Xray Chest 1 View- PORTABLE-Urgent (04.29.19 @ 07:25) >  Right pleural effusion, unchanged. The left lung is clear. The heart and   mediastinum unremarkable. No evidence of pneumothorax..      Physical Exam:    Neuro: alert, no deficits    Pulm: diminished R base  Supplemental O2 in place  R pigtail sutured at skin    CV: S1 S2  RRR    Abd:  soft non tender     Extremities: 1+ edema B/L lower extremities                PAST MEDICAL & SURGICAL HISTORY:  HLD (hyperlipidemia)  HTN (hypertension)  COPD (chronic obstructive pulmonary disease)  Stenosis of lumbosacral spine  H/O back injury  No significant past surgical history Subjective:  "Hello..I feel like Im getting a little better"        V/S  T(C): 36.7 (04-29-19 @ 15:00), Max: 36.7 (04-29-19 @ 15:00)  HR: 79 (04-29-19 @ 15:00) (71 - 93)  BP: 110/68 (04-29-19 @ 15:00) (110/68 - 162/78)  RR: 16 (04-29-19 @ 15:00) (16 - 18)  SpO2: 97% (04-29-19 @ 15:00) (96% - 100%)                                             Tele: SR  90s       MEDICATIONS  (STANDING):  ALBUTerol/ipratropium for Nebulization 3 milliLiter(s) Nebulizer every 6 hours  atorvastatin 40 milliGRAM(s) Oral at bedtime  docusate sodium 100 milliGRAM(s) Oral three times a day  losartan 50 milliGRAM(s) Oral daily  montelukast 10 milliGRAM(s) Oral daily  pantoprazole  Injectable 40 milliGRAM(s) IV Push daily  polyethylene glycol 3350 17 Gram(s) Oral daily  senna 2 Tablet(s) Oral at bedtime  sodium chloride 0.9%. 150 milliLiter(s) (50 mL/Hr) IV Continuous <Continuous>      04-29    146<H>  |  100  |  30.0<H>  ----------------------------<  83  4.3   |  38.0<H>  |  0.91    Ca    8.8      29 Apr 2019 06:08    TPro  6.2<L>  /  Alb  2.7<L>  /  TBili  0.3<L>  /  DBili  x   /  AST  20  /  ALT  15  /  AlkPhos  64  04-29                               12.5   11.4  )-----------( 171      ( 29 Apr 2019 06:08 )             42.0        PT/INR - ( 29 Apr 2019 06:08 )   PT: 12.6 sec;   INR: 1.09 ratio         PTT - ( 29 Apr 2019 06:08 )  PTT:29.4 sec          CXR: < from: Xray Chest 1 View- PORTABLE-Urgent (04.29.19 @ 07:25) >  Right pleural effusion, unchanged. The left lung is clear. The heart and   mediastinum unremarkable. No evidence of pneumothorax..      Physical Exam:    Neuro: alert, no deficits    Pulm: diminished R base  Supplemental O2 in place  R pigtail sutured at skin    CV: S1 S2  RRR    Abd:  soft non tender     Extremities: 1+ edema B/L lower extremities                PAST MEDICAL & SURGICAL HISTORY:  HLD (hyperlipidemia)  HTN (hypertension)  COPD (chronic obstructive pulmonary disease)  Stenosis of lumbosacral spine  H/O back injury  No significant past surgical history

## 2019-04-29 NOTE — PROGRESS NOTE ADULT - SUBJECTIVE AND OBJECTIVE BOX
PULMONARY PROGRESS NOTE      [id number]    Patient is a 78y old  Male who presents with a chief complaint of Hypercapnic Respiratory Failure (29 Apr 2019 11:00)    COPD  RLL pneumonia with loculated parapneumonic effusion  Moderate AS  Cardiomyopathy    INTERVAL HPI/OVERNIGHT EVENTS:  No complaints    MEDICATIONS  (STANDING):  ALBUTerol/ipratropium for Nebulization 3 milliLiter(s) Nebulizer every 6 hours  atorvastatin 40 milliGRAM(s) Oral at bedtime  docusate sodium 100 milliGRAM(s) Oral three times a day  losartan 25 milliGRAM(s) Oral daily  montelukast 10 milliGRAM(s) Oral daily  pantoprazole  Injectable 40 milliGRAM(s) IV Push daily  polyethylene glycol 3350 17 Gram(s) Oral daily  senna 2 Tablet(s) Oral at bedtime  sodium chloride 0.9%. 150 milliLiter(s) (50 mL/Hr) IV Continuous <Continuous>      MEDICATIONS  (PRN):  acetaminophen   Tablet .. 650 milliGRAM(s) Oral every 6 hours PRN Temp greater or equal to 38C (100.4F), Mild Pain (1 - 3)  ALPRAZolam 0.5 milliGRAM(s) Oral two times a day PRN anxiety , especially with BIPAP      Allergies    No Known Allergies    Intolerances        PAST MEDICAL & SURGICAL HISTORY:  HLD (hyperlipidemia)  HTN (hypertension)  COPD (chronic obstructive pulmonary disease)  Stenosis of lumbosacral spine  H/O back injury  No significant past surgical history      SOCIAL HISTORY  Smoking History:       REVIEW OF SYSTEMS:    CONSTITUTIONAL:  No distress    HEENT:  Eyes:  No diplopia or blurred vision. ENT:  No earache, sore throat or runny nose.    CARDIOVASCULAR:  No pressure, squeezing, tightness, heaviness or aching about the chest; no palpitations.    RESPIRATORY:  No cough, shortness of breath, PND or orthopnea. Mild SOBOE    GASTROINTESTINAL:  No nausea, vomiting or diarrhea.    GENITOURINARY:  No dysuria, frequency or urgency.    NEUROLOGIC:  No paresthesias, fasciculations, seizures or weakness.    PSYCHIATRIC:  No disorder of thought or mood.    Vital Signs Last 24 Hrs  T(C): 36.4 (29 Apr 2019 09:25), Max: 36.7 (28 Apr 2019 15:00)  T(F): 97.5 (29 Apr 2019 09:25), Max: 98 (28 Apr 2019 15:00)  HR: 93 (29 Apr 2019 09:25) (72 - 93)  BP: 130/59 (29 Apr 2019 09:25) (123/63 - 162/78)  BP(mean): --  RR: 16 (29 Apr 2019 09:25) (16 - 18)  SpO2: 96% (29 Apr 2019 09:25) (96% - 100%)    PHYSICAL EXAMINATION:    GENERAL: The patient is awake and alert in no apparent distress.     HEENT: Head is normocephalic and atraumatic. Extraocular muscles are intact. Mucous membranes are moist.    NECK: Supple.    LUNGS: Clear to auscultation without wheezing, rales or rhonchi; respirations unlabored    HEART: Regular rate and rhythm without murmur.    ABDOMEN: Soft, nontender, and nondistended.      EXTREMITIES: Without any cyanosis, clubbing, rash, lesions or edema.    NEUROLOGIC: Grossly intact.    LABS:                        12.5   11.4  )-----------( 171      ( 29 Apr 2019 06:08 )             42.0     04-29    146<H>  |  100  |  30.0<H>  ----------------------------<  83  4.3   |  38.0<H>  |  0.91    Ca    8.8      29 Apr 2019 06:08    TPro  6.2<L>  /  Alb  2.7<L>  /  TBili  0.3<L>  /  DBili  x   /  AST  20  /  ALT  15  /  AlkPhos  64  04-29    PT/INR - ( 29 Apr 2019 06:08 )   PT: 12.6 sec;   INR: 1.09 ratio         PTT - ( 29 Apr 2019 06:08 )  PTT:29.4 sec        MICROBIOLOGY: Neg    RADIOLOGY & ADDITIONAL STUDIES:   EXAM:  XR CHEST PORTABLE URGENT 1V                          PROCEDURE DATE:  04/29/2019          INTERPRETATION:  CHEST AP PORTABLE:    History: effusion.     Date and time of exam: 4/29/2019 7:18 AM.    Technique: A single AP view of the chest was obtained.    Comparison exam: 4/28/2019 11:35 AM.    Findings:  Right pleural effusion, unchanged. The left lung is clear. The heart and   mediastinum unremarkable. No evidence of pneumothorax..    Impression:  Right pleural effusion. Stable exam without significant change since the   previous study..      YARI PRADO M.D., ATTENDING RADIOLOGIST  This document has been electronically signed. Apr 29 2019  9:33AM

## 2019-04-29 NOTE — PROGRESS NOTE ADULT - ASSESSMENT
79 y/o male with PMH of COPD, PPM, HTN, HLD, lumbar spinal stenosis who came to the ED  with progressive shortness of breath over the past few days. In the ED, was found to have WBC 29.7, serum bicarb 9.0, anion gap 21, lactate 2.9. ABG 7.19/64/118/20, started on NIPPV. Patient was uncomfortable on BiPAP, transitioned to 40% Venti-mask After 45 minutes of VM, repeat ABG 7.18/68/75/21 and patient was placed back on NIPPV, CXR with dense RL consolidation and R pleural effusion, A right thoracentesis was performed, drained ~250cc dark straw fluid, empiric Abx given and Pt was admitted to MICU for hypercapnic hypoxemic respiratory failure. Patient respiratory symptoms had improved and downgraded to hospitalist service. Pt was followed by pulmonology, CT chest showed Loculated right pleural effusion, small foci of air within the effusion may be secondary to recent intervention, IR consulted - s/p pigtail 04/26 and was displaced on 4/28 hence removed by CT surgery. CT surgery planing for VATS.    Off note, Pt was also noted to have multiple PVC, seen by cardiology, s/p interrogation positive for NSVT, underwent cardiac cath which showed normal coronaries, LVEF 30%.      >Acute respiratory failure with hypoxia and hypercapnia due to severe COPD/ PNA:  - Clinically better, on NC O2. On home o2 prn.    - Pulmonary on board.   - C/w oxygen NC during day, BIPAP qhs and prn  - C/w Duoneb, Prednisone.       >Pneumonia complicated by sepsis and parapneumonic effusion:  - Likely due to gram positive organisms.   - Completed Azithromycin and Rocephin -  end date 04/26  - ID signed off, pulmonary on board.   - CT chest Loculated right pleural effusion. Small foci of air within the effusion may be secondary to recent intervention. If there has been no recent  intervention, this could indicate empyema..   - S/p IR pigtail 04/26, displaced on cxr 4/28 hence removed by CTS.   - CT surgery on board- Plan for possible VATS on Tuesday      >Right loculated effusion- Plan as above.     >NSVT:  - Patient PPM interrogation revealed episodes of Nonsustained VT, asymptomatic.   - S/p Cardiac cath with normal coronaries   - Keep K >4, mag >2    >Newly diagnosed systolic CHF:  - EF 30 % on cardiac cath.   - Coronaries normal  - Normal TSH  - C/w ARBs/ Statins, not using BB due to COPD.  - Cardio on bord.     >Moderate AS- Cardio f/u.   >SIMONE- Resolved.   >Hx HTN /HLD- Continue Losartan 25mg, Atorvastatin 40mg   >DVT ppx- SCD, Lovenox held- going for VATS tomorrow.

## 2019-04-29 NOTE — PROGRESS NOTE ADULT - ASSESSMENT
Assess    COPD  Moderate AS  Cardiomyopathy  Parapneumonic effusion  No significant pulmonary contraindication to VATS Pleural drainage under general anesthesia    Rec    Neb  02  ABx  VATS drainage as needed Assess    COPD  Moderate AS  Cardiomyopathy  Parapneumonic effusion  No significant pulmonary contraindication to VATS Pleural drainage under general anesthesia    Rec    Neb  02  VATS drainage as needed

## 2019-04-29 NOTE — PROGRESS NOTE ADULT - ASSESSMENT
A/P:  79 yo male, PMHx COPD, PPM, HTN, HLD, lumbar spinal stenosis, BIBA with progressive shortness of breath over the past few days. Patient notes general malaise for the past two weeks. Today, he complained to his wife of a "pulled muscle" on the right side of his back with increasing shortness of breath prompting him to call 911. His wife states today he sounded "gurgly" and was unable to catch his breath. Patient denies fever or chills, chest pain, n/v/d. Upon EMS arrival, patient had SpO2 83% started on 100% NRB with improvement to 96%. Upon arrival to ED, was found to have WBC 29.7, serum bicarb 9.0, anion gap 21, lactate 2.9. ABG 7.19/64/118/20, started on NIPPV. Patient was uncomfortable on BiPAP, transitioned to 40% ventimask. After 45 minutes of VM, repeat ABG 7.18/68/75/21 and patient was placed back on NIPPV. Afebrile, hemodynamically stable, sinus tachycardia HR low 100's. Patient was given 3 duonebs, Zosyn, Vancomycin, and Azithromycin empirically. CXR with dense RL consolidation and R pleural effusion. ICU consult was called for hypercapnic hypoxemic respiratory failure. A right thoracentesis was performed, drained ~250cc dark straw fluid. Patient was admitted to ICU for further management. (20 Apr 2019 15:53)  Appreciate above, confirmed accuracy with patient.  Consult called for SOB and ?NSVT.  Patient with PMH HTN, COPD/Emphysema, PNA, HLD, PPM for SSS, HFrEF 45%.  Patient reports since friday increased SOB, general malaise, mild non-productive cough, similar to how he felt with last pneumonia.   Patient PPM interrogation revealed episodes of Nonsustained VT,asymptomatic.       Problem/Recommendation - 1:  Problem: Dyspnea. Recommendation: - multifactorial, awaiting VATs for loculated pleural effusion     Problem/Recommendation - 2:  ·  Problem: Arrhythmia. - NSVT  - non-obstructive coronary arteries  - Not able to use Beta blockers because of COPD   Problem/Recommendation - 3:  ·  Problem: Aortic stenosis, moderate.   - no interventions warranted at this time    Problem/Recommendation 4:  perioperative risk stratification.  Overall, intermediate cardiac risk for intermediate risk surgery.  Benefits of surgery far outweigh the risk.  No further cardiac testing needed prior to the procedure    Problem/Recommendation 5:  depressed LVEF - non-ischemia (mild on echo, moderate to severe on LV gram); uptitrate losartan to 50 mg daily.  Can be started on aldosterone inhibitor as an outpatient with Dr. Casas.  Cannot tolerate BB due to COPD.    Thank you for allowing me to participate in care of your patient.   Please call Dr. Casas as needed.   Outpatient follow up with Dr. Casas in 2 weeks

## 2019-04-29 NOTE — PHYSICAL THERAPY INITIAL EVALUATION ADULT - TRANSFER SAFETY CONCERNS NOTED: SIT/STAND, REHAB EVAL
in anterior direction, sagittal plane/decreased weight-shifting ability/decreased sequencing ability

## 2019-04-30 NOTE — BRIEF OPERATIVE NOTE - OPERATION/FINDINGS
right 8 fr pigtail catheter placed.  30 cc debris laden yellow fluid drained.
Flexible bronchoscopy performed: extensive mucus found in airways  Total right lung decortication of extensive fibropurulent exudate

## 2019-04-30 NOTE — BRIEF OPERATIVE NOTE - NSICDXBRIEFPREOP_GEN_ALL_CORE_FT
PRE-OP DIAGNOSIS:  Pleural effusion, right 26-Apr-2019 12:19:08  Tin Acuna
PRE-OP DIAGNOSIS:  Pleural effusion, right 26-Apr-2019 12:19:08  Tin Acuna

## 2019-04-30 NOTE — PROGRESS NOTE ADULT - ASSESSMENT
77 y/o male with PMH of COPD, PPM, HTN, HLD, lumbar spinal stenosis who came to the ED  with progressive shortness of breath over the past few days. In the ED, was found to have WBC 29.7, serum bicarb 9.0, anion gap 21, lactate 2.9. ABG 7.19/64/118/20, started on NIPPV. Patient was uncomfortable on BiPAP, transitioned to 40% Venti-mask After 45 minutes of VM, repeat ABG 7.18/68/75/21 and patient was placed back on NIPPV, CXR with dense RL consolidation and R pleural effusion, A right thoracentesis was performed, drained ~250cc dark straw fluid, empiric Abx given and Pt was admitted to MICU for hypercapnic hypoxemic respiratory failure. Patient respiratory symptoms had improved and downgraded to hospitalist service. Pt was followed by pulmonology, CT chest showed Loculated right pleural effusion, small foci of air within the effusion may be secondary to recent intervention, IR consulted - s/p pigtail 04/26 and was displaced on 4/28 hence removed by CT surgery. CT surgery planing for VATS.    Off note, Pt was also noted to have multiple PVC, seen by cardiology, s/p interrogation positive for NSVT, underwent cardiac cath which showed normal coronaries, LVEF 30%.      >Acute respiratory failure with hypoxia and hypercapnia due to severe COPD/ PNA:  - Requiring NC O2. On home o2 prn.    - Pulmonary on board.   - C/w oxygen NC during day, BIPAP qhs and prn  - C/w Duoneb, Prednisone.       >Pneumonia complicated by sepsis and parapneumonic effusion:  - Likely due to gram positive organisms.   - Completed Azithromycin and Rocephin -  end date 04/26  - ID signed off, pulmonary on board.   - CT chest Loculated right pleural effusion. Small foci of air within the effusion may be secondary to recent intervention. If there has been no recent  intervention, this could indicate empyema.  - S/p IR pigtail 04/26, displaced on cxr 4/28 hence removed by CTS. Repeat CXR 4/29 with stable right effusion  - CT surgery on board- Plan for possible VATS today.    >Right loculated effusion- Plan as above, VATS today.     >NSVT:  - Patient PPM interrogation revealed episodes of Nonsustained VT, asymptomatic.   - S/p Cardiac cath with normal coronaries   - Keep K >4, mag >2    >Newly diagnosed systolic CHF:  - EF 30 % on cardiac cath.   - Coronaries normal  - Normal TSH  - C/w ARBs/ Statins, not using BB due to COPD.  - Cardio on bord.     >Moderate AS- Cardio f/u on d/c.   >SIMONE- Resolved.   >Hx HTN /HLD- Continue Losartan 25mg, Atorvastatin 40mg   >DVT ppx- SCD, Lovenox held- going for VATS.

## 2019-04-30 NOTE — PROGRESS NOTE ADULT - ASSESSMENT
Assess    COPD  Moderate AS  Cardiomyopathy  Parapneumonic effusion  No significant pulmonary contraindication to VATS Pleural drainage under general anesthesia    Rec    Neb  02  VATS drainage as needed

## 2019-04-30 NOTE — PROGRESS NOTE ADULT - ATTENDING COMMENTS
Plan of care discussed with Pt/ RN. Plan of care discussed with Pt/ RN.    ADDENDUM 6 PM- Pt s/p VATS and went to CTICU post procedure. Spoke with CT surgery NP and recommended transfer pt care under CT surgery Dr Dominguez. Please recall hospitalist as needed.

## 2019-04-30 NOTE — PROGRESS NOTE ADULT - SUBJECTIVE AND OBJECTIVE BOX
PULMONARY PROGRESS NOTE      [id number]    Patient is a 78y old  Male who presents with a chief complaint of Hypercapnic Respiratory Failure (29 Apr 2019 11:00)    COPD  RLL pneumonia with loculated parapneumonic effusion  Moderate AS  Cardiomyopathy    INTERVAL HPI/OVERNIGHT EVENTS:  No complaints  Await VATS today    MEDICATIONS  (STANDING):  ALBUTerol/ipratropium for Nebulization 3 milliLiter(s) Nebulizer every 6 hours  atorvastatin 40 milliGRAM(s) Oral at bedtime  docusate sodium 100 milliGRAM(s) Oral three times a day  losartan 25 milliGRAM(s) Oral daily  montelukast 10 milliGRAM(s) Oral daily  pantoprazole  Injectable 40 milliGRAM(s) IV Push daily  polyethylene glycol 3350 17 Gram(s) Oral daily  senna 2 Tablet(s) Oral at bedtime  sodium chloride 0.9%. 150 milliLiter(s) (50 mL/Hr) IV Continuous <Continuous>      MEDICATIONS  (PRN):  acetaminophen   Tablet .. 650 milliGRAM(s) Oral every 6 hours PRN Temp greater or equal to 38C (100.4F), Mild Pain (1 - 3)  ALPRAZolam 0.5 milliGRAM(s) Oral two times a day PRN anxiety , especially with BIPAP      Allergies    No Known Allergies    Intolerances        PAST MEDICAL & SURGICAL HISTORY:  HLD (hyperlipidemia)  HTN (hypertension)  COPD (chronic obstructive pulmonary disease)  Stenosis of lumbosacral spine  H/O back injury  No significant past surgical history      SOCIAL HISTORY  Smoking History:       REVIEW OF SYSTEMS:    CONSTITUTIONAL:  No distress    HEENT:  Eyes:  No diplopia or blurred vision. ENT:  No earache, sore throat or runny nose.    CARDIOVASCULAR:  No pressure, squeezing, tightness, heaviness or aching about the chest; no palpitations.    RESPIRATORY:  No cough, shortness of breath, PND or orthopnea. Mild SOBOE    GASTROINTESTINAL:  No nausea, vomiting or diarrhea.    GENITOURINARY:  No dysuria, frequency or urgency.    NEUROLOGIC:  No paresthesias, fasciculations, seizures or weakness.    PSYCHIATRIC:  No disorder of thought or mood.    Vital Signs Last 24 Hrs  T(C): 36.4 (29 Apr 2019 09:25), Max: 36.7 (28 Apr 2019 15:00)  T(F): 97.5 (29 Apr 2019 09:25), Max: 98 (28 Apr 2019 15:00)  HR: 93 (29 Apr 2019 09:25) (72 - 93)  BP: 130/59 (29 Apr 2019 09:25) (123/63 - 162/78)  BP(mean): --  RR: 16 (29 Apr 2019 09:25) (16 - 18)  SpO2: 96% (29 Apr 2019 09:25) (96% - 100%)    PHYSICAL EXAMINATION:    GENERAL: The patient is awake and alert in no apparent distress.     HEENT: Head is normocephalic and atraumatic. Extraocular muscles are intact. Mucous membranes are moist.    NECK: Supple.    LUNGS: Clear to auscultation without wheezing, rales or rhonchi; respirations unlabored    HEART: Regular rate and rhythm without murmur.    ABDOMEN: Soft, nontender, and nondistended.      EXTREMITIES: Without any cyanosis, clubbing, rash, lesions or edema.    NEUROLOGIC: Grossly intact.    LABS:                        12.5   11.4  )-----------( 171      ( 29 Apr 2019 06:08 )             42.0     04-29    146<H>  |  100  |  30.0<H>  ----------------------------<  83  4.3   |  38.0<H>  |  0.91    Ca    8.8      29 Apr 2019 06:08    TPro  6.2<L>  /  Alb  2.7<L>  /  TBili  0.3<L>  /  DBili  x   /  AST  20  /  ALT  15  /  AlkPhos  64  04-29    PT/INR - ( 29 Apr 2019 06:08 )   PT: 12.6 sec;   INR: 1.09 ratio         PTT - ( 29 Apr 2019 06:08 )  PTT:29.4 sec        MICROBIOLOGY: Neg    RADIOLOGY & ADDITIONAL STUDIES:   EXAM:  XR CHEST PORTABLE URGENT 1V                          PROCEDURE DATE:  04/29/2019          INTERPRETATION:  CHEST AP PORTABLE:    History: effusion.     Date and time of exam: 4/29/2019 7:18 AM.    Technique: A single AP view of the chest was obtained.    Comparison exam: 4/28/2019 11:35 AM.    Findings:  Right pleural effusion, unchanged. The left lung is clear. The heart and   mediastinum unremarkable. No evidence of pneumothorax..    Impression:  Right pleural effusion. Stable exam without significant change since the   previous study..      YARI PRADO M.D., ATTENDING RADIOLOGIST  This document has been electronically signed. Apr 29 2019  9:33AM

## 2019-04-30 NOTE — BRIEF OPERATIVE NOTE - NSICDXBRIEFPROCEDURE_GEN_ALL_CORE_FT
PROCEDURES:  Bronchoscopy, flexible, adult 30-Apr-2019 15:38:47  Stephanie Bautista  Total decortication of right lung 30-Apr-2019 15:37:18  Stephanie Bautista
PROCEDURES:  Insertion of right chest tube with ultrasound guidance 26-Apr-2019 12:18:57  Tin Acuna

## 2019-04-30 NOTE — PROGRESS NOTE ADULT - SUBJECTIVE AND OBJECTIVE BOX
JOHN CIFUENTES Male 78y [id number]    Patient is a 78y old  Male who presents with a chief complaint of Hypercapnic Respiratory Failure (30 Apr 2019 09:51)      Subjective/objective:  Pt seen and examined at bedside, no over night event reported by night staff. Pt still with SOB and requiring O2, SOB more worse on exertion. Plan for VATS with CTS today.     Review of system:  No fever, chills, nausea, vomiting, headache, dizziness, chest pain, SOB or palpitation.      PHYSICAL EXAM:    Vital Signs Last 24 Hrs  T(C): 36.9 (30 Apr 2019 09:23), Max: 36.9 (30 Apr 2019 09:23)  T(F): 98.4 (30 Apr 2019 09:23), Max: 98.4 (30 Apr 2019 09:23)  HR: 70 (30 Apr 2019 09:23) (66 - 84)  BP: 138/76 (30 Apr 2019 09:23) (110/68 - 160/83)  BP(mean): --  RR: 15 (30 Apr 2019 09:23) (15 - 20)  SpO2: 94% (30 Apr 2019 09:23) (94% - 99%)    GENERAL: Pt lying comfortably, NAD.  CHEST/LUNG: Diminished breath sounds R>L, no wheezing or crackles. CT removed by CT surgery.  HEART: S1S2+, Regular rate and rhythm; No murmurs.  ABDOMEN: Soft, Nontender, Nondistended; Bowel sounds present.  Extremities: No LE edema, pulses  NEURO: AAOX3, no focal deficits, no motor r sensory loss.  PSYCH: normal mood.      MEDICATIONS  (STANDING):  ALBUTerol/ipratropium for Nebulization 3 milliLiter(s) Nebulizer every 6 hours  atorvastatin 40 milliGRAM(s) Oral at bedtime  docusate sodium 100 milliGRAM(s) Oral three times a day  losartan 50 milliGRAM(s) Oral daily  montelukast 10 milliGRAM(s) Oral daily  pantoprazole  Injectable 40 milliGRAM(s) IV Push daily  polyethylene glycol 3350 17 Gram(s) Oral daily  predniSONE   Tablet 50 milliGRAM(s) Oral daily  senna 2 Tablet(s) Oral at bedtime  sodium chloride 0.9%. 150 milliLiter(s) (50 mL/Hr) IV Continuous <Continuous>    MEDICATIONS  (PRN):  acetaminophen   Tablet .. 650 milliGRAM(s) Oral every 6 hours PRN Temp greater or equal to 38C (100.4F), Mild Pain (1 - 3)  ALPRAZolam 0.5 milliGRAM(s) Oral two times a day PRN anxiety , especially with BIPAP        Labs:  LABS:                        12.0   13.8  )-----------( 164      ( 30 Apr 2019 09:27 )             39.4     04-30    145  |  99  |  28.0<H>  ----------------------------<  87  4.1   |  36.0<H>  |  0.84    Ca    8.7      30 Apr 2019 09:27    TPro  6.2<L>  /  Alb  2.7<L>  /  TBili  0.3<L>  /  DBili  x   /  AST  20  /  ALT  15  /  AlkPhos  64  04-29    PT/INR - ( 29 Apr 2019 06:08 )   PT: 12.6 sec;   INR: 1.09 ratio         PTT - ( 29 Apr 2019 06:08 )  PTT:29.4 sec    LIVER FUNCTIONS - ( 29 Apr 2019 06:08 )  Alb: 2.7 g/dL / Pro: 6.2 g/dL / ALK PHOS: 64 U/L / ALT: 15 U/L / AST: 20 U/L / GGT: x

## 2019-04-30 NOTE — BRIEF OPERATIVE NOTE - NSICDXBRIEFPOSTOP_GEN_ALL_CORE_FT
POST-OP DIAGNOSIS:  Pleural effusion, right 26-Apr-2019 12:19:23  Tin Acuna
POST-OP DIAGNOSIS:  Pleural effusion, right 26-Apr-2019 12:19:23  Tin Acuna

## 2019-05-01 NOTE — PROVIDER CONTACT NOTE (OTHER) - REASON
Events noted for surgical intervention and upgrade status to CICU. PT requests new MD orders for PT Evaluation.

## 2019-05-01 NOTE — PROGRESS NOTE ADULT - SUBJECTIVE AND OBJECTIVE BOX
Subjective:  77yo Male with no c/o pain, resting comfortably.    HPI:  79 yo male, PMHx COPD, PPM, HTN, HLD, lumbar spinal stenosis, BIBA with progressive shortness of breath over the past few days. Patient notes general malaise for the past two weeks. Today, he complained to his wife of a "pulled muscle" on the right side of his back with increasing shortness of breath prompting him to call 911. His wife states today he sounded "gurgly" and was unable to catch his breath. Patient denies fever or chills, chest pain, n/v/d. Upon EMS arrival, patient had SpO2 83% started on 100% NRB with improvement to 96%. Upon arrival to ED, was found to have WBC 29.7, serum bicarb 9.0, anion gap 21, lactate 2.9. ABG 7.19/64/118/20, started on NIPPV. Patient was uncomfortable on BiPAP, transitioned to 40% ventimask. After 45 minutes of VM, repeat ABG 7.18/68/75/21 and patient was placed back on NIPPV. Afebrile, hemodynamically stable, sinus tachycardia HR low 100's. Patient was given 3 duonebs, Zosyn, Vancomycin, and Azithromycin empirically. CXR with dense RL consolidation and R pleural effusion. ICU consult was called for hypercapnic hypoxemic respiratory failure. A right thoracentesis was performed, drained ~250cc dark straw fluid. Patient was admitted to ICU for further management.    Past Medical History:  Chronic obstructive pulmonary disease with acute exacerbation  HLD (hyperlipidemia)  HTN (hypertension)  COPD (chronic obstructive pulmonary disease)  Stenosis of lumbosacral spine  H/O back injury  No pertinent past medical history  Pleural effusion, right  COPD exacerbation  Aortic stenosis, moderate  Arrhythmia  Dyspnea  Advanced care planning/counseling discussion  Encounter for palliative care  Pneumonia of right lower lobe due to infectious organism  Acute on chronic respiratory failure with hypoxia  Pneumonia  Metabolic acidosis  Pneumonia, bacterial  Sepsis  Acute respiratory failure with hypoxia and hypercapnia  Bronchoscopy, flexible, adult  Total decortication of right lung  Insertion of right chest tube with ultrasound guidance  No significant past surgical history  Acute systolic congestive heart failure        acetaminophen   Tablet .. 650 milliGRAM(s) Oral every 6 hours PRN  ALBUTerol/ipratropium for Nebulization 3 milliLiter(s) Nebulizer every 6 hours  ALPRAZolam 0.5 milliGRAM(s) Oral two times a day PRN  atorvastatin 40 milliGRAM(s) Oral at bedtime  docusate sodium 100 milliGRAM(s) Oral three times a day  enoxaparin Injectable 40 milliGRAM(s) SubCutaneous daily  fentaNYL PCA (50 MICROgram(s)/mL) 30 milliLiter(s) PCA Continuous PCA Continuous  hydrALAZINE Injectable 10 milliGRAM(s) IV Push every 6 hours PRN  losartan 50 milliGRAM(s) Oral daily  montelukast 10 milliGRAM(s) Oral daily  pantoprazole  Injectable 40 milliGRAM(s) IV Push daily  piperacillin/tazobactam IVPB. 3.375 Gram(s) IV Intermittent every 8 hours  polyethylene glycol 3350 17 Gram(s) Oral daily  predniSONE   Tablet 50 milliGRAM(s) Oral daily  senna 2 Tablet(s) Oral at bedtime  sodium chloride 0.9% lock flush 3 milliLiter(s) IV Push every 8 hours  MEDICATIONS  (PRN):  acetaminophen   Tablet .. 650 milliGRAM(s) Oral every 6 hours PRN Temp greater or equal to 38C (100.4F), Mild Pain (1 - 3)  ALPRAZolam 0.5 milliGRAM(s) Oral two times a day PRN anxiety , especially with BIPAP  hydrALAZINE Injectable 10 milliGRAM(s) IV Push every 6 hours PRN SBP > 160    Height (cm): 185.42 ( @ 12:51)  Weight (kg): 111.1 ( @ 12:51)  BMI (kg/m2): 32.3 ( @ 12:51)  BSA (m2): 2.35 ( @ 12:51)  Daily Height in cm: 185.42 (2019 12:51)    Daily Weight in k.1 (2019 04:00)                                12.0   13.8  )-----------( 164      ( 2019 09:27 )             39.4       145  |  99  |  28.0<H>  ----------------------------<  87  4.1   |  36.0<H>  |  0.84    Ca    8.7      2019 09:27    TPro  6.2<L>  /  Alb  2.7<L>  /  TBili  0.3<L>  /  DBili  x   /  AST  20  /  ALT  15  /  AlkPhos  64  04-29      PT/INR - ( 2019 06:08 )   PT: 12.6 sec;   INR: 1.09 ratio         PTT - ( 2019 06:08 )  PTT:29.4 sec      Objective:  T(C): 37 (19 @ 02:00), Max: 37.1 (19 @ 12:51)  HR: 67 (19 @ 02:00) (61 - 90)  BP: 140/61 (19 @ 02:00) (136/60 - 160/73)  RR: 19 (19 @ 02:00) (14 - 29)  SpO2: 99% (19 @ 02:00) (90% - 100%)  Wt(kg): --CAPILLARY BLOOD GLUCOSE      I&O's Summary    2019 07:  -  2019 07:00  --------------------------------------------------------  IN: 980 mL / OUT: 1475 mL / NET: -495 mL    2019 07:  -  01 May 2019 03:21  --------------------------------------------------------  IN: 100 mL / OUT: 985 mL / NET: -885 mL  :    Physical Exam:  Neuro: A0X3, non focal  Pulm: CtA b/l Unlabored  CV: s1/s2 NSR  Abd: soft nt/nd  Ext: warm, minimal 1+ edema, well perfused

## 2019-05-01 NOTE — PROGRESS NOTE ADULT - PROBLEM SELECTOR PLAN 1
R VATS with decortication,  CT x 2 in place, #2 CT with +air leak  continue to monitor CT output closely and resolution of air leak  ID Consult pending for appropriate ABX coverage and duration  Encourage IS hourly while awake  OOB to chair, ambulate with PT assist  Monitor labs, CXR daily S/P R VATS with decortication,  CT x 2 in place, #2 CT with +air leak  continue to monitor CT output closely and resolution of air leak  ID Consult pending for appropriate ABX coverage and duration  Encourage IS hourly while awake  OOB to chair, ambulate with PT assist  Monitor labs, CXR daily

## 2019-05-01 NOTE — PROGRESS NOTE ADULT - SUBJECTIVE AND OBJECTIVE BOX
PULMONARY PROGRESS NOTE      [id number]    Patient is a 78y old  Male who presents with a chief complaint of Hypercapnic Respiratory Failure (01 May 2019 03:20)      INTERVAL HPI/OVERNIGHT EVENTS:  Post VATS  Looks good>up in chair on nasal O2  Some chest pain  Review of CXR>lung up much improved aeration.   MEDICATIONS  (STANDING):  ALBUTerol/ipratropium for Nebulization 3 milliLiter(s) Nebulizer every 6 hours  atorvastatin 40 milliGRAM(s) Oral at bedtime  docusate sodium 100 milliGRAM(s) Oral three times a day  enoxaparin Injectable 40 milliGRAM(s) SubCutaneous daily  fentaNYL PCA (50 MICROgram(s)/mL) 30 milliLiter(s) PCA Continuous PCA Continuous  losartan 50 milliGRAM(s) Oral daily  montelukast 10 milliGRAM(s) Oral daily  pantoprazole  Injectable 40 milliGRAM(s) IV Push daily  piperacillin/tazobactam IVPB. 3.375 Gram(s) IV Intermittent every 8 hours  polyethylene glycol 3350 17 Gram(s) Oral daily  predniSONE   Tablet 50 milliGRAM(s) Oral daily  senna 2 Tablet(s) Oral at bedtime  sodium chloride 0.9% lock flush 3 milliLiter(s) IV Push every 8 hours      MEDICATIONS  (PRN):  acetaminophen   Tablet .. 650 milliGRAM(s) Oral every 6 hours PRN Temp greater or equal to 38C (100.4F), Mild Pain (1 - 3)  ALPRAZolam 0.5 milliGRAM(s) Oral two times a day PRN anxiety , especially with BIPAP      Allergies    No Known Allergies    Intolerances        PAST MEDICAL & SURGICAL HISTORY:  HLD (hyperlipidemia)  HTN (hypertension)  COPD (chronic obstructive pulmonary disease)  Stenosis of lumbosacral spine  H/O back injury  No significant past surgical history      SOCIAL HISTORY  Smoking History: Former      REVIEW OF SYSTEMS:    CONSTITUTIONAL:  No distress    HEENT:  Eyes:  No diplopia or blurred vision. ENT:  No earache, sore throat or runny nose.    CARDIOVASCULAR:  No pressure, squeezing, tightness, heaviness or aching about the chest; no palpitations.    RESPIRATORY: Per HPI    GASTROINTESTINAL:  No nausea, vomiting or diarrhea.    GENITOURINARY:  No dysuria, frequency or urgency.    MUSCULOSKELETAL:  No joint pain    SKIN:  No new lesions.    NEUROLOGIC:  No paresthesias, fasciculations, seizures or weakness.    PSYCHIATRIC:  No disorder of thought or mood.    ENDOCRINE:  No heat or cold intolerance, polyuria or polydipsia.    HEMATOLOGICAL:  No easy bruising or bleeding.     Vital Signs Last 24 Hrs  T(C): 37 (01 May 2019 09:00), Max: 37.1 (30 Apr 2019 12:51)  T(F): 98.6 (01 May 2019 09:00), Max: 98.7 (30 Apr 2019 12:51)  HR: 69 (01 May 2019 11:00) (61 - 90)  BP: 132/62 (01 May 2019 11:00) (115/56 - 160/73)  BP(mean): 89 (01 May 2019 11:00) (75 - 109)  RR: 22 (01 May 2019 11:00) (14 - 30)  SpO2: 99% (01 May 2019 11:00) (90% - 100%)    PHYSICAL EXAMINATION:    GENERAL: The patient is awake and alert in no apparent distress.     HEENT: Head is normocephalic and atraumatic. Extraocular muscles are intact. Mucous membranes are moist.    NECK: Supple.    LUNGS: Clear to auscultation without wheezing, rales or rhonchi; respirations unlabored    HEART: Regular rate and rhythm without murmur.    ABDOMEN: Soft, nontender, and nondistended.      EXTREMITIES: Without any cyanosis, clubbing, rash, lesions or edema.    NEUROLOGIC: Grossly intact.    SKIN: No ulceration or induration present.      LABS:                        12.4   17.5  )-----------( 158      ( 01 May 2019 03:53 )             39.9     05-01    141  |  99  |  24.0<H>  ----------------------------<  100  4.4   |  34.0<H>  |  0.78    Ca    8.7      01 May 2019 03:53                          MICROBIOLOGY:    RADIOLOGY & ADDITIONAL STUDIES:Xray per HPI

## 2019-05-01 NOTE — PROGRESS NOTE ADULT - ASSESSMENT
78M former smoker, COPD, PNA with loculated pleural effusion. s/p Pigtail 4/26 in IR.  Right VATS decortication 4/30, uneventful.

## 2019-05-01 NOTE — PROGRESS NOTE ADULT - SUBJECTIVE AND OBJECTIVE BOX
[facility]  INFECTIOUS DISEASES AND INTERNAL MEDICINE at [place]  =======================================================  Onesimo Madison MD [facility] CenterCRISTIAN Arauz MD  Diplomates American Board of Internal Medicine and Infectious Diseases  =======================================================    [id number]  JOHN CIFUENTES   follow up for: right side parapneumonic effusion, right sided PNA  patient seen and examined.     CALLED back by CT surgery service  s/p decortication in right side 4/30/19  chest tubes in place    ===================================================  REVIEW OF SYSTEMS:  as above  all other ROS negative    =======================================================  Allergies  No Known Allergies    Antibiotics:  piperacillin/tazobactam IVPB. 3.375 Gram(s) IV Intermittent every 8 hours    ======================================================  Physical Exam:  ============  T(F): 98.2 (01 May 2019 15:52), Max: 98.7 (30 Apr 2019 12:51)  HR: 76 (01 May 2019 15:52)  BP: 112/63 (01 May 2019 15:52)  RR: 18 (01 May 2019 15:52)  SpO2: 97% (01 May 2019 15:52) (90% - 100%)    General:  No acute distress.  Eye: Pupils are equal, round and reactive to light, Extraocular movements are intact, Normal conjunctiva.  HENT: Normocephalic, Oral mucosa is moist, No pharyngeal erythema, No sinus tenderness.  Neck: Supple, No lymphadenopathy.  Respiratory: diminished aeration right base; Chest tubes in place  Cardiovascular: Normal rate, Regular rhythm, Good pulses equal in all extremities, TRACE edema.  Gastrointestinal: Soft, Non-tender, Non-distended, Normal bowel sounds.  Genitourinary: No costovertebral angle tenderness.  Lymphatics: No lymphadenopathy neck,   Musculoskeletal: Normal range of motion, Normal strength.  Integumentary: No rash.   Neurologic: Alert, Oriented, No focal deficits, Cranial Nerves II-XII are grossly intact.  Psychiatric: Appropriate mood & affect.    =======================================================  Labs:                        12.4   17.5  )-----------( 158      ( 01 May 2019 03:53 )             39.9       WBC Count: 17.5 K/uL (05-01-19 @ 03:53)  WBC Count: 13.8 K/uL (04-30-19 @ 09:27)  WBC Count: 11.4 K/uL (04-29-19 @ 06:08)  WBC Count: 12.1 K/uL (04-28-19 @ 05:43)  WBC Count: 13.7 K/uL (04-27-19 @ 06:08)      05-01    141  |  99  |  24.0<H>  ----------------------------<  100  4.4   |  34.0<H>  |  0.78    Ca    8.7      01 May 2019 03:53        Culture - Tissue with Gram Stain (collected 05-01-19 @ 07:27)  Source: .Tissue Product of Decortication  Gram Stain (05-01-19 @ 11:28):    Few WBC's    No organisms seen    Culture - Acid Fast - Body Fluid w/Smear (collected 04-26-19 @ 17:21)  Source: Pleural Fl    Culture - Fungal, Body Fluid (collected 04-26-19 @ 12:27)  Source: .Body Fluid    Culture - Body Fluid with Gram Stain (collected 04-26-19 @ 12:27)  Source: .Body Fluid  Gram Stain (04-26-19 @ 14:03):    No WBC's or organisms seen    Culture - Acid Fast - Body Fluid w/Smear (collected 04-21-19 @ 16:57)  Source: .Body Fluid    Culture - Urine (collected 04-20-19 @ 22:04)  Source: .Urine  Final Report (04-22-19 @ 10:23):    No growth    Culture - Body Fluid with Gram Stain (collected 04-20-19 @ 15:36)  Source: .Body Fluid  Gram Stain (04-20-19 @ 19:57):    Few White blood cells    Few Yeast  Final Report (04-25-19 @ 16:59):    No growth at 5 days.    Culture - Blood (collected 04-20-19 @ 11:19)  Source: .Blood  Final Report (04-25-19 @ 13:00):    No growth at 5 days.    Culture - Blood (collected 04-20-19 @ 11:19)  Source: .Blood  Final Report (04-25-19 @ 13:00):    No growth at 5 days.

## 2019-05-01 NOTE — PROGRESS NOTE ADULT - ASSESSMENT
S/P VATS with improved radiologic picture  COPD without wheezing>much improved breath sounds on right     Plan:  1.Per surgery  2.Taper steroids  3.?duration of antibiotics at this time>likely can d/c in next 48 hours.

## 2019-05-01 NOTE — PROGRESS NOTE ADULT - ASSESSMENT
This 79 yo male, PMHx COPD, PPM, HTN, HLD, lumbar spinal stenosis, BIBA with progressive shortness of breath over the past few days on 4/20/19.    RL consolidation and R pleural effusion.     Likely Pneumonia/ CAP with right parapneumonic effusion    - Completed course of Azithromycin and  Ceftriaxone ends on 4/26/18  thoracentesis fluid with no growth.     s/p Decortication on 4/30/19  - will follow cultures    agree with Zosyn for now.    - follow up all outstanding cultures  - trend temperature and WBC curve  - repeat cultures from blood and all sources if febrile.

## 2019-05-02 NOTE — PROGRESS NOTE ADULT - SUBJECTIVE AND OBJECTIVE BOX
[facility]  INFECTIOUS DISEASES AND INTERNAL MEDICINE at [place]  =======================================================  Onesimo Arauz MD  Diplomates American Board of Internal Medicine and Infectious Diseases  =======================================================    [id number]  JOHN CIFUENTES   follow up for: right side parapneumonic effusion, right sided PNA  patient seen and examined.     less SOB today  WBC downtrending    ===================================================  REVIEW OF SYSTEMS:  as above  all other ROS negative    =======================================================  Allergies  No Known Allergies    Antibiotics:  piperacillin/tazobactam IVPB. 3.375 Gram(s) IV Intermittent every 8 hours    ======================================================  Physical Exam:  ============  T(F): 97.8 (02 May 2019 05:48), Max: 98.7 (30 Apr 2019 12:51)  HR: 60 (02 May 2019 08:18)  BP: 154/94 (02 May 2019 05:48)  RR: 19 (02 May 2019 05:48)  SpO2: 96% (02 May 2019 08:18) (90% - 100%)    General:  No acute distress.  Eye: Pupils are equal, round and reactive to light, Extraocular movements are intact, Normal conjunctiva.  HENT: Normocephalic, Oral mucosa is moist, No pharyngeal erythema, No sinus tenderness.  Neck: Supple, No lymphadenopathy.  Respiratory: diminished aeration right base; Chest tubes in place  Cardiovascular: Normal rate, Regular rhythm, Good pulses equal in all extremities, TRACE edema.  Gastrointestinal: Soft, Non-tender, Non-distended, Normal bowel sounds.  Genitourinary: No costovertebral angle tenderness.  Lymphatics: No lymphadenopathy neck,   Musculoskeletal: Normal range of motion, Normal strength.  Integumentary: No rash.   Neurologic: Alert, Oriented, No focal deficits, Cranial Nerves II-XII are grossly intact.  Psychiatric: Appropriate mood & affect.    =======================================================  Labs:                        12.0   15.2  )-----------( 182      ( 02 May 2019 06:17 )             38.4       WBC Count: 15.2 K/uL (05-02-19 @ 06:17)  WBC Count: 17.5 K/uL (05-01-19 @ 03:53)  WBC Count: 13.8 K/uL (04-30-19 @ 09:27)  WBC Count: 11.4 K/uL (04-29-19 @ 06:08)  WBC Count: 12.1 K/uL (04-28-19 @ 05:43)      05-02    144  |  100  |  31.0<H>  ----------------------------<  84  4.1   |  34.0<H>  |  0.91    Ca    8.6      02 May 2019 06:17  Mg     2.0     05-02        Culture - Tissue with Gram Stain (collected 05-01-19 @ 07:27)  Source: .Tissue Product of Decortication  Gram Stain (05-01-19 @ 11:28):    Few WBC's    No organisms seen    Culture - Acid Fast - Body Fluid w/Smear (collected 04-26-19 @ 17:21)  Source: Pleural Fl    Culture - Fungal, Body Fluid (collected 04-26-19 @ 12:27)  Source: .Body Fluid    Culture - Body Fluid with Gram Stain (collected 04-26-19 @ 12:27)  Source: .Body Fluid  Gram Stain (04-26-19 @ 14:03):    No WBC's or organisms seen    Culture - Acid Fast - Body Fluid w/Smear (collected 04-21-19 @ 16:57)  Source: .Body Fluid    Culture - Urine (collected 04-20-19 @ 22:04)  Source: .Urine  Final Report (04-22-19 @ 10:23):    No growth    Culture - Body Fluid with Gram Stain (collected 04-20-19 @ 15:36)  Source: .Body Fluid  Gram Stain (04-20-19 @ 19:57):    Few White blood cells    Few Yeast  Final Report (04-25-19 @ 16:59):    No growth at 5 days.    Culture - Blood (collected 04-20-19 @ 11:19)  Source: .Blood  Final Report (04-25-19 @ 13:00):    No growth at 5 days.    Culture - Blood (collected 04-20-19 @ 11:19)  Source: .Blood  Final Report (04-25-19 @ 13:00):    No growth at 5 days.

## 2019-05-02 NOTE — PROGRESS NOTE ADULT - ASSESSMENT
This 77 yo male, PMHx COPD, PPM, HTN, HLD, lumbar spinal stenosis, BIBA with progressive shortness of breath over the past few days on 4/20/19.    RL consolidation and R pleural effusion.     Likely Pneumonia/ CAP with right parapneumonic effusion    - Completed course of Azithromycin and Ceftriaxone ends on 4/26/18  thoracentesis fluid with no growth.     s/p Decortication on 4/30/19  - will follow cultures    continue Zosyn for now    - follow up all outstanding cultures  - trend temperature and WBC curve  - repeat cultures from blood and all sources if febrile.

## 2019-05-02 NOTE — PROGRESS NOTE ADULT - SUBJECTIVE AND OBJECTIVE BOX
Subjective: "I am uncomfortable. I'm sweaty and constipated" lying in bed, appears uncomfortable     Vital Signs:  Vital Signs Last 24 Hrs  T(C): 36.6 (05-02-19 @ 10:00), Max: 36.8 (05-01-19 @ 15:52)  T(F): 97.8 (05-02-19 @ 10:00), Max: 98.2 (05-01-19 @ 15:52)  HR: 67 (05-02-19 @ 10:00) (60 - 80)  BP: 106/51 (05-02-19 @ 10:00) (106/51 - 154/94)  RR: 18 (05-02-19 @ 10:00) (17 - 20)  SpO2: 96% (05-02-19 @ 08:18) (95% - 100%) on nasal cannula    Telemetry/Alarms: SR 70 with multifocal PVCs    Relevant labs, radiology and Medications reviewed    Pertinent Physical Exam  General: WN/WD NAD  Neurology: A&Ox3, nonfocal, HOWARD x 4  Respiratory: rhonchi right with decreased bs right base  CV: RRR, S1S2, no murmurs, rubs or gallops  Abdominal: Soft, NT, ND +BS  Extremities: +1 ankle edema, + peripheral pulses  right lateral dressing with mild serous drainage    05-01 @ 07:01  -  05-02 @ 07:00  --------------------------------------------------------  IN:    Oral Fluid: 897 mL    sodium chloride 0.9%.: 220 mL  Total IN: 1117 mL    OUT:    Chest Tube: 25 mL    Chest Tube: 152 mL    Voided: 1280 mL  Total OUT: 1457 mL    Total NET: -340 mL      05-02 @ 07:01  -  05-02 @ 15:16  --------------------------------------------------------  IN:    Oral Fluid: 680 mL  Total IN: 680 mL    OUT:    Voided: 300 mL  Total OUT: 300 mL    Total NET: 380 mL

## 2019-05-02 NOTE — PROGRESS NOTE ADULT - SUBJECTIVE AND OBJECTIVE BOX
PULMONARY PROGRESS NOTE      [id number]    Patient is a 78y old  Male who presents with a chief complaint of Hypercapnic Respiratory Failure (02 May 2019 10:55)      INTERVAL HPI/OVERNIGHT EVENTS:  Chest tubes in place  CXR stable  Feels uncomfortable but not dyspneic  MEDICATIONS  (STANDING):  ALBUTerol/ipratropium for Nebulization 3 milliLiter(s) Nebulizer every 6 hours  atorvastatin 40 milliGRAM(s) Oral at bedtime  docusate sodium 100 milliGRAM(s) Oral three times a day  enoxaparin Injectable 40 milliGRAM(s) SubCutaneous daily  losartan 50 milliGRAM(s) Oral daily  montelukast 10 milliGRAM(s) Oral daily  pantoprazole  Injectable 40 milliGRAM(s) IV Push daily  piperacillin/tazobactam IVPB. 3.375 Gram(s) IV Intermittent every 8 hours  polyethylene glycol 3350 17 Gram(s) Oral daily  predniSONE   Tablet 40 milliGRAM(s) Oral daily  senna 2 Tablet(s) Oral at bedtime  sodium chloride 0.9% lock flush 3 milliLiter(s) IV Push every 8 hours  sodium chloride 0.9%. 1000 milliLiter(s) (20 mL/Hr) IV Continuous <Continuous>      MEDICATIONS  (PRN):  acetaminophen   Tablet .. 650 milliGRAM(s) Oral every 6 hours PRN Temp greater or equal to 38C (100.4F), Mild Pain (1 - 3)      Allergies    No Known Allergies    Intolerances        PAST MEDICAL & SURGICAL HISTORY:  HLD (hyperlipidemia)  HTN (hypertension)  COPD (chronic obstructive pulmonary disease)  Stenosis of lumbosacral spine  H/O back injury  No significant past surgical history      SOCIAL HISTORY  Smoking History:       REVIEW OF SYSTEMS:    CONSTITUTIONAL:  No distress    HEENT:  Eyes:  No diplopia or blurred vision. ENT:  No earache, sore throat or runny nose.    CARDIOVASCULAR:  No pressure, squeezing, tightness, heaviness or aching about the chest; no palpitations.    RESPIRATORY:  No cough, shortness of breath, PND or orthopnea. Mild SOBOE    GASTROINTESTINAL:  No nausea, vomiting or diarrhea.    GENITOURINARY:  No dysuria, frequency or urgency.    MUSCULOSKELETAL:  No joint pain    SKIN:  No new lesions.    NEUROLOGIC:  No paresthesias, fasciculations, seizures or weakness.    PSYCHIATRIC:  No disorder of thought or mood.    ENDOCRINE:  No heat or cold intolerance, polyuria or polydipsia.    HEMATOLOGICAL:  No easy bruising or bleeding.     Vital Signs Last 24 Hrs  T(C): 36.6 (02 May 2019 10:00), Max: 36.8 (01 May 2019 15:52)  T(F): 97.8 (02 May 2019 10:00), Max: 98.2 (01 May 2019 15:52)  HR: 67 (02 May 2019 10:00) (60 - 80)  BP: 106/51 (02 May 2019 10:00) (106/51 - 154/94)  BP(mean): --  RR: 18 (02 May 2019 10:00) (17 - 20)  SpO2: 96% (02 May 2019 08:18) (95% - 100%)    PHYSICAL EXAMINATION:    GENERAL: The patient is awake and alert in no apparent distress.     HEENT: Head is normocephalic and atraumatic. Extraocular muscles are intact. Mucous membranes are moist.    NECK: Supple.    LUNGS: Clear to auscultation without wheezing, rales or rhonchi; respirations unlabored; decreased at bases    HEART: Regular rate and rhythm without murmur.    ABDOMEN: Soft, nontender, and nondistended.      EXTREMITIES: Without any cyanosis, clubbing, rash, lesions or edema.    NEUROLOGIC: Grossly intact.    SKIN: No ulceration or induration present.      LABS:                        12.0   15.2  )-----------( 182      ( 02 May 2019 06:17 )             38.4     05-02    144  |  100  |  31.0<H>  ----------------------------<  84  4.1   |  34.0<H>  |  0.91    Ca    8.6      02 May 2019 06:17  Mg     2.0     05-02                          MICROBIOLOGY:    RADIOLOGY & ADDITIONAL STUDIES:< from: Xray Chest 1 View- PORTABLE-Routine (05.02.19 @ 05:24) >   EXAM:  XR CHEST PORTABLE ROUTINE 1V                          PROCEDURE DATE:  05/02/2019          INTERPRETATION:  Portable chest radiograph        CLINICAL INFORMATION:   Status post VATS procedure. Follow-up.    TECHNIQUE:  Portable  AP view of the chest was obtained.    COMPARISON: 5/1/2019 available for review.    FINDINGS: RIGHT chest tube tip overlying apex of lung. Second chest tube   overlies RIGHT lung base. No pneumothorax extrapleural air collection or   pleural effusion. LEFT lung clear.    The  heart is enlarged in transverse diameter. No hilar mass. Trachea   midline.  . Cardiac device wire leads are within right atrium and right ventricle.   .    Visualized osseous structures are intact.        IMPRESSION: Catheters in place. No pneumothorax following VATS procedure   RIGHT lung. No evidence of active chest disease.      < end of copied text >

## 2019-05-02 NOTE — PROGRESS NOTE ADULT - PROBLEM SELECTOR PLAN 1
S/P R VATS with decortication,  CT x 2 in place, no air leak noted  Continue to monitor CT output  ID followup for appropriate ABX coverage and duration  Encourage IS hourly while awake  OOB to chair, ambulate with PT assist  Monitor labs, CXR daily

## 2019-05-02 NOTE — PROGRESS NOTE ADULT - ASSESSMENT
78M former smoker, COPD, PNA with loculated pleural effusion. s/p Pigtail 4/26 in IR.  Right VATS decortication 4/30, uneventful. Chest tube #2 air leak > now appears resolved.

## 2019-05-02 NOTE — PROGRESS NOTE ADULT - SUBJECTIVE AND OBJECTIVE BOX
78 year Old Male, S/P Flexible Bronchoscopy Right VATS with Total Lung Decortication under GETA on 4/30/19. Patient is awake and alert, vital signs are stable. Patient is on PCA for pain management, and is being managed   very well. Patient is on Nasal Cannula O2/3L/Min. Right Chest Tube in progress. Patient had no complaints or complications with regard to Anesthesia Care.

## 2019-05-02 NOTE — CHART NOTE - NSCHARTNOTEFT_GEN_A_CORE
Source: Patient [x ]  Family [ ]   other [ ]    Pt admitted with hypercapnic Respiratory Failure    Current Diet: Diet, DASH/TLC:   Sodium & Cholesterol Restricted  Supplement Feeding Modality:  Oral  Ensure Enlive Cans or Servings Per Day:  3       Frequency:  Three Times a day (04-27-19 @ 18:32)    PO intake:  Pt reports good po intake at meals, consumes 1-3 Ensure shakes/day.    Current Weight:   (5/2) 256#  (4/30) 245#    % Weight Change -no wt loss noted.  2+ mild edema b/l legs    Pertinent Medications: MEDICATIONS  (STANDING):  ALBUTerol/ipratropium for Nebulization 3 milliLiter(s) Nebulizer every 6 hours  atorvastatin 40 milliGRAM(s) Oral at bedtime  docusate sodium 100 milliGRAM(s) Oral three times a day  enoxaparin Injectable 40 milliGRAM(s) SubCutaneous daily  fentaNYL PCA (50 MICROgram(s)/mL) 30 milliLiter(s) PCA Continuous PCA Continuous  losartan 50 milliGRAM(s) Oral daily  montelukast 10 milliGRAM(s) Oral daily  pantoprazole  Injectable 40 milliGRAM(s) IV Push daily  piperacillin/tazobactam IVPB. 3.375 Gram(s) IV Intermittent every 8 hours  polyethylene glycol 3350 17 Gram(s) Oral daily  predniSONE   Tablet 40 milliGRAM(s) Oral daily  senna 2 Tablet(s) Oral at bedtime  sodium chloride 0.9% lock flush 3 milliLiter(s) IV Push every 8 hours  sodium chloride 0.9%. 1000 milliLiter(s) (20 mL/Hr) IV Continuous <Continuous>    MEDICATIONS  (PRN):  acetaminophen   Tablet .. 650 milliGRAM(s) Oral every 6 hours PRN Temp greater or equal to 38C (100.4F), Mild Pain (1 - 3)    Pertinent Labs: CBC Full  -  ( 02 May 2019 06:17 )  WBC Count : 15.2 K/uL  RBC Count : 4.31 M/uL  Hemoglobin : 12.0 g/dL  Hematocrit : 38.4 %  Platelet Count - Automated : 182 K/uL  Mean Cell Volume : 89.1 fl  Mean Cell Hemoglobin : 27.8 pg  Mean Cell Hemoglobin Concentration : 31.3 g/dL  05-02 Na144 mmol/L Glu 84 mg/dL K+ 4.1 mmol/L Cr  0.91 mg/dL BUN 31.0 mg/dL<H> Phos n/a   Alb n/a   PAB n/a       Skin: no skin breakdown noted    Nutrition focused physical exam conducted - found signs of malnutrition [x ]absent [ ]present    Subcutaneous fat loss: [ ] Orbital fat pads region, [ ]Buccal fat region, [ ]Triceps region,  [ ]Ribs region    Muscle wasting: [ ]Temples region, [ ]Clavicle region, [ ]Shoulder region, [ ]Scapula region, [ ]Interosseous region,  [ ]thigh region, [ ]Calf region    Estimated Needs:   [x ] no change since previous assessment  [ ] recalculated:     Current Nutrition Diagnosis: Pt remains at nutrition risk secondary to moderate protein calorie malnutrition (acute) related to inadequate protein energy intake in the setting of septic pneumonia and COPD upon admission as evidenced by pt previously meeting <50% estimated energy intake >5 days, 2+ BLLE edema.  Malnutrition improving as po intake is improving.  Pt also with new dx of CHF.  Discussed heart failure nutrition recommendations.  Pt verbalized understanding, literature provided.    Recommendations:   Continue with Ensure tid  Monitor daily wts    Monitoring and Evaluation:   [x ] PO intake [x ] Tolerance to diet prescription [X] Weights  [X] Follow up per protocol [X] Labs:

## 2019-05-03 NOTE — PROGRESS NOTE ADULT - ASSESSMENT
This 79 yo male, PMHx COPD, PPM, HTN, HLD, lumbar spinal stenosis, BIBA with progressive shortness of breath over the past few days on 4/20/19.    RL consolidation and R pleural effusion.     Likely Pneumonia/ CAP with right parapneumonic effusion    - Completed course of Azithromycin and Ceftriaxone  on 4/26/18  thoracentesis fluid with no growth.     s/p Decortication on 4/30/19  - culture with peptostreptococcus  continue Zosyn ; this will cover peptostreptococcus    - follow up all outstanding cultures  - trend temperature and WBC curve  - repeat cultures from blood and all sources if febrile.

## 2019-05-03 NOTE — PROGRESS NOTE ADULT - SUBJECTIVE AND OBJECTIVE BOX
PULMONARY PROGRESS NOTE      [id number]    Patient is a 78y old  Male who presents with a chief complaint of Hypercapnic Respiratory Failure (02 May 2019 15:28)      INTERVAL HPI/OVERNIGHT EVENTS:  CT came out this AM.  Some discomfort but without increased SOB.  On NCO2  MEDICATIONS  (STANDING):  ALBUTerol/ipratropium for Nebulization 3 milliLiter(s) Nebulizer every 6 hours  atorvastatin 40 milliGRAM(s) Oral at bedtime  docusate sodium 100 milliGRAM(s) Oral three times a day  enoxaparin Injectable 40 milliGRAM(s) SubCutaneous daily  losartan 50 milliGRAM(s) Oral daily  melatonin 5 milliGRAM(s) Oral at bedtime  montelukast 10 milliGRAM(s) Oral daily  pantoprazole  Injectable 40 milliGRAM(s) IV Push daily  piperacillin/tazobactam IVPB. 3.375 Gram(s) IV Intermittent every 8 hours  polyethylene glycol 3350 17 Gram(s) Oral daily  predniSONE   Tablet 40 milliGRAM(s) Oral daily  senna 2 Tablet(s) Oral at bedtime  sodium chloride 0.9% lock flush 3 milliLiter(s) IV Push every 8 hours  sodium chloride 0.9%. 1000 milliLiter(s) (20 mL/Hr) IV Continuous <Continuous>      MEDICATIONS  (PRN):  acetaminophen   Tablet .. 650 milliGRAM(s) Oral every 6 hours PRN Temp greater or equal to 38C (100.4F), Mild Pain (1 - 3)  ALPRAZolam 0.25 milliGRAM(s) Oral at bedtime PRN insomnia/anxiety  oxyCODONE    5 mG/acetaminophen 325 mG 1 Tablet(s) Oral every 4 hours PRN Moderate Pain (4 - 6)  oxyCODONE    5 mG/acetaminophen 325 mG 2 Tablet(s) Oral every 4 hours PRN Severe Pain (7 - 10)      Allergies    No Known Allergies    Intolerances        PAST MEDICAL & SURGICAL HISTORY:  HLD (hyperlipidemia)  HTN (hypertension)  COPD (chronic obstructive pulmonary disease)  Stenosis of lumbosacral spine  H/O back injury  No significant past surgical history        Vital Signs Last 24 Hrs  T(C): 36.6 (03 May 2019 05:32), Max: 36.8 (02 May 2019 23:28)  T(F): 97.8 (03 May 2019 05:32), Max: 98.3 (02 May 2019 23:28)  HR: 80 (03 May 2019 07:52) (67 - 80)  BP: 129/72 (03 May 2019 05:32) (106/51 - 133/70)  BP(mean): --  RR: 21 (03 May 2019 05:32) (18 - 21)  SpO2: 95% (03 May 2019 05:32) (94% - 98%)    PHYSICAL EXAMINATION:    GENERAL: The patient is awake and alert in no apparent distress.     HEENT: Head is normocephalic and atraumatic. Extraocular muscles are intact. Mucous membranes are moist.    NECK: Supple.    LUNGS: diminished bs, largely clear.    HEART: Regular rate and rhythm without murmur.    ABDOMEN: Soft, nontender, and nondistended.      EXTREMITIES: Without any cyanosis, clubbing, rash, lesions 1+edema.    NEUROLOGIC: Grossly intact.    SKIN: No ulceration or induration present.      LABS:                        12.0   15.2  )-----------( 182      ( 02 May 2019 06:17 )             38.4     05-02    144  |  100  |  31.0<H>  ----------------------------<  84  4.1   |  34.0<H>  |  0.91    Ca    8.6      02 May 2019 06:17  Mg     2.0     05-02                          MICROBIOLOGY:Culture - Acid Fast - Tissue w/Smear (05.01.19 @ 18:23)    Specimen Source: .Tissue Product of Decortication    Acid Fast Bacilli Smear:   No acid fast bacilli seen by fluorochrome stain    Culture - Tissue with Gram Stain (05.01.19 @ 07:27)    Gram Stain:   Few WBC's  No organisms seen    Specimen Source: .Tissue Product of Decortication    Culture Results:   Culture in progress        RADIOLOGY & ADDITIONAL STUDIES:< from: Xray Chest 1 View- PORTABLE-Routine (05.02.19 @ 05:24) >  FINDINGS: RIGHT chest tube tip overlying apex of lung. Second chest tube   overlies RIGHT lung base. No pneumothorax extrapleural air collection or   pleural effusion. LEFT lung clear.    The  heart is enlarged in transverse diameter. No hilar mass. Trachea   midline.  . Cardiac device wire leads are within right atrium and right ventricle.   .    Visualized osseous structures are intact.    < end of copied text >

## 2019-05-03 NOTE — PROGRESS NOTE ADULT - ASSESSMENT
78M former smoker, COPD, PNA with loculated pleural effusion. s/p Pigtail 4/26 in IR.  Right VATS decortication 4/30, uneventful. Chest tube #2 air leak > now appears resolved.    Interval: today pt had chest tube accidentally removed while being transferred from bed to chair. PA made aware and stat cxr ordered with no evidence of PTX. Pt hemodynamically stable at this time. 2nd xray now pending.

## 2019-05-03 NOTE — PROGRESS NOTE ADULT - SUBJECTIVE AND OBJECTIVE BOX
Subjective: Pt resting in bed. Pt denies any headache syncope, chest pain, palpitations, SOB, difficulty breathing, nausea, vomiting, fevers, chills, abdominal discomfort, urinary retention.    Interval: today pt had chest tube accidentally removed while being transferred from bed to chair. PA made aware and stat cxr ordered with no evidence of PTX. Pt hemodynamically stable at this time. 2nd xray now pending.      T(C): 36.9 (05-03-19 @ 10:00), Max: 36.9 (05-03-19 @ 10:00)  HR: 96 (05-03-19 @ 10:00) (74 - 96)  BP: 150/68 (05-03-19 @ 10:00) (113/66 - 150/68)  ABP: --  ABP(mean): --  RR: 22 (05-03-19 @ 10:00) (19 - 22)  SpO2: 95% (05-03-19 @ 09:45) (94% - 98%)  Wt(kg): --  CVP(mm Hg): --  CO: --  CI: --  PA: --       I&O's Detail    02 May 2019 07:01  -  03 May 2019 07:00  --------------------------------------------------------  IN:    Oral Fluid: 920 mL    Solution: 100 mL  Total IN: 1020 mL    OUT:    Chest Tube: 9 mL    Chest Tube: 120 mL    Voided: 700 mL  Total OUT: 829 mL    Total NET: 191 mL      03 May 2019 07:01  -  03 May 2019 13:20  --------------------------------------------------------  IN:    Oral Fluid: 120 mL  Total IN: 120 mL    OUT:    Voided: 300 mL  Total OUT: 300 mL    Total NET: -180 mL          LABS: All Lab data reviewed and analyzed                        12.0   15.2  )-----------( 182      ( 02 May 2019 06:17 )             38.4     05-02    144  |  100  |  31.0<H>  ----------------------------<  84  4.1   |  34.0<H>  |  0.91    Ca    8.6      02 May 2019 06:17  Mg     2.0     05-02              CAPILLARY BLOOD GLUCOSE               RADIOLOGY: - Reviewed and analyzed   CXR: pending    HOSPITAL MEDICATIONS: All medications reviewed and analyzed  MEDICATIONS  (STANDING):  ALBUTerol/ipratropium for Nebulization 3 milliLiter(s) Nebulizer every 6 hours  atorvastatin 40 milliGRAM(s) Oral at bedtime  docusate sodium 100 milliGRAM(s) Oral three times a day  enoxaparin Injectable 40 milliGRAM(s) SubCutaneous daily  losartan 50 milliGRAM(s) Oral daily  melatonin 5 milliGRAM(s) Oral at bedtime  montelukast 10 milliGRAM(s) Oral daily  pantoprazole  Injectable 40 milliGRAM(s) IV Push daily  piperacillin/tazobactam IVPB. 3.375 Gram(s) IV Intermittent every 8 hours  polyethylene glycol 3350 17 Gram(s) Oral daily  predniSONE   Tablet 40 milliGRAM(s) Oral daily  senna 2 Tablet(s) Oral at bedtime  sodium chloride 0.9% lock flush 3 milliLiter(s) IV Push every 8 hours  sodium chloride 0.9%. 1000 milliLiter(s) (20 mL/Hr) IV Continuous <Continuous>    MEDICATIONS  (PRN):  acetaminophen   Tablet .. 650 milliGRAM(s) Oral every 6 hours PRN Temp greater or equal to 38C (100.4F), Mild Pain (1 - 3)  ALPRAZolam 0.25 milliGRAM(s) Oral every 8 hours PRN anxiety  oxyCODONE    5 mG/acetaminophen 325 mG 1 Tablet(s) Oral every 4 hours PRN Moderate Pain (4 - 6)  oxyCODONE    5 mG/acetaminophen 325 mG 2 Tablet(s) Oral every 4 hours PRN Severe Pain (7 - 10)          Lines:     Physical Exam  Neuro: A+O x 3, non-focal, speech clear and intact  HEENT:  NCAT, PERRL, EOMI. No conjunctival edema or iIcterus, no thrush.  No ETT or NGT/OGT  Neck:  ROM intact, no JVD, no nodes or masses palpable, trachea midline, no tracheostomy  Pulm: breath sounds over b/l upper lung fields sounds clear, diminished lung sounds at b/l bases.    CV: RRR, S1, S2. No murmurs, rubs, or gallops noted.  Abd: +BSx4. Soft, nontender, nondistended.  : no cva tenderness   Ext: HOWARD x 4, no edema, no cyanosis or clubbing, distal motor/neuro/circ intact  Skin: warm, dry, no rashes  Incisions: right chest tube dressing site c/d/i         Case including assessment/plan of care discussed with   CT surgery attending.  Critical Care time:   45   minutes of noncontinuos critical care time spent evaluating/treating, reviewing imaging, labs, discussing case with multidisciplinary team, discussing plans/goals of care with patient/family to prevent further life threatening depreciation of the patient's condition. Non-inclusive is procedure time.       78yMale with PMH     Bronchoscopy, flexible, adult  Total decortication of right lung  Insertion of right chest tube with ultrasound guidance      PAST MEDICAL & SURGICAL HISTORY:  HLD (hyperlipidemia)  HTN (hypertension)  COPD (chronic obstructive pulmonary disease)  Stenosis of lumbosacral spine  H/O back injury  No significant past surgical history

## 2019-05-03 NOTE — PROGRESS NOTE ADULT - SUBJECTIVE AND OBJECTIVE BOX
[facility]  INFECTIOUS DISEASES AND INTERNAL MEDICINE at [place]  =======================================================  Onesimo Madison MD [facility] HospitalCRIST[facility] Arauz MD  Diplomates American Board of Internal Medicine and Infectious Diseases  =======================================================    MRN-[id number]  JOHN CIFUENTES   follow up for: right side parapneumonic effusion, right sided PNA  patient seen and examined.     s/p VATS  culture from surgical specimen with Peptostreptococcus   had dislodgement of Chest tube yesterday on 5/2/19.    pain on deep breaths    ===================================================  REVIEW OF SYSTEMS:  as above  all other ROS negative    =======================================================  Allergies  No Known Allergies    Antibiotics:  piperacillin/tazobactam IVPB. 3.375 Gram(s) IV Intermittent every 8 hours    ======================================================  Physical Exam:  ============  T(F): 98.4 (03 May 2019 10:00), Max: 98.4 (03 May 2019 10:00)  HR: 96 (03 May 2019 10:00)  BP: 150/68 (03 May 2019 10:00)  RR: 22 (03 May 2019 10:00)  SpO2: 95% (03 May 2019 09:45) (94% - 100%)    General:  No acute distress.  Eye: Pupils are equal, round and reactive to light, Extraocular movements are intact, Normal conjunctiva.  HENT: Normocephalic, Oral mucosa is moist, No pharyngeal erythema, No sinus tenderness.  Neck: Supple, No lymphadenopathy.  Respiratory: diminished aeration right base; mild tachypnea  Cardiovascular: Normal rate, Regular rhythm, Good pulses equal in all extremities, TRACE edema.  Gastrointestinal: Soft, Non-tender, Non-distended, Normal bowel sounds.  Genitourinary: No costovertebral angle tenderness.  Lymphatics: No lymphadenopathy neck,   Musculoskeletal: Normal range of motion, Normal strength.  Integumentary: No rash.   Neurologic: Alert, Oriented, No focal deficits, Cranial Nerves II-XII are grossly intact.  Psychiatric: Appropriate mood & affect.    =======================================================  Labs:                        12.0   15.2  )-----------( 182      ( 02 May 2019 06:17 )             38.4       WBC Count: 15.2 K/uL (05-02-19 @ 06:17)  WBC Count: 17.5 K/uL (05-01-19 @ 03:53)  WBC Count: 13.8 K/uL (04-30-19 @ 09:27)  WBC Count: 11.4 K/uL (04-29-19 @ 06:08)      05-02    144  |  100  |  31.0<H>  ----------------------------<  84  4.1   |  34.0<H>  |  0.91    Ca    8.6      02 May 2019 06:17  Mg     2.0     05-02        Culture - Acid Fast - Tissue w/Smear (collected 05-01-19 @ 18:23)  Source: .Tissue Product of Decortication    Culture - Tissue with Gram Stain (collected 05-01-19 @ 07:27)  Source: .Tissue Product of Decortication  Gram Stain (05-01-19 @ 11:28):    Few WBC's    No organisms seen    Culture - Acid Fast - Body Fluid w/Smear (collected 04-26-19 @ 17:21)  Source: Pleural Fl    Culture - Fungal, Body Fluid (collected 04-26-19 @ 12:27)  Source: .Body Fluid    Culture - Body Fluid with Gram Stain (collected 04-26-19 @ 12:27)  Source: .Body Fluid pleural fluid  Gram Stain (04-26-19 @ 14:03):    No WBC's or organisms seen  Final Report (05-02-19 @ 14:30):    Few Peptostreptococcus asaccharolyticus (anaerobic organism)  Organism: Peptostreptococcus asaccharolyticus (05-03-19 @ 09:12)    Sensitivities:      Method Type: IdentMetho  Organism: Peptostreptococcus asaccharolyticus (05-02-19 @ 14:30)    Culture - Acid Fast - Body Fluid w/Smear (collected 04-21-19 @ 16:57)  Source: .Body Fluid    Culture - Urine (collected 04-20-19 @ 22:04)  Source: .Urine  Final Report (04-22-19 @ 10:23):    No growth    Culture - Body Fluid with Gram Stain (collected 04-20-19 @ 15:36)  Source: .Body Fluid  Gram Stain (04-20-19 @ 19:57):    Few White blood cells    Few Yeast  Final Report (04-25-19 @ 16:59):    No growth at 5 days.    Culture - Blood (collected 04-20-19 @ 11:19)  Source: .Blood  Final Report (04-25-19 @ 13:00):    No growth at 5 days.    Culture - Blood (collected 04-20-19 @ 11:19)  Source: .Blood  Final Report (04-25-19 @ 13:00):    No growth at 5 days.

## 2019-05-03 NOTE — PROGRESS NOTE ADULT - PROBLEM SELECTOR PLAN 1
S/P R VATS with decortication,  CT x 2 removed 5/3  pleural culture 4/26 with Peptostreptococcus appropriate , continue ABX coverage with zosyn. Will discuss with plan for length of duration with ID.   Encourage IS hourly while awake  OOB to chair, ambulate with PT assist  Monitor labs, CXR daily

## 2019-05-04 NOTE — PROGRESS NOTE ADULT - ASSESSMENT
77 yo man, with PMH of COPD, PPM, HTN, and lumbar spinal stenosis was admitted with shortness of breath for few days, on 4/20/19.    RLL consolidation and R pleural effusion.     Likely Pneumonia/ CAP with right parapneumonic effusion    - Completed course of Azithromycin and Ceftriaxone on 4/26/18  - Thoracentesis fluid with no growth.   - S/p Decortication on 4/30/19  - Culture with Peptostreptococcus  - Continue Zosyn 3.375gm q8h  - follow up all outstanding cultures  - trend temperature and WBC curve  - repeat cultures from blood and all sources if febrile.     Will follow.

## 2019-05-04 NOTE — PROGRESS NOTE ADULT - SUBJECTIVE AND OBJECTIVE BOX
[facility]  INFECTIOUS DISEASES AND INTERNAL MEDICINE at [place]  =======================================================  Onesimo Arauz MD  Diplomates American Board of Internal Medicine and Infectious Diseases  =======================================================    JOHN CIFUENTES [id number]    Follow up: Pneumonia with parapneumonic effusion    Doing better, no chest pain or SOB at this time. still has some cough. Afebrile.     Allergies:  No Known Allergies    Antibiotics:  piperacillin/tazobactam IVPB. 3.375 Gram(s) IV Intermittent every 8 hours    REVIEW OF SYSTEMS:  CONSTITUTIONAL:  No Fever or chills  HEENT:   No diplopia or blurred vision.  No earache, sore throat or runny nose.  CARDIOVASCULAR:  No chest pain or SOB  RESPIRATORY:  No cough, shortness of breath, PND or orthopnea.  GASTROINTESTINAL:  No nausea, vomiting or diarrhea.  GENITOURINARY:  No dysuria, frequency or urgency. No Blood in urine  MUSCULOSKELETAL:  no joint aches, no muscle pain  SKIN:  No change in skin, hair or nails.  NEUROLOGIC:  No paresthesias or weakness.  PSYCHIATRIC:  No disorder of thought or mood.  ENDOCRINE:  No heat or cold intolerance, polyuria or polydipsia.  HEMATOLOGICAL:  No easy bruising or bleeding.      Physical Exam:  ICU Vital Signs Last 24 Hrs  T(C): 37.1 (04 May 2019 11:09), Max: 37.1 (04 May 2019 11:09)  T(F): 98.7 (04 May 2019 11:09), Max: 98.7 (04 May 2019 11:09)  HR: 71 (04 May 2019 11:09) (67 - 99)  BP: 127/71 (04 May 2019 11:09) (110/63 - 152/95)  RR: 19 (04 May 2019 11:09) (18 - 20)  SpO2: 98% (04 May 2019 11:09) (94% - 99%)  GEN: NAD  HEENT: normocephalic and atraumatic. EOMI. SAMANTHA.    NECK: Supple.  No lymphadenopathy   LUNGS: Decreased breath sounds in RLL, dressing on RL chest with no sign of infection   HEART: Regular rate and rhythm without murmur.  ABDOMEN: Soft, nontender, and nondistended.  Positive bowel sounds.    : No CVA tenderness  EXTREMITIES: Without any cyanosis, clubbing, rash, lesions or edema.  MSK: no joint swelling  NEUROLOGIC: grossly intact.  PSYCHIATRIC: Appropriate affect .  SKIN: No ulceration or induration present.      Labs:  RECENT CULTURES:  05-01 @ 18:23 .Tissue Product of Decortication       05-01 @ 07:27 .Tissue Product of Decortication     Few Gram Positive Cocci (anaerobic organism) Identification to follow.  Culture in progress    Few WBC's  No organisms seen    04-26 @ 17:21 Pleural Fl     Culture is being performed.    04-26 @ 12:27 .Body Fluid pleural fluid Peptostreptococcus asaccharolyticus    Few Peptostreptococcus asaccharolyticus (anaerobic organism)    No WBC's or organisms seen    04-21 @ 16:57 .Body Fluid     No growth at 1 week.    04-20 @ 22:04 .Urine     No growth    04-20 @ 15:36 .Body Fluid     No growth at 5 days.    Few White blood cells  Few Yeast    04-20 @ 12:13      NotDetec    All imaging and data are reviewed.

## 2019-05-04 NOTE — PROGRESS NOTE ADULT - ASSESSMENT
S/P VATS for complex effusion.  Doing fairly well.  Needs to ambulate/be evaluated via PT for possible outpatient therapy.    Plan:  1.Taper prednisone as outpatient>needs f/u with us post d/c  2.Has O2 at home but needs new equipment apparently  3.Continue bronchodilators  4.Discussed with CT surgery>to get PT eval  5.Will f/u PRN

## 2019-05-04 NOTE — PROGRESS NOTE ADULT - SUBJECTIVE AND OBJECTIVE BOX
PULMONARY PROGRESS NOTE      [id number]    Patient is a 78y old  Male who presents with a chief complaint of Hypercapnic Respiratory Failure (04 May 2019 11:59)      INTERVAL HPI/OVERNIGHT EVENTS:  Tubes out  Feels much better  Hasn't walked yet though  ?plan for d/c tomorrow  MEDICATIONS  (STANDING):  ALBUTerol/ipratropium for Nebulization 3 milliLiter(s) Nebulizer every 6 hours  atorvastatin 40 milliGRAM(s) Oral at bedtime  docusate sodium 100 milliGRAM(s) Oral three times a day  enoxaparin Injectable 40 milliGRAM(s) SubCutaneous daily  losartan 50 milliGRAM(s) Oral daily  melatonin 5 milliGRAM(s) Oral at bedtime  montelukast 10 milliGRAM(s) Oral daily  pantoprazole  Injectable 40 milliGRAM(s) IV Push daily  piperacillin/tazobactam IVPB. 3.375 Gram(s) IV Intermittent every 8 hours  polyethylene glycol 3350 17 Gram(s) Oral daily  predniSONE   Tablet 40 milliGRAM(s) Oral daily  senna 2 Tablet(s) Oral at bedtime  sodium chloride 0.9% lock flush 3 milliLiter(s) IV Push every 8 hours  sodium chloride 0.9%. 1000 milliLiter(s) (20 mL/Hr) IV Continuous <Continuous>      MEDICATIONS  (PRN):  acetaminophen   Tablet .. 650 milliGRAM(s) Oral every 6 hours PRN Temp greater or equal to 38C (100.4F), Mild Pain (1 - 3)  ALPRAZolam 0.25 milliGRAM(s) Oral every 8 hours PRN anxiety  oxyCODONE    5 mG/acetaminophen 325 mG 1 Tablet(s) Oral every 4 hours PRN Moderate Pain (4 - 6)  oxyCODONE    5 mG/acetaminophen 325 mG 2 Tablet(s) Oral every 4 hours PRN Severe Pain (7 - 10)      Allergies    No Known Allergies    Intolerances        PAST MEDICAL & SURGICAL HISTORY:  HLD (hyperlipidemia)  HTN (hypertension)  COPD (chronic obstructive pulmonary disease)  Stenosis of lumbosacral spine  H/O back injury  No significant past surgical history      SOCIAL HISTORY  Smoking History: Former      REVIEW OF SYSTEMS:    CONSTITUTIONAL:  No distress    HEENT:  Eyes:  No diplopia or blurred vision. ENT:  No earache, sore throat or runny nose.    CARDIOVASCULAR:  No pressure, squeezing, tightness, heaviness or aching about the chest; no palpitations.    RESPIRATORY:  No cough, shortness of breath, PND or orthopnea. Mild SOBOE    GASTROINTESTINAL:  No nausea, vomiting or diarrhea.    GENITOURINARY:  No dysuria, frequency or urgency.    MUSCULOSKELETAL:  No joint pain    SKIN:  No new lesions.    NEUROLOGIC:  No paresthesias, fasciculations, seizures or weakness.    PSYCHIATRIC:  No disorder of thought or mood.    ENDOCRINE:  No heat or cold intolerance, polyuria or polydipsia.    HEMATOLOGICAL:  No easy bruising or bleeding.     Vital Signs Last 24 Hrs  T(C): 37.1 (04 May 2019 11:09), Max: 37.1 (04 May 2019 11:09)  T(F): 98.7 (04 May 2019 11:09), Max: 98.7 (04 May 2019 11:09)  HR: 71 (04 May 2019 11:09) (67 - 99)  BP: 127/71 (04 May 2019 11:09) (110/63 - 152/95)  BP(mean): --  RR: 19 (04 May 2019 11:09) (18 - 20)  SpO2: 98% (04 May 2019 11:09) (94% - 99%)    PHYSICAL EXAMINATION:    GENERAL: The patient is awake and alert in no apparent distress.     HEENT: Head is normocephalic and atraumatic. Extraocular muscles are intact. Mucous membranes are moist.    NECK: Supple.    LUNGS: Clear to auscultation without wheezing, rales or rhonchi; respirations unlabored; somewhat diminished on right side    HEART: Regular rate and rhythm without murmur.    ABDOMEN: Soft, nontender, and nondistended.      EXTREMITIES: Without any cyanosis, clubbing, rash, lesions or edema.    NEUROLOGIC: Grossly intact.    SKIN: No ulceration or induration present.      LABS:                              MICROBIOLOGY:    RADIOLOGY & ADDITIONAL STUDIES:< from: Xray Chest 1 View- PORTABLE-Routine (05.04.19 @ 06:17) >     EXAM:  XR CHEST PORTABLE ROUTINE 1V                          PROCEDURE DATE:  05/04/2019          INTERPRETATION:  Chest radiograph (one view)     CPT 94665    CLINICAL INFORMATION:  Patient is unable to communicate. Status post   VATS. Follow-up. No additional information is provided.    TECHNIQUE:  Single frontal view of the chest was obtained.    FINDINGS:  Prior study dated 5/3/2019 was available for review.    The lungs demonstrate right basilar atelectatic changes unchanged from   priorstudy. The left lung field remains clear. The heart and mediastinum   appear intact.  Pacemaker is noted.      IMPRESSION: Right basilar atelectatic changes noted without significant   change from prior study dated 5/3/2019.   Pacemaker present.     < end of copied text >

## 2019-05-04 NOTE — PROGRESS NOTE ADULT - ASSESSMENT
78M former smoker, COPD, PNA with loculated pleural effusion. s/p Pigtail 4/26 in IR. Right VATS decortication 4/30. Chest tube #2 air leak > now appears resolved.

## 2019-05-04 NOTE — PROGRESS NOTE ADULT - SUBJECTIVE AND OBJECTIVE BOX
Subjective: Pt resting in bed without complaints.    Interval: 5/3 Chest tubes accidentally removed while being transferred from bed to chair, pt continues to be hemodynamically stable. Subsequent Cxr stable without pneumothorax.            PAST MEDICAL & SURGICAL HISTORY:  HLD (hyperlipidemia)  HTN (hypertension)  COPD (chronic obstructive pulmonary disease)  Stenosis of lumbosacral spine  H/O back injury  No significant past surgical history      HOSPITAL MEDICATIONS: All medications reviewed and analyzed  MEDICATIONS  (STANDING):  ALBUTerol/ipratropium for Nebulization 3 milliLiter(s) Nebulizer every 6 hours  atorvastatin 40 milliGRAM(s) Oral at bedtime  docusate sodium 100 milliGRAM(s) Oral three times a day  enoxaparin Injectable 40 milliGRAM(s) SubCutaneous daily  losartan 50 milliGRAM(s) Oral daily  melatonin 5 milliGRAM(s) Oral at bedtime  montelukast 10 milliGRAM(s) Oral daily  pantoprazole  Injectable 40 milliGRAM(s) IV Push daily  piperacillin/tazobactam IVPB. 3.375 Gram(s) IV Intermittent every 8 hours  polyethylene glycol 3350 17 Gram(s) Oral daily  predniSONE   Tablet 40 milliGRAM(s) Oral daily  senna 2 Tablet(s) Oral at bedtime  sodium chloride 0.9% lock flush 3 milliLiter(s) IV Push every 8 hours  sodium chloride 0.9%. 1000 milliLiter(s) (20 mL/Hr) IV Continuous <Continuous>    MEDICATIONS  (PRN):  acetaminophen   Tablet .. 650 milliGRAM(s) Oral every 6 hours PRN Temp greater or equal to 38C (100.4F), Mild Pain (1 - 3)  ALPRAZolam 0.25 milliGRAM(s) Oral every 8 hours PRN anxiety  oxyCODONE    5 mG/acetaminophen 325 mG 1 Tablet(s) Oral every 4 hours PRN Moderate Pain (4 - 6)  oxyCODONE    5 mG/acetaminophen 325 mG 2 Tablet(s) Oral every 4 hours PRN Severe Pain (7 - 10)        T(C): 36.9 (05-03-19 @ 10:00), Max: 36.9 (05-03-19 @ 10:00)  HR: 96 (05-03-19 @ 10:00) (74 - 96)  BP: 150/68 (05-03-19 @ 10:00) (113/66 - 150/68)  ABP: --  ABP(mean): --  RR: 22 (05-03-19 @ 10:00) (19 - 22)  SpO2: 95% (05-03-19 @ 09:45) (94% - 98%)      I&O's Detail    02 May 2019 07:01  -  03 May 2019 07:00  --------------------------------------------------------  IN:    Oral Fluid: 920 mL    Solution: 100 mL  Total IN: 1020 mL    OUT:    Chest Tube: 9 mL    Chest Tube: 120 mL    Voided: 700 mL  Total OUT: 829 mL    Total NET: 191 mL      03 May 2019 07:01  -  03 May 2019 13:20  --------------------------------------------------------  IN:    Oral Fluid: 120 mL  Total IN: 120 mL    OUT:    Voided: 300 mL  Total OUT: 300 mL  Total NET: -180 mL      LABS: All Lab data reviewed and analyzed                        12.0   15.2  )-----------( 182      ( 02 May 2019 06:17 )             38.4     05-02    144  |  100  |  31.0<H>  ----------------------------<  84  4.1   |  34.0<H>  |  0.91    Ca    8.6      02 May 2019 06:17  Mg     2.0     05-02           RADIOLOGY: - Reviewed and analyzed   CXR: pending read.      Physical Exam  General: Pt sitting up in bed in NAD.  Neuro: A+O x 3, non-focal, speech clear and intact  Neck: Trachea midline.   Pulm: CTA in all lung spaces. No wheezes, rales, or rhonchi.  CV: RRR, S1, S2. No murmurs, rubs, or gallops noted.  Abd: +BSx4. Soft, nontender, nondistended.   Ext: HOWARD x 4, no edema, no cyanosis or clubbing. +pulses throughout.  Incisions: previous right chest tube site with dressing c/d/i

## 2019-05-04 NOTE — PROGRESS NOTE ADULT - PROBLEM SELECTOR PLAN 1
S/P R VATS with decortication,  CT x 2 removed 5/3  Pleural culture 4/26 with Peptostreptococcus continue ABX coverage with zosyn.  Will discuss with ID about transitioning to PO abx.  Encourage IS hourly while awake  OOB to chair, ambulate with PT assist  Monitor labs, CXR daily

## 2019-05-05 NOTE — PROGRESS NOTE ADULT - PROBLEM SELECTOR PLAN 2
continue percocet prn
Continue colace.
Zosyn completed today as per Dr. Dominguez  Maintain SPO2>90%, will discharge to home on supplemental home oxygen.  Continue Prednisone taper  Continue Becca
resolving
stopping pca  try percocet

## 2019-05-05 NOTE — PROGRESS NOTE ADULT - ASSESSMENT
78  year old male with a PMH former smoker, COPD, PNA with loculated pleural effusion. s/p Pigtail  on 4/26 in IR. Right VATS decortication on 4/30. Chest tube #2 air leak > now appears resolved. on 5/3 both chest tubes dislodged.

## 2019-05-05 NOTE — PROGRESS NOTE ADULT - PROBLEM SELECTOR PROBLEM 2
Constipation due to opioid therapy
Pleural effusion
Acute respiratory failure with hypoxia and hypercapnia
Constipation due to opioid therapy
Constipation due to opioid therapy
Pneumonia

## 2019-05-05 NOTE — PROGRESS NOTE ADULT - SUBJECTIVE AND OBJECTIVE BOX
Subjective: "I was told I will not be able to go home because which switched our insurance & need to call the oxygen company."  Pt. OOB to chair, denies any SOB or chest pain, NAD noted, wife at the bedside.    VITAL SIGNS  Vital Signs Last 24 Hrs  T(C): 36.6 (19 @ 15:00), Max: 37.1 (19 @ 11:42)  T(F): 97.9 (19 @ 15:00), Max: 98.8 (19 @ 11:42)  HR: 78 (19 @ 15:32) (54 - 85)  BP: 116/66 (19 @ 15:00) (110/64 - 127/68)  RR: 19 (19 @ 15:00) (16 - 19)  SpO2: 94% (19 @ 15:32) (94% - 98%)  on (O2)              Telemetry: Sinus rhythm   LVEF: 35%    MEDICATIONS  acetaminophen   Tablet .. 650 milliGRAM(s) Oral every 6 hours PRN  ALBUTerol/ipratropium for Nebulization 3 milliLiter(s) Nebulizer every 6 hours  ALPRAZolam 0.25 milliGRAM(s) Oral every 8 hours PRN  atorvastatin 40 milliGRAM(s) Oral at bedtime  docusate sodium 100 milliGRAM(s) Oral three times a day  enoxaparin Injectable 40 milliGRAM(s) SubCutaneous daily  losartan 50 milliGRAM(s) Oral daily  melatonin 5 milliGRAM(s) Oral at bedtime  montelukast 10 milliGRAM(s) Oral daily  oxyCODONE    5 mG/acetaminophen 325 mG 1 Tablet(s) Oral every 4 hours PRN  oxyCODONE    5 mG/acetaminophen 325 mG 2 Tablet(s) Oral every 4 hours PRN  pantoprazole  Injectable 40 milliGRAM(s) IV Push daily  polyethylene glycol 3350 17 Gram(s) Oral daily  senna 2 Tablet(s) Oral at bedtime  sodium chloride 0.9% lock flush 3 milliLiter(s) IV Push every 8 hours      PHYSICAL EXAM  General: well nourished, well developed, no acute distress  Neurology: alert and oriented x 3, nonfocal, no gross deficits  Respiratory: Diminished at the right base.   CV: regular rate and rhythm, normal S1, S2  Abdomen: soft, nontender, nondistended, positive bowel sounds, last bowel movement 19.  Extremities: warm, well perfused. Trace edema x2BLE. + DP pulses  Incisions: Right thoracotomy incision CDI with suture in place, no drainage noted.      I&O's Detail    04 May 2019 07:  -  05 May 2019 07:00  --------------------------------------------------------  IN:  Total IN: 0 mL    OUT:    Voided: 1175 mL  Total OUT: 1175 mL    Total NET: -1175 mL      05 May 2019 07:  -  05 May 2019 18:47  --------------------------------------------------------  IN:    Solution: 200 mL  Total IN: 200 mL    OUT:    Voided: 400 mL  Total OUT: 400 mL    Total NET: -200 mL          Weights:  Daily     Daily Weight in k.3 (05 May 2019 00:00)  Admit Wt: Drug Dosing Weight  Height (cm): 185.42 (2019 12:51)  Weight (kg): 111.1 (2019 12:51)  BMI (kg/m2): 32.3 (2019 12:51)  BSA (m2): 2.35 (2019 12:51)    LABS      147<H>  |  101  |  37.0<H>  ----------------------------<  94  3.7   |  36.0<H>  |  1.28    Ca    8.8      05 May 2019 05:52                                   12.1   12.0  )-----------( 182      ( 05 May 2019 05:52 )             40.7                  CAPILLARY BLOOD GLUCOSE               Today's CXR:< from: Xray Chest 1 View- PORTABLE-Routine (19 @ 06:45) >  EXAM:  XR CHEST PORTABLE ROUTINE 1V                          PROCEDURE DATE:  2019          INTERPRETATION:  TECHNIQUE: Single portable view of the chest.    COMPARISON: 2019    CLINICAL HISTORY: s/p VATS    FINDINGS:    Single frontal view of the chest demonstrates small right lower lobe   effusion/atelectasis, unchanged. The cardiomediastinal silhouette is   enlarged. No acute osseous abnormalities. Overlying EKG leads and wires   are noted. Left-sided dual-chamber pacemaker    IMPRESSION: No interval change.                SURY CATES M.D., ATTENDING RADIOLOGIST  This document has been electronically signed. May  5 2019 12:07PM        < end of copied text >          PAST MEDICAL & SURGICAL HISTORY:  HLD (hyperlipidemia)  HTN (hypertension)  COPD (chronic obstructive pulmonary disease)  Stenosis of lumbosacral spine  H/O back injury  No significant past surgical history

## 2019-05-05 NOTE — PROGRESS NOTE ADULT - PROBLEM SELECTOR PLAN 1
s/p Right VATS decortication on 4/30  Encourage the use of I/S, CDBE, chest PT, OOB to chair, daily PT, & duonebs  Unable to discharge to home today, home oxygen to be arranged tomorrow.  Continue Prednisone taper.  Follow up as out patient with Dr. Castillo.  Discussed plan with Dr. Hall & CTS in morning rounds.

## 2019-05-05 NOTE — PROGRESS NOTE ADULT - PROBLEM SELECTOR PROBLEM 1
Pleural effusion on right
Pleural effusion on right
Dyspnea
Pleural effusion on right
Pneumonia
Pneumonia, bacterial
Pleural effusion on right
Pleural effusion on right

## 2019-05-05 NOTE — PROGRESS NOTE ADULT - NSHPATTENDINGPLANDISCUSS_GEN_ALL_CORE
CT surgery
CT surgery
CT surgery team
Dr Bruner
Dr Reena Kang
RN
cardiology, RN and wife at bedside
medical service
patient.
RN
patient, RN, SW, wife at bedside, palliative team
patient, RN, SW, wife over phone
pt at bedside
Dr. Hall & CTS in morning rounds.

## 2019-05-06 NOTE — PHYSICAL THERAPY INITIAL EVALUATION ADULT - GENERAL OBSERVATIONS, REHAB EVAL
Pt. greeted resting in bed (+) IV lock, 3L O2 via NC, agreeable to PT
Pt received OOB in chair, + telemetry/SpO2 monitor, on RA - PA testing for SpO2 on RA.
fall precautions
Pt. received in the chair next o bed, NAD

## 2019-05-06 NOTE — PHYSICAL THERAPY INITIAL EVALUATION ADULT - PERTINENT HX OF CURRENT PROBLEM, REHAB EVAL
77 yo male, PMHx COPD, PPM, HTN, HLD, lumbar spinal stenosis, BIBA with progressive shortness of breath over the past few days.
Pt is an 79 y/o male who presented from home with SOB.
Insertion of right chest tube

## 2019-05-06 NOTE — PHYSICAL THERAPY INITIAL EVALUATION ADULT - CRITERIA FOR SKILLED THERAPEUTIC INTERVENTIONS
impairments found
impairments found
predicted duration of therapy intervention/risk reduction/prevention/therapy frequency/anticipated discharge recommendation/rehab potential/anticipated equipment needs at discharge/impairments found/functional limitations in following categories

## 2019-05-06 NOTE — PROGRESS NOTE ADULT - SUBJECTIVE AND OBJECTIVE BOX
[facility]  INFECTIOUS DISEASES AND INTERNAL MEDICINE at [place]  =======================================================  Onesimo Arauz MD  Diplomates American Board of Internal Medicine and Infectious Diseases  =======================================================    JOHN CIFUENTES [id number]    Follow up: Pneumonia with parapneumonic effusion    Doing better, no chest pain or SOB at this time. still has some cough. Afebrile.   HAS COMPLETED IV ABX     Allergies:  No Known Allergies    Antibiotics:   NONE    REVIEW OF SYSTEMS:  CONSTITUTIONAL:  No Fever or chills  HEENT:   No diplopia or blurred vision.  No earache, sore throat or runny nose.  CARDIOVASCULAR:  No chest pain or SOB  RESPIRATORY:  No cough, shortness of breath, PND or orthopnea.  GASTROINTESTINAL:  No nausea, vomiting or diarrhea.  GENITOURINARY:  No dysuria, frequency or urgency. No Blood in urine  MUSCULOSKELETAL:  no joint aches, no muscle pain  SKIN:  No change in skin, hair or nails.  NEUROLOGIC:  No paresthesias or weakness.  PSYCHIATRIC:  No disorder of thought or mood.  ENDOCRINE:  No heat or cold intolerance, polyuria or polydipsia.  HEMATOLOGICAL:  No easy bruising or bleeding.      Physical Exam:   Vital Signs Last 24 Hrs  T(C): 36.6 (06 May 2019 05:28), Max: 37.1 (05 May 2019 11:42)  T(F): 97.8 (06 May 2019 05:28), Max: 98.8 (05 May 2019 11:42)  HR: 95 (06 May 2019 09:48) (46 - 95)  BP: 152/86 (06 May 2019 05:28) (112/66 - 152/86)  BP(mean): --  RR: 20 (06 May 2019 09:36) (16 - 20)  SpO2: 96% (06 May 2019 09:48) (85% - 99%)    GEN: NAD  HEENT: normocephalic and atraumatic. EOMI. SAMANTHA.    NECK: Supple.  No lymphadenopathy   LUNGS: Decreased breath sounds in RLL, dressing on RL chest with no sign of infection   HEART: Regular rate and rhythm without murmur.  ABDOMEN: Soft, nontender, and nondistended.  Positive bowel sounds.    : No CVA tenderness  EXTREMITIES: Without any cyanosis, clubbing, rash, lesions or edema.  MSK: no joint swelling  NEUROLOGIC: grossly intact.  PSYCHIATRIC: Appropriate affect .  SKIN: No ulceration or induration present.      Labs:  RECENT CULTURES:  05-01 @ 18:23 .Tissue Product of Decortication       05-01 @ 07:27 .Tissue Product of Decortication     Few Gram Positive Cocci (anaerobic organism) Identification to follow.  Culture in progress    Few WBC's  No organisms seen    04-26 @ 17:21 Pleural Fl     Culture is being performed.    04-26 @ 12:27 .Body Fluid pleural fluid Peptostreptococcus asaccharolyticus    Few Peptostreptococcus asaccharolyticus (anaerobic organism)    No WBC's or organisms seen    04-21 @ 16:57 .Body Fluid     No growth at 1 week.    04-20 @ 22:04 .Urine     No growth    04-20 @ 15:36 .Body Fluid     No growth at 5 days.    Few White blood cells  Few Yeast    04-20 @ 12:13      NotDetec    All imaging and data are reviewed.

## 2019-05-06 NOTE — PHYSICAL THERAPY INITIAL EVALUATION ADULT - ACTIVE RANGE OF MOTION EXAMINATION, REHAB EVAL
bilateral upper extremity Active ROM was WFL (within functional limits)/bilateral  lower extremity Active ROM was WFL (within functional limits)
no Active ROM deficits were identified/with exception to b/l shoulder flexion 0-80 degrees - chronic
bilateral lower extremity Active ROM was WNL (within normal limits)/bilateral upper extremity Active ROM was WFL (within functional limits)/assessed during functional mobility, resistance not applied

## 2019-05-06 NOTE — PHYSICAL THERAPY INITIAL EVALUATION ADULT - PLANNED THERAPY INTERVENTIONS, PT EVAL
gait training/transfer training/strengthening/balance training/bed mobility training
gait training/transfer training/bed mobility training
bed mobility training/gait training/transfer training

## 2019-05-06 NOTE — PROGRESS NOTE ADULT - PROVIDER SPECIALTY LIST ADULT
Anesthesia
Cardiology
Critical Care
Hospitalist
Infectious Disease
Pulmonology
Thoracic Surgery
Cardiology
Hospitalist
Hospitalist
Thoracic Surgery
Thoracic Surgery

## 2019-05-06 NOTE — DISCHARGE NOTE NURSING/CASE MANAGEMENT/SOCIAL WORK - NSDCDPATPORTLINK_GEN_ALL_CORE
You can access the [facility] Patient Portal, offered by [facility], by registering with the following website: [email or web address]

## 2019-05-06 NOTE — PHYSICAL THERAPY INITIAL EVALUATION ADULT - IMPAIRMENTS FOUND, PT EVAL
gait, locomotion, and balance/aerobic capacity/endurance
gait, locomotion, and balance
aerobic capacity/endurance/gait, locomotion, and balance/muscle strength

## 2019-05-06 NOTE — PHYSICAL THERAPY INITIAL EVALUATION ADULT - PRECAUTIONS/LIMITATIONS, REHAB EVAL
3 L O2/oxygen therapy device and L/min
oxygen therapy device and L/min
fall precautions/oxygen therapy device and L/min/cardiac precautions

## 2019-05-06 NOTE — PROGRESS NOTE ADULT - REASON FOR ADMISSION
Hypercapnic Respiratory Failure

## 2019-05-06 NOTE — PHYSICAL THERAPY INITIAL EVALUATION ADULT - GAIT DISTANCE, PT EVAL
100 feet/x 2 with standing rest break, SpO2 80% following ambulation on RA - PA aware.
50ft. 30 sec standing rest break, 50 ft
20 feet

## 2019-05-06 NOTE — PROGRESS NOTE ADULT - ASSESSMENT
77 yo man, with PMH of COPD, PPM, HTN, and lumbar spinal stenosis was admitted with shortness of breath for few days, on 4/20/19.    RLL consolidation and R pleural effusion.     Likely Pneumonia/ CAP with right parapneumonic effusion    - Completed course of Azithromycin and Ceftriaxone on 4/26/18  - Thoracentesis fluid with no growth.   - S/p Decortication on 4/30/19  - Culture with Peptostreptococcus   COMPLETED  Zosyn 3.375gm q8h   AFEBRILE OFF ABX   WILL FOLLOW UP AS NEEDED

## 2019-05-06 NOTE — PHYSICAL THERAPY INITIAL EVALUATION ADULT - ADDITIONAL COMMENTS
Pt lives in a private home with no FABIOLA and none inside. Spouse is available 24/7 to assist as needed.
Pt. lives in a house with his wife with no stairs to enter and no stairs inside. Pt. was independent prior to admission with all functional skills. Pt. wife at home at all times and able to assist as needed. Pt. owns a RW and shower chair.
as per pt. : reports living in the apartment (Mother /daughter house), no steps to enter, (-) DME, wife available to assist the pt. upon D/C home

## 2019-05-06 NOTE — PHYSICAL THERAPY INITIAL EVALUATION ADULT - FUNCTIONAL LIMITATIONS, PT EVAL
community/leisure
self-care/community/leisure/home management
community/leisure/work/self-care/home management

## 2019-05-06 NOTE — PHYSICAL THERAPY INITIAL EVALUATION ADULT - ASR EQUIP NEEDS DISCH PT EVAL
rolling walker (5 inch wheels)
rolling walker (5 inch wheels)/pt. owns
rolling walker (5 inch wheels)

## 2019-05-22 NOTE — CONSULT LETTER
[Dear  ___] : Dear  [unfilled], [Consult Letter:] : I had the pleasure of evaluating your patient, [unfilled]. [Please see my note below.] : Please see my note below. [Consult Closing:] : Thank you very much for allowing me to participate in the care of this patient.  If you have any questions, please do not hesitate to contact me. [Sincerely,] : Sincerely, [Boni Schneider MD] : Boni Schneidre MD

## 2019-05-22 NOTE — PHYSICAL EXAM
[Normal Conjunctiva] : the conjunctiva exhibited no abnormalities [Normal Oropharynx] : normal oropharynx [Neck Appearance] : the appearance of the neck was normal [Jugular Venous Distention Increased] : there was no jugular-venous distention [Thyroid Diffuse Enlargement] : the thyroid was not enlarged [Heart Rate And Rhythm] : heart rate and rhythm were normal [Heart Sounds] : normal S1 and S2 [Arterial Pulses Normal] : the arterial pulses were normal [Edema] : no peripheral edema present [Respiration, Rhythm And Depth] : normal respiratory rhythm and effort [Auscultation Breath Sounds / Voice Sounds] : lungs were clear to auscultation bilaterally [Lungs Percussion] : the lungs were normal to percussion [Bowel Sounds] : normal bowel sounds [Abdomen Soft] : soft [Abdomen Tenderness] : non-tender [Abnormal Walk] : normal gait [Skin Turgor] : normal skin turgor [] : no rash [No Focal Deficits] : no focal deficits [Oriented To Time, Place, And Person] : oriented to person, place, and time [Impaired Insight] : insight and judgment were intact [Affect] : the affect was normal [Memory Recent] : recent memory was not impaired [Nail Clubbing] : no clubbing of the fingernails [Cyanosis, Localized] : no localized cyanosis [FreeTextEntry1] : Mallampati II-III

## 2019-05-22 NOTE — ASSESSMENT
[FreeTextEntry1] :   ES COPD\par    Mostly remodelled asthma (stiff, permanently constricted airways)\par Cor pulmonale  \par Severe obstruction\par Respiratory failure\par Needs NIV to improve ventilation and oxygenation to relieve cor pulmonale and to avoid hospitalization or death\par Bipap was considered but rejected due to the progressive nature of his disease

## 2019-05-22 NOTE — HISTORY OF PRESENT ILLNESS
[FreeTextEntry1] :     77 yo overweight male former smoker (D-C in 2000) with COPD likely IGLESIA and cor pulmonale.  Some episodes of productive cough.  RLL nodule stable from 12/3/13 to August, 2015 - benign.  Doing OK.. Mostly back pain issue - precluding rehab - but improving post surgery on a benign tumor.  Using duoneb.  No sputum or weight loss. Episodic steroids.\par Chronic, stable, moderate, 3 block LEGER relieved with 3 minutes of rest. \par \par 5/25/18\par PPM in [place]\par Wants Rehab and POC\par \par 8/30/18\par OK\par Most cost effective Rx has been DuoNeb TID and asmanex qd\par Await POC\par \par 11/29/18\par New prednisone taper and increase daily dose to 10 mg daily\par \par 5/22/19\par Post empyema and right lung decortication late April of 2019

## 2019-05-29 NOTE — REVIEW OF SYSTEMS
[Feeling Tired] : feeling tired [Cough] : cough [Shortness Of Breath] : shortness of breath [Joint Swelling] : joint swelling [SOB on Exertion] : shortness of breath during exertion [Joint Stiffness] : joint stiffness [Limb Swelling] : limb swelling [Depression] : depression

## 2019-05-29 NOTE — ASSESSMENT
[FreeTextEntry1] : Veto is an 78-year-old male with multiple medical problems and recent decortication for empyema who appears more short of breath and edematous than on discharge. I have recommended he restart his diuretics and followup with cardiology in the near future. From my standpoint he can see me on a p.r.n. basis going forward.\par \[facility] Thank you for allowing me to participate in the care of your patient.\par \par Cam Dominguez MD\[facility] Department of Cardiovascular and Thoracic Surgery\[facility] \[facility] [employer]\[facility] [employer] [facility]

## 2019-05-29 NOTE — PHYSICAL EXAM
[Neck Appearance] : the appearance of the neck was normal [Neck Cervical Mass (___cm)] : no neck mass was observed [Thyroid Diffuse Enlargement] : the thyroid was not enlarged [Jugular Venous Distention Increased] : there was no jugular-venous distention [FreeTextEntry1] : he has bilateral rhonchi [Heart Rate And Rhythm] : heart rate was normal and rhythm regular [Thyroid Nodule] : there were no palpable thyroid nodules [Murmurs] : no murmurs [Heart Sounds Pericardial Friction Rub] : no pericardial rub [Heart Sounds Gallop] : no gallops [Heart Sounds] : normal S1 and S2

## 2019-05-29 NOTE — DATA REVIEWED
[FreeTextEntry1] : Chest x-ray reveals a small residual right effusion with evidence of pulmonary vascular congestion.

## 2019-05-29 NOTE — HISTORY OF PRESENT ILLNESS
[FreeTextEntry1] : \demarcus Laws was in the office today for a postoperative visit. He recently underwent a right thoracoscopy and decortication. Since his discharge he has noted increased swelling in the lower extremities with a productive cough and shortness of breath. He has not been taking diuretics as prescribed.

## 2019-05-30 PROBLEM — R06.02 SOB (SHORTNESS OF BREATH) ON EXERTION: Status: RESOLVED | Noted: 2019-01-01 | Resolved: 2019-01-01

## 2019-05-30 PROBLEM — F41.9 ANXIETY: Status: ACTIVE | Noted: 2019-01-01

## 2019-05-30 PROBLEM — Z87.01 HISTORY OF PNEUMONIA: Status: RESOLVED | Noted: 2019-01-01 | Resolved: 2019-01-01

## 2019-05-31 NOTE — HISTORY OF PRESENT ILLNESS
[FreeTextEntry1] : Patient is a 78-year-old  male with a past medical history of hypertension, former smoker, COPD with chronic dyspnea was seen by me as a new patient on 5/17/2018. Patient had PPM implanted in [place] around March/April 2018 for probably sick sinus syndrome. Patient had an echocardiogram prior to the pacemaker implantation which showed LVEF 45%. Patient has no history of CAD, myocardial infarction, cardiac arrhythmias.\par \par Patient has a chronic dyspnea with minimal exertion which he attributes to COPD. \par Since last visit, Patient was admitted in the [facility] on April 20th with c/o progressive shortness of breath. Patient underwent Cardica cath on april 26 th revealed normal coronaries with EF of 30% and mod. AS. He denies any chest pain, Palpitations, PND, near syncope or syncope. His wife is with him in the room.\par Patient had infected right pleural effusion, underwent right decortication. Patient was discharged home from the hospital.\par \par \par Now patient comes for f/u, Patient has a chronic dyspnea with minimal exertion which he attributes to COPD. Is on home oxygen  3L/ min/ 24 hrs a day. Patients wife c/o worsening in his dyspnea from baseline, now c/o dyspnea with walking to the bath room from his bed room. B/L LE were 3+- 4+ pitting edema. Patient denies any exertional chest tightness, palpitations or syncope.

## 2019-05-31 NOTE — PHYSICAL EXAM
[General Appearance - Well Developed] : well developed [Normal Appearance] : normal appearance [Well Groomed] : well groomed [General Appearance - Well Nourished] : well nourished [No Deformities] : no deformities [General Appearance - In No Acute Distress] : no acute distress [Normal Conjunctiva] : the conjunctiva exhibited no abnormalities [Eyelids - No Xanthelasma] : the eyelids demonstrated no xanthelasmas [Normal Oral Mucosa] : normal oral mucosa [No Oral Pallor] : no oral pallor [No Oral Cyanosis] : no oral cyanosis [Normal Jugular Venous A Waves Present] : normal jugular venous A waves present [Normal Jugular Venous V Waves Present] : normal jugular venous V waves present [No Jugular Venous Kolb A Waves] : no jugular venous kolb A waves [Respiration, Rhythm And Depth] : normal respiratory rhythm and effort [Exaggerated Use Of Accessory Muscles For Inspiration] : no accessory muscle use [Heart Rate And Rhythm] : heart rate and rhythm were normal [Heart Sounds] : normal S1 and S2 [Murmurs] : no murmurs present [Abdomen Soft] : soft [Abdomen Tenderness] : non-tender [Abdomen Mass (___ Cm)] : no abdominal mass palpated [Abnormal Walk] : normal gait [Nail Clubbing] : no clubbing of the fingernails [Cyanosis, Localized] : no localized cyanosis [Petechial Hemorrhages (___cm)] : no petechial hemorrhages [Skin Color & Pigmentation] : normal skin color and pigmentation [] : no rash [No Venous Stasis] : no venous stasis [Skin Lesions] : no skin lesions [No Skin Ulcers] : no skin ulcer [No Xanthoma] : no  xanthoma was observed [Oriented To Time, Place, And Person] : oriented to person, place, and time [Affect] : the affect was normal [Mood] : the mood was normal [No Anxiety] : not feeling anxious [FreeTextEntry1] : uses wheel chair for out doors

## 2019-05-31 NOTE — ASSESSMENT
[FreeTextEntry1] : EKG- sinus tachycardia, 120 bpm,  with frequent PAC's, and RBBB\par Cath report- 4/26/2019- normal coronaries, Mod. AS, EF- 30%\par home oxygen 24hrs\par \par Diagnosis:\par 1. HFrEF- Started on Demadox 20 mg daily, Cont. Spironolactone. NOT ON BB DUE TO COPD\par 2. CAD- Recent cath revealed normal coronaries.\par 3. Bilateral leg edema- ordered venous Duplex of b/l LE for venous insufficiency and DVT stat, BMP stat and in 7 days\par 4. Exertion dyspnea - probably secondary to COPD and CHF combination.\par 5.f/u in 2 weeks with me when Dr. leigh in the office and \par 6. f/u with Dr. Leigh in 3 months or as needed\par \par Kenzie Worrell NP\par \par \par

## 2019-06-01 NOTE — H&P ADULT - HISTORY OF PRESENT ILLNESS
This is 77 YO Man with a PMHx of COPD (on 3L home O2), CHF (EF 45-50%), CAD (non-obstructive on cath 04/19), Moderate AS, loculated pleural effusion s/p pigtail and Right VATS Decortication (04/30/19), HTN, HLD, came to ED due to worsening SOB and LE edema. Patient report SOB for the last 2 weeks, getting worse, initially it was associated with exertion but for the last few days report SOB at rest.  Patient was seen in the cardiology office yesterday and started on Demadex 20mg PO daily, but took his first dose this morning. Patient states that he was unable to sleep due to the SOB and orthopnea, and symptoms were so severe this morning that he came to the ED to be evaluated. Also pt report worsening swelling of LE for the last week, associated with discomfort. In ED pt got lasix 40 IV, urinating, feeling better.  Denied chest pain, fever, chills, cough.     Denied chest pain,  palpitation, abd. pain, nausea, vomiting, headache, dizziness, numbness, tingling, urinary and bowel complaints.

## 2019-06-01 NOTE — ED PROVIDER NOTE - OBJECTIVE STATEMENT
79yo M with worsening LEGER for last 2 weeks, gradual onset, worsening. recently admitted for PNA and COPD exacerbation. unsure if any heart history, has PPM for his 'COPD' per patient. no fever. chronic cough with white sputum not worsening. worsening LE swelling, unable to walk more than a few steps now. saw cardiologist this week who recommend starting new medication. no CP.     [facility]

## 2019-06-01 NOTE — ED PROVIDER NOTE - CLINICAL SUMMARY MEDICAL DECISION MAKING FREE TEXT BOX
likely CHF exacerbation cath with 30% EF. no CP. chronic cough no infectious sx. unlikely worsening PNA but likely. will get CXR. labs. ekg monitor. lungs clear unlikely related to COPD at this time

## 2019-06-01 NOTE — H&P ADULT - ASSESSMENT
This is 77 YO Man with a PMHx of COPD (on 3L home O2), CHF (EF 45-50%), CAD (non-obstructive on cath 04/19), Moderate AS, loculated pleural effusion s/p pigtail and Right VATS Decortication (04/30/19), HTN, HLD, came to ED due to worsening SOB and LE edema. Patient report SOB for the last 2 weeks, getting worse, initially it was associated with exertion but for the last few days report SOB at rest.  Patient was seen in the cardiology office yesterday and started on Demadex 20mg PO daily, but took his first dose this morning. Patient states that he was unable to sleep due to the SOB and orthopnea, and symptoms were so severe this morning that he came to the ED to be evaluated. Also pt report worsening swelling of LE for the last week, associated with discomfort. In ED pt got lasix 40 IV, urinating, feeling better.  Denied chest pain, fever, chills, cough.     A/P    >Acute on chronic systolic chf   admit to tele, serial troponin  Last echo with EF 45-50% 04/2019  appreciate cardiology input - start Lasix 40 IV BID   Strict i/o and daily weights    Continue aldactone  No beta blockade for decompensated HF and COPD  Worsening right effusion and atelectasis, CT chest pending  LE venous dopplers to r/o DVTs    >COPD on home oxygen - not in exacerbation  c/w nebs, oxygen, incentive spirometry     >CAD - non obstructive CAD - s/p cath 04/19  c/w aspirin, statin    >Right loculated effusion- s/p VATS 04/30/2019  Ct chest showing effusion, ?consolidation - pt is afebrile, WBC 11 better from last admission, was treated with Abx for pneumonia, hold abx, check procalcitonin  consider Ct surgery consult  incentive spirometry       >Moderate AS- Cardio f/u on d/c.     >Hx HTN /HLD- Continue Losartan 25mg, Atorvastatin 40mg     >DVT ppx- SCD, Lovenox

## 2019-06-01 NOTE — ED PROVIDER NOTE - NS ED ROS FT
ROS: no CP +SOB. no cough. no fever. no n/v/d/c. no abd pain. no rash. no bleeding. no urinary complaints. no weakness. no vision changes. no HA. no neck/back pain. +extremity swelling. No change in mental status.

## 2019-06-01 NOTE — ED ADULT NURSE NOTE - OBJECTIVE STATEMENT
pt reports severe suarez over past few days. increasing edema to le's over past 2 weeks. pt has no other complaints at this time. a and o x3. breathing even and unlabored on 3l o2 via nc at rest (uses 3l o2 constant at home). b/l crackles on lung ascultation. nsr to sinus tach on cm. b/l +2 pitting edema below the knee. will continue to monitor.

## 2019-06-01 NOTE — CONSULT NOTE ADULT - SUBJECTIVE AND OBJECTIVE BOX
History of Present Illness:  HPI:  This is 79 YO Man with a PMHx of COPD (on 3L home O2), CHF (EF 45-50%), CAD (non-obstructive on cath 04/19), Moderate AS, loculated pleural effusion s/p pigtail and Right VATS Decortication (04/30/19), HTN, HLD, came to ED due to worsening SOB and LE edema. Patient report SOB for the last 2 weeks, getting worse, initially it was associated with exertion but for the last few days report SOB at rest.  Patient was seen in the cardiology office yesterday and started on Demadex 20mg PO daily, but took his first dose this morning. Patient states that he was unable to sleep due to the SOB and orthopnea, and symptoms were so severe this morning that he came to the ED to be evaluated. Also pt report worsening swelling of LE for the last week, associated with discomfort. In ED pt got lasix 40 IV, urinating, feeling better.  Denied chest pain, fever, chills, cough.     Denied chest pain,  palpitation, abd. pain, nausea, vomiting, headache, dizziness, numbness, tingling, urinary and bowel complaints. (01 Jun 2019 15:33)      Current Subjective Assessment: Patient lying in bed in NAD "I feel a little better now, I've been peeing like crazy" Patient reports feeling progressively sob worse on exertion over past two weeks. Patient also has had worsening of LE edema, and complains of skin discomfort due to stretching.    Past Medical History  CHF (congestive heart failure)  CAD (coronary artery disease)  HLD (hyperlipidemia)  HTN (hypertension)  COPD (chronic obstructive pulmonary disease)  Stenosis of lumbosacral spine  H/O back injury      Past Surgical History  Artificial pacemaker  No significant past surgical history  No significant past surgical history      MEDICATIONS  (STANDING):  ALBUTerol/ipratropium for Nebulization 3 milliLiter(s) Nebulizer every 6 hours  aspirin enteric coated 81 milliGRAM(s) Oral daily  atorvastatin 40 milliGRAM(s) Oral at bedtime  docusate sodium 100 milliGRAM(s) Oral three times a day  enoxaparin Injectable 40 milliGRAM(s) SubCutaneous every 24 hours  furosemide   Injectable 40 milliGRAM(s) IV Push two times a day  losartan 25 milliGRAM(s) Oral daily  montelukast 10 milliGRAM(s) Oral daily  pantoprazole    Tablet 40 milliGRAM(s) Oral before breakfast  predniSONE   Tablet 10 milliGRAM(s) Oral daily  senna 2 Tablet(s) Oral at bedtime  spironolactone 25 milliGRAM(s) Oral daily    MEDICATIONS  (PRN):  acetaminophen   Tablet .. 650 milliGRAM(s) Oral every 6 hours PRN Temp greater or equal to 38C (100.4F), Mild Pain (1 - 3)  ondansetron Injectable 4 milliGRAM(s) IV Push every 6 hours PRN Nausea and/or Vomiting    Antiplatelet therapy:                           Last dose/amt:    Allergies: No Known Allergies      SOCIAL HISTORY:  Smoker: [ ] Yes  [x ] No        PACK YEARS:                         WHEN QUIT?  ETOH use: [ ] Yes  [x ] No              FREQUENCY / QUANTITY:  Ilicit Drug use:  [ ] Yes  [x ] No  Occupation:  Live with:  Assist device use: none      Review of Systems  GENERAL:  Fevers[] chills[] sweats[] fatigue[x] weight loss[] weight gain []                                      [ ] NEGATIVE  NEURO:  parathesias[] seizures []  syncope []  confusion []                                                                [ x] NEGATIVE                 EYES: glasses[]  blurry vision[]  discharge[] pain[] glaucoma []                                                           [x ] NEGATIVE                 ENMT:  difficulty hearing []  vertigo[]  dysphagia[] epistaxis[] recent dental work []                     [ ]x NEGATIVE                 CV:  chest pain[] palpitations[] LEGER [x] diaphoresis [] edema[x]                                                           [ ] NEGATIVE                                 RESPIRATORY:  wheezing[] SOB[] cough [] sputum[] hemoptysis[]                                                   [ x] NEGATIVE               GI:  nausea[]  vomiting []  diarrhea[] constipation [] melena []                                                          [x ] NEGATIVE            : hematuria[ ]  dysuria[ ] urgency[] incontinence[]                                                                          [x ] NEGATIVE                    MUSKULOSKELETAL:  arthritis[ ]  joint swelling [ ] muscle weakness [ ]                                            [x ] NEGATIVE                     SKIN/BREAST:  rash[ ] itching [ ]  hair loss[ ] masses[ ]                                                                          [ x] NEGATIVE                     PSYCH:  dementia [ ] depression [ ] anxiety[ ]                                                                                          [x ] NEGATIVE                        HEME/LYMPH:  bruises easily[ ] enlarged lymph nodes[ ] tender lymph nodes[ ]                           [x ] NEGATIVE                      ENDOCRINE:  cold intolerance[ ] heat intolerance[ ] polydipsia[ ]                                                      [ x] NEGATIVE                        PHYSICAL EXAM  Vital Signs Last 24 Hrs  T(C): 36.8 (01 Jun 2019 11:52), Max: 36.8 (01 Jun 2019 11:52)  T(F): 98.2 (01 Jun 2019 11:52), Max: 98.2 (01 Jun 2019 11:52)  HR: 99 (01 Jun 2019 11:52) (99 - 99)  BP: 131/80 (01 Jun 2019 11:52) (131/80 - 131/80)  BP(mean): --  RR: 22 (01 Jun 2019 11:52) (22 - 22)  SpO2: 100% (01 Jun 2019 11:52) (100% - 100%)    General: Well nourished, well developed, no acute distress.                                                         Neuro: Normal exam oriented to person/place & time with no focal motor or sensory  deficits.                    Eyes: Normal exam of conjunctiva & lids, pupils equally reactive.   ENT: Normal exam of nasal/oral mucosa with absence of cyanosis.   Neck: Normal exam of jugular veins, trachea & thyroid.   Chest: diminished bases b/l                                                                          CV:  Auscultation: normal [x ] S3[ ] S4[ ] Irregular [ ] Rub[ ] Clicks[ ]  Murmurs none:[x ]systolic [ ]  diastolic [ ] holosystolic [ ]   Abdominal Aorta: normal [ ] nonpalpable[ ]                                                                         GI: Normal exam of abdomen, liver & spleen with no noted masses or tenderness.                                                                                              Extremities: Normal no evidence of cyanosis or deformity Edema: none[ ]trace[ ]1+[ ]2+[ ]3+[ ]4+[x ]  Lower Extremity Pulses: Right[ ] Left[ ]Varicosities[ ]  SKIN : Normal exam to inspection & palpation.                                                           LABS:                        11.2   11.6  )-----------( 214      ( 01 Jun 2019 12:19 )             37.0     06-01    144  |  97<L>  |  22.0<H>  ----------------------------<  110  4.6   |  38.0<H>  |  0.78    Ca    9.6      01 Jun 2019 12:19  Mg     2.1     06-01    TPro  7.6  /  Alb  3.2<L>  /  TBili  0.4  /  DBili  x   /  AST  14  /  ALT  8   /  AlkPhos  70  06-01    PT/INR - ( 01 Jun 2019 13:08 )   PT: 14.0 sec;   INR: 1.21 ratio         PTT - ( 01 Jun 2019 13:08 )  PTT:30.1 sec    CARDIAC MARKERS ( 01 Jun 2019 12:19 )  x     / 0.09 ng/mL / 33 U/L / x     / x        < from: CT Chest No Cont (06.01.19 @ 13:58) >    IMPRESSION:    Multiloculated RIGHT lung base pleural effusions. Complete consolidation   atelectasis RIGHT lower lobe with a reticular opacities of posterior   segment RIGHT upper lobe. LEFT lung clear. Centrilobular emphysema.  Occluded RIGHT lower lobe bronchus..     < end of copied text >

## 2019-06-01 NOTE — CONSULT NOTE ADULT - PROBLEM SELECTOR RECOMMENDATION 9
mild loculated effusion: no intervention needed at this time  Continue diuresis  Consider cardio workup/eval  Needs aggressive chest PT, incentive spirometry to clear lungs of secretions.  D/W Dr Dominguez

## 2019-06-01 NOTE — ED ADULT NURSE REASSESSMENT NOTE - NS ED NURSE REASSESS COMMENT FT1
pt sitting calm in bed. a and o x3. breathing even and unlabored. nsr to st on cm. pt has no complaints at this time. admitted and awaiting bed placement upstairs. will continue to monitor.

## 2019-06-01 NOTE — ED ADULT NURSE NOTE - PMH
CAD (coronary artery disease)    CHF (congestive heart failure)    COPD (chronic obstructive pulmonary disease)    H/O back injury    HLD (hyperlipidemia)    HTN (hypertension)    Stenosis of lumbosacral spine

## 2019-06-01 NOTE — CONSULT NOTE ADULT - SUBJECTIVE AND OBJECTIVE BOX
Patient is a 78y old  Male who presents with a chief complaint of shortness of breath.     HPI: 77 y/o M with a PMHx of COPD (on 3L home O2), HFpEF (EF 45-50%), CAD (non-obstructive on cath 04/19), Moderate AS, loculated pleural effusion s/p pigtail and Right VATS Decortication (04/30/19), HTN, HLD, and lumbar stenosis who was BIBA for worsening SOB. Patient states that for the last week he has been experiencing worsening LEGER that progressed to SOB at rest. Patient states his LE were also becoming progressively more swollen and uncomfortable. Patient was seen in the office yesterday and started on Demadex 20mg PO daily, but took his first dose this morning. Patient states that he was unable to sleep due to the SOB and orthopnea, and symptoms were so severe this morning that he came to the ED to be evaluated. Patient is now urinating frequently after Lasix 40mg IV BID, mild improvement in symptoms. Patient denies any fevers, chills, CP, palpitations, syncope, near syncope, abdominal pain, N/V/D, headache, or dizziness.      PAST MEDICAL & SURGICAL HISTORY:  HLD (hyperlipidemia)  HTN (hypertension)  COPD (chronic obstructive pulmonary disease)  Stenosis of lumbosacral spine  H/O back injury  No significant past surgical history      PREVIOUS DIAGNOSTIC TESTING:      ECHO  FINDINGS:  < from: TTE Echo Complete w/Doppler (04.21.19 @ 10:38) >  PHYSICIAN INTERPRETATION:  Left Ventricle: Endocardial visualization was enhanced with intravenous   echo contrast. The left ventricular internal cavity size is normal. There   is mild left ventricular hypertrophy involving the posterior wall. The   LVH involves posterior walls.  Global LV systolic function was mildly decreased. Left ventricular   ejection fraction, by visual estimation, is 45 to 50%. The mitral in-flow   pattern reveals no discernable A-wave, therefore nocomment on diastolic   function can be made.  Right Ventricle: The right ventricle was not well visualized. TV S' 0.1   m/s.  Left Atrium: The left atrium is normal in size.  Right Atrium: The right atrium is normal in size.  Pericardium: There is noevidence of pericardial effusion.  Mitral Valve: Thickening and calcification of the anterior and posterior   mitral valve leaflets. Trace mitral valve regurgitation is seen.  Tricuspid Valve: Trivial tricuspid regurgitation is visualized.  Aortic Valve: Peak transaortic gradient equals 37.1 mmHg, mean   transaortic gradient equals 20.6 mmHg, the calculated aortic valve area   equals 0.98 cm² by the continuity equation consistent with moderate   aortic stenosis. Trivial aortic valve regurgitationis seen.  Pulmonic Valve: The pulmonic valve was not well visualized. Trace   pulmonic valve regurgitation.  Aorta: The aortic root is normal in size and structure. There is mild   aortic root calcification.  Pulmonary Artery: The pulmonary artery isnot well seen.  Venous: The inferior vena cava was dilated, with respiratory size   variation greater than 50%.  Additional Comments: A pacer wire is visualized in the right atrium and   right ventricle.       Summary:   1. Technically difficult study.   2. Endocardial visualization was enhanced with intravenous echo contrast.   3. Normal left ventricular internal cavity size.   4. Mildly decreased global left ventricular systolic function.   5. Left ventricular ejection fraction, by visual estimation, is 45 to   50%.   6. There is mild left ventricular hypertrophy.   7. The mitral in-flow pattern reveals no discernable A-wave, therefore   no comment on diastolic function can be made.   8. There is mild aortic root calcification.   9. Peak transaortic gradient equals 37.1 mmHg, mean transaortic gradient   equals 20.6 mmHg, the calculated aortic valve area equals 0.98 cm² by the   continuity equation consistent with moderate aortic stenosis.  10. Thickening and calcification of the anterior and posterior mitral   valve leaflets.    Andres Saravia MD Electronically signed on 4/21/2019 at 4:50:47 PM    < end of copied text >    CATHETERIZATION  FINDINGS:  < from: Cardiac Cath Lab - Adult (04.26.19 @ 13:54) >  VENTRICLES: Global left ventricular function was severely depressed. EF  estimated was 30 %.  VALVES: AORTIC VALVE: There was moderate aortic stenosis.  CORONARY VESSELS: The coronary circulation is left dominant.  LM:   --  LM: Normal.  LAD:   --  LAD: Normal. The vessel was normal sized, not calcified, and not  tortuous. Angiography showed minor luminal irregularities with no flow  limiting lesions. There was a discrete 30 % stenosis at the ostium of the  vessel segment. The lesion was without evidence of thrombus. There was  PACO grade 3 flow through the vessel (brisk flow).  CX:   --  Circumflex: Normal. The vessel was normal sized, not calcified,  and not tortuous. Angiography showed minor luminal irregularities with no  flow limiting lesions.  RI:   --  Ramus intermedius: Normal. The vessel was normal sized, not  calcified, and not tortuous. Angiography showed minor luminal  irregularities with no flow limiting lesions.  RCA:   --  RCA: Normal. The vessel was normal sized, not calcified, and not  tortuous. Angiography showed minor luminal irregularities with no flow  limiting lesions.  COMPLICATIONS: There were no complications. No complications occurred  during the cath lab visit.  DIAGNOSTIC IMPRESSIONS: There is mild irregularity of the coronary anatomy.  Left ventricular function is abnormal.  LVEF=30%  Moderate Elevation of Right Heart Pressures:  RA=19 mmHg; RV=48/23 mmHg  Moderate Pulmonary Hypertension=56/31 - 37 mmHg  Normal CO (6.04 L/min) and CI (2.55 L/min/m2)  Moderate Aortic Stenosis (CHING=1.46 and AVG=17 mmHg)  Normal Ascending Aorta  DIAGNOSTIC RECOMMENDATIONS: The patient should continue with the present  medications. Patient management should include aggressive medical therapy,  close monitoring of BUN and creatinine, resumption of all previous  activities in 2 days, and a cardiac rehabilitation program. Medical  management is recommended.  Prepared and signed by  Lopez Bruner MD  Signed 04/26/2019 15:59:28    < end of copied text >    Allergies    No Known Allergies    Intolerances    MEDICATIONS  (STANDING):    HOME CARDIAC MEDICATIONS:  Demadex 20mg PO daily  Aldactone 25mg PO daily   Lipitor 40mg PO QHS      FAMILY HISTORY: Denies      SOCIAL HISTORY:     CIGARETTES: Former smoker, quit 20 years ago. 2 ppd x 35 years    ALCOHOL: Denies    REVIEW OF SYSTEMS:  CONSTITUTIONAL: No fever, weight loss, or fatigue  Cardiovascular:     chest pain,  dyspnea,  syncope,    palpitaitons,      dizziness,    Orthopnea,      Paroxsymal nocturnal dyspnea;  Respiratory:    Dyspnea,   cough,   ;   Genitourinary:  No dysuria, no hematuria; Gastrointestinal:   No dark color stool, no melena, no diarrhea, no constipation, no abdominal pain; Neurological: No headache, no dizziness, no slurred speech;  Psychiatric: No agitation, no anxiety.  ALL OTHER REVIEW OF SYSTEMS ARE NEGATIVE.    Vital Signs Last 24 Hrs  T(C): 36.8 (01 Jun 2019 11:52), Max: 36.8 (01 Jun 2019 11:52)  T(F): 98.2 (01 Jun 2019 11:52), Max: 98.2 (01 Jun 2019 11:52)  HR: 99 (01 Jun 2019 11:52) (99 - 99)  BP: 131/80 (01 Jun 2019 11:52) (131/80 - 131/80)  BP(mean): --  RR: 22 (01 Jun 2019 11:52) (22 - 22)  SpO2: 100% (01 Jun 2019 11:52) (100% - 100%)    Daily Height in cm: 185.42 (01 Jun 2019 11:52)    Daily     I&O's Detail      PHYSICAL EXAM:  Appearance: Normal, well nourished	  HEENT:   Normal oral mucosa, PERRL, EOMI, sclera non-icteric	  Lymphatic: No cervical lymphadenopathy  Cardiovascular: Normal S1 S2, No JVD, No cardiac murmurs, No carotid bruits, No peripheral edema  Respiratory: Lungs clear to auscultation	  Psychiatry: A & O x 3, Mood & affect appropriate  Gastrointestinal:  Soft, Non-tender, + BS, no bruits	  Skin: No rashes, No ecchymoses, No cyanosis  Neurologic: Grossly non-focal with full strength in all four extremities  Extremities: Normal range of motion, No clubbing, cyanosis or edema  Vascular: Peripheral pulses palpable 2+ bilaterally      INTERPRETATION OF TELEMETRY:    ECG:    LABS:                        11.2   11.6  )-----------( 214      ( 01 Jun 2019 12:19 )             37.0     06-01    144  |  97<L>  |  22.0<H>  ----------------------------<  110  4.6   |  38.0<H>  |  0.78    Ca    9.6      01 Jun 2019 12:19  Mg     2.1     06-01    TPro  7.6  /  Alb  3.2<L>  /  TBili  0.4  /  DBili  x   /  AST  14  /  ALT  8   /  AlkPhos  70  06-01    CARDIAC MARKERS ( 01 Jun 2019 12:19 )  x     / 0.09 ng/mL / 33 U/L / x     / x          PT/INR - ( 01 Jun 2019 13:08 )   PT: 14.0 sec;   INR: 1.21 ratio         PTT - ( 01 Jun 2019 13:08 )  PTT:30.1 sec    I&O's Summary    BNPSerum Pro-Brain Natriuretic Peptide: 3700 pg/mL (06-01 @ 12:19)    Serum Pro-Brain Natriuretic Peptide: 3700 pg/mL (06-01-19 @ 12:19)    RADIOLOGY & ADDITIONAL STUDIES: Patient is a 78y old  Male who presents with a chief complaint of shortness of breath.     HPI: 79 y/o M with a PMHx of COPD (on 3L home O2), HFpEF (EF 45-50%), CAD (non-obstructive on cath 04/19), Moderate AS, loculated pleural effusion s/p pigtail and Right VATS Decortication (04/30/19), HTN, HLD, and lumbar stenosis who was BIBA for worsening SOB. Patient states that for the last week he has been experiencing worsening LEGER that progressed to SOB at rest. Patient states his LE were also becoming progressively more swollen and uncomfortable. Patient was seen in the office yesterday and started on Demadex 20mg PO daily, but took his first dose this morning. Patient states that he was unable to sleep due to the SOB and orthopnea, and symptoms were so severe this morning that he came to the ED to be evaluated. Patient is now urinating frequently after Lasix 40mg IV BID, mild improvement in symptoms. Patient denies any fevers, chills, CP, palpitations, syncope, near syncope, abdominal pain, N/V/D, headache, or dizziness.      PAST MEDICAL & SURGICAL HISTORY:  HLD (hyperlipidemia)  HTN (hypertension)  COPD (chronic obstructive pulmonary disease)  Stenosis of lumbosacral spine  H/O back injury  No significant past surgical history      PREVIOUS DIAGNOSTIC TESTING:      ECHO  FINDINGS:  < from: TTE Echo Complete w/Doppler (04.21.19 @ 10:38) >  PHYSICIAN INTERPRETATION:  Left Ventricle: Endocardial visualization was enhanced with intravenous   echo contrast. The left ventricular internal cavity size is normal. There   is mild left ventricular hypertrophy involving the posterior wall. The   LVH involves posterior walls.  Global LV systolic function was mildly decreased. Left ventricular   ejection fraction, by visual estimation, is 45 to 50%. The mitral in-flow   pattern reveals no discernable A-wave, therefore nocomment on diastolic   function can be made.  Right Ventricle: The right ventricle was not well visualized. TV S' 0.1   m/s.  Left Atrium: The left atrium is normal in size.  Right Atrium: The right atrium is normal in size.  Pericardium: There is noevidence of pericardial effusion.  Mitral Valve: Thickening and calcification of the anterior and posterior   mitral valve leaflets. Trace mitral valve regurgitation is seen.  Tricuspid Valve: Trivial tricuspid regurgitation is visualized.  Aortic Valve: Peak transaortic gradient equals 37.1 mmHg, mean   transaortic gradient equals 20.6 mmHg, the calculated aortic valve area   equals 0.98 cm² by the continuity equation consistent with moderate   aortic stenosis. Trivial aortic valve regurgitationis seen.  Pulmonic Valve: The pulmonic valve was not well visualized. Trace   pulmonic valve regurgitation.  Aorta: The aortic root is normal in size and structure. There is mild   aortic root calcification.  Pulmonary Artery: The pulmonary artery isnot well seen.  Venous: The inferior vena cava was dilated, with respiratory size   variation greater than 50%.  Additional Comments: A pacer wire is visualized in the right atrium and   right ventricle.       Summary:   1. Technically difficult study.   2. Endocardial visualization was enhanced with intravenous echo contrast.   3. Normal left ventricular internal cavity size.   4. Mildly decreased global left ventricular systolic function.   5. Left ventricular ejection fraction, by visual estimation, is 45 to   50%.   6. There is mild left ventricular hypertrophy.   7. The mitral in-flow pattern reveals no discernable A-wave, therefore   no comment on diastolic function can be made.   8. There is mild aortic root calcification.   9. Peak transaortic gradient equals 37.1 mmHg, mean transaortic gradient   equals 20.6 mmHg, the calculated aortic valve area equals 0.98 cm² by the   continuity equation consistent with moderate aortic stenosis.  10. Thickening and calcification of the anterior and posterior mitral   valve leaflets.    Andres Saravia MD Electronically signed on 4/21/2019 at 4:50:47 PM    < end of copied text >    CATHETERIZATION  FINDINGS:  < from: Cardiac Cath Lab - Adult (04.26.19 @ 13:54) >  VENTRICLES: Global left ventricular function was severely depressed. EF  estimated was 30 %.  VALVES: AORTIC VALVE: There was moderate aortic stenosis.  CORONARY VESSELS: The coronary circulation is left dominant.  LM:   --  LM: Normal.  LAD:   --  LAD: Normal. The vessel was normal sized, not calcified, and not  tortuous. Angiography showed minor luminal irregularities with no flow  limiting lesions. There was a discrete 30 % stenosis at the ostium of the  vessel segment. The lesion was without evidence of thrombus. There was  PACO grade 3 flow through the vessel (brisk flow).  CX:   --  Circumflex: Normal. The vessel was normal sized, not calcified,  and not tortuous. Angiography showed minor luminal irregularities with no  flow limiting lesions.  RI:   --  Ramus intermedius: Normal. The vessel was normal sized, not  calcified, and not tortuous. Angiography showed minor luminal  irregularities with no flow limiting lesions.  RCA:   --  RCA: Normal. The vessel was normal sized, not calcified, and not  tortuous. Angiography showed minor luminal irregularities with no flow  limiting lesions.  COMPLICATIONS: There were no complications. No complications occurred  during the cath lab visit.  DIAGNOSTIC IMPRESSIONS: There is mild irregularity of the coronary anatomy.  Left ventricular function is abnormal.  LVEF=30%  Moderate Elevation of Right Heart Pressures:  RA=19 mmHg; RV=48/23 mmHg  Moderate Pulmonary Hypertension=56/31 - 37 mmHg  Normal CO (6.04 L/min) and CI (2.55 L/min/m2)  Moderate Aortic Stenosis (CHING=1.46 and AVG=17 mmHg)  Normal Ascending Aorta  DIAGNOSTIC RECOMMENDATIONS: The patient should continue with the present  medications. Patient management should include aggressive medical therapy,  close monitoring of BUN and creatinine, resumption of all previous  activities in 2 days, and a cardiac rehabilitation program. Medical  management is recommended.  Prepared and signed by  Lopez Bruner MD  Signed 04/26/2019 15:59:28    < end of copied text >    Allergies    No Known Allergies    Intolerances    MEDICATIONS  (STANDING):    HOME CARDIAC MEDICATIONS:  Demadex 20mg PO daily  Aldactone 25mg PO daily   Lipitor 40mg PO QHS      FAMILY HISTORY: Denies      SOCIAL HISTORY:     CIGARETTES: Former smoker, quit 20 years ago. 2 ppd x 35 years    ALCOHOL: Denies    REVIEW OF SYSTEMS:  CONSTITUTIONAL: No fever, weight loss, or fatigue  Cardiovascular: AS PER HPI  Respiratory:  AS PER HPI  Genitourinary:  No dysuria, no hematuria;   Gastrointestinal:   No dark color stool, no melena, no diarrhea, no constipation, no abdominal pain;   Neurological: No headache, no dizziness, no slurred speech;    Psychiatric: No agitation, no anxiety.    ALL OTHER REVIEW OF SYSTEMS ARE NEGATIVE.    Vital Signs Last 24 Hrs  T(C): 36.8 (01 Jun 2019 11:52), Max: 36.8 (01 Jun 2019 11:52)  T(F): 98.2 (01 Jun 2019 11:52), Max: 98.2 (01 Jun 2019 11:52)  HR: 99 (01 Jun 2019 11:52) (99 - 99)  BP: 131/80 (01 Jun 2019 11:52) (131/80 - 131/80)  RR: 22 (01 Jun 2019 11:52) (22 - 22)  SpO2: 100% (01 Jun 2019 11:52) (100% - 100%)    Daily Height in cm: 185.42 (01 Jun 2019 11:52)      PHYSICAL EXAM:  Appearance: Normal, well nourished	  HEENT:   Normal oral mucosa, PERRL, EOMI, sclera non-icteric	  Lymphatic: No cervical lymphadenopathy  Cardiovascular: Tachycardic S1, S2, + JVD,  II/VI systolic murmur at rusb, No carotid bruits, 3+ pitting LE edema   Respiratory: Rales at b/l lung bases  Psychiatry: A & O x 3, Mood & affect appropriate  Gastrointestinal:  Soft, Non-tender, + BS, no bruits	  Skin: No rashes, No ecchymoses, No cyanosis  Neurologic: Grossly non-focal with full strength in all four extremities  Extremities: Normal range of motion, No clubbing, cyanosis   Vascular: Peripheral pulses palpable 2+ bilaterally      INTERPRETATION OF TELEMETRY: ST, PVCs    ECG: ST, LAD, RBBB, NSST/T wave abnormalities     LABS:                        11.2   11.6  )-----------( 214      ( 01 Jun 2019 12:19 )             37.0     06-01    144  |  97<L>  |  22.0<H>  ----------------------------<  110  4.6   |  38.0<H>  |  0.78    Ca    9.6      01 Jun 2019 12:19  Mg     2.1     06-01    TPro  7.6  /  Alb  3.2<L>  /  TBili  0.4  /  DBili  x   /  AST  14  /  ALT  8   /  AlkPhos  70  06-01    CARDIAC MARKERS ( 01 Jun 2019 12:19 )  x     / 0.09 ng/mL / 33 U/L / x     / x          PT/INR - ( 01 Jun 2019 13:08 )   PT: 14.0 sec;   INR: 1.21 ratio         PTT - ( 01 Jun 2019 13:08 )  PTT:30.1 sec    I&O's Summary    BNPSerum Pro-Brain Natriuretic Peptide: 3700 pg/mL (06-01 @ 12:19)    Serum Pro-Brain Natriuretic Peptide: 3700 pg/mL (06-01-19 @ 12:19)    RADIOLOGY & ADDITIONAL STUDIES:  < from: Xray Chest 1 View- PORTABLE-Urgent (06.01.19 @ 13:07) >   EXAM:  XR CHEST PORTABLE URGENT 1V                          PROCEDURE DATE:  06/01/2019          INTERPRETATION:  Portable chest radiograph        CLINICAL INFORMATION:   Short of breath.    TECHNIQUE:  Portable  AP view of the chest was obtained.    COMPARISON: 5/29/2019 available for review.    FINDINGS:   The lungs  show increasing RIGHT lower lobe airspace consolidation and   moderate effusion. LEFT lung clear.    The  heart is enlarged in transverse diameter. No hilar mass. Trachea   midline.  . Cardiac device wire leads are within right atrium and right ventricle.         Visualized osseous structures are intact.        IMPRESSION:   Increasing RIGHT lower lobe airspace consolidation and   effusion..      < end of copied text > Patient is a 78y old  Male who presents with a chief complaint of shortness of breath.     HPI: 79 y/o M with a PMHx of COPD (on 3L home O2), HFpEF (EF 45-50%), CAD (non-obstructive on cath 04/19), Moderate AS, loculated pleural effusion s/p pigtail and Right VATS Decortication (04/30/19), HTN, HLD, and lumbar stenosis who was BIBA for worsening SOB. Patient states that for the last week he has been experiencing worsening LEGER that progressed to SOB at rest. Patient states his LE were also becoming progressively more swollen and uncomfortable. Patient was seen in the office yesterday and started on Demadex 20mg PO daily, but took his first dose this morning. Patient states that he was unable to sleep due to the SOB and orthopnea, and symptoms were so severe this morning that he came to the ED to be evaluated. Patient is now urinating frequently after Lasix 40mg IV BID, mild improvement in symptoms. Patient denies any fevers, chills, CP, palpitations, syncope, near syncope, abdominal pain, N/V/D, headache, or dizziness.      PAST MEDICAL & SURGICAL HISTORY:  HLD (hyperlipidemia)  HTN (hypertension)  COPD (chronic obstructive pulmonary disease)  Stenosis of lumbosacral spine  H/O back injury  No significant past surgical history      PREVIOUS DIAGNOSTIC TESTING:      ECHO  FINDINGS:  < from: TTE Echo Complete w/Doppler (04.21.19 @ 10:38) >  PHYSICIAN INTERPRETATION:  Left Ventricle: Endocardial visualization was enhanced with intravenous   echo contrast. The left ventricular internal cavity size is normal. There   is mild left ventricular hypertrophy involving the posterior wall. The   LVH involves posterior walls.  Global LV systolic function was mildly decreased. Left ventricular   ejection fraction, by visual estimation, is 45 to 50%. The mitral in-flow   pattern reveals no discernable A-wave, therefore nocomment on diastolic   function can be made.  Right Ventricle: The right ventricle was not well visualized. TV S' 0.1   m/s.  Left Atrium: The left atrium is normal in size.  Right Atrium: The right atrium is normal in size.  Pericardium: There is noevidence of pericardial effusion.  Mitral Valve: Thickening and calcification of the anterior and posterior   mitral valve leaflets. Trace mitral valve regurgitation is seen.  Tricuspid Valve: Trivial tricuspid regurgitation is visualized.  Aortic Valve: Peak transaortic gradient equals 37.1 mmHg, mean   transaortic gradient equals 20.6 mmHg, the calculated aortic valve area   equals 0.98 cm² by the continuity equation consistent with moderate   aortic stenosis. Trivial aortic valve regurgitationis seen.  Pulmonic Valve: The pulmonic valve was not well visualized. Trace   pulmonic valve regurgitation.  Aorta: The aortic root is normal in size and structure. There is mild   aortic root calcification.  Pulmonary Artery: The pulmonary artery isnot well seen.  Venous: The inferior vena cava was dilated, with respiratory size   variation greater than 50%.  Additional Comments: A pacer wire is visualized in the right atrium and   right ventricle.       Summary:   1. Technically difficult study.   2. Endocardial visualization was enhanced with intravenous echo contrast.   3. Normal left ventricular internal cavity size.   4. Mildly decreased global left ventricular systolic function.   5. Left ventricular ejection fraction, by visual estimation, is 45 to   50%.   6. There is mild left ventricular hypertrophy.   7. The mitral in-flow pattern reveals no discernable A-wave, therefore   no comment on diastolic function can be made.   8. There is mild aortic root calcification.   9. Peak transaortic gradient equals 37.1 mmHg, mean transaortic gradient   equals 20.6 mmHg, the calculated aortic valve area equals 0.98 cm² by the   continuity equation consistent with moderate aortic stenosis.  10. Thickening and calcification of the anterior and posterior mitral   valve leaflets.    Andres Saravia MD Electronically signed on 4/21/2019 at 4:50:47 PM    < end of copied text >    CATHETERIZATION  FINDINGS:  < from: Cardiac Cath Lab - Adult (04.26.19 @ 13:54) >  VENTRICLES: Global left ventricular function was severely depressed. EF  estimated was 30 %.  VALVES: AORTIC VALVE: There was moderate aortic stenosis.  CORONARY VESSELS: The coronary circulation is left dominant.  LM:   --  LM: Normal.  LAD:   --  LAD: Normal. The vessel was normal sized, not calcified, and not  tortuous. Angiography showed minor luminal irregularities with no flow  limiting lesions. There was a discrete 30 % stenosis at the ostium of the  vessel segment. The lesion was without evidence of thrombus. There was  PACO grade 3 flow through the vessel (brisk flow).  CX:   --  Circumflex: Normal. The vessel was normal sized, not calcified,  and not tortuous. Angiography showed minor luminal irregularities with no  flow limiting lesions.  RI:   --  Ramus intermedius: Normal. The vessel was normal sized, not  calcified, and not tortuous. Angiography showed minor luminal  irregularities with no flow limiting lesions.  RCA:   --  RCA: Normal. The vessel was normal sized, not calcified, and not  tortuous. Angiography showed minor luminal irregularities with no flow  limiting lesions.  COMPLICATIONS: There were no complications. No complications occurred  during the cath lab visit.  DIAGNOSTIC IMPRESSIONS: There is mild irregularity of the coronary anatomy.  Left ventricular function is abnormal.  LVEF=30%  Moderate Elevation of Right Heart Pressures:  RA=19 mmHg; RV=48/23 mmHg  Moderate Pulmonary Hypertension=56/31 - 37 mmHg  Normal CO (6.04 L/min) and CI (2.55 L/min/m2)  Moderate Aortic Stenosis (CHING=1.46 and AVG=17 mmHg)  Normal Ascending Aorta  DIAGNOSTIC RECOMMENDATIONS: The patient should continue with the present  medications. Patient management should include aggressive medical therapy,  close monitoring of BUN and creatinine, resumption of all previous  activities in 2 days, and a cardiac rehabilitation program. Medical  management is recommended.  Prepared and signed by  Lopez Bruner MD  Signed 04/26/2019 15:59:28    < end of copied text >    Allergies    No Known Allergies    Intolerances    MEDICATIONS  (STANDING):    HOME CARDIAC MEDICATIONS:  Demadex 20mg PO daily  Aldactone 25mg PO daily   Lipitor 40mg PO QHS      FAMILY HISTORY: Denies      SOCIAL HISTORY:     CIGARETTES: Former smoker, quit 20 years ago. 2 ppd x 35 years    ALCOHOL: Denies    REVIEW OF SYSTEMS:  CONSTITUTIONAL: No fever, weight loss, or fatigue  Cardiovascular: AS PER HPI  Respiratory:  AS PER HPI  Genitourinary:  No dysuria, no hematuria;   Gastrointestinal:   No dark color stool, no melena, no diarrhea, no constipation, no abdominal pain;   Neurological: No headache, no dizziness, no slurred speech;    Psychiatric: No agitation, no anxiety.    ALL OTHER REVIEW OF SYSTEMS ARE NEGATIVE.    Vital Signs Last 24 Hrs  T(C): 36.8 (01 Jun 2019 11:52), Max: 36.8 (01 Jun 2019 11:52)  T(F): 98.2 (01 Jun 2019 11:52), Max: 98.2 (01 Jun 2019 11:52)  HR: 99 (01 Jun 2019 11:52) (99 - 99)  BP: 131/80 (01 Jun 2019 11:52) (131/80 - 131/80)  RR: 22 (01 Jun 2019 11:52) (22 - 22)  SpO2: 100% (01 Jun 2019 11:52) (100% - 100%)    Daily Height in cm: 185.42 (01 Jun 2019 11:52)      PHYSICAL EXAM:  Appearance: Normal, well nourished	  HEENT:   Normal oral mucosa, PERRL, EOMI, sclera non-icteric	  Lymphatic: No cervical lymphadenopathy  Cardiovascular: Tachycardic S1, S2, + JVD,  II/VI systolic murmur at rusb, No carotid bruits, 3+ pitting LE edema   Respiratory: Rales at b/l lung bases  Psychiatry: A & O x 3, Mood & affect appropriate  Gastrointestinal:  Soft, Non-tender, + BS, no bruits	  Skin: No rashes, No ecchymoses, No cyanosis  Neurologic: Grossly non-focal with full strength in all four extremities  Extremities: Normal range of motion, No clubbing, cyanosis   Vascular: Peripheral pulses palpable 2+ bilaterally      INTERPRETATION OF TELEMETRY: ST, PVCs    ECG: ST, LAD, RBBB, NSST/T wave abnormalities     LABS:                        11.2   11.6  )-----------( 214      ( 01 Jun 2019 12:19 )             37.0     06-01    144  |  97<L>  |  22.0<H>  ----------------------------<  110  4.6   |  38.0<H>  |  0.78    Ca    9.6      01 Jun 2019 12:19  Mg     2.1     06-01    TPro  7.6  /  Alb  3.2<L>  /  TBili  0.4  /  DBili  x   /  AST  14  /  ALT  8   /  AlkPhos  70  06-01    CARDIAC MARKERS ( 01 Jun 2019 12:19 )  x     / 0.09 ng/mL / 33 U/L / x     / x          PT/INR - ( 01 Jun 2019 13:08 )   PT: 14.0 sec;   INR: 1.21 ratio         PTT - ( 01 Jun 2019 13:08 )  PTT:30.1 sec    I&O's Summary    BNPSerum Pro-Brain Natriuretic Peptide: 3700 pg/mL (06-01 @ 12:19)    Serum Pro-Brain Natriuretic Peptide: 3700 pg/mL (06-01-19 @ 12:19)    RADIOLOGY & ADDITIONAL STUDIES:  < from: Xray Chest 1 View- PORTABLE-Urgent (06.01.19 @ 13:07) >   EXAM:  XR CHEST PORTABLE URGENT 1V                          PROCEDURE DATE:  06/01/2019          INTERPRETATION:  Portable chest radiograph        CLINICAL INFORMATION:   Short of breath.    TECHNIQUE:  Portable  AP view of the chest was obtained.    COMPARISON: 5/29/2019 available for review.    FINDINGS:   The lungs  show increasing RIGHT lower lobe airspace consolidation and   moderate effusion. LEFT lung clear.    The  heart is enlarged in transverse diameter. No hilar mass. Trachea   midline.  . Cardiac device wire leads are within right atrium and right ventricle.         Visualized osseous structures are intact.        IMPRESSION:   Increasing RIGHT lower lobe airspace consolidation and   effusion..      < end of copied text >    < from: CT Chest No Cont (06.01.19 @ 13:58) >  IMPRESSION:    Multiloculated RIGHT lung base pleural effusions. Complete consolidation   atelectasis RIGHT lower lobe with a reticular opacities of posterior   segment RIGHT upper lobe. LEFT lung clear. Centrilobular emphysema.  Occluded RIGHT lower lobe bronchus..     < end of copied text > Patient is a 78y old  Male who presents with a chief complaint of shortness of breath.     HPI: 77 y/o M with a PMHx of COPD (on 3L home O2), HFrEF (EF 45-50%), CAD (non-obstructive on cath 04/19), Moderate AS, loculated pleural effusion s/p pigtail and Right VATS Decortication (04/30/19), HTN, HLD, and lumbar stenosis who was BIBA for worsening SOB. Patient states that for the last week he has been experiencing worsening LEGER that progressed to SOB at rest. Patient states his LE were also becoming progressively more swollen and uncomfortable. Patient was seen in the office yesterday and started on Demadex 20mg PO daily, but took his first dose this morning. Patient states that he was unable to sleep due to the SOB and orthopnea, and symptoms were so severe this morning that he came to the ED to be evaluated. Patient is now urinating frequently after Lasix 40mg IV BID, mild improvement in symptoms. Patient denies any fevers, chills, CP, palpitations, syncope, near syncope, abdominal pain, N/V/D, headache, or dizziness.      PAST MEDICAL & SURGICAL HISTORY:  HLD (hyperlipidemia)  HTN (hypertension)  COPD (chronic obstructive pulmonary disease)  Stenosis of lumbosacral spine  H/O back injury  No significant past surgical history      PREVIOUS DIAGNOSTIC TESTING:      ECHO  FINDINGS:  < from: TTE Echo Complete w/Doppler (04.21.19 @ 10:38) >  PHYSICIAN INTERPRETATION:  Left Ventricle: Endocardial visualization was enhanced with intravenous   echo contrast. The left ventricular internal cavity size is normal. There   is mild left ventricular hypertrophy involving the posterior wall. The   LVH involves posterior walls.  Global LV systolic function was mildly decreased. Left ventricular   ejection fraction, by visual estimation, is 45 to 50%. The mitral in-flow   pattern reveals no discernable A-wave, therefore nocomment on diastolic   function can be made.  Right Ventricle: The right ventricle was not well visualized. TV S' 0.1   m/s.  Left Atrium: The left atrium is normal in size.  Right Atrium: The right atrium is normal in size.  Pericardium: There is noevidence of pericardial effusion.  Mitral Valve: Thickening and calcification of the anterior and posterior   mitral valve leaflets. Trace mitral valve regurgitation is seen.  Tricuspid Valve: Trivial tricuspid regurgitation is visualized.  Aortic Valve: Peak transaortic gradient equals 37.1 mmHg, mean   transaortic gradient equals 20.6 mmHg, the calculated aortic valve area   equals 0.98 cm² by the continuity equation consistent with moderate   aortic stenosis. Trivial aortic valve regurgitationis seen.  Pulmonic Valve: The pulmonic valve was not well visualized. Trace   pulmonic valve regurgitation.  Aorta: The aortic root is normal in size and structure. There is mild   aortic root calcification.  Pulmonary Artery: The pulmonary artery isnot well seen.  Venous: The inferior vena cava was dilated, with respiratory size   variation greater than 50%.  Additional Comments: A pacer wire is visualized in the right atrium and   right ventricle.       Summary:   1. Technically difficult study.   2. Endocardial visualization was enhanced with intravenous echo contrast.   3. Normal left ventricular internal cavity size.   4. Mildly decreased global left ventricular systolic function.   5. Left ventricular ejection fraction, by visual estimation, is 45 to   50%.   6. There is mild left ventricular hypertrophy.   7. The mitral in-flow pattern reveals no discernable A-wave, therefore   no comment on diastolic function can be made.   8. There is mild aortic root calcification.   9. Peak transaortic gradient equals 37.1 mmHg, mean transaortic gradient   equals 20.6 mmHg, the calculated aortic valve area equals 0.98 cm² by the   continuity equation consistent with moderate aortic stenosis.  10. Thickening and calcification of the anterior and posterior mitral   valve leaflets.    Andres Saravia MD Electronically signed on 4/21/2019 at 4:50:47 PM    < end of copied text >    CATHETERIZATION  FINDINGS:  < from: Cardiac Cath Lab - Adult (04.26.19 @ 13:54) >  VENTRICLES: Global left ventricular function was severely depressed. EF  estimated was 30 %.  VALVES: AORTIC VALVE: There was moderate aortic stenosis.  CORONARY VESSELS: The coronary circulation is left dominant.  LM:   --  LM: Normal.  LAD:   --  LAD: Normal. The vessel was normal sized, not calcified, and not  tortuous. Angiography showed minor luminal irregularities with no flow  limiting lesions. There was a discrete 30 % stenosis at the ostium of the  vessel segment. The lesion was without evidence of thrombus. There was  PACO grade 3 flow through the vessel (brisk flow).  CX:   --  Circumflex: Normal. The vessel was normal sized, not calcified,  and not tortuous. Angiography showed minor luminal irregularities with no  flow limiting lesions.  RI:   --  Ramus intermedius: Normal. The vessel was normal sized, not  calcified, and not tortuous. Angiography showed minor luminal  irregularities with no flow limiting lesions.  RCA:   --  RCA: Normal. The vessel was normal sized, not calcified, and not  tortuous. Angiography showed minor luminal irregularities with no flow  limiting lesions.  COMPLICATIONS: There were no complications. No complications occurred  during the cath lab visit.  DIAGNOSTIC IMPRESSIONS: There is mild irregularity of the coronary anatomy.  Left ventricular function is abnormal.  LVEF=30%  Moderate Elevation of Right Heart Pressures:  RA=19 mmHg; RV=48/23 mmHg  Moderate Pulmonary Hypertension=56/31 - 37 mmHg  Normal CO (6.04 L/min) and CI (2.55 L/min/m2)  Moderate Aortic Stenosis (CHING=1.46 and AVG=17 mmHg)  Normal Ascending Aorta  DIAGNOSTIC RECOMMENDATIONS: The patient should continue with the present  medications. Patient management should include aggressive medical therapy,  close monitoring of BUN and creatinine, resumption of all previous  activities in 2 days, and a cardiac rehabilitation program. Medical  management is recommended.  Prepared and signed by  Lopez Bruner MD  Signed 04/26/2019 15:59:28    < end of copied text >    Allergies    No Known Allergies    Intolerances    MEDICATIONS  (STANDING):    HOME CARDIAC MEDICATIONS:  Demadex 20mg PO daily  Aldactone 25mg PO daily   Lipitor 40mg PO QHS      FAMILY HISTORY: Denies      SOCIAL HISTORY:     CIGARETTES: Former smoker, quit 20 years ago. 2 ppd x 35 years    ALCOHOL: Denies    REVIEW OF SYSTEMS:  CONSTITUTIONAL: No fever, weight loss, or fatigue  Cardiovascular: AS PER HPI  Respiratory:  AS PER HPI  Genitourinary:  No dysuria, no hematuria;   Gastrointestinal:   No dark color stool, no melena, no diarrhea, no constipation, no abdominal pain;   Neurological: No headache, no dizziness, no slurred speech;    Psychiatric: No agitation, no anxiety.    ALL OTHER REVIEW OF SYSTEMS ARE NEGATIVE.    Vital Signs Last 24 Hrs  T(C): 36.8 (01 Jun 2019 11:52), Max: 36.8 (01 Jun 2019 11:52)  T(F): 98.2 (01 Jun 2019 11:52), Max: 98.2 (01 Jun 2019 11:52)  HR: 99 (01 Jun 2019 11:52) (99 - 99)  BP: 131/80 (01 Jun 2019 11:52) (131/80 - 131/80)  RR: 22 (01 Jun 2019 11:52) (22 - 22)  SpO2: 100% (01 Jun 2019 11:52) (100% - 100%)    Daily Height in cm: 185.42 (01 Jun 2019 11:52)      PHYSICAL EXAM:  Appearance: Normal, well nourished	  HEENT:   Normal oral mucosa, PERRL, EOMI, sclera non-icteric	  Lymphatic: No cervical lymphadenopathy  Cardiovascular: Tachycardic S1, S2, + JVD,  II/VI systolic murmur at rusb, No carotid bruits, 3+ pitting LE edema   Respiratory: Rales at b/l lung bases  Psychiatry: A & O x 3, Mood & affect appropriate  Gastrointestinal:  Soft, Non-tender, + BS, no bruits	  Skin: No rashes, No ecchymoses, No cyanosis  Neurologic: Grossly non-focal with full strength in all four extremities  Extremities: Normal range of motion, No clubbing, cyanosis   Vascular: Peripheral pulses palpable 2+ bilaterally      INTERPRETATION OF TELEMETRY: ST, PVCs    ECG: ST, LAD, RBBB, NSST/T wave abnormalities     LABS:                        11.2   11.6  )-----------( 214      ( 01 Jun 2019 12:19 )             37.0     06-01    144  |  97<L>  |  22.0<H>  ----------------------------<  110  4.6   |  38.0<H>  |  0.78    Ca    9.6      01 Jun 2019 12:19  Mg     2.1     06-01    TPro  7.6  /  Alb  3.2<L>  /  TBili  0.4  /  DBili  x   /  AST  14  /  ALT  8   /  AlkPhos  70  06-01    CARDIAC MARKERS ( 01 Jun 2019 12:19 )  x     / 0.09 ng/mL / 33 U/L / x     / x          PT/INR - ( 01 Jun 2019 13:08 )   PT: 14.0 sec;   INR: 1.21 ratio         PTT - ( 01 Jun 2019 13:08 )  PTT:30.1 sec    I&O's Summary    BNPSerum Pro-Brain Natriuretic Peptide: 3700 pg/mL (06-01 @ 12:19)    Serum Pro-Brain Natriuretic Peptide: 3700 pg/mL (06-01-19 @ 12:19)    RADIOLOGY & ADDITIONAL STUDIES:  < from: Xray Chest 1 View- PORTABLE-Urgent (06.01.19 @ 13:07) >   EXAM:  XR CHEST PORTABLE URGENT 1V                          PROCEDURE DATE:  06/01/2019          INTERPRETATION:  Portable chest radiograph        CLINICAL INFORMATION:   Short of breath.    TECHNIQUE:  Portable  AP view of the chest was obtained.    COMPARISON: 5/29/2019 available for review.    FINDINGS:   The lungs  show increasing RIGHT lower lobe airspace consolidation and   moderate effusion. LEFT lung clear.    The  heart is enlarged in transverse diameter. No hilar mass. Trachea   midline.  . Cardiac device wire leads are within right atrium and right ventricle.         Visualized osseous structures are intact.        IMPRESSION:   Increasing RIGHT lower lobe airspace consolidation and   effusion..      < end of copied text >    < from: CT Chest No Cont (06.01.19 @ 13:58) >  IMPRESSION:    Multiloculated RIGHT lung base pleural effusions. Complete consolidation   atelectasis RIGHT lower lobe with a reticular opacities of posterior   segment RIGHT upper lobe. LEFT lung clear. Centrilobular emphysema.  Occluded RIGHT lower lobe bronchus..     < end of copied text >

## 2019-06-01 NOTE — H&P ADULT - NSHPPHYSICALEXAM_GEN_ALL_CORE
ICU Vital Signs Last 24 Hrs  T(C): 36.8 (01 Jun 2019 11:52), Max: 36.8 (01 Jun 2019 11:52)  T(F): 98.2 (01 Jun 2019 11:52), Max: 98.2 (01 Jun 2019 11:52)  HR: 99 (01 Jun 2019 11:52) (99 - 99)  BP: 131/80 (01 Jun 2019 11:52) (131/80 - 131/80)  BP(mean): --  ABP: --  ABP(mean): --  RR: 22 (01 Jun 2019 11:52) (22 - 22)  SpO2: 100% (01 Jun 2019 11:52) (100% - 100%)

## 2019-06-01 NOTE — ED PROVIDER NOTE - PHYSICAL EXAMINATION
Gen: NAD, AOx3  Head: NCAT  HEENT: EOMI, oral mucosa moist, normal conjunctiva, neck supple  Lung: faint crackles b/l, no respiratory distress  CV: rrr, no murmur, Normal perfusion  Abd: soft, NTND  MSK: +3-4 b/l LE pitting edema, no visible deformities  Neuro: No focal neurologic deficits  Skin: No rash   Psych: normal affect

## 2019-06-01 NOTE — CONSULT NOTE ADULT - ASSESSMENT
78 M h/o CAD, CHF, COPD on home oxygen (3 L), HTN, HLD with recent total right lung decortication for pleural effusion (fibropurulent exudate noted) on 4/30/19, likely d/t CAP with parapneumonic effusion. On review of CT, patient appears to have mild loculated effusion with r bronchial occlusion.

## 2019-06-01 NOTE — CONSULT NOTE ADULT - ASSESSMENT
A/P:  79 y/o M with a PMHx of COPD (on 3L home O2), HFpEF (EF 45-50%), CAD (non-obstructive on cath 04/19), Moderate AS, loculated pleural effusion s/p pigtail and Right VATS Decortication (04/30/19), HTN, HLD, and lumbar stenosis who was BIBA for worsening SOB. Patient states that for the last week he has been experiencing worsening LEGER that progressed to SOB at rest. Patient states his LE were also becoming progressively more swollen and uncomfortable. Patient was seen in the office yesterday and started on Demadex 20mg PO daily, but took his first dose this morning. Patient states that he was unable to sleep due to the SOB and orthopnea, and symptoms were so severe this morning that he came to the ED to be evaluated. Patient is now urinating frequently after Lasix 40mg IV BID, mild improvement in symptoms. Patient denies any fevers, chills, CP, palpitations, syncope, near syncope, abdominal pain, N/V/D, headache, or dizziness.  Troponin 0.09  Normal CK 33  BNP 3700    1. Acute on chronic HFpEF  - Last echo with EF 45-50% 04/2019  - Received demadex 20mg PO and Lasix 40mg IV  - Lasix 40mg IV BID  - Monitor on telemetry.    - Strict i/o and daily standing weights (if possible).    - Keep K > 4, Mg > 2.    -Monitor renal function with ongoing diuresis with BID BMP.  - Continue aldactone  - No beta blockade for decompensated HF and COPD  - Worsening right effusion and atelectasis, CT chest pending    2. Elevated Troponin  - In setting of heart failure exacerbation  - No symptoms of ACS  - Cardiac cath 04/19 with non-obstructive CAD  - Continue lipitor, start ASA 81mg PO daily    3. HTN  - BP well controlled   - Continue current regimen A/P:  77 y/o M with a PMHx of COPD (on 3L home O2), HFpEF (EF 45-50%), CAD (non-obstructive on cath 04/19), Moderate AS, loculated pleural effusion s/p pigtail and Right VATS Decortication (04/30/19), HTN, HLD, and lumbar stenosis who was BIBA for worsening SOB. Patient states that for the last week he has been experiencing worsening LEGER that progressed to SOB at rest. Patient states his LE were also becoming progressively more swollen and uncomfortable. Patient was seen in the office yesterday and started on Demadex 20mg PO daily, but took his first dose this morning. Patient states that he was unable to sleep due to the SOB and orthopnea, and symptoms were so severe this morning that he came to the ED to be evaluated. Patient is now urinating frequently after Lasix 40mg IV BID, mild improvement in symptoms. Patient denies any fevers, chills, CP, palpitations, syncope, near syncope, abdominal pain, N/V/D, headache, or dizziness.  Troponin 0.09  Normal CK 33  BNP 3700    1. Acute on chronic HFpEF  - Last echo with EF 45-50% 04/2019  - Received demadex 20mg PO and Lasix 40mg IV  - Lasix 40mg IV BID  - Monitor on telemetry.    - Strict i/o and daily standing weights (if possible).    - Keep K > 4, Mg > 2.    -Monitor renal function with ongoing diuresis with BID BMP.  - Continue aldactone  - No beta blockade for decompensated HF and COPD  - Worsening right effusion and atelectasis, CT chest pending  - LE venous dopplers to r/o DVTs    2. Elevated Troponin  - In setting of heart failure exacerbation  - No symptoms of ACS  - Cardiac cath 04/19 with non-obstructive CAD  - Continue lipitor, start ASA 81mg PO daily    3. HTN  - BP well controlled   - Continue current regimen     4. Loculated Pleural Effusion  - S/p R VATS 04/30/19  - Still with pleural effusions on CT with atelectasis and occluded RLL bronchus A/P:  79 y/o M with a PMHx of COPD (on 3L home O2), HFrEF (EF 45-50%), CAD (non-obstructive on cath 04/19), Moderate AS, loculated pleural effusion s/p pigtail and Right VATS Decortication (04/30/19), HTN, HLD, and lumbar stenosis who was BIBA for worsening SOB. Patient states that for the last week he has been experiencing worsening LEGER that progressed to SOB at rest. Patient states his LE were also becoming progressively more swollen and uncomfortable. Patient was seen in the office yesterday and started on Demadex 20mg PO daily, but took his first dose this morning. Patient states that he was unable to sleep due to the SOB and orthopnea, and symptoms were so severe this morning that he came to the ED to be evaluated. Patient is now urinating frequently after Lasix 40mg IV BID, mild improvement in symptoms. Patient denies any fevers, chills, CP, palpitations, syncope, near syncope, abdominal pain, N/V/D, headache, or dizziness.  Troponin 0.09  Normal CK 33  BNP 3700    1. Acute on chronic HFrEF  - Last echo with EF 45-50% 04/2019  - Received demadex 20mg PO and Lasix 40mg IV  - Lasix 40mg IV BID  - Monitor on telemetry.    - Strict i/o and daily standing weights (if possible).    - Keep K > 4, Mg > 2.    -Monitor renal function with ongoing diuresis with BID BMP.  - Continue aldactone  - No beta blockade for decompensated HF and COPD  - Worsening right effusion and atelectasis, CT chest pending  - LE venous dopplers to r/o DVTs    2. Elevated Troponin  - In setting of heart failure exacerbation  - No symptoms of ACS  - Cardiac cath 04/19 with non-obstructive CAD  - Continue lipitor, start ASA 81mg PO daily    3. HTN  - BP well controlled   - Continue current regimen     4. Loculated Pleural Effusion  - S/p R VATS 04/30/19  - Still with pleural effusions on CT with atelectasis and occluded RLL bronchus

## 2019-06-01 NOTE — H&P ADULT - NSICDXPASTMEDICALHX_GEN_ALL_CORE_FT
PAST MEDICAL HISTORY:  CAD (coronary artery disease)     CHF (congestive heart failure)     COPD (chronic obstructive pulmonary disease)     H/O back injury     HLD (hyperlipidemia)     HTN (hypertension)     Stenosis of lumbosacral spine

## 2019-06-01 NOTE — ED ADULT NURSE NOTE - NSIMPLEMENTINTERV_GEN_ALL_ED
Implemented All Fall Risk Interventions:  Cross to call system. Call bell, personal items and telephone within reach. Instruct patient to call for assistance. Room bathroom lighting operational. Non-slip footwear when patient is off stretcher. Physically safe environment: no spills, clutter or unnecessary equipment. Stretcher in lowest position, wheels locked, appropriate side rails in place. Provide visual cue, wrist band, yellow gown, etc. Monitor gait and stability. Monitor for mental status changes and reorient to person, place, and time. Review medications for side effects contributing to fall risk. Reinforce activity limits and safety measures with patient and family.

## 2019-06-01 NOTE — CONSULT NOTE ADULT - ATTENDING COMMENTS
Pt p/w acute on chronic CHF. Has massive 4+ b/l LE edema. Will required a prolonged admission to adequately diurese him. May require lasix gtt. Will cont to follow.

## 2019-06-01 NOTE — ED ADULT NURSE NOTE - CHPI ED NUR SYMPTOMS NEG
no nausea/no chest pain/no syncope/no congestion/no vomiting/no chills/no diaphoresis/no dizziness/no fever/no back pain

## 2019-06-01 NOTE — ED ADULT TRIAGE NOTE - CHIEF COMPLAINT QUOTE
Pt with increasing SOB and b/l lower extremity swelling. Pt recently diagnosed with pneumonia last week, O2 dependant. Was found to be in new onset Afib by EMS. Pt with increasing SOB and b/l lower extremity swelling. Pt recently diagnosed with pneumonia last week, O2 dependant.

## 2019-06-01 NOTE — ED ADULT NURSE NOTE - CHIEF COMPLAINT QUOTE
Pt with increasing SOB and b/l lower extremity swelling. Pt recently diagnosed with pneumonia last week, O2 dependant.

## 2019-06-02 NOTE — PROGRESS NOTE ADULT - PROBLEM SELECTOR PLAN 6
Continue Lovenox for DVT for DVT prophylaxis. Continue Lovenox for DVT for DVT prophylaxis.  Continue Protonix for GI prophylaxis.

## 2019-06-02 NOTE — PROGRESS NOTE ADULT - ASSESSMENT
78 year old male with a PMH of CAD, CHF, COPD on home oxygen (3 L), HTN, HLD with recent total right lung decortication for pleural effusion (fibropurulent exudate noted) on 4/30/19, likely d/t CAP with parapneumonic effusion. On review of CT, patient appears to have mild loculated effusion with right bronchial occlusion.

## 2019-06-02 NOTE — PROGRESS NOTE ADULT - PROBLEM SELECTOR PLAN 3
Continue lasix & aldactone.  Daily weights  Strict I/O's  Cardiology following
Supplement K  check magnesium  keep k >4 and mg >2  add beta blocker

## 2019-06-02 NOTE — PROGRESS NOTE ADULT - ASSESSMENT
This is 77 YO Man with a PMHx of COPD (on 3L home O2), CHF (EF 45-50%), CAD (non-obstructive on cath 04/19), Moderate AS, loculated pleural effusion s/p pigtail and Right VATS Decortication (04/30/19), HTN, HLD, came to ED due to worsening SOB and LE edema. Patient report SOB for the last 2 weeks, getting worse, initially it was associated with exertion but for the last few days report SOB at rest.  Patient was seen in the cardiology office yesterday and started on Demadex 20mg PO daily, but took his first dose this morning. Patient states that he was unable to sleep due to the SOB and orthopnea, and symptoms were so severe this morning that he came to the ED to be evaluated. Also pt report worsening swelling of LE for the last week, associated with discomfort. In ED pt got lasix 40 IV, urinating, feeling better.  Denied chest pain, fever, chills, cough.     A/P    >Acute on chronic systolic chf   serial troponin flat  Last echo with EF 45-50% 04/2019  appreciate cardiology input - c/w Lasix 40 IV BID   Strict i/o and daily weights    Continue aldactone  No beta blockade for decompensated HF and COPD  CT chest showed multiloculated R pleural effusion, complete consolidation  LE venous doppler negative for DVT  keep leg elevated    >COPD on home oxygen - not in exacerbation  c/w nebs, oxygen, incentive spirometry     >CAD - non obstructive CAD - s/p cath 04/19  c/w aspirin, statin    >Right loculated effusion- s/p VATS 04/30/2019  Ct chest noted - effusion and consolidation  CT surgery input - mild loculated effusion: no intervention needed at this time  Continue diuresis  Needs aggressive chest PT, incentive spirometry to clear lungs of secretions.  incentive spirometry   procalcitonin 0.20 - monitor off abx      >Moderate AS- Cardio f/u on d/c.     >Hx HTN /HLD- Continue Losartan 25mg, Atorvastatin 40mg     >DVT ppx- SCD, Lovenox This is 79 YO Man with a PMHx of COPD (on 3L home O2), CHF (EF 45-50%), CAD (non-obstructive on cath 04/19), Moderate AS, loculated pleural effusion s/p pigtail and Right VATS Decortication (04/30/19), HTN, HLD, came to ED due to worsening SOB and LE edema. Patient report SOB for the last 2 weeks, getting worse, initially it was associated with exertion but for the last few days report SOB at rest.  Patient was seen in the cardiology office yesterday and started on Demadex 20mg PO daily, but took his first dose this morning. Patient states that he was unable to sleep due to the SOB and orthopnea, and symptoms were so severe this morning that he came to the ED to be evaluated. Also pt report worsening swelling of LE for the last week, associated with discomfort. In ED pt got lasix 40 IV, urinating, feeling better.  Denied chest pain, fever, chills, cough.     A/P    >Acute on chronic systolic chf   serial troponin flat  Last echo with EF 45-50% 04/2019  appreciate cardiology input - c/w Lasix 40 IV BID   Strict i/o and daily weights    Continue aldactone  No beta blockade for decompensated HF and COPD  CT chest showed multiloculated R pleural effusion, complete consolidation  LE venous doppler negative for DVT  keep leg elevated    >COPD on home oxygen - not in exacerbation  c/w nebs, oxygen, incentive spirometry     >CAD - non obstructive CAD - s/p cath 04/19  c/w aspirin, statin    >Right loculated effusion- s/p VATS 04/30/2019, cytology negative  Ct chest noted - effusion and consolidation  CT surgery input - mild loculated effusion: no intervention needed at this time  Continue diuresis  Needs aggressive chest PT, incentive spirometry to clear lungs of secretions.  incentive spirometry   procalcitonin 0.20 - monitor off abx      >Moderate AS- Cardio f/u on d/c.     >Hx HTN /HLD- Continue Losartan 25mg, Atorvastatin 40mg     >DVT ppx- SCD, Lovenox

## 2019-06-02 NOTE — PROGRESS NOTE ADULT - SUBJECTIVE AND OBJECTIVE BOX
[facility]  [address] [address]    REASON FOR VISIT:  UPDATE:  TELEMETRY MONITORING:    CARDIAC MARKERS ( 02 Jun 2019 06:16 )  x     / 0.10 ng/mL / x     / x     / x      CARDIAC MARKERS ( 01 Jun 2019 20:53 )  x     / x     / 28 U/L / x     / x      CARDIAC MARKERS ( 01 Jun 2019 12:19 )  x     / 0.09 ng/mL / 33 U/L / x     / x          06-02    144  |  90<L>  |  22.0<H>  ----------------------------<  93  3.6   |  41.0<H>  |  0.88    Ca    9.2      02 Jun 2019 06:16  Mg     2.1     06-01    TPro  7.6  /  Alb  3.2<L>  /  TBili  0.4  /  DBili  x   /  AST  14  /  ALT  8   /  AlkPhos  70  06-01    LIVER FUNCTIONS - ( 01 Jun 2019 12:19 )  Alb: 3.2 g/dL / Pro: 7.6 g/dL / ALK PHOS: 70 U/L / ALT: 8 U/L / AST: 14 U/L / GGT: x                 MEDICATIONS  (STANDING):  ALBUTerol/ipratropium for Nebulization 3 milliLiter(s) Nebulizer every 6 hours  aspirin enteric coated 81 milliGRAM(s) Oral daily  atorvastatin 40 milliGRAM(s) Oral at bedtime  docusate sodium 100 milliGRAM(s) Oral three times a day  enoxaparin Injectable 40 milliGRAM(s) SubCutaneous every 24 hours  furosemide   Injectable 40 milliGRAM(s) IV Push two times a day  losartan 25 milliGRAM(s) Oral daily  montelukast 10 milliGRAM(s) Oral daily  pantoprazole    Tablet 40 milliGRAM(s) Oral before breakfast  predniSONE   Tablet 10 milliGRAM(s) Oral daily  senna 2 Tablet(s) Oral at bedtime  spironolactone 25 milliGRAM(s) Oral daily    ROS:        No fever chills  Cardiac  No cp sob or palp  Resp  no cough no mucus production  Gi  no abd pain no melana  Ext No calf tenderness, no edema    Vital Signs Last 24 Hrs  T(C): 36.9 (02 Jun 2019 03:32), Max: 36.9 (02 Jun 2019 03:32)  T(F): 98.5 (02 Jun 2019 03:32), Max: 98.5 (02 Jun 2019 03:32)  HR: 102 (02 Jun 2019 09:04) (78 - 112)  BP: 159/98 (02 Jun 2019 03:32) (128/89 - 159/98)  BP(mean): --  RR: 18 (02 Jun 2019 03:32) (18 - 18)  SpO2: 92% (02 Jun 2019 09:04) (92% - 98%)  T(C): 36.9 (06-02-19 @ 03:32), Max: 36.9 (06-02-19 @ 03:32)  HR: 102 (06-02-19 @ 09:04) (78 - 112)  BP: 159/98 (06-02-19 @ 03:32) (128/89 - 159/98)  RR: 18 (06-02-19 @ 03:32) (18 - 18)  SpO2: 92% (06-02-19 @ 09:04) (92% - 98%)    HEENT Head atraumatic eomi, oral mucosa moist  CV S1&S2      No r/g/ m   RESP    GI  Soft active bowel sounds non tender  EXT  No clubbing/Cyanosis /Edema  NEURO  Alert oriented  No gross motor or sensory deficits [facility]  [address], [place] [address]    Cardio Dr. Casas    REASON FOR VISIT:  Follow up on congestive heart failure  UPDATE:  Remains in ER  Legs still swollen but sob is somewhat better    TELEMETRY MONITORING: Sinus rhythm   Non sustained svt    CARDIAC MARKERS ( 02 Jun 2019 06:16 )  x     / 0.10 ng/mL / x     / x     / x      CARDIAC MARKERS ( 01 Jun 2019 20:53 )  x     / x     / 28 U/L / x     / x      CARDIAC MARKERS ( 01 Jun 2019 12:19 )  x     / 0.09 ng/mL / 33 U/L / x     / x        ROS:  No fever chills  Cardiac  No syncope, no palp no chest discomfort  + sob  Resp  no cough no mucus production  Gi  no abd pain no melana  Ext No calf tenderness ++ edema      06-02    144  |  90<L>  |  22.0<H>  ----------------------------<  93  3.6   |  41.0<H>  |  0.88    Ca    9.2      02 Jun 2019 06:16  Mg     2.1     06-01    TPro  7.6  /  Alb  3.2<L>  /  TBili  0.4  /  DBili  x   /  AST  14  /  ALT  8   /  AlkPhos  70  06-01    LIVER FUNCTIONS - ( 01 Jun 2019 12:19 )  Alb: 3.2 g/dL / Pro: 7.6 g/dL / ALK PHOS: 70 U/L / ALT: 8 U/L / AST: 14 U/L / GGT: x             MEDICATIONS  (STANDING):  ALBUTerol/ipratropium for Nebulization 3 milliLiter(s) Nebulizer every 6 hours  aspirin enteric coated 81 milliGRAM(s) Oral daily  atorvastatin 40 milliGRAM(s) Oral at bedtime  docusate sodium 100 milliGRAM(s) Oral three times a day  enoxaparin Injectable 40 milliGRAM(s) SubCutaneous every 24 hours  furosemide   Injectable 40 milliGRAM(s) IV Push two times a day  losartan 25 milliGRAM(s) Oral daily  montelukast 10 milliGRAM(s) Oral daily  pantoprazole    Tablet 40 milliGRAM(s) Oral before breakfast  predniSONE   Tablet 10 milliGRAM(s) Oral daily  senna 2 Tablet(s) Oral at bedtime  spironolactone 25 milliGRAM(s) Oral daily      Vital Signs Last 24 Hrs  T(C): 36.9 (02 Jun 2019 03:32), Max: 36.9 (02 Jun 2019 03:32)  T(F): 98.5 (02 Jun 2019 03:32), Max: 98.5 (02 Jun 2019 03:32)  HR: 102 (02 Jun 2019 09:04) (78 - 112)  BP: 159/98 (02 Jun 2019 03:32) (128/89 - 159/98)  BP(mean): --  RR: 18 (02 Jun 2019 03:32) (18 - 18)  SpO2: 92% (02 Jun 2019 09:04) (92% - 98%)  T(C): 36.9 (06-02-19 @ 03:32), Max: 36.9 (06-02-19 @ 03:32)  HR: 102 (06-02-19 @ 09:04) (78 - 112)  BP: 159/98 (06-02-19 @ 03:32) (128/89 - 159/98)  RR: 18 (06-02-19 @ 03:32) (18 - 18)  SpO2: 92% (06-02-19 @ 09:04) (92% - 98%)    Sitting up in bed in nad  NECK;  No jvd  CV S1&S2 Regular       RESP  Decreased bs  GI  Soft active bowel sounds non tender  EXT  No clubbing/Cyanosis /2 + edema with chronic venous changes  NEURO  Alert oriented  No gross motor or sensory deficits    < from: TTE Echo Complete w/Doppler (04.21.19 @ 10:38) >    Summary:   1. Technically difficult study.   2. Endocardial visualization was enhanced with intravenous echo contrast.   3. Normal left ventricular internal cavity size.   4. Mildly decreased global left ventricular systolic function.   5. Left ventricular ejection fraction, by visual estimation, is 45 to   50%.   6. There is mild left ventricular hypertrophy.   7. The mitral in-flow pattern reveals no discernable A-wave, therefore   no comment on diastolic function can be made.   8. There is mild aortic root calcification.   9. Peak transaortic gradient equals 37.1 mmHg, mean transaortic gradient   equals 20.6 mmHg, the calculated aortic valve area equals 0.98 cm² by the   continuity equation consistent with moderate aortic stenosis.  10. Thickening and calcification of the anterior and posterior mitral   valve leaflets.    < end of copied text >  < from: Cardiac Cath Lab - Adult (04.26.19 @ 13:54) >  VENTRICLES: Global left ventricular function was severely depressed. EF  estimated was 30 %.  VALVES: AORTIC VALVE: There was moderate aortic stenosis.  CORONARY VESSELS: The coronary circulation is left dominant.  LM:   --  LM: Normal.  LAD:   --  LAD: Normal. The vessel was normal sized, not calcified, and not  tortuous. Angiography showed minor luminal irregularities with no flow  limiting lesions. There was a discrete 30 % stenosis at the ostium of the  vessel segment. The lesion was without evidence of thrombus. There was  PACO grade 3 flow through the vessel (brisk flow).  CX:   --  Circumflex: Normal. The vessel was normal sized, not calcified,  and not tortuous. Angiography showed minor luminal irregularities with no  flow limiting lesions.  RI:   --  Ramus intermedius: Normal. The vessel was normal sized, not  calcified, and not tortuous. Angiography showed minor luminal  irregularities with no flow limiting lesions.  RCA:   --  RCA: Normal. The vessel was normal sized, not calcified, and not  tortuous. Angiography showed minor luminal irregularities with no flow  limiting lesions.  COMPLICATIONS: There were no complications. No complications occurred  during the cath lab visit.  DIAGNOSTIC IMPRESSIONS: There is mild irregularity of the coronary anatomy.  Left ventricular function is abnormal.  LVEF=30%  Moderate Elevation of Right Heart Pressures:  RA=19 mmHg; RV=48/23 mmHg  Moderate Pulmonary Hypertension=56/31 - 37 mmHg  Normal CO (6.04 L/min) and CI (2.55 L/min/m2)  Moderate Aortic Stenosis (CHING=1.46 and AVG=17 mmHg)  Normal Ascending Aorta    < end of copied text >

## 2019-06-02 NOTE — PROGRESS NOTE ADULT - PROBLEM SELECTOR PLAN 2
Continue Cozaar
increase lorsartan to 50 mg  Patient has not tolerated beta blocker in the past due to copd  Low na diet

## 2019-06-02 NOTE — PROGRESS NOTE ADULT - ASSESSMENT
77 y/o M with a PMHx of COPD (on 3L home O2), HFrEF (EF 45-50%), CAD (non-obstructive on cath 04/19), Moderate AS, loculated pleural effusion s/p pigtail and Right VATS Decortication (04/30/19), HTN, HLD, and lumbar stenosis who was BIBA for worsening SOB. Patient states that for the last week he has been experiencing worsening LEGER that progressed to SOB at rest. Patient states his LE were also becoming progressively more swollen and uncomfortable. Patient was seen in the office yesterday and started on Demadex 20mg PO daily, but took his first dose this morning. Patient states that he was unable to sleep due to the SOB and orthopnea, and symptoms were so severe this morning that he came to the ED to be evaluated. Patient is now urinating frequently after Lasix 40mg IV BID, mild improvement in symptoms. Patient denies any fevers, chills, CP, palpitations, syncope, near syncope, abdominal pain, N/V/D, headache, or dizziness.  Troponin 0.09  Normal CK 33  BNP 3700 77 y/o M with a PMHx of COPD (on 3L home O2), HFrEF (EF 45-50%), CAD (non-obstructive on cath 04/19), Moderate AS, loculated pleural effusion s/p pigtail and Right VATS Decortication (04/30/19), HTN, HLD, and lumbar stenosis who was BIBA for worsening SOB.  and increasing leg edema.  Trop negative and ekg without ischemic changes

## 2019-06-02 NOTE — PROGRESS NOTE ADULT - SUBJECTIVE AND OBJECTIVE BOX
Internal Medicine Hospitalist - Dr. Zohreh CIFUENTES    [id number]    78y      Male    Patient is a 78y old  Male who presents with a chief complaint of SOB (01 Jun 2019 18:07)    INTERVAL HPI/ OVERNIGHT EVENTS: Patient is seen and examined, still c/o SOB, slightly better today, denied chest pain, abd. pain, nausea and vomiting    REVIEW OF SYSTEMS:    Denied fever, chills, abd. pain, nausea, vomiting, chest pain, headache, dizziness    PHYSICAL EXAM:    Vital Signs Last 24 Hrs  T(C): 36.9 (02 Jun 2019 03:32), Max: 36.9 (02 Jun 2019 03:32)  T(F): 98.5 (02 Jun 2019 03:32), Max: 98.5 (02 Jun 2019 03:32)  HR: 102 (02 Jun 2019 09:04) (78 - 112)  BP: 159/98 (02 Jun 2019 03:32) (128/89 - 159/98)  BP(mean): --  RR: 18 (02 Jun 2019 03:32) (18 - 22)  SpO2: 92% (02 Jun 2019 09:04) (92% - 100%)    GENERAL: NAD  HEENT: EOMI  Neck: supple  CHEST/LUNG: positive crackles over bases  HEART: S1S2+ audible  ABDOMEN: Soft, Nontender, Nondistended; Bowel sounds present  EXTREMITIES:  positive 4 plus pitting edema  CNS: AAO X 3  Psychiatry: normal mood    LABS:                        11.2   11.6  )-----------( 214      ( 01 Jun 2019 12:19 )             37.0     06-02    144  |  90<L>  |  22.0<H>  ----------------------------<  93  3.6   |  41.0<H>  |  0.88    Ca    9.2      02 Jun 2019 06:16  Mg     2.1     06-01    TPro  7.6  /  Alb  3.2<L>  /  TBili  0.4  /  DBili  x   /  AST  14  /  ALT  8   /  AlkPhos  70  06-01    PT/INR - ( 01 Jun 2019 13:08 )   PT: 14.0 sec;   INR: 1.21 ratio         PTT - ( 01 Jun 2019 13:08 )  PTT:30.1 sec        MEDICATIONS  (STANDING):  ALBUTerol/ipratropium for Nebulization 3 milliLiter(s) Nebulizer every 6 hours  aspirin enteric coated 81 milliGRAM(s) Oral daily  atorvastatin 40 milliGRAM(s) Oral at bedtime  docusate sodium 100 milliGRAM(s) Oral three times a day  enoxaparin Injectable 40 milliGRAM(s) SubCutaneous every 24 hours  furosemide   Injectable 40 milliGRAM(s) IV Push two times a day  losartan 25 milliGRAM(s) Oral daily  montelukast 10 milliGRAM(s) Oral daily  pantoprazole    Tablet 40 milliGRAM(s) Oral before breakfast  predniSONE   Tablet 10 milliGRAM(s) Oral daily  senna 2 Tablet(s) Oral at bedtime  spironolactone 25 milliGRAM(s) Oral daily    MEDICATIONS  (PRN):  acetaminophen   Tablet .. 650 milliGRAM(s) Oral every 6 hours PRN Temp greater or equal to 38C (100.4F), Mild Pain (1 - 3)  ondansetron Injectable 4 milliGRAM(s) IV Push every 6 hours PRN Nausea and/or Vomiting      RADIOLOGY & ADDITIONAL TEST    < from: CT Chest No Cont (06.01.19 @ 13:58) >  IMPRESSION:    Multiloculated RIGHT lung base pleural effusions. Complete consolidation   atelectasis RIGHT lower lobe with a reticular opacities of posterior   segment RIGHT upper lobe. LEFT lung clear. Centrilobular emphysema.  Occluded RIGHT lower lobe bronchus..     < end of copied text

## 2019-06-02 NOTE — PROGRESS NOTE ADULT - SUBJECTIVE AND OBJECTIVE BOX
Subjective: "I heard there isn't anything you could do, my SOB has slightly improved."  Pt. lying in stretcher, NAD noted.    VITAL SIGNS  Vital Signs Last 24 Hrs  T(C): 36.9 (06-02-19 @ 03:32), Max: 36.9 (06-02-19 @ 03:32)  T(F): 98.5 (06-02-19 @ 03:32), Max: 98.5 (06-02-19 @ 03:32)  HR: 102 (06-02-19 @ 09:04) (78 - 112)  BP: 159/98 (06-02-19 @ 03:32) (128/89 - 159/98)  RR: 18 (06-02-19 @ 03:32) (18 - 18)  SpO2: 92% (06-02-19 @ 09:04) (92% - 98%)  on (O2)              MEDICATIONS  acetaminophen   Tablet .. 650 milliGRAM(s) Oral every 6 hours PRN  ALBUTerol/ipratropium for Nebulization 3 milliLiter(s) Nebulizer every 6 hours  aspirin enteric coated 81 milliGRAM(s) Oral daily  atorvastatin 40 milliGRAM(s) Oral at bedtime  docusate sodium 100 milliGRAM(s) Oral three times a day  enoxaparin Injectable 40 milliGRAM(s) SubCutaneous every 24 hours  furosemide   Injectable 40 milliGRAM(s) IV Push two times a day  losartan 25 milliGRAM(s) Oral once  losartan 50 milliGRAM(s) Oral daily  montelukast 10 milliGRAM(s) Oral daily  ondansetron Injectable 4 milliGRAM(s) IV Push every 6 hours PRN  pantoprazole    Tablet 40 milliGRAM(s) Oral before breakfast  predniSONE   Tablet 10 milliGRAM(s) Oral daily  senna 2 Tablet(s) Oral at bedtime  spironolactone 25 milliGRAM(s) Oral daily      PHYSICAL EXAM  General: well nourished, well developed, no acute distress  Neurology: alert and oriented x 3, nonfocal, no gross deficits  Respiratory: Diminished at the bases   CV: regular rate and rhythm, normal S1, S2  Abdomen: soft, nontender, nondistended, positive bowel sounds  Extremities: warm, well perfused. +3 edema x2BLE. + DP pulses    I&O's Detail      Weights:  Daily     Daily   Admit Wt: Drug Dosing Weight  Height (cm): 185.42 (01 Jun 2019 11:52)  Weight (kg): 115.7 (01 Jun 2019 11:52)  BMI (kg/m2): 33.7 (01 Jun 2019 11:52)  BSA (m2): 2.39 (01 Jun 2019 11:52)    LABS  06-02    144  |  90<L>  |  22.0<H>  ----------------------------<  93  3.6   |  41.0<H>  |  0.88    Ca    9.2      02 Jun 2019 06:16  Mg     2.1     06-01    TPro  7.6  /  Alb  3.2<L>  /  TBili  0.4  /  DBili  x   /  AST  14  /  ALT  8   /  AlkPhos  70  06-01                                 11.0   12.2  )-----------( 186      ( 02 Jun 2019 06:16 )             37.3          PT/INR - ( 01 Jun 2019 13:08 )   PT: 14.0 sec;   INR: 1.21 ratio         PTT - ( 01 Jun 2019 13:08 )  PTT:30.1 sec        CAPILLARY BLOOD GLUCOSE           Today's CXR:< from: Xray Chest 1 View- PORTABLE-Routine (06.02.19 @ 07:16) >  EXAM:  XR CHEST PORTABLE ROUTINE 1V                          PROCEDURE DATE:  06/02/2019      INTERPRETATION:  Portable chest radiograph        CLINICAL INFORMATION:   Short of breath.    < from: CT Chest No Cont (06.01.19 @ 13:58) >   EXAM:  CT CHEST                          PROCEDURE DATE:  06/01/2019          INTERPRETATION:  Chest CT without contrast .  COMPARISON: 4/25/2019 chest CT scan. And chest radiograph 6/1/2019  CLINICAL INFORMATION: Cough, shortness of breath. Increasing RIGHT lung   base effusion and/or infiltrate.    TECHNIQUE: Contiguous axial 2.5 mm slice thickness images of the chest   were obtained without intravenous contrast administration.  FINDINGS:  There is an occluded RIGHT lower lobe bronchus.  There is a moderate multiloculated RIGHT lung base pleural effusion..   There is complete consolidation/atelectasis of the RIGHT lower lobe.   There is diffuse centrilobular emphysema.   RIGHT posterior lung segments show reticulonodular opacities.  LEFTlung parenchyma is clear.  There are no mediastinal lymphadenopathy or masses.    The mediastinum great vessels are normal.     There is mild cardiomegaly.. There is no pericardial effusion. Cardiac   device wire leads are within right atrium and right ventricle.       Visualized upper abdominal viscera unremarkable.    The bones are normal.     IMPRESSION:    Multiloculated RIGHT lung base pleural effusions. Complete consolidation   atelectasis RIGHT lower lobe with a reticular opacities of posterior   segment RIGHT upper lobe. LEFT lung clear. Centrilobular emphysema.  Occluded RIGHT lower lobe bronchus..       < end of copied text >  < from: CT Chest No Cont (06.01.19 @ 13:58) >    BLAINE GANN M.D., ATTENDING RADIOLOGIST  This document has been electronically signed. Jun 1 2019  2:26PM  < end of copied text >    PAST MEDICAL & SURGICAL HISTORY:  CHF (congestive heart failure)  CAD (coronary artery disease)  HLD (hyperlipidemia)  HTN (hypertension)  COPD (chronic obstructive pulmonary disease)  Stenosis of lumbosacral spine  H/O back injury  Artificial pacemaker

## 2019-06-03 NOTE — PROGRESS NOTE ADULT - ASSESSMENT
This is 79 YO Man with a PMHx of COPD (on 3L home O2), CHF (EF 45-50%), CAD (non-obstructive on cath 04/19), Moderate AS, loculated pleural effusion s/p pigtail and Right VATS Decortication (04/30/19), HTN, HLD, came to ED due to worsening SOB and LE edema. Patient report SOB for the last 2 weeks, getting worse, initially it was associated with exertion but for the last few days report SOB at rest.  Patient was seen in the cardiology office yesterday and started on Demadex 20mg PO daily, but took his first dose this morning. Patient states that he was unable to sleep due to the SOB and orthopnea, and symptoms were so severe this morning that he came to the ED to be evaluated. Also pt report worsening swelling of LE for the last week, associated with discomfort. In ED pt got lasix 40 IV, urinating, feeling better.  Denied chest pain, fever, chills, cough.     A/P  >Acute on chronic systolic chf - cardio and ct surgery following  Last echo with EF 45-50% 04/2019  appreciate cardiology input - c/w Lasix 40 IV BID   Continue aldactone  No beta blockade for decompensated HF and COPD  CT chest showed multiloculated R pleural effusion, complete consolidation  LE venous doppler negative for DVT    >COPD on home oxygen - not in exacerbation  c/w nebs, oxygen, incentive spirometry     >CAD - non obstructive CAD - s/p cath 04/19  c/w aspirin, statin    >Right loculated effusion- s/p VATS 04/30/2019, cytology negative  Ct chest noted - effusion and consolidation  CT surgery input - mild loculated effusion: no intervention needed at this time  Continue diuresis  Needs aggressive chest PT, incentive spirometry to clear lungs of secretions.  incentive spirometry   procalcitonin 0.20 - monitor off abx    >Moderate AS- Cardio f/u on d/c.     >Hx HTN /HLD- Continue Losartan 25mg, Atorvastatin 40mg     >DVT ppx- SCD, Lovenox   may need bishnu on dc

## 2019-06-03 NOTE — PROGRESS NOTE ADULT - SUBJECTIVE AND OBJECTIVE BOX
[facility]                                                        Office: [address] [address]                                                       Telephone: [phone]. Fax:[phone]                                                                             PROGRESS NOTE    Subjective:  Patient's leg edema has improved, patient states his breathing is improved     Review of symptoms:   Cardiac:  No chest pain. + dyspnea. No palpitations. +bilateral leg edema  Respiratory:no cough. No dyspnea  Gastrointestinal: No diarrhea. No abdominal pain. No bleeding.   Neuro: No focal neuro complaints.      	  Vitals:  T(C): 36.7 (06-03-19 @ 09:47), Max: 36.7 (06-02-19 @ 16:08)  HR: 92 (06-03-19 @ 15:31) (71 - 100)  BP: 107/56 (06-03-19 @ 09:47) (107/56 - 127/87)  RR: 20 (06-03-19 @ 09:47) (18 - 20)  SpO2: 95% (06-03-19 @ 15:31) (94% - 100%)  Wt(kg): --  I&O's Summary    02 Jun 2019 07:01  -  03 Jun 2019 07:00  --------------------------------------------------------  IN: 0 mL / OUT: 1925 mL / NET: -1925 mL    03 Jun 2019 07:01  -  03 Jun 2019 15:45  --------------------------------------------------------  IN: 480 mL / OUT: 1300 mL / NET: -820 mL      Weight (kg): 115.7 (06-01 @ 11:52)    PHYSICAL EXAM:  Appearance: Comfortable. No acute distress  HEENT:  Head and neck: Atraumatic. Normocephalic. , Neck is supple. No JVD,   Neurologic: Alert and awake, Grossly nonfocal.   Cardiovascular: Normal S1 S2, Systolic murmur +  Respiratory: Lungs clear to auscultation  Gastrointestinal:  Soft, Non-tender, + BS  Lower Extremities: bilateral pitting leg edema 2-3+  Psychiatry: Patient is calm. No agitation.  Skin: No rashes.    CURRENT MEDICATIONS:    MEDICATIONS  (STANDING):  ALBUTerol/ipratropium for Nebulization 3 milliLiter(s) Nebulizer every 6 hours  aspirin enteric coated 81 milliGRAM(s) Oral daily  atorvastatin 40 milliGRAM(s) Oral at bedtime  docusate sodium 100 milliGRAM(s) Oral three times a day  enoxaparin Injectable 40 milliGRAM(s) SubCutaneous every 24 hours  furosemide   Injectable 40 milliGRAM(s) IV Push two times a day  losartan 50 milliGRAM(s) Oral daily  montelukast 10 milliGRAM(s) Oral daily  pantoprazole    Tablet 40 milliGRAM(s) Oral before breakfast  predniSONE   Tablet 10 milliGRAM(s) Oral daily  senna 2 Tablet(s) Oral at bedtime  spironolactone 25 milliGRAM(s) Oral daily      LABS:	 	                          10.7   10.3  )-----------( 219      ( 03 Jun 2019 06:23 )             35.9     06-03    145  |  92<L>  |  27.0<H>  ----------------------------<  84  3.6   |  41.0<H>  |  0.87    Ca    9.0      03 Jun 2019 06:23  Mg     1.9     06-03      proBNP: Serum Pro-Brain Natriuretic Peptide: 3700 pg/mL (06-01 @ 12:19)    Lipid Profile:   CARDIAC MARKERS ( 02 Jun 2019 06:16 )  x     / 0.10 ng/mL / x     / x     / x      CARDIAC MARKERS ( 01 Jun 2019 20:53 )  x     / x     / 28 U/L / x     / x              TELEMETRY: Reviewed  - No significant arrhythmias    ECG:  < from: CT Chest No Cont (06.01.19 @ 13:58) >  IMPRESSION:    Multiloculated RIGHT lung base pleural effusions. Complete consolidation   atelectasis RIGHT lower lobe with a reticular opacities of posterior   segment RIGHT upper lobe. LEFT lung clear. Centrilobular emphysema.  Occluded RIGHT lower lobe bronchus..                 BLAINE GANN M.D., ATTENDING RADIOLOGIST  This document has been electronically signed. Jun 1 2019  2:26PM    < end of copied text >

## 2019-06-03 NOTE — PHYSICAL THERAPY INITIAL EVALUATION ADULT - PERTINENT HX OF CURRENT PROBLEM, REHAB EVAL
77 YO Man with a PMHx of COPD (on 3L home O2), CHF (EF 45-50%), CAD (non-obstructive on cath 04/19), Moderate AS, loculated pleural effusion s/p pigtail and Right VATS Decortication (04/30/19), HTN, HLD, came to ED due to worsening SOB and LE edema. Pt admitted for Acute on chronic systolic CHF

## 2019-06-03 NOTE — PHYSICAL THERAPY INITIAL EVALUATION ADULT - ADDITIONAL COMMENTS
Pt lives in a private home with his wife. Pt's spouse unable to assist at home. No steps to enter, 1 step inside with handrails. Pt was independent PTA without assist device. Pt owns RW, SAC, shower chair and commode.

## 2019-06-03 NOTE — PHYSICAL THERAPY INITIAL EVALUATION ADULT - IMPAIRMENTS FOUND, PT EVAL
aerobic capacity/endurance/gait, locomotion, and balance/muscle strength/posture/ventilation and respiration/gas exchange none

## 2019-06-03 NOTE — CDI QUERY NOTE - NSCDIOTHERTXTBX2_GEN_ALL_CORE_FT
Documentation noted patient with 02 dependency at home, on 3lnc.    6/3/19 Hositalist note: COPD on home oxygen - not in exacerbation  c/w nebs, oxygen, incentive spirometry     Please clarify if the above clinical indicators can support a diagnosis?  A) Chronic respiratory failure  B) Other, please specify   C) Not clinically significant

## 2019-06-03 NOTE — PHYSICAL THERAPY INITIAL EVALUATION ADULT - IMPAIRMENTS CONTRIBUTING TO GAIT DEVIATIONS, PT EVAL
Pt required standing rest breaks 2*2 LEGER/decreased strength/impaired balance/impaired postural control

## 2019-06-03 NOTE — PROGRESS NOTE ADULT - ASSESSMENT
Patient is a 78-year-old  male with a past medical history of hypertension, former smoker, COPD with chronic dyspnea , has PPM for Sick Sinus syndrome. Patient underwent Cardica cath on april 26 th revealed normal coronaries with EF of 30% and mod. AS. Patient was recently admitted to the hospital for a right pleural effusion which was infected patient had a decortication done. Patient was seen by me in the office on May 31, 2019, patient was advised to have a bilateral venous duplex and he was started on Demadex, patient states that symptoms got worse he decided to come to the ER.  bilateral venous duplex negative for DVT    Assessment:  1. Acute on Chronic CHF  2. Moderate AS  3. Pulmonary- right sided pleural effusion  4. COPD on home O2 and other problems as noted    Recommendations:  1. Continue lasix for 1-2 days and then switch to PO Demadex 40 mg daily  2. Limited ECHO to Check LVEF    Discussed with Dr Ashu Goode

## 2019-06-03 NOTE — CDI QUERY NOTE - NSCDIOTHERTXTBX_GEN_ALL_CORE_HH
Documentation noted patient with elevated troponin in the setting of heart failure exacerbation.    Supporting Documentation:  6/1/19 Troponin= 0.09  6/2/19 Troponin= 0.09<0.10    6/1 Cardiology note: Elevated Troponin	  - In setting of heart failure exacerbation  - No symptoms of ACS  - Cardiac cath 04/19 with non-obstructive CAD  - Continue Lipitor, start ASA 81mg PO daily    Can you please clarify if the above clinical indicators can support a more specific diagnosis?  A) Elevated troponin in the setting of demand ischemia  B) Other, Please specify  C) Not clinically significant

## 2019-06-03 NOTE — PHYSICAL THERAPY INITIAL EVALUATION ADULT - CRITERIA FOR SKILLED THERAPEUTIC INTERVENTIONS
anticipated equipment needs at discharge/impairments found/anticipated discharge recommendation/TOMMY

## 2019-06-03 NOTE — PROGRESS NOTE ADULT - SUBJECTIVE AND OBJECTIVE BOX
JOHN CIFUENTES    [id number]    78y      Male    INTERVAL HPI/OVERNIGHT EVENTS: patient being seen for chf. patient seen at bedside and states breathing is improved    REVIEW OF SYSTEMS:    CONSTITUTIONAL: No fever, weight loss, or fatigue  RESPIRATORY: No cough, wheezing, hemoptysis; No shortness of breath  CARDIOVASCULAR: No chest pain, palpitations  GASTROINTESTINAL: No abdominal or epigastric pain. No nausea, vomiting  NEUROLOGICAL: No headaches, memory loss, loss of strength.  MISCELLANEOUS:      Vital Signs Last 24 Hrs  T(C): 36.7 (03 Jun 2019 09:47), Max: 36.7 (02 Jun 2019 16:08)  T(F): 98.1 (03 Jun 2019 09:47), Max: 98.1 (03 Jun 2019 09:47)  HR: 100 (03 Jun 2019 09:47) (71 - 100)  BP: 107/56 (03 Jun 2019 09:47) (107/56 - 127/87)  BP(mean): --  RR: 20 (03 Jun 2019 09:47) (18 - 20)  SpO2: 96% (03 Jun 2019 09:47) (94% - 100%)    PHYSICAL EXAM:    GENERAL: NAD, speaking in full sentences  HEENT: EOMI  Neck: supple  CHEST/LUNG: diminihsged right greater than left  HEART: S1S2+ audible  ABDOMEN: Soft, Nontender, Nondistended; Bowel sounds present  EXTREMITIES:  edema improved  CNS: AAO X 3  Psychiatry: normal mood        LABS:                        10.7   10.3  )-----------( 219      ( 03 Jun 2019 06:23 )             35.9     06-03    145  |  92<L>  |  27.0<H>  ----------------------------<  84  3.6   |  41.0<H>  |  0.87    Ca    9.0      03 Jun 2019 06:23  Mg     1.9     06-03        MEDICATIONS  (STANDING):  ALBUTerol/ipratropium for Nebulization 3 milliLiter(s) Nebulizer every 6 hours  aspirin enteric coated 81 milliGRAM(s) Oral daily  atorvastatin 40 milliGRAM(s) Oral at bedtime  docusate sodium 100 milliGRAM(s) Oral three times a day  enoxaparin Injectable 40 milliGRAM(s) SubCutaneous every 24 hours  furosemide   Injectable 40 milliGRAM(s) IV Push two times a day  losartan 50 milliGRAM(s) Oral daily  montelukast 10 milliGRAM(s) Oral daily  pantoprazole    Tablet 40 milliGRAM(s) Oral before breakfast  predniSONE   Tablet 10 milliGRAM(s) Oral daily  senna 2 Tablet(s) Oral at bedtime  spironolactone 25 milliGRAM(s) Oral daily    MEDICATIONS  (PRN):  acetaminophen   Tablet .. 650 milliGRAM(s) Oral every 6 hours PRN Temp greater or equal to 38C (100.4F), Mild Pain (1 - 3)  ondansetron Injectable 4 milliGRAM(s) IV Push every 6 hours PRN Nausea and/or Vomiting      RADIOLOGY & ADDITIONAL TESTS:

## 2019-06-04 NOTE — PROGRESS NOTE ADULT - ASSESSMENT
Patient is a 77 y/o M with a PMHx of COPD (on 3L home O2), HFrEF (EF 45-50%), CAD (non-obstructive on cath 04/19), Moderate AS, , HTN, HLD, and lumbar stenosis who was BIBA for worsening SOB.  and increasing leg edema.  Trop negative and ekg without ischemic changes. On review of CT, patient appears to have mild loculated effusion with right bronchial occlusion.    Patient is a 78-year-old  male with a past medical history of hypertension, former smoker, COPD with chronic dyspnea , has PPM for Sick Sinus syndrome. Patient underwent Cardic cath on april 26 th revealed normal coronaries with EF of 30% and mod. AS. Patient was recently admitted to the hospital for a right pleural effusion which was infected patient had a decortication done,loculated pleural effusion s/p pigtail and Right VATS Decortication (04/30/19). Patient was seen by me in the office on May 31, 2019, patient was advised to have a bilateral venous duplex and he was started on Demadex, patient states that symptoms got worse he decided to come to the ER.  bilateral venous duplex negative for DVT    Assessment:  1. Acute on Chronic CHF- Echo LVEF 40-45%  2. Moderate AS  3. Pulmonary- right sided pleural effusion  4. COPD on home O2 and other problems as noted  5. Bilateral leg edema    Recommendations:  1. Agree with holding lasix due increasing bicarb  2. Continue losartan and Spironolactone  3. May consider adding Demadex 20 mg daily starting on 6/5/2019  4. Suggest Vascular consult and Venous duplex for possible Bilateral leg venous insufficiency    1. Discussed with Dr Ashu Goode

## 2019-06-04 NOTE — PROGRESS NOTE ADULT - ASSESSMENT
This is 79 YO Man with a PMHx of COPD (on 3L home O2), CHF (EF 45-50%), CAD (non-obstructive on cath 04/19), Moderate AS, loculated pleural effusion s/p pigtail and Right VATS Decortication (04/30/19), HTN, HLD, came to ED due to worsening SOB and LE edema. Patient report SOB for the last 2 weeks, getting worse, initially it was associated with exertion but for the last few days report SOB at rest.  Patient was seen in the cardiology office yesterday and started on Demadex 20mg PO daily, but took his first dose this morning. Patient states that he was unable to sleep due to the SOB and orthopnea, and symptoms were so severe this morning that he came to the ED to be evaluated. Also pt report worsening swelling of LE for the last week, associated with discomfort. In ED pt got lasix 40 IV, urinating, feeling better.  Patient being managed medically for pleural effusion, and was given one dose diamox today for elevated bicarb with improvement     A/P  >Acute on chronic systolic chf - cardio and ct surgery following  Last echo with EF 45-50% 04/2019  appreciate cardiology input - c/w Lasix 40 IV BID   Continue aldactone  No beta blockade for decompensated HF and COPD  CT chest showed multiloculated R pleural effusion, complete consolidation  LE venous doppler negative for DVT    >COPD on home oxygen - not in exacerbation  c/w nebs, oxygen, incentive spirometry     >CAD - non obstructive CAD - s/p cath 04/19  c/w aspirin, statin    >Right loculated effusion- s/p VATS 04/30/2019, cytology negative  Ct chest noted - effusion and consolidation  CT surgery input - mild loculated effusion: no intervention needed at this time  Continue diuresis  Needs aggressive chest PT, incentive spirometry to clear lungs of secretions.  incentive spirometry   procalcitonin 0.20 - monitor off abx    >Moderate AS- Cardio f/u on d/c.     >Hx HTN /HLD- Continue Losartan 25mg, Atorvastatin 40mg     >DVT ppx- SCD, Lovenox   may need bishnu on dc This is 77 YO Man with a PMHx of COPD (on 3L home O2), CHF (EF 45-50%), CAD (non-obstructive on cath 04/19), Moderate AS, loculated pleural effusion s/p pigtail and Right VATS Decortication (04/30/19), HTN, HLD, came to ED due to worsening SOB and LE edema. Patient report SOB for the last 2 weeks, getting worse, initially it was associated with exertion but for the last few days report SOB at rest.  Patient was seen in the cardiology office yesterday and started on Demadex 20mg PO daily, but took his first dose this morning. Patient states that he was unable to sleep due to the SOB and orthopnea, and symptoms were so severe this morning that he came to the ED to be evaluated. Also pt report worsening swelling of LE for the last week, associated with discomfort. In ED pt got lasix 40 IV, urinating, feeling better.  Patient being managed medically for pleural effusion, and was given one dose of diamox today for elevated bicarb with improvement     A/P  >Acute on chronic systolic chf - cardio and ct surgery following  Last echo with EF 45-50% 04/2019  appreciate cardiology input   --> will switch to demadex  Continue aldactone  will slowly add back beta blocker  CT chest showed multiloculated R pleural effusion, complete consolidation  LE venous doppler negative for DVT    > eleveated bicarb - s.p 1 dose of diamox  --> demadex    >COPD on home oxygen - not in exacerbation  c/w nebs, oxygen, incentive spirometry     >CAD - non obstructive CAD - s/p cath 04/19  c/w aspirin, statin, beta blocker started    >Right loculated effusion- s/p VATS 04/30/2019, cytology negative  Ct chest noted - effusion and consolidation  CT surgery input - mild loculated effusion: no intervention needed at this time  Continue diuresis  Needs aggressive chest PT, incentive spirometry to clear lungs of secretions.  incentive spirometry   procalcitonin 0.20 - monitor off abx    >Moderate AS- Cardio f/u on d/c.     >Hx HTN /HLD- Continue Losartan 25mg, Atorvastatin 40mg , coreg     >DVT ppx- SCD, Lovenox   may need bishnu on dc

## 2019-06-04 NOTE — PROGRESS NOTE ADULT - SUBJECTIVE AND OBJECTIVE BOX
Subjective: "My breathing is much better." Patient woken up from sleeping in chair in NAD    Tele:                                                              T(C): 36.7 (06-04-19 @ 09:44), Max: 37.1 (06-03-19 @ 19:37)  HR: 92 (06-04-19 @ 14:54) (68 - 109)  BP: 133/67 (06-04-19 @ 09:44) (105/64 - 133/67)  RR: 20 (06-04-19 @ 09:44) (18 - 20)  SpO2: 93% (06-04-19 @ 14:54) (93% - 95%) 2L NC    06-04    142  |  92<L>  |  32.0<H>  ----------------------------<  120<H>  4.0   |  41.0<H>  |  0.96    Ca    9.4      04 Jun 2019 13:19  Mg     2.0     06-04    TPro  7.4  /  Alb  3.0<L>  /  TBili  0.5  /  DBili  x   /  AST  12  /  ALT  7   /  AlkPhos  64  06-04                               10.9   10.8  )-----------( 218      ( 04 Jun 2019 06:27 )             36.5               CAPILLARY BLOOD GLUCOSE    CXR:   < from: Xray Chest 1 View- PORTABLE-Routine (06.02.19 @ 07:16) >  FINDINGS:   The lungs show stable RIGHT lower lobe airspace consolidation and   moderate effusion. LEFT lung clear.     The heart is enlarged in transverse diameter. No hilar mass.Trachea   midline.   Cardiac device wire leads are within right atrium and right ventricle.     Visualized osseous structures are intact.     IMPRESSION: Stable RIGHT lower lobe airspace consolidation and   effusion..     CT Chest:   < from: CT Chest No Cont (06.01.19 @ 13:58) >  TECHNIQUE: Contiguous axial 2.5 mm slice thickness images of the chest   were obtained without intravenous contrast administration.  FINDINGS:  There is an occluded RIGHT lower lobe bronchus.  There is a moderate multiloculated RIGHT lung base pleural effusion..   There is complete consolidation/atelectasis of the RIGHT lower lobe.   There is diffuse centrilobular emphysema.   RIGHT posterior lung segments show reticulonodular opacities.  LEFTlung parenchyma is clear.  There are no mediastinal lymphadenopathy or masses.    The mediastinum great vessels are normal.     There is mild cardiomegaly.. There is no pericardial effusion. Cardiac   device wire leads are within right atrium and right ventricle.       Visualized upper abdominal viscera unremarkable.    The bones are normal.     IMPRESSION:    Multiloculated RIGHT lung base pleural effusions. Complete consolidation   atelectasis RIGHT lower lobe with a reticular opacities of posterior   segment RIGHT upper lobe. LEFT lung clear. Centrilobular emphysema.  Occluded RIGHT lower lobe bronchus..     I&O's Detail    03 Jun 2019 07:01  -  04 Jun 2019 07:00  --------------------------------------------------------  IN:    Oral Fluid: 720 mL  Total IN: 720 mL    OUT:    Voided: 1700 mL  Total OUT: 1700 mL    Total NET: -980 mL      04 Jun 2019 07:01  -  04 Jun 2019 15:38  --------------------------------------------------------  IN:    Oral Fluid: 480 mL  Total IN: 480 mL    OUT:    Voided: 1400 mL  Total OUT: 1400 mL    Total NET: -920 mL    CHEST TUBE:  [ ] YES [x] NO      Drug Dosing Weight  Height (cm): 185.42 (01 Jun 2019 11:52)  Weight (kg): 115.7 (01 Jun 2019 11:52)  BMI (kg/m2): 33.7 (01 Jun 2019 11:52)  BSA (m2): 2.39 (01 Jun 2019 11:52)    ALBUTerol/ipratropium for Nebulization 3 milliLiter(s) Nebulizer every 6 hours  atorvastatin 40 milliGRAM(s) Oral at bedtime  docusate sodium 100 milliGRAM(s) Oral three times a day  losartan 50 milliGRAM(s) Oral daily  montelukast 10 milliGRAM(s) Oral daily  pantoprazole    Tablet 40 milliGRAM(s) Oral before breakfast  predniSONE   Tablet 10 milliGRAM(s) Oral daily  senna 2 Tablet(s) Oral at bedtime  spironolactone 25 milliGRAM(s) Oral daily    Assessment  Neuro: A&O x3 without deficits.  Pulm: CTA b/l with diminished breath sounds noted to right lower base. Without wheeze, rales.  CV: S1+S2 irregular rhythm without murmur.  Abd: Soft NT, ND, +BS  Extrem/MS: 2-3+ pitting edema noted to b/l lower extremities +pulses throughout.

## 2019-06-04 NOTE — PROGRESS NOTE ADULT - ASSESSMENT
78 year old male with a PMH of CAD, CHF, COPD on home oxygen (3 L), HTN, HLD with recent total right lung decortication for pleural effusion (fibropurulent exudate noted) on 4/30/19, likely d/t CAP with parapneumonic effusion. On review of CT, patient appears to have mild loculated effusion with right bronchial occlusion. Improved breathing since last seen as per patient.

## 2019-06-04 NOTE — PROGRESS NOTE ADULT - SUBJECTIVE AND OBJECTIVE BOX
[facility]                                                        Office: [address] [address]                                                       Telephone: [phone]. Fax:[phone]                                                                             PROGRESS NOTE    Subjective:  Patient is feeling OK, no complaints     Review of symptoms:   Cardiac:  No chest pain. No dyspnea. No palpitations.  Respiratory:no cough. No dyspnea  Gastrointestinal: No diarrhea. No abdominal pain. No bleeding.   Neuro: No focal neuro complaints.      	  Vitals:  T(C): 36.7 (06-04-19 @ 09:44), Max: 37.1 (06-03-19 @ 19:37)  HR: 92 (06-04-19 @ 14:54) (68 - 109)  BP: 133/67 (06-04-19 @ 09:44) (105/64 - 133/67)  RR: 20 (06-04-19 @ 09:44) (18 - 20)  SpO2: 93% (06-04-19 @ 14:54) (93% - 95%)  Wt(kg): --  I&O's Summary    03 Jun 2019 07:01  -  04 Jun 2019 07:00  --------------------------------------------------------  IN: 720 mL / OUT: 1700 mL / NET: -980 mL    04 Jun 2019 07:01  -  04 Jun 2019 15:05  --------------------------------------------------------  IN: 480 mL / OUT: 1400 mL / NET: -920 mL      Weight (kg): 115.7 (06-01 @ 11:52)    PHYSICAL EXAM:  Appearance: Comfortable. No acute distress  HEENT:  Head and neck: Atraumatic. Normocephalic. , Neck is supple. No JVD,   Neurologic: Alert and awake, Grossly nonfocal.   Lymphatic: No cervical lymphadenopathy  Cardiovascular: Normal S1 S2, No murmurs. No JVD,   Respiratory: Diminished breath sounds on right lung base  Gastrointestinal:  Soft, Non-tender, + BS  Lower Extremities:  2-3+ pitting edema  Psychiatry: Patient is calm. No agitation.  Skin: No rashes.    CURRENT MEDICATIONS:    MEDICATIONS  (STANDING):  ALBUTerol/ipratropium for Nebulization 3 milliLiter(s) Nebulizer every 6 hours  aspirin enteric coated 81 milliGRAM(s) Oral daily  atorvastatin 40 milliGRAM(s) Oral at bedtime  docusate sodium 100 milliGRAM(s) Oral three times a day  enoxaparin Injectable 40 milliGRAM(s) SubCutaneous every 24 hours  losartan 50 milliGRAM(s) Oral daily  montelukast 10 milliGRAM(s) Oral daily  pantoprazole    Tablet 40 milliGRAM(s) Oral before breakfast  predniSONE   Tablet 10 milliGRAM(s) Oral daily  senna 2 Tablet(s) Oral at bedtime  spironolactone 25 milliGRAM(s) Oral daily      LABS:	 	                          10.9   10.8  )-----------( 218      ( 04 Jun 2019 06:27 )             36.5     06-04    142  |  92<L>  |  32.0<H>  ----------------------------<  120<H>  4.0   |  41.0<H>  |  0.96    Ca    9.4      04 Jun 2019 13:19  Mg     2.0     06-04    TPro  7.4  /  Alb  3.0<L>  /  TBili  0.5  /  DBili  x   /  AST  12  /  ALT  7   /  AlkPhos  64  06-04    proBNP: Serum Pro-Brain Natriuretic Peptide: 3700 pg/mL (06-01 @ 12:19)    Lipid Profile:       LIVER FUNCTIONS - ( 04 Jun 2019 06:27 )  Alb: 3.0 g/dL / Pro: 7.4 g/dL / ALK PHOS: 64 U/L / ALT: 7 U/L / AST: 12 U/L / GGT: x             TELEMETRY: Reviewed  - No significant arrhythmias    ECG:    < from: TTE Echo Limited or F/U (06.04.19 @ 09:53) >  Summary:   1. Left ventricular ejection fraction, by visual estimation, is 40 to   45%.   2. Technically difficult study.   3. Moderately decreased global left ventricular systolic function.   4. Basal and mid inferior wall and mid inferolateral segment are   abnormal as described above.   5. Normal left ventricular internal cavity size.   6. A prior echocardiogram performed on 4/21/2019 is available for   comparison purposes.q   7. Endocardial visualization was enhanced with intravenous echo contrast.   8. Previous echocardiogram shows similar LVEF    MD Saji Electronically signed on 6/4/2019 at 12:00:38 PM              < end of copied text >

## 2019-06-04 NOTE — PROGRESS NOTE ADULT - SUBJECTIVE AND OBJECTIVE BOX
JOHN CIFUENTES    [id number]    78y      Male    INTERVAL HPI/OVERNIGHT EVENTS: patient being seen for chf and pleural effusions. patient seen at bedside and in nad    critical value this am bicarb 45    REVIEW OF SYSTEMS:    CONSTITUTIONAL: No fever, weight loss, or fatigue  RESPIRATORY: No cough, wheezing, hemoptysis; No shortness of breath  CARDIOVASCULAR: No chest pain, palpitations  GASTROINTESTINAL: No abdominal or epigastric pain. No nausea, vomiting  NEUROLOGICAL: No headaches, memory loss, loss of strength.  MISCELLANEOUS:      Vital Signs Last 24 Hrs  T(C): 36.8 (04 Jun 2019 16:18), Max: 37.1 (03 Jun 2019 19:37)  T(F): 98.2 (04 Jun 2019 16:18), Max: 98.8 (04 Jun 2019 05:00)  HR: 92 (04 Jun 2019 16:18) (68 - 109)  BP: 118/71 (04 Jun 2019 16:18) (105/64 - 133/67)  BP(mean): --  RR: 19 (04 Jun 2019 16:18) (18 - 20)  SpO2: 93% (04 Jun 2019 16:18) (93% - 95%)    PHYSICAL EXAM:  GENERAL: NAD, speaking in full sentences  HEENT: EOMI  Neck: supple  CHEST/LUNG: diminihsged right greater than left  HEART: S1S2+ audible  ABDOMEN: Soft, Nontender, Nondistended; Bowel sounds present  EXTREMITIES:  edema improved  CNS: AAO X 3  Psychiatry: normal mood        LABS:                        10.9   10.8  )-----------( 218      ( 04 Jun 2019 06:27 )             36.5     06-04    142  |  92<L>  |  32.0<H>  ----------------------------<  120<H>  4.0   |  41.0<H>  |  0.96    Ca    9.4      04 Jun 2019 13:19  Mg     2.0     06-04    TPro  7.4  /  Alb  3.0<L>  /  TBili  0.5  /  DBili  x   /  AST  12  /  ALT  7   /  AlkPhos  64  06-04            MEDICATIONS  (STANDING):  ALBUTerol/ipratropium for Nebulization 3 milliLiter(s) Nebulizer every 6 hours  aspirin enteric coated 81 milliGRAM(s) Oral daily  atorvastatin 40 milliGRAM(s) Oral at bedtime  docusate sodium 100 milliGRAM(s) Oral three times a day  enoxaparin Injectable 40 milliGRAM(s) SubCutaneous every 24 hours  losartan 50 milliGRAM(s) Oral daily  montelukast 10 milliGRAM(s) Oral daily  pantoprazole    Tablet 40 milliGRAM(s) Oral before breakfast  predniSONE   Tablet 10 milliGRAM(s) Oral daily  senna 2 Tablet(s) Oral at bedtime  spironolactone 25 milliGRAM(s) Oral daily  torsemide 20 milliGRAM(s) Oral daily    MEDICATIONS  (PRN):  acetaminophen   Tablet .. 650 milliGRAM(s) Oral every 6 hours PRN Temp greater or equal to 38C (100.4F), Mild Pain (1 - 3)  ondansetron Injectable 4 milliGRAM(s) IV Push every 6 hours PRN Nausea and/or Vomiting      RADIOLOGY & ADDITIONAL TESTS:

## 2019-06-05 NOTE — CONSULT NOTE ADULT - ASSESSMENT
Stage 4 COPD with OHS and chronic hypercapnic resp failure  RLL findings consistent with prior surgery and fluid overload  Hypercapnic resp failure treatment has already been arranged as outpt  CHF responding to RX  Little else to add    Plan:  Trilogy vent already on order  per cardiology  resume outpt pulm meds on DC  f/u outpt cct 8 wks  f/u pari rivera in first week post DC  will sign off  recall prn

## 2019-06-05 NOTE — PROGRESS NOTE ADULT - SUBJECTIVE AND OBJECTIVE BOX
HOSPITALIST PROGRESS NOTE    JOHN CIFUENTES  [id number]  78yMale    Patient is a 78y old  Male who presents with a chief complaint of SOB (05 Jun 2019 10:35)      SUBJECTIVE:   Chart reviewed since admission.  Patient seen and examined at bedside for Respiratory failure,  CHF, COPD  Feels better - denies any dyspnea at rest. Leg swelling has really gone done. Cough - unable to expectorate.  Denies any chest pain or chills      OBJECTIVE:  Vital Signs Last 24 Hrs  T(C): 36.9 (05 Jun 2019 10:35), Max: 36.9 (05 Jun 2019 10:35)  T(F): 98.5 (05 Jun 2019 10:35), Max: 98.5 (05 Jun 2019 10:35)  HR: 71 (05 Jun 2019 14:50) (71 - 96)  BP: 134/78 (05 Jun 2019 10:35) (118/68 - 134/78)   RR: 19 (05 Jun 2019 10:35) (18 - 19)  SpO2: 94% (05 Jun 2019 14:50) (93% - 94%)    PHYSICAL EXAMINATION  General: Elderly male sitting up in chair - NAD  HEENT:  AT NC pupils equal, responsive, reactive to light and accomodation EOMI  NECK:  Supple  CVS: regular rate and rhythm S1 S2  RESP:  Decreased breath sounds RLL>LLL  GI:  Soft nontender BS+  : No suprapubic or CVA tenderness  MSK:  Edema bilateral lower extremity  CNS:  No gross focal or global deficit noted  INTEG:  Warm dry skin  PSYCH:  Fair mood    MONITOR:  CAPILLARY BLOOD GLUCOSE            I&O's Summary    04 Jun 2019 07:01  -  05 Jun 2019 07:00  --------------------------------------------------------  IN: 720 mL / OUT: 2950 mL / NET: -2230 mL    05 Jun 2019 07:01  -  05 Jun 2019 14:55  --------------------------------------------------------  IN: 480 mL / OUT: 700 mL / NET: -220 mL                            10.9   10.8  )-----------( 218      ( 04 Jun 2019 06:27 )             36.5       06-04    142  |  92<L>  |  32.0<H>  ----------------------------<  120<H>  4.0   |  41.0<H>  |  0.96    Ca    9.4      04 Jun 2019 13:19  Mg     2.0     06-04    TPro  7.4  /  Alb  3.0<L>  /  TBili  0.5  /  DBili  x   /  AST  12  /  ALT  7   /  AlkPhos  64  06-04            Culture:    TTE:  < from: TTE Echo Limited or F/U (06.04.19 @ 09:53) >  EXAM:  ECHO TRANSTHORACIC;F U OR LTD      PROCEDURE DATE:  Jun 4 2019   .      INTERPRETATION:  REPORT:    TRANSTHORACIC ECHOCARDIOGRAM REPORT         Patient Name:   JOHN CIFUENTES JR Patient Location: Inpatient  Medical Rec #:  [id number] #:      [id number]  Account #:                        Height:           72.8 in 185.0 cm  YOB: 1940        Weight:           255.1 lb 115.70 kg  Patient Age:    78 years          BSA:              2.38 m²  Patient Gender: M              BP:               127/78 mmHg       Date of Exam:        6/4/2019 9:53:38 AM  Sonographer:         Rebecca Holbrook  Referring Physician: Liseth Casas MD    Procedure:     Follow up or Limited Echocardiogram and Echocardiogram with               Definity Contrast.  Indications:   Dyspnea, unspecified - R06.00  Diagnosis:     Nonrheumatic mitral (valve) insufficiency - I34.0  Study Details: Technically difficult study. Study quality was adversely   affected                 due tobody habitus and patient obesity.         2D AND M-MODE MEASUREMENTS (normal ranges within parentheses):  Left Ventricle:                  Normal  IVSd (2D):              1.53 cm (0.7-1.1)  LVPWd (2D):             1.53 cm (0.7-1.1)  LVIDd (2D):      4.93 cm (3.4-5.7)  LVIDs (2D):             3.99 cm  LV FS (2D):             19.1 %   (>25%)  Relative Wall Thickness  0.62    (<0.42)    LV SYSTOLIC FUNCTION BY 2D PLANIMETRY (MOD):  EF-A4C View: 42.1 % EF-A2C View: 35.2 % EF-Biplane: 38 %    SPECTRAL DOPPLER ANALYSIS (where applicable):     PHYSICIAN INTERPRETATION:  Left Ventricle: Endocardial visualization was enhanced with intravenous   echo contrast. The left ventricular internal cavity size is normal.  Global LV systolic function was moderately decreased. Left ventricular   ejection fraction, by visual estimation, is 40 to 45%.       LV Wall Scoring:  The basal and mid inferior wall and mid inferolateral segment are   hypokinetic.    In comparison to the previous echocardiogram(s): Prior examinations are   available and were reviewed for comparison purposes. The left ventricular   function is worse. A prior echocardiogram performed on 4/21/2019 is   available for comparison purposes.       Summary:   1. Left ventricular ejection fraction, by visual estimation, is 40 to   45%.   2. Technically difficult study.   3. Moderately decreased global left ventricular systolic function.   4. Basal and mid inferior wall and mid inferolateral segment are   abnormal as described above.   5. Normal left ventricular internal cavity size.   6. A prior echocardiogram performed on 4/21/2019 is available for   comparison purposes.q   7. Endocardial visualization was enhanced with intravenous echo contrast.   8. Previous echocardiogram shows similar LVEF    MD Saji Electronically signed on 6/4/2019 at 12:00:38 PM              *** Final ***                  ALTA ALFARO   This document has been electronically signed. Jun 4 2019  9:53AM            < end of copied text >    RADIOLOGY  < from: Xray Chest 1 View- PORTABLE-Routine (06.05.19 @ 05:28) >     EXAM:  XR CHEST PORTABLE ROUTINE 1V                          PROCEDURE DATE:  06/05/2019          INTERPRETATION:  CHEST AP PORTABLE:    History: Cough.     Date and time of exam: 6/5/2019 4:31 AM.    Technique: A single AP view of the chest was obtained.    Comparison exam: 6/2/2019 7:10 AM.    Findings:  Right pleural effusion, unchanged. Right basilar atelectasis or   infiltrate, unchanged. The left lung remains clear..    Impression:  Stable exam without significant change since the previous study..                YARI PRADO M.D., ATTENDING RADIOLOGIST  This document has been electronically signed. Jun 5 2019  9:16AM        < end of copied text >    < from: CT Chest No Cont (06.01.19 @ 13:58) >     EXAM:  CT CHEST                          PROCEDURE DATE:  06/01/2019          INTERPRETATION:  Chest CT without contrast .  COMPARISON: 4/25/2019 chest CT scan. And chest radiograph 6/1/2019  CLINICAL INFORMATION: Cough, shortness of breath. Increasing RIGHT lung   base effusion and/or infiltrate.    TECHNIQUE: Contiguous axial 2.5 mm slice thickness images of the chest   were obtained without intravenous contrast administration.  FINDINGS:  There is an occluded RIGHT lower lobe bronchus.  There is a moderate multiloculated RIGHT lung base pleural effusion..   There is complete consolidation/atelectasis of the RIGHT lower lobe.   There is diffuse centrilobular emphysema.   RIGHT posterior lung segments show reticulonodular opacities.  LEFTlung parenchyma is clear.  There are no mediastinal lymphadenopathy or masses.    The mediastinum great vessels are normal.     There is mild cardiomegaly.. There is no pericardial effusion. Cardiac   device wire leads are within right atrium and right ventricle.       Visualized upper abdominal viscera unremarkable.    The bones are normal.     IMPRESSION:    Multiloculated RIGHT lung base pleural effusions. Complete consolidation   atelectasis RIGHT lower lobe with a reticular opacities of posterior   segment RIGHT upper lobe. LEFT lung clear. Centrilobular emphysema.  Occluded RIGHT lower lobe bronchus..                 BLAINE GANN M.D., ATTENDING RADIOLOGIST  This document has been electronically signed. Jun 1 2019  2:26PM    < end of copied text >  < from: CT Chest No Cont (06.01.19 @ 13:58) >     EXAM:  CT CHEST                          PROCEDURE DATE:  06/01/2019          INTERPRETATION:  Chest CT without contrast .  COMPARISON: 4/25/2019 chest CT scan. And chest radiograph 6/1/2019  CLINICAL INFORMATION: Cough, shortness of breath. Increasing RIGHT lung   base effusion and/or infiltrate.    TECHNIQUE: Contiguous axial 2.5 mm slice thickness images of the chest   were obtained without intravenous contrast administration.  FINDINGS:  There is an occluded RIGHT lower lobe bronchus.  There is a moderate multiloculated RIGHT lung base pleural effusion..   There is complete consolidation/atelectasis of the RIGHT lower lobe.   There is diffuse centrilobular emphysema.   RIGHT posterior lung segments show reticulonodular opacities.  LEFTlung parenchyma is clear.  There are no mediastinal lymphadenopathy or masses.    The mediastinum great vessels are normal.     There is mild cardiomegaly.. There is no pericardial effusion. Cardiac   device wire leads are within right atrium and right ventricle.       Visualized upper abdominal viscera unremarkable.    The bones are normal.     IMPRESSION:    Multiloculated RIGHT lung base pleural effusions. Complete consolidation   atelectasis RIGHT lower lobe with a reticular opacities of posterior   segment RIGHT upper lobe. LEFT lung clear. Centrilobular emphysema.  Occluded RIGHT lower lobe bronchus..                 BLAINE GANN M.D., ATTENDING RADIOLOGIST  This document has been electronically signed. Jun 1 2019  2:26PM    < end of copied text >      MEDICATIONS  (STANDING):  ALBUTerol/ipratropium for Nebulization 3 milliLiter(s) Nebulizer every 6 hours  aspirin enteric coated 81 milliGRAM(s) Oral daily  atorvastatin 40 milliGRAM(s) Oral at bedtime  carvedilol 3.125 milliGRAM(s) Oral every 12 hours  docusate sodium 100 milliGRAM(s) Oral three times a day  enoxaparin Injectable 40 milliGRAM(s) SubCutaneous every 24 hours  losartan 50 milliGRAM(s) Oral daily  montelukast 10 milliGRAM(s) Oral daily  pantoprazole    Tablet 40 milliGRAM(s) Oral before breakfast  predniSONE   Tablet 10 milliGRAM(s) Oral daily  senna 2 Tablet(s) Oral at bedtime  spironolactone 25 milliGRAM(s) Oral daily  torsemide 20 milliGRAM(s) Oral daily      MEDICATIONS  (PRN):  acetaminophen   Tablet .. 650 milliGRAM(s) Oral every 6 hours PRN Temp greater or equal to 38C (100.4F), Mild Pain (1 - 3)  ondansetron Injectable 4 milliGRAM(s) IV Push every 6 hours PRN Nausea and/or Vomiting

## 2019-06-05 NOTE — CONSULT NOTE ADULT - SUBJECTIVE AND OBJECTIVE BOX
PULMONARY CONSULT NOTE      JOHN CIFUENTESROC-[id number]    Patient is a 78y old  Male who presents with a chief complaint of SOB (04 Jun 2019 18:15)      HISTORY OF PRESENT ILLNESS:  This is 77 YO Man with a PMHx of COPD (on 3L home O2), CHF (EF 45-50%), CAD (non-obstructive on cath 04/19), Moderate AS, loculated pleural effusion s/p pigtail and Right VATS Decortication (04/30/19), HTN, HLD, came to ED due to worsening SOB and LE edema. Patient report SOB for the last 2 weeks, getting worse, initially it was associated with exertion but for the last few days report SOB at rest.  Patient was seen in the cardiology office yesterday and started on Demadex 20mg PO daily, but took his first dose this morning. Patient states that he was unable to sleep due to the SOB and orthopnea, and symptoms were so severe this morning that he came to the ED to be evaluated. Also pt report worsening swelling of LE for the last week, associated with discomfort. In ED pt got lasix 40 IV, urinating, feeling better.  Denied chest pain, fever, chills, cough.     Denied chest pain,  palpitation, abd. pain, nausea, vomiting, headache, dizziness, numbness, tingling, urinary and bowel complaints.     Pt treated with diuresis with improvement.    Stage 4 COPD with FEV1 0.64 (19%). Pt rejected BIPAP in past. Followed by Dr Schneider    MEDICATIONS  (STANDING):  ALBUTerol/ipratropium for Nebulization 3 milliLiter(s) Nebulizer every 6 hours  aspirin enteric coated 81 milliGRAM(s) Oral daily  atorvastatin 40 milliGRAM(s) Oral at bedtime  carvedilol 3.125 milliGRAM(s) Oral every 12 hours  docusate sodium 100 milliGRAM(s) Oral three times a day  enoxaparin Injectable 40 milliGRAM(s) SubCutaneous every 24 hours  losartan 50 milliGRAM(s) Oral daily  montelukast 10 milliGRAM(s) Oral daily  pantoprazole    Tablet 40 milliGRAM(s) Oral before breakfast  predniSONE   Tablet 10 milliGRAM(s) Oral daily  senna 2 Tablet(s) Oral at bedtime  spironolactone 25 milliGRAM(s) Oral daily  torsemide 20 milliGRAM(s) Oral daily      MEDICATIONS  (PRN):  acetaminophen   Tablet .. 650 milliGRAM(s) Oral every 6 hours PRN Temp greater or equal to 38C (100.4F), Mild Pain (1 - 3)  ondansetron Injectable 4 milliGRAM(s) IV Push every 6 hours PRN Nausea and/or Vomiting      Allergies    No Known Allergies    Intolerances        PAST MEDICAL & SURGICAL HISTORY:  CHF (congestive heart failure)  CAD (coronary artery disease)  HLD (hyperlipidemia)  HTN (hypertension)  COPD (chronic obstructive pulmonary disease)  Stenosis of lumbosacral spine  H/O back injury  Artificial pacemaker      FAMILY HISTORY:      SOCIAL HISTORY  Smoking History:     REVIEW OF SYSTEMS:    CONSTITUTIONAL:  No fevers, chills, sweats    HEENT:  Eyes:  No diplopia or blurred vision. ENT:  No earache, sore throat or runny nose. sinus headache or postnasl drip    CARDIOVASCULAR:  No pressure, squeezing, tightness, or heaviness about the chest; no palpitations, leg swelling, orthopnea or PND    RESPIRATORY:  No cough, shortness of breath, PND or orthopnea. Mild SOBOE    GASTROINTESTINAL:  No abdominal pain, nausea, vomiting or diarrhea.    GENITOURINARY:  No dysuria, frequency or urgency.    NEUROLOGIC:  No paresthesias, fasciculations, seizures or weakness.    PSYCHIATRIC:  No disorder of thought or mood.    Vital Signs Last 24 Hrs  T(C): 36.9 (05 Jun 2019 10:35), Max: 36.9 (05 Jun 2019 10:35)  T(F): 98.5 (05 Jun 2019 10:35), Max: 98.5 (05 Jun 2019 10:35)  HR: 81 (05 Jun 2019 10:35) (73 - 96)  BP: 134/78 (05 Jun 2019 10:35) (118/68 - 134/78)  BP(mean): --  RR: 19 (05 Jun 2019 10:35) (18 - 19)  SpO2: 94% (05 Jun 2019 10:35) (93% - 94%)    PHYSICAL EXAMINATION:    GENERAL: The patient is a well-developed, well-nourished _____in no apparent distress.     HEENT: Head is normocephalic and atraumatic. Extraocular muscles are intact. Mucous membranes are moist.     NECK: Supple.     LUNGS: Clear to auscultation without wheezing, rales, or rhonchi. Respirations unlabored    HEART: Regular rate and rhythm without murmur.    ABDOMEN: Soft, nontender, and nondistended.  No hepatosplenomegaly is noted.    EXTREMITIES: Without any cyanosis, clubbing, rash, lesions or edema.    NEUROLOGIC: Grossly intact.      LABS:                        10.9   10.8  )-----------( 218      ( 04 Jun 2019 06:27 )             36.5     06-04    142  |  92<L>  |  32.0<H>  ----------------------------<  120<H>  4.0   |  41.0<H>  |  0.96    Ca    9.4      04 Jun 2019 13:19  Mg     2.0     06-04    TPro  7.4  /  Alb  3.0<L>  /  TBili  0.5  /  DBili  x   /  AST  12  /  ALT  7   /  AlkPhos  64  06-04                        MICROBIOLOGY:    RADIOLOGY & ADDITIONAL STUDIES:  < from: Xray Chest 1 View- PORTABLE-Routine (06.05.19 @ 05:28) >   EXAM:  XR CHEST PORTABLE ROUTINE 1V                          PROCEDURE DATE:  06/05/2019          INTERPRETATION:  CHEST AP PORTABLE:    History: Cough.     Date and time of exam: 6/5/2019 4:31 AM.    Technique: A single AP view of the chest was obtained.    Comparison exam: 6/2/2019 7:10 AM.    Findings:  Right pleural effusion, unchanged. Right basilar atelectasis or   infiltrate, unchanged. The left lung remains clear..    Impression:  Stable exam without significant change since the previous study..                YARI PRADO M.D., ATTENDING RADIOLOGIST  This document has been electronically signed. Jun 5 2019  9:16AM    < end of copied text >  < from: CT Chest No Cont (06.01.19 @ 13:58) >   EXAM:  CT CHEST                          PROCEDURE DATE:  06/01/2019          INTERPRETATION:  Chest CT without contrast .  COMPARISON: 4/25/2019 chest CT scan. And chest radiograph 6/1/2019  CLINICAL INFORMATION: Cough, shortness of breath. Increasing RIGHT lung   base effusion and/or infiltrate.    TECHNIQUE: Contiguous axial 2.5 mm slice thickness images of the chest   were obtained without intravenous contrast administration.  FINDINGS:  There is an occluded RIGHT lower lobe bronchus.  There is a moderate multiloculated RIGHT lung base pleural effusion..   There is complete consolidation/atelectasis of the RIGHT lower lobe.   There is diffuse centrilobular emphysema.   RIGHT posterior lung segments show reticulonodular opacities.  LEFTlung parenchyma is clear.  There are no mediastinal lymphadenopathy or masses.    The mediastinum great vessels are normal.     There is mild cardiomegaly.. There is no pericardial effusion. Cardiac   device wire leads are within right atrium and right ventricle.       Visualized upper abdominal viscera unremarkable.    The bones are normal.     IMPRESSION:    Multiloculated RIGHT lung base pleural effusions. Complete consolidation   atelectasis RIGHT lower lobe with a reticular opacities of posterior   segment RIGHT upper lobe. LEFT lung clear. Centrilobular emphysema.  Occluded RIGHT lower lobe bronchus..                 BLAINE GANN M.D., ATTENDING RADIOLOGIST  This document has been electronically signed. Jun 1 2019  2:26PM    < end of copied text >  < from: US Duplex Venous Lower Ext Complete, Bilateral (06.02.19 @ 08:45) >     EXAM:  US DPLX LWR EXT VEINS COMPL BI                          PROCEDURE DATE:  06/02/2019          INTERPRETATION:  CLINICAL INFORMATION: Lower extremity swelling,   shortness of breath, leg swelling, moderate    COMPARISON: None available.    TECHNIQUE: Duplex sonography of the BILATERAL LOWER extremities with   color and spectral Doppler, with and without compression.      FINDINGS:    There is normal compressibility of the bilateral common femoral, femoral   and popliteal veins.     Doppler examination shows normal spontaneous and phasic flow.    No calf vein thrombosis is detected.    IMPRESSION:     No evidence of bilateral lower extremity deep venous thrombosis.                    SURY CATES M.D., ATTENDING RADIOLOGIST  This document has been electronically signed. Jun 2 2019  8:48AM    < end of copied text > PULMONARY CONSULT NOTE      JOHN CIFUENTESROC-[id number]    Patient is a 78y old  Male who presents with a chief complaint of SOB (04 Jun 2019 18:15)      HISTORY OF PRESENT ILLNESS:  This is 79 YO Man with a PMHx of COPD (on 3L home O2), CHF (EF 45-50%), CAD (non-obstructive on cath 04/19), Moderate AS, loculated pleural effusion s/p pigtail and Right VATS Decortication (04/30/19), HTN, HLD, came to ED due to worsening SOB and LE edema. Patient report SOB for the last 2 weeks, getting worse, initially it was associated with exertion but for the last few days report SOB at rest.  Patient was seen in the cardiology office yesterday and started on Demadex 20mg PO daily, but took his first dose this morning. Patient states that he was unable to sleep due to the SOB and orthopnea, and symptoms were so severe this morning that he came to the ED to be evaluated. Also pt report worsening swelling of LE for the last week, associated with discomfort. In ED pt got lasix 40 IV, urinating, feeling better.  Denied chest pain, fever, chills, cough.     Denied chest pain,  palpitation, abd. pain, nausea, vomiting, headache, dizziness, numbness, tingling, urinary and bowel complaints.     Pt treated with diuresis with improvement.    Stage 4 COPD with FEV1 0.64 (19%). Pt rejected BIPAP in past. Has a NIV on order. Followed by Dr Schneider    Denies cough wheeze or sputum. Edema improved    MEDICATIONS  (STANDING):  ALBUTerol/ipratropium for Nebulization 3 milliLiter(s) Nebulizer every 6 hours  aspirin enteric coated 81 milliGRAM(s) Oral daily  atorvastatin 40 milliGRAM(s) Oral at bedtime  carvedilol 3.125 milliGRAM(s) Oral every 12 hours  docusate sodium 100 milliGRAM(s) Oral three times a day  enoxaparin Injectable 40 milliGRAM(s) SubCutaneous every 24 hours  losartan 50 milliGRAM(s) Oral daily  montelukast 10 milliGRAM(s) Oral daily  pantoprazole    Tablet 40 milliGRAM(s) Oral before breakfast  predniSONE   Tablet 10 milliGRAM(s) Oral daily  senna 2 Tablet(s) Oral at bedtime  spironolactone 25 milliGRAM(s) Oral daily  torsemide 20 milliGRAM(s) Oral daily      MEDICATIONS  (PRN):  acetaminophen   Tablet .. 650 milliGRAM(s) Oral every 6 hours PRN Temp greater or equal to 38C (100.4F), Mild Pain (1 - 3)  ondansetron Injectable 4 milliGRAM(s) IV Push every 6 hours PRN Nausea and/or Vomiting      Allergies    No Known Allergies    Intolerances        PAST MEDICAL & SURGICAL HISTORY:  CHF (congestive heart failure)  CAD (coronary artery disease)  HLD (hyperlipidemia)  HTN (hypertension)  COPD (chronic obstructive pulmonary disease)  Stenosis of lumbosacral spine  H/O back injury  Artificial pacemaker      FAMILY HISTORY:      SOCIAL HISTORY  Smoking History:     REVIEW OF SYSTEMS:    CONSTITUTIONAL:  No fevers, chills, sweats    HEENT:  Eyes:  No diplopia or blurred vision. ENT:  No earache, sore throat or runny nose. sinus headache or postnasl drip    CARDIOVASCULAR:  No pressure, squeezing, tightness, or heaviness about the chest; no palpitations, leg swelling, orthopnea or PND    RESPIRATORY:  above    GASTROINTESTINAL:  No abdominal pain, nausea, vomiting or diarrhea.    GENITOURINARY:  No dysuria, frequency or urgency.    NEUROLOGIC:  No paresthesias, fasciculations, seizures or weakness.    PSYCHIATRIC:  No disorder of thought or mood.    Vital Signs Last 24 Hrs  T(C): 36.9 (05 Jun 2019 10:35), Max: 36.9 (05 Jun 2019 10:35)  T(F): 98.5 (05 Jun 2019 10:35), Max: 98.5 (05 Jun 2019 10:35)  HR: 81 (05 Jun 2019 10:35) (73 - 96)  BP: 134/78 (05 Jun 2019 10:35) (118/68 - 134/78)  BP(mean): --  RR: 19 (05 Jun 2019 10:35) (18 - 19)  SpO2: 94% (05 Jun 2019 10:35) (93% - 94%)    PHYSICAL EXAMINATION:    GENERAL: The patient is a well-developed, well-nourished _____in no apparent distress.     HEENT: Head is normocephalic and atraumatic. Extraocular muscles are intact. Mucous membranes are moist.     NECK: Supple.     LUNGS: Clear to auscultation without wheezing, rales, or rhonchi. Respirations unlabored    HEART: Regular rate and rhythm without murmur.    ABDOMEN: MO,Soft, nontender, and nondistended.  No hepatosplenomegaly is noted.    EXTREMITIES: Without any cyanosis, clubbing, rash, lesions, 2+edema.    NEUROLOGIC: Grossly intact.      LABS:                        10.9   10.8  )-----------( 218      ( 04 Jun 2019 06:27 )             36.5     06-04    142  |  92<L>  |  32.0<H>  ----------------------------<  120<H>  4.0   |  41.0<H>  |  0.96    Ca    9.4      04 Jun 2019 13:19  Mg     2.0     06-04    TPro  7.4  /  Alb  3.0<L>  /  TBili  0.5  /  DBili  x   /  AST  12  /  ALT  7   /  AlkPhos  64  06-04        Blood Gas Profile - Arterial (06.05.19 @ 12:08)    pH, Arterial: 7.43    pCO2, Arterial: 64 mmHg    pO2, Arterial: 89 mmHg    HCO3, Arterial: 39 mmoL/L    Base Excess, Arterial: 15.3 mmol/L    Oxygen Saturation, Arterial: 98 %    FIO2, Arterial: 3    Blood Gas Comments Arterial: 3L nc    Blood Gas Source Arterial: Arterial                    MICROBIOLOGY:    RADIOLOGY & ADDITIONAL STUDIES:  < from: Xray Chest 1 View- PORTABLE-Routine (06.05.19 @ 05:28) >   EXAM:  XR CHEST PORTABLE ROUTINE 1V                          PROCEDURE DATE:  06/05/2019          INTERPRETATION:  CHEST AP PORTABLE:    History: Cough.     Date and time of exam: 6/5/2019 4:31 AM.    Technique: A single AP view of the chest was obtained.    Comparison exam: 6/2/2019 7:10 AM.    Findings:  Right pleural effusion, unchanged. Right basilar atelectasis or   infiltrate, unchanged. The left lung remains clear..    Impression:  Stable exam without significant change since the previous study..                YARI PRADO M.D., ATTENDING RADIOLOGIST  This document has been electronically signed. Jun 5 2019  9:16AM    < end of copied text >  < from: CT Chest No Cont (06.01.19 @ 13:58) >   EXAM:  CT CHEST                          PROCEDURE DATE:  06/01/2019          INTERPRETATION:  Chest CT without contrast .  COMPARISON: 4/25/2019 chest CT scan. And chest radiograph 6/1/2019  CLINICAL INFORMATION: Cough, shortness of breath. Increasing RIGHT lung   base effusion and/or infiltrate.    TECHNIQUE: Contiguous axial 2.5 mm slice thickness images of the chest   were obtained without intravenous contrast administration.  FINDINGS:  There is an occluded RIGHT lower lobe bronchus.  There is a moderate multiloculated RIGHT lung base pleural effusion..   There is complete consolidation/atelectasis of the RIGHT lower lobe.   There is diffuse centrilobular emphysema.   RIGHT posterior lung segments show reticulonodular opacities.  LEFTlung parenchyma is clear.  There are no mediastinal lymphadenopathy or masses.    The mediastinum great vessels are normal.     There is mild cardiomegaly.. There is no pericardial effusion. Cardiac   device wire leads are within right atrium and right ventricle.       Visualized upper abdominal viscera unremarkable.    The bones are normal.     IMPRESSION:    Multiloculated RIGHT lung base pleural effusions. Complete consolidation   atelectasis RIGHT lower lobe with a reticular opacities of posterior   segment RIGHT upper lobe. LEFT lung clear. Centrilobular emphysema.  Occluded RIGHT lower lobe bronchus..                 BLAINE GANN M.D., ATTENDING RADIOLOGIST  This document has been electronically signed. Jun 1 2019  2:26PM    < end of copied text >  < from: US Duplex Venous Lower Ext Complete, Bilateral (06.02.19 @ 08:45) >     EXAM:  US DPLX LWR EXT VEINS COMPL BI                          PROCEDURE DATE:  06/02/2019          INTERPRETATION:  CLINICAL INFORMATION: Lower extremity swelling,   shortness of breath, leg swelling, moderate    COMPARISON: None available.    TECHNIQUE: Duplex sonography of the BILATERAL LOWER extremities with   color and spectral Doppler, with and without compression.      FINDINGS:    There is normal compressibility of the bilateral common femoral, femoral   and popliteal veins.     Doppler examination shows normal spontaneous and phasic flow.    No calf vein thrombosis is detected.    IMPRESSION:     No evidence of bilateral lower extremity deep venous thrombosis.      < from: Xray Chest 1 View- PORTABLE-Routine (06.05.19 @ 05:28) >     EXAM:  XR CHEST PORTABLE ROUTINE 1V                          PROCEDURE DATE:  06/05/2019          INTERPRETATION:  CHEST AP PORTABLE:    History: Cough.     Date and time of exam: 6/5/2019 4:31 AM.    Technique: A single AP view of the chest was obtained.    Comparison exam: 6/2/2019 7:10 AM.    Findings:  Right pleural effusion, unchanged. Right basilar atelectasis or   infiltrate, unchanged. The left lung remains clear..    Impression:  Stable exam without significant change since the previous study..                YARI PRADO M.D., ATTENDING RADIOLOGIST  This document has been electronically signed. Jun 5 2019  9:16AM    < end of copied text >                SURY CATES M.D., ATTENDING RADIOLOGIST  This document has been electronically signed. Jun 2 2019  8:48AM    < end of copied text >  < from: TTE Echo Limited or F/U (06.04.19 @ 09:53) >  Summary:   1. Left ventricular ejection fraction, by visual estimation, is 40 to   45%.   2. Technically difficult study.   3. Moderately decreased global left ventricular systolic function.   4. Basal and mid inferior wall and mid inferolateral segment are   abnormal as described above.   5. Normal left ventricular internal cavity size.   6. A prior echocardiogram performed on 4/21/2019 is available for   comparison purposes.q   7. Endocardial visualization was enhanced with intravenous echo contrast.   8. Previous echocardiogram shows similar LVEF    MD Saji Electronically signed on 6/4/2019 at 12:00:38 PM     ALTA ALFARO   This document has been electronically signed. Jun 4 2019  9:53AM    < end of copied text >

## 2019-06-05 NOTE — PROGRESS NOTE ADULT - ASSESSMENT
Patient is a 79 y/o M with a PMHx of COPD (on 3L home O2), HFrEF (EF 45-50%), CAD (non-obstructive on cath 04/19), Moderate AS, , HTN, HLD, and lumbar stenosis who was BIBA for worsening SOB.  and increasing leg edema.  Trop negative and ekg without ischemic changes. On review of CT, patient appears to have mild loculated effusion with right bronchial occlusion.    Patient is a 78-year-old  male with a past medical history of hypertension, former smoker, COPD with chronic dyspnea , has PPM for Sick Sinus syndrome. Patient underwent Cardic cath on april 26 th revealed normal coronaries with EF of 30% and mod. AS. Patient was recently admitted to the hospital for a right pleural effusion which was infected patient had a decortication done,loculated pleural effusion s/p pigtail and Right VATS Decortication (04/30/19). Patient was seen by me in the office on May 31, 2019, patient was advised to have a bilateral venous duplex and he was started on Demadex, patient states that symptoms got worse he decided to come to the ER.  bilateral venous duplex negative for DVT    Assessment:  1. Acute on Chronic CHF- Echo LVEF 40-45%  2. Moderate AS  3. Pulmonary- right sided pleural effusion  4. COPD on home O2 and other problems as noted  5. Bilateral leg edema    Recommendations:    1. Continue losartan and Spironolactone  2. Not a Candidate for Beta blocker therapy for systolic CHF due to severe COPD  3. Agree with Demadex, monitor BMP    I will sign off

## 2019-06-05 NOTE — DIETITIAN INITIAL EVALUATION ADULT. - SOURCE
Patient's chart reviewed, please contact to schedule OA colonoscopy with .Patient Preference    Screening Criteria  Age: 50 year old Patient meets age criteria.  PCP:  Lobito Rivera MD  Personal H/O Colon CA or Polyps: Patient does NOT have a personal history of colon polyps or colon cancer  Family H/O Colon CA: No family history of colon cancer.  GI Symptoms: No  Most recent colon procedure No prior Flexi or Colonoscopy  Concerns for taking prep at home(mobility, etc): No    ALLERGIES:   Allergen Reactions   • Pollen Cough       Medications:      Summary of your Discharge Medications           Accurate as of 4/16/19 12:00 PM. Always use your most recent med list.               Take these Medications      Details   * albuterol (2.5 MG/3ML) 0.083% nebulizer solution  Commonly known as:  VENTOLIN   Take 2.5 mg by nebulization every 6 hours as needed for Wheezing.     * PROAIR  (90 Base) MCG/ACT inhaler   Generic drug:  albuterol  Inhale 108 mcg into the lungs as needed.     buPROPion 150 MG 24 hr tablet  Commonly known as:  WELLBUTRIN XL   Take 150 mg by mouth.     cyclobenzaprine 10 MG tablet  Commonly known as:  FLEXERIL   Take 1 tablet by mouth 3 times daily.     gabapentin 300 MG capsule  Commonly known as:  NEURONTIN   Take 300 mg by mouth.     losartan 25 MG tablet  Commonly known as:  COZAAR   Take 25 mg by mouth.     metformin 500 MG 24 hr tablet  Commonly known as:  GLUCOPHAGE-XR   Take 500 mg by mouth.     mometasone-formoterol 100-5 MCG/ACT inhaler  Commonly known as:  DULERA   Inhale 2 puffs into the lungs.     omeprazole 40 MG capsule  Commonly known as:  PRILOSEC   TAKE 1 CAPSULE(40 MG) BY MOUTH DAILY     ONE TOUCH ULTRA TEST test strip   Generic drug:  blood glucose  USE ONCE DAILY     ONETOUCH DELICA LANCETS FINE Misc   USE ONCE DAILY     oxyCODONE HCl 10 MG immediate release tablet   Take 1 tablet by mouth 3 times daily as needed for pain     Vitamin D (Ergocalciferol) 79936 units capsule    Interview limited as pt states "I'm fine," and appeared uninterested in answering questions at this time./patient Take 1 capsule by mouth 1 day a week.         * This list has 2 medication(s) that are the same as other medications prescribed for you. Read the directions carefully, and ask your doctor or other care provider to review them with you.                Anticoagulation (examples - coumadin, heparin, lovenox, xarelto, pradaxa): No  Platelet Modifying (examples - Plavix, aspirin, nsaids, Aggrenox) : No  Concerns for sedation. On Chronic long acting narcotics, Methadone, Suboxone, antianxiety like xanax, klonazepam: Yes  Review of other medications indicate - chronic long-term narcotic use, oral dm meds, nebulizer, inhaler  BMI: Estimated body mass index is 55.78 kg/m² as calculated from the following:    Height as of 4/11/19: 5' 4\" (1.626 m).    Weight as of 4/11/19: 147.4 kg.:more than 45 Yes  Is the patient over 300 lbs Weight:   Wt Readings from Last 1 Encounters:   04/11/19 (!) 147.4 kg   :  Yes  On Oxygen:  No    Past Medical History:  Past Medical History:   Diagnosis Date   • Carpal tunnel syndrome    • COPD (chronic obstructive pulmonary disease) (CMS/[facility])    • DJD (degenerative joint disease)    • Gout September 2015   • Lumbar back pain    • Osteoarthritis    • RAD (reactive airway disease)    • Sleep apnea        Past Surgical History:  Past Surgical History:   Procedure Laterality Date   • Eye surgery Bilateral 2016    lasix   • Past surgical history  10-    eye surgery cataract         Other Concerns: BMI is 55.78    Prior Labs: If abnormal creatinine/electrolytes, then will need Nulytely prep  Creatinine   Date Value Ref Range Status   04/11/2019 0.92 0.51 - 0.95 mg/dL Final     BUN   Date Value Ref Range Status   04/11/2019 13 6 - 20 mg/dL Final     Sodium   Date Value Ref Range Status   04/11/2019 140 135 - 145 mmol/L Final     Potassium   Date Value Ref Range Status   04/11/2019 4.4 3.4 - 5.1 mmol/L Final     Chloride   Date Value Ref Range Status   04/11/2019 107 98 - 107 mmol/L Final     Carbon  Dioxide   Date Value Ref Range Status   04/11/2019 28 21 - 32 mmol/L Final     Glucose   Date Value Ref Range Status   04/11/2019 102 (H) 65 - 99 mg/dL Final     CALCIUM   Date Value Ref Range Status   04/11/2019 9.2 8.4 - 10.2 mg/dL Final        SCHEDULING:  OK to schedule open access colonoscopy, if no then will need office visit: Yes  Physician Scheduled with: Patient Preference  Diagnosis code from O/A order:  Colon Cancer Screening Z12.11  Location: Hospital  Sedation: MAC  BMI is 55.78  Prep: Physician Preference

## 2019-06-05 NOTE — PROGRESS NOTE ADULT - SUBJECTIVE AND OBJECTIVE BOX
Subjective:  Pt in chair NAD.  Pt is comfortable.  No issues overnight     T(C): 36.9 (06-05-19 @ 10:35), Max: 36.9 (06-05-19 @ 10:35)  HR: 71 (06-05-19 @ 14:50) (71 - 96)  BP: 134/78 (06-05-19 @ 10:35) (118/68 - 134/78)    RR: 19 (06-05-19 @ 10:35) (18 - 19)  SpO2: 94% (06-05-19 @ 14:50) (93% - 94%) 2-3 L NC  Tele: SR           06-04    142  |  92<L>  |  32.0<H>  ----------------------------<  120<H>  4.0   |  41.0<H>  |  0.96    Ca    9.4      04 Jun 2019 13:19  Mg     2.0     06-04    TPro  7.4  /  Alb  3.0<L>  /  TBili  0.5  /  DBili  x   /  AST  12  /  ALT  7   /  AlkPhos  64  06-04                               10.9   10.8  )-----------( 218      ( 04 Jun 2019 06:27 )             36.5                 CAPILLARY BLOOD GLUCOSE               CXR: < from: Xray Chest 1 View- PORTABLE-Routine (06.05.19 @ 05:28) >  Right pleural effusion, unchanged. Right basilar atelectasis or   infiltrate, unchanged. The left lung remains clear..    Impression:  Stable exam without significant change since the previous study..    < end of copied text >           Assessment  Neuro:  Alert Awake NAD  Pulm:  decreased at Rt base , mild crackles noted   CV: RRR S1 S2   Abd:  soft NT ND + BS  EXT:  1-2 + edema + DP b/l         Assessment:  78yMale    with PAST MEDICAL & SURGICAL HISTORY:  CHF (congestive heart failure)  CAD (coronary artery disease)  HLD (hyperlipidemia)  HTN (hypertension)  COPD (chronic obstructive pulmonary disease)  Stenosis of lumbosacral spine  H/O back injury  Artificial pacemaker

## 2019-06-05 NOTE — PROGRESS NOTE ADULT - ASSESSMENT
This is 77 YO Man with a PMHx of COPD (on 3L home O2), CHF (EF 45-50%), CAD (non-obstructive on cath 04/19), Moderate AS, loculated pleural effusion s/p pigtail and Right VATS Decortication (04/30/19), HTN, HLD, came to ED due to worsening SOB and LE edema. Patient report SOB for the last 2 weeks, getting worse, initially it was associated with exertion but for the last few days report SOB at rest.  Patient was seen in the cardiology office yesterday and started on Demadex 20mg PO daily, but took his first dose this morning. Patient states that he was unable to sleep due to the SOB and orthopnea, and symptoms were so severe this morning that he came to the ED to be evaluated. Also pt report worsening swelling of LE for the last week, associated with discomfort. In ED pt got lasix 40 IV, urinating, feeling better.  Patient being managed medically for pleural effusion, and was given one dose of diamox today for elevated bicarb with improvement     A/P  >Acute on chronic systolic chf - TTE with moderately decreased LVSF, EF 40-45%.  - Continue PO Demadex, Aldactone  - Continue Coreg, Losartan        > elevated bicarb - s.p 1 dose of diamox  --> demadex    >COPD on home oxygen - not in exacerbation  c/w nebs, oxygen, incentive spirometry     >CAD - non obstructive CAD - s/p cath 04/19  c/w aspirin, statin, beta blocker      >Right loculated effusion- s/p VATS 04/30/2019, cytology negative  Ct chest noted - effusion and consolidation  CT surgery input - mild loculated effusion: no intervention needed at this time  Continue diuresis  Needs aggressive chest PT, incentive spirometry to clear lungs of secretions.  incentive spirometry   procalcitonin 0.20 - monitor off abx    >Moderate AS- Cardio f/u on d/c.     >Hx HTN /HLD- Continue Losartan 25mg, Atorvastatin 40mg , coreg     >DVT ppx- SCD, Lovenox   may need bishnu on dc    May discontinue telemonitor, continue

## 2019-06-05 NOTE — DIETITIAN INITIAL EVALUATION ADULT. - OTHER INFO
Pt admitted with CHF and pleural effusions.  Pt reports good po intake at meals and prior to admission.  Pt denies wt loss.  Unable to obtain further information at this time.  Heart failure nutrition guidelines left at bedside.  RD to remain available.

## 2019-06-05 NOTE — PROGRESS NOTE ADULT - SUBJECTIVE AND OBJECTIVE BOX
[place] [facility]                                                        Office: [address] [address]                                                       Telephone: [phone]. Fax:[phone]                                                                             PROGRESS NOTE    Subjective:  Patient is feeling fine, no complaints     Review of symptoms:   Cardiac:  No chest pain. No dyspnea. No palpitations.  Respiratory:no cough. No dyspnea  Gastrointestinal: No diarrhea. No abdominal pain. No bleeding.   Neuro: No focal neuro complaints.      	  Vital Signs Last 24 Hrs  T(C): 36.8 (05 Jun 2019 15:26), Max: 36.9 (05 Jun 2019 10:35)  T(F): 98.2 (05 Jun 2019 15:26), Max: 98.5 (05 Jun 2019 10:35)  HR: 96 (05 Jun 2019 15:26) (71 - 96)  BP: 118/78 (05 Jun 2019 15:26) (118/68 - 134/78)  BP(mean): --  RR: 18 (05 Jun 2019 15:26) (18 - 19)  SpO2: 95% (05 Jun 2019 16:16) (93% - 96%)    Weight (kg): 115.7 (06-01 @ 11:52)    PHYSICAL EXAM:  Appearance: Comfortable. No acute distress  HEENT:  Head and neck: Atraumatic. Normocephalic. , Neck is supple. No JVD,   Neurologic: Alert and awake, Grossly nonfocal.   Lymphatic: No cervical lymphadenopathy  Cardiovascular: Normal S1 S2, No murmurs. No JVD,   Respiratory: Diminished breath sounds on right lung base  Gastrointestinal:  Soft, Non-tender, + BS  Lower Extremities:  trace pitting edema  Psychiatry: Patient is calm. No agitation.  Skin: No rashes.    CURRENT MEDICATIONS:    MEDICATIONS  (STANDING):  ALBUTerol/ipratropium for Nebulization 3 milliLiter(s) Nebulizer every 6 hours  aspirin enteric coated 81 milliGRAM(s) Oral daily  atorvastatin 40 milliGRAM(s) Oral at bedtime  docusate sodium 100 milliGRAM(s) Oral three times a day  enoxaparin Injectable 40 milliGRAM(s) SubCutaneous every 24 hours  losartan 50 milliGRAM(s) Oral daily  montelukast 10 milliGRAM(s) Oral daily  pantoprazole    Tablet 40 milliGRAM(s) Oral before breakfast  predniSONE   Tablet 10 milliGRAM(s) Oral daily  senna 2 Tablet(s) Oral at bedtime  spironolactone 25 milliGRAM(s) Oral daily                            10.9   10.8  )-----------( 218      ( 04 Jun 2019 06:27 )             36.5   N=x    ; L=x        04 Jun 2019 13:19    142    |  92     |  32.0   ----------------------------<  120    4.0     |  41.0   |  0.96     Ca    9.4        04 Jun 2019 13:19  Mg     2.0       04 Jun 2019 06:27    TPro  7.4    /  Alb  3.0    /  TBili  0.5    /  DBili  x      /  AST  12     /  ALT  7      /  AlkPhos  64     04 Jun 2019 06:27      Hepatic panel: 04 Jun 2019 06:27  7.4   | 3.0                            0.5   | 0.5  /x                              12    | 7                                 /64   \par                                 ECG:    < from: TTE Echo Limited or F/U (06.04.19 @ 09:53) >  Summary:   1. Left ventricular ejection fraction, by visual estimation, is 40 to   45%.   2. Technically difficult study.   3. Moderately decreased global left ventricular systolic function.   4. Basal and mid inferior wall and mid inferolateral segment are   abnormal as described above.   5. Normal left ventricular internal cavity size.   6. A prior echocardiogram performed on 4/21/2019 is available for   comparison purposes.q   7. Endocardial visualization was enhanced with intravenous echo contrast.   8. Previous echocardiogram shows similar LVEF    MD Saji Electronically signed on 6/4/2019 at 12:00:38 PM              < end of copied text >

## 2019-06-06 NOTE — PROGRESS NOTE ADULT - SUBJECTIVE AND OBJECTIVE BOX
Subjective:  "I think I'm not making progress. They say I'm dehydrated now."    T(C): 36.6 (06-06-19 @ 16:09), Max: 36.9 (06-06-19 @ 10:38)  HR: 74 (06-06-19 @ 16:09) (64 - 727)  BP: 103/62 (06-06-19 @ 16:09) (98/62 - 142/70)  RR: 18 (06-06-19 @ 16:09) (18 - 22)  SpO2: 96% (06-06-19 @ 16:09) (92% - 99%)    I&O's Detail    05 Jun 2019 07:01  -  06 Jun 2019 07:00  --------------------------------------------------------  IN:    Oral Fluid: 480 mL  Total IN: 480 mL    OUT:    Voided: 1650 mL  Total OUT: 1650 mL    Total NET: -1170 mL      06 Jun 2019 07:01  -  06 Jun 2019 16:56  --------------------------------------------------------  IN:    Oral Fluid: 360 mL  Total IN: 360 mL    OUT:  Total OUT: 0 mL    Total NET: 360 mL          Patient's  laboratory results and current medications reviewed.    Physical Exam:  Gen: WN/WD NAD  Neuro: AAOx3, nonfocal  Pulm: diminished bs throughout, worse on right base  CV: RRR  Ext: 2-3+ pitting edema in LE bilat improved  chest tube sites x 2 well healed.    Today's CXR:    < from: Xray Chest 1 View- PORTABLE-Routine (06.05.19 @ 05:28) >    Findings:  Right pleural effusion, unchanged. Right basilar atelectasis or   infiltrate, unchanged. The left lung remains clear..    Impression:  Stable exam without significant change since the previous study..    < end of copied text >

## 2019-06-06 NOTE — PROGRESS NOTE ADULT - SUBJECTIVE AND OBJECTIVE BOX
HOSPITALIST PROGRESS NOTE    JOHN CIFUENTES  [id number]  78yMale    Patient is a 78y old  Male who presents with a chief complaint of SOB (05 Jun 2019 16:38)      SUBJECTIVE:   Chart reviewed since last visit.  Patient seen and examined at bedside.      OBJECTIVE:  Vital Signs Last 24 Hrs  T(C): 36.6 (06 Jun 2019 16:09), Max: 36.9 (06 Jun 2019 10:38)  T(F): 97.9 (06 Jun 2019 16:09), Max: 98.4 (06 Jun 2019 10:38)  HR: 68 (06 Jun 2019 17:41) (64 - 727)  BP: 145/72 (06 Jun 2019 17:41) (98/62 - 145/72)  BP(mean): --  RR: 18 (06 Jun 2019 16:09) (18 - 22)  SpO2: 96% (06 Jun 2019 16:09) (92% - 99%)    PHYSICAL EXAMINATION  General:   HEENT:    NECK:    CVS:   RESP:    GI:    :   MSK:    CNS:    INTEG:    PSYCH:      MONITOR:  CAPILLARY BLOOD GLUCOSE            I&O's Summary    05 Jun 2019 07:01  -  06 Jun 2019 07:00  --------------------------------------------------------  IN: 480 mL / OUT: 1650 mL / NET: -1170 mL    06 Jun 2019 07:01  -  06 Jun 2019 19:27  --------------------------------------------------------  IN: 360 mL / OUT: 500 mL / NET: -140 mL          06-06    146<H>  |  96<L>  |  36.0<H>  ----------------------------<  102  3.5   |  41.0<H>  |  1.13    Ca    9.5      06 Jun 2019 05:53              Culture:    TTE:    RADIOLOGY        MEDICATIONS  (STANDING):  ALBUTerol/ipratropium for Nebulization 3 milliLiter(s) Nebulizer every 6 hours  aspirin enteric coated 81 milliGRAM(s) Oral daily  atorvastatin 40 milliGRAM(s) Oral at bedtime  carvedilol 3.125 milliGRAM(s) Oral every 12 hours  docusate sodium 100 milliGRAM(s) Oral three times a day  enoxaparin Injectable 40 milliGRAM(s) SubCutaneous every 24 hours  losartan 50 milliGRAM(s) Oral daily  montelukast 10 milliGRAM(s) Oral daily  pantoprazole    Tablet 40 milliGRAM(s) Oral before breakfast  predniSONE   Tablet 10 milliGRAM(s) Oral daily  senna 2 Tablet(s) Oral at bedtime  spironolactone 25 milliGRAM(s) Oral daily  torsemide 20 milliGRAM(s) Oral daily      MEDICATIONS  (PRN):  acetaminophen   Tablet .. 650 milliGRAM(s) Oral every 6 hours PRN Temp greater or equal to 38C (100.4F), Mild Pain (1 - 3)  ondansetron Injectable 4 milliGRAM(s) IV Push every 6 hours PRN Nausea and/or Vomiting HOSPITALIST PROGRESS NOTE    JOHN CIFUENTES  [id number]  78yMale    Patient is a 78y old  Male who presents with a chief complaint of SOB (05 Jun 2019 16:38)      SUBJECTIVE:   Chart reviewed since last visit.  Patient seen and examined at bedside earlier in morning today for CHF, COPD, CAD  Dyspnea improved - none at rest.  Occasional cough - unable to expectorate.  Denies any chest pain, palpitations and dizziness      OBJECTIVE:  Vital Signs Last 24 Hrs  T(C): 36.6 (06 Jun 2019 16:09), Max: 36.9 (06 Jun 2019 10:38)  T(F): 97.9 (06 Jun 2019 16:09), Max: 98.4 (06 Jun 2019 10:38)  HR: 68 (06 Jun 2019 17:41) (64 - 727)  BP: 145/72 (06 Jun 2019 17:41) (98/62 - 145/72)   RR: 18 (06 Jun 2019 16:09) (18 - 22)  SpO2: 96% (06 Jun 2019 16:09) (92% - 99%)    PHYSICAL EXAMINATION  General: Elderly male sitting up in chair - NAD  HEENT:  AT NC pupils equal, responsive, reactive to light and accomodation EOMI  NECK:  Supple  CVS: regular rate and rhythm S1 S2  RESP:  Decreased breath sounds RLL>LLL  GI:  Soft nontender BS+  : No suprapubic or CVA tenderness  MSK:  Edema bilateral lower extremity - appears chronic - with skin tenting currently  CNS:  No gross focal or global deficit noted  INTEG:  Warm dry skin  PSYCH:  Fair mood    MONITOR:  CAPILLARY BLOOD GLUCOSE            I&O's Summary    05 Jun 2019 07:01  -  06 Jun 2019 07:00  --------------------------------------------------------  IN: 480 mL / OUT: 1650 mL / NET: -1170 mL    06 Jun 2019 07:01  -  06 Jun 2019 19:27  --------------------------------------------------------  IN: 360 mL / OUT: 500 mL / NET: -140 mL          06-06    146<H>  |  96<L>  |  36.0<H>  ----------------------------<  102  3.5   |  41.0<H>  |  1.13    Ca    9.5      06 Jun 2019 05:53              Culture:    TTE:    RADIOLOGY        MEDICATIONS  (STANDING):  ALBUTerol/ipratropium for Nebulization 3 milliLiter(s) Nebulizer every 6 hours  aspirin enteric coated 81 milliGRAM(s) Oral daily  atorvastatin 40 milliGRAM(s) Oral at bedtime  carvedilol 3.125 milliGRAM(s) Oral every 12 hours  docusate sodium 100 milliGRAM(s) Oral three times a day  enoxaparin Injectable 40 milliGRAM(s) SubCutaneous every 24 hours  losartan 50 milliGRAM(s) Oral daily  montelukast 10 milliGRAM(s) Oral daily  pantoprazole    Tablet 40 milliGRAM(s) Oral before breakfast  predniSONE   Tablet 10 milliGRAM(s) Oral daily  senna 2 Tablet(s) Oral at bedtime  spironolactone 25 milliGRAM(s) Oral daily  torsemide 20 milliGRAM(s) Oral daily      MEDICATIONS  (PRN):  acetaminophen   Tablet .. 650 milliGRAM(s) Oral every 6 hours PRN Temp greater or equal to 38C (100.4F), Mild Pain (1 - 3)  ondansetron Injectable 4 milliGRAM(s) IV Push every 6 hours PRN Nausea and/or Vomiting

## 2019-06-06 NOTE — PROGRESS NOTE ADULT - ASSESSMENT
78 year old male with a PMH of CAD, CHF, COPD on home oxygen (3 L), HTN, HLD with recent total right lung decortication for pleural effusion (fibropurulent exudate noted) on 4/30/19, likely d/t CAP with parapneumonic effusion. On review of CT, patient appears to have mild loculated effusion with right bronchial occlusion. Improved breathing since last seen as per patient. Ultrasound of chest yesterday revealed no significant, tappable pocket of fluid.     Continue care per medical team  No indication for chest tube at this time.   Will sign off for now.  D/W Dr Dominguez  Please re-call if needed.

## 2019-06-06 NOTE — PROGRESS NOTE ADULT - ASSESSMENT
This is 79 YO Man with a PMHx of COPD (on 3L home O2), CHF (EF 45-50%), CAD (non-obstructive on cath 04/19), Moderate AS, loculated pleural effusion s/p pigtail and Right VATS Decortication (04/30/19), HTN, HLD, came to ED due to worsening SOB and LE edema. Patient report SOB for the last 2 weeks, getting worse, initially it was associated with exertion but for the last few days report SOB at rest.  Patient was seen in the cardiology office yesterday and started on Demadex 20mg PO daily, but took his first dose this morning. Patient states that he was unable to sleep due to the SOB and orthopnea, and symptoms were so severe this morning that he came to the ED to be evaluated. Also pt report worsening swelling of LE for the last week, associated with discomfort. In ED pt got lasix 40 IV, urinating, feeling better.  Patient being managed medically for pleural effusion, and was given one dose of diamox today for elevated bicarb with improvement     A/P  >Acute on chronic systolic chf - TTE with moderately decreased LVSF, EF 40-45%.  - Continue PO Demadex, Aldactone  - Continue Coreg, Losartan        > elevated bicarb - s.p 1 dose of diamox  --> demadex    >COPD on home oxygen - not in exacerbation  c/w nebs, oxygen, incentive spirometry     >CAD - non obstructive CAD - s/p cath 04/19  c/w aspirin, statin, beta blocker      >Right loculated effusion- s/p VATS 04/30/2019, cytology negative  Ct chest noted - effusion and consolidation  CT surgery input - mild loculated effusion: no intervention needed at this time  Continue diuresis  Needs aggressive chest PT, incentive spirometry to clear lungs of secretions.  incentive spirometry   procalcitonin 0.20 - monitor off abx    >Moderate AS- Cardio f/u on d/c.     >Hx HTN /HLD- Continue Losartan 25mg, Atorvastatin 40mg , coreg     >DVT ppx- SCD, Lovenox   may need bishnu on dc    May discontinue telemonitor, continue  This is 77 YO Man with a PMHx of COPD (on 3L home O2), CHF (EF 45-50%), CAD (non-obstructive on cath 04/19), Moderate AS, loculated pleural effusion s/p pigtail and Right VATS Decortication (04/30/19), HTN, HLD, came to ED due to worsening SOB and LE edema. Patient report SOB for the last 2 weeks, getting worse, initially it was associated with exertion but for the last few days report SOB at rest.  Patient was seen in the cardiology office yesterday and started on Demadex 20mg PO daily, but took his first dose this morning. Patient states that he was unable to sleep due to the SOB and orthopnea, and symptoms were so severe this morning that he came to the ED to be evaluated. Also pt report worsening swelling of LE for the last week, associated with discomfort. In ED pt got lasix 40 IV, urinating, feeling better.     A/P  >Acute on chronic systolic chf - TTE with moderately decreased LVSF, EF 40-45%. Diuresed extensive with -6445 net balance  - Continue PO Demadex, Aldactone  - Continue Coreg, Losartan  - Monitor SNa, if stable, anticipate discharge to [facility] in am      >COPD on home oxygen - not in exacerbation. Compensated respiratory acidosis present.   - c/w nebs, oxygen, incentive spirometry    - NIV for home already arranged for last admission - awaiting delivery    >CAD - non obstructive CAD - s/p cath 04/19  c/w aspirin, statin, beta blocker      >Right loculated effusion- s/p VATS 04/30/2019, cytology negative  Ct chest noted - effusion and consolidation  CT surgery input - mild loculated effusion: no intervention needed at this time  Continue diuresis  Needs aggressive chest PT, incentive spirometry to clear lungs of secretions.  incentive spirometry   procalcitonin 0.20 - monitor off abx    >Moderate AS- Cardio f/u on d/c.     >Hx HTN /HLD- Continue Losartan 25mg, Atorvastatin 40mg , coreg     >DVT ppx- SCD, Lovenox       Disposition - Likely [facility] in next 24-48 hours

## 2019-06-06 NOTE — PROGRESS NOTE ADULT - PROBLEM SELECTOR PLAN 1
Continue lasix & aldactone.  Daily weights  Strict I/O's  Mild loculated effusion noted on CT  US of Rt Thoracic side.  small effusion noted   As per Dr. Dominguez, no intervention needed at this time.  Continue NT suction, chest PT, & the use of incentive spirometry to clear lungs of secretions.  Continue Duonebs & singular  Cardiology following   Cont Medical Mgmt   Discussed plan with Dr. Dominguez.
Continue lasix & aldactone.  Daily weights  Strict I/O's  Mild loculated effusion noted on CT  As per Dr. Dominguez, no intervention needed at this time.  Continue NT suction, chest PT, & the use of incentive spirometry to clear lungs of secretions.  Continue Duonebs & singular  Cardiology following   Discussed plan with Dr. Dominguez.
Continue lasix & aldactone.  Daily weights  Strict I/O's  Mild loculated effusion noted on CT  As per Dr. Dominguez, no intervention needed at this time.  Continue NT suction, chest PT, & the use of incentive spirometry to clear lungs of secretions.  Continue Duonebs & singular  Cardiology following   Discussed plan with Dr. Dominguez.
Continue lasix & aldactone.  Daily weights  Strict I/O's  Mild loculated effusion noted on CT  US today of Rt Thoracic side.  small effusion noted   As per Dr. Dominguez, no intervention needed at this time.  Continue NT suction, chest PT, & the use of incentive spirometry to clear lungs of secretions.  Continue Duonebs & singular  Cardiology following   Cont Medical Mgmt   Discussed plan with Dr. Dominguez.
optimize b/p  increase arb  c/w lasix 40 bid and supplement K  discussed low na diet with patient  intake and output  Daily weights

## 2019-06-06 NOTE — PROGRESS NOTE ADULT - PROBLEM SELECTOR PROBLEM 1
CHF (congestive heart failure)
CHF (congestive heart failure)
Consolidation lung
CHF (congestive heart failure)

## 2019-06-06 NOTE — PROGRESS NOTE ADULT - PROVIDER SPECIALTY LIST ADULT
Cardiology
Hospitalist
Internal Medicine
Internal Medicine
Thoracic Surgery

## 2019-06-07 NOTE — DISCHARGE NOTE PROVIDER - HOSPITAL COURSE
79 YO Man with a PMHx of COPD (on 3L home O2), CHF (EF 45-50%), CAD (non-obstructive on cath 04/19), Moderate AS, loculated pleural effusion s/p pigtail and Right VATS Decortication (04/30/19), HTN, HLD, came to ED due to worsening SOB and LE edema. Patient report SOB for the last 2 weeks, getting worse, initially it was associated with exertion but for the last few days report SOB at rest.  Patient was seen in the cardiology office yesterday and started on Demadex 20mg PO daily, but took his first dose this morning. Patient states that he was unable to sleep due to the SOB and orthopnea, and symptoms were so severe this morning that he came to the ED to be evaluated. Also pt report worsening swelling of LE for the last week, associated with discomfort. In ED pt got lasix 40 IV, urinating, feeling better.         A/P    1) Acute on chronic systolic chf     - TTE with moderately decreased LVSF, EF 40-45%. s/p Diuresed extensive with -6445 net balance    - Continue PO Demadex, Aldactone    - Continue Coreg, Losartan            2) COPD on home oxygen - not in exacerbation. Compensated respiratory acidosis present.     - c/w nebs, oxygen, incentive spirometry      - NIV for home already arranged for last admission - awaiting delivery        3) CAD - non obstructive CAD - s/p cath 04/19    - c/w aspirin, statin, beta blocker          4) Right loculated effusion- s/p VATS 04/30/2019, cytology negative    - Ct chest noted - effusion and consolidation    - CT surgery input - mild loculated effusion: no intervention needed at this time    - Continue diuresis        5) Moderate AS    - Cardio f/u on d/c as outpt        6)Hx HTN /HLD    - Continue Losartan 25mg, Atorvastatin 40mg , coreg         PHYSICAL EXAM:    Vital Signs Last 24 Hrs    T(C): 36.3 (07 Jun 2019 10:01), Max: 36.6 (06 Jun 2019 16:09)    T(F): 97.4 (07 Jun 2019 10:01), Max: 97.9 (06 Jun 2019 16:09)    HR: 78 (07 Jun 2019 10:48) (49 - 108)    BP: 98/62 (07 Jun 2019 10:01) (98/62 - 145/72)    BP(mean): --    RR: 18 (07 Jun 2019 10:01) (18 - 18)    SpO2: 98% (07 Jun 2019 10:48) (93% - 100%)        General: Elderly male sitting up in chair - NAD    HEENT: PERRLA EOMI    CVS: regular rate and rhythm S1 S2    RESP:  Decreased breath sounds, no wheeze    GI:  Soft nontender BS+     MSK:  Edema bilateral lower extremity - appears chronic - with skin tenting currently    CNS:  No gross focal or global deficit noted, 5/5 motor b/l upper and lower ext, sensory intact         total time spent for discharge 36 minutes

## 2019-06-07 NOTE — DISCHARGE NOTE PROVIDER - NSDCCPCAREPLAN_GEN_ALL_CORE_FT
PRINCIPAL DISCHARGE DIAGNOSIS  Diagnosis: Acute on chronic systolic congestive heart failure  Assessment and Plan of Treatment: now euvolemic, continue meds as prescruebd and folow up with cardiology

## 2019-06-07 NOTE — DISCHARGE NOTE PROVIDER - CARE PROVIDER_API CALL
Liseth Casas)  Cardiology; Cardiovascular Disease; Internal Medicine  [address] [place] [address]  Phone: [phone]  Fax: [phone]  Follow Up Time:

## 2019-06-07 NOTE — DISCHARGE NOTE NURSING/CASE MANAGEMENT/SOCIAL WORK - NSDCDPATPORTLINK_GEN_ALL_CORE
You can access the [facility] Patient Portal, offered by [facility], by registering with the following website: [email or web address]

## 2019-06-10 PROBLEM — I25.10 ATHEROSCLEROTIC HEART DISEASE OF NATIVE CORONARY ARTERY WITHOUT ANGINA PECTORIS: Chronic | Status: ACTIVE | Noted: 2019-01-01

## 2019-06-10 PROBLEM — I50.9 HEART FAILURE, UNSPECIFIED: Chronic | Status: ACTIVE | Noted: 2019-01-01

## 2019-06-10 NOTE — ED ADULT NURSE NOTE - NSIMPLEMENTINTERV_GEN_ALL_ED
Implemented All Fall Risk Interventions:  [place] to call system. Call bell, personal items and telephone within reach. Instruct patient to call for assistance. Room bathroom lighting operational. Non-slip footwear when patient is off stretcher. Physically safe environment: no spills, clutter or unnecessary equipment. Stretcher in lowest position, wheels locked, appropriate side rails in place. Provide visual cue, wrist band, yellow gown, etc. Monitor gait and stability. Monitor for mental status changes and reorient to person, place, and time. Review medications for side effects contributing to fall risk. Reinforce activity limits and safety measures with patient and family.

## 2019-06-10 NOTE — ED ADULT NURSE REASSESSMENT NOTE - NS ED NURSE REASSESS COMMENT FT1
LAte entry ; Pt transported to CT for additional Scans and then to 4 BR , verbal report given to Heidy - receiving nurse, all questions answered, pt tolerating BIPAP settings well. pt care endorsed at thi time

## 2019-06-10 NOTE — ED ADULT NURSE REASSESSMENT NOTE - NS ED NURSE REASSESS COMMENT FT1
pt saturating 88% on 50% non rebreather, MD called to bedside. pt  states he does not want Bipap or to be intubated. pt alert and oriented x4

## 2019-06-10 NOTE — ED ADULT NURSE REASSESSMENT NOTE - NS ED NURSE REASSESS COMMENT FT1
Pt report received from FLOR Spann and then report given to FLOR Perez. Pt on CM/ & BiPAP in NAD sating ~91% on 50% 02.

## 2019-06-10 NOTE — ED ADULT NURSE REASSESSMENT NOTE - NS ED NURSE REASSESS COMMENT FT1
Pt resting comfortably now, tolerating BIPAP settings well, no distress noted , breathing easy and unlabored

## 2019-06-10 NOTE — H&P ADULT - ASSESSMENT
77 y/o male with CHF exacerbation, COPD with respiratory failure, right pleural effusion, sepsis with possible right Pneumonia, CAD,

## 2019-06-10 NOTE — ED PROVIDER NOTE - PROGRESS NOTE DETAILS
will cover pneumonia and treat like sepsis with recent hospital admission darrick b lines on lung ulstrasound will not tolerate any fluids grossly overloaded fluids on exam will diuresis cover for hcap same right plerual effusion recurring on xray. am attending aware of plan of care

## 2019-06-10 NOTE — ED ADULT NURSE NOTE - OBJECTIVE STATEMENT
pt alert and oriented x4 fromross c/o SOB. pt wheezing bilaterally.pt respirations labored. pt placed on 3 L NC saturating 95%. MD at bedside. pt denies pain. pt educated on plan of care, pt able to successfully teach back plan of care to RN, RN will continue to reeducate pt during hospital stay.

## 2019-06-10 NOTE — CONSULT NOTE ADULT - SUBJECTIVE AND OBJECTIVE BOX
HPI:  79 y/o male with h/o CHF, Pacemaker, CAD, COPD stage 4, on home oxygen, s/p total right lung decortication, multiloculated right empyema with Peptostreptococcus Asaccharolyticus, who was discharged from [facility] to [facility] 4 days ago, was referred to the ER because of SOB. in the ER, patient in respiratory failure and CHF, required Bi pap. Temp: 100. WBC: 16 00 with Neutro: 87. Lactate: 1.3.PCO2: 64. PO2: 89.   Echo on 6/4/19: EF: 40%. (10 Jaspal 2019 09:46) Placed on Bipap> admitted 4 Dereje      PAST MEDICAL & SURGICAL HISTORY:  CHF (congestive heart failure)  CAD (coronary artery disease)  HLD (hyperlipidemia)  HTN (hypertension)  COPD (chronic obstructive pulmonary disease)  Stenosis of lumbosacral spine  H/O back injury  Artificial pacemaker      REVIEW OF SYSTEMS  **On Bipap, unable to answer questions D/T SOB, ^  WOB        MEDICATIONS  (STANDING):  ALBUTerol/ipratropium for Nebulization 3 milliLiter(s) Nebulizer every 6 hours  aspirin enteric coated 81 milliGRAM(s) Oral daily  atorvastatin 20 milliGRAM(s) Oral at bedtime  carvedilol 3.125 milliGRAM(s) Oral every 12 hours  enoxaparin Injectable 40 milliGRAM(s) SubCutaneous daily  furosemide   Injectable 20 milliGRAM(s) IV Push two times a day  losartan 25 milliGRAM(s) Oral daily  montelukast 10 milliGRAM(s) Oral daily  pantoprazole    Tablet 40 milliGRAM(s) Oral before breakfast  piperacillin/tazobactam IVPB. 3.375 Gram(s) IV Intermittent every 8 hours  predniSONE   Tablet 10 milliGRAM(s) Oral daily  sertraline 25 milliGRAM(s) Oral daily  spironolactone 25 milliGRAM(s) Oral daily  vancomycin  IVPB 1000 milliGRAM(s) IV Intermittent every 12 hours    MEDICATIONS  (PRN):  acetaminophen   Tablet .. 650 milliGRAM(s) Oral every 6 hours PRN Temp greater or equal to 38C (100.4F)  docusate sodium 100 milliGRAM(s) Oral three times a day PRN Constipation  senna 2 Tablet(s) Oral at bedtime PRN Constipation      Allergies    No Known Allergies    Intolerances      Vital Signs Last 24 Hrs  T(C): 37.8 (10 Jaspal 2019 05:46), Max: 37.8 (10 Jaspal 2019 05:46)  T(F): 100 (10 Jaspal 2019 05:46), Max: 100 (10 Jaspal 2019 05:46)  HR: 101 (10 Jaspal 2019 11:24) (85 - 107)  BP: 127/87 (10 Jaspal 2019 08:04) (125/63 - 130/72)  BP(mean): --  RR: 26 (10 Jaspal 2019 08:04) (22 - 26)  SpO2: 93% (10 Jaspal 2019 11:24) (84% - 100%)    General: WN/WD NAD  Neurology: Awake, following commands  Neck: Neck supple, trachea midline, No JVD,   Respiratory: Bipap in use, tachypnea, markedly diminished on right side  + rhonci  CV: RRR, S1S2, no murmurs, rubs or gallops  Abdominal: Soft, NT, ND +BS,   Extremities: 1+  edema B/L   lower extrem, multiple areas ecchymosis thin tissue w/ skin tears noted upper extrem  + peripheral pulses    LABS:                        11.1   16.5  )-----------( 240      ( 10 Jaspal 2019 06:11 )             37.1     06-10    141  |  92<L>  |  29.0<H>  ----------------------------<  120<H>  4.1   |  38.0<H>  |  0.83    Ca    9.1      10 Jaspal 2019 06:11  Mg     2.0     06-10    TPro  8.2  /  Alb  2.9<L>  /  TBili  0.4  /  DBili  x   /  AST  13  /  ALT  8   /  AlkPhos  70  06-10    PT/INR - ( 10 Jaspal 2019 06:11 )   PT: 13.8 sec;   INR: 1.19 ratio         PTT - ( 10 Jaspal 2019 06:11 )  PTT:32.5 sec      RADIOLOGY & ADDITIONAL STUDIES:  CT: < from: CT Chest No Cont (06.10.19 @ 11:05) >  Lungs, Airways and Pleura: Extensive mucous plugging of the distal right   bronchus intermedius extending into the right middle lobe and right lower   lobe segmental bronchi. Extensive consolidative changes in the right   lower lobe with associated mild pleural effusion may represent   atelectasis and or pneumonia. Additional subsegmental consolidative   changes in the right middle lobe and posterior right upper lobe.   Calcified posterior left pleural plaque. Centrilobular emphysema.   Biapical pleural parenchymal scarring. Minimal patchy nodularity in the   left lower lobe may represent a multifocal component. Mild volume loss in   the right.    Heart and Great Vessels: Atherosclerotic changes of the aorta and   coronary vasculature. Left-sided pacemaker with leads in place. Heart is   mildly prominent. No pericardial effusions.    Mediastinum and Katelyn: Subcentimeter mediastinal lymph nodes.    Neck and Chest Wall: Fluid and/or edema within the right inferior lateral   chest wall.    Bones: Degenerative changes and osteopenia.    Upper Abdomen:  Bilateral renal atrophy and renal cortical scarring.   Exophytic right upper pole renal cyst.    < end of copied text >    ASSESSMENT:   78yMalePAST MEDICAL & SURGICAL HISTORY:  CHF (congestive heart failure)  CAD (coronary artery disease)  HLD (hyperlipidemia)  HTN (hypertension)  COPD (chronic obstructive pulmonary disease)  Stenosis of lumbosacral spine  H/O back injury  Artificial pacemaker  HEALTH ISSUES - PROBLEM Dx:  Pleural effusion on right: Pleural effusion on right  Coronary artery disease involving coronary bypass graft of native heart without angina pectoris: Coronary artery disease involving coronary bypass graft of native heart without angina pectoris  Chronic obstructive pulmonary disease, unspecified COPD type: Chronic obstructive pulmonary disease, unspecified COPD type  Acute on chronic systolic congestive heart failure: Acute on chronic systolic congestive heart failure      HEALTH ISSUES - R/O PROBLEM Dx:  Pneumonia of right lower lobe due to infectious organism: R/O Pneumonia of right lower lobe due to infectious organism      PLAN:    Supplemental O2 w/ Bipap  CT images will be reviewed by Dr Dominguez  May require drainage R lung vs bronch

## 2019-06-10 NOTE — ED ADULT TRIAGE NOTE - CHIEF COMPLAINT QUOTE
c/o of SOB recent admission, presents with auditory wheeze on NRB applied by EMS, wet non-productive cough, BLE noted MD Leon at bedside

## 2019-06-10 NOTE — CONSULT NOTE ADULT - SUBJECTIVE AND OBJECTIVE BOX
[facility]  INFECTIOUS DISEASES AND INTERNAL MEDICINE at [place]  =======================================================  Onesimo Arauz MD  Diplomates American Board of Internal Medicine and Infectious Diseases  Tel: [phone]      Fax: [phone]  =======================================================      MRN-[id number]  JOHN CIFUENTES     History obtained from the chart and wife at bedside.  Patient unable to answer questions    CC: Patient is a 78y old  Male who presents with a chief complaint of SOB (10 Jaspal 2019 11:46)    77y/o  Male with h/o CHF, Pacemaker, CAD, COPD stage 4, on home oxygen, s/p total right lung decortication, multiloculated right empyema with Peptostreptococcus Asaccharolyticus s/p Zosyn. Patient was recently discharged from [facility] to [facility]. Sent back to the ER on 6/10 due to worsening SOB. In the ER he was noted to be in respiratory distress and was placed on BIPAP. Afebrile and leukocytosis to 16k. Started on IV Vancomycin and Zosyn. ID input requested.       Past Medical & Surgical Hx:  CHF (congestive heart failure)  CAD (coronary artery disease)  HLD (hyperlipidemia)  HTN (hypertension)  COPD (chronic obstructive pulmonary disease)  Stenosis of lumbosacral spine  H/O back injury  Artificial pacemaker      Social Hx:  Former smoker      FAMILY HISTORY:  Unable to obtain. Patient unable to answer questions      Allergies  No Known Allergies      Antibiotics:  piperacillin/tazobactam IVPB. 3.375 Gram(s) IV Intermittent every 8 hours  vancomycin  IVPB 1250 milliGRAM(s) IV Intermittent every 12 hours       REVIEW OF SYSTEMS:  Unable to obtain due to medical condition       Physical Exam:  Vital Signs Last 24 Hrs  T(C): 37.4 (10 Jaspal 2019 13:35), Max: 37.8 (10 Jaspal 2019 05:46)  T(F): 99.4 (10 Jaspal 2019 13:35), Max: 100 (10 Jaspal 2019 05:46)  HR: 98 (10 Jaspal 2019 13:00) (85 - 107)  BP: 138/96 (10 Jaspal 2019 13:00) (125/63 - 138/96)  RR: 23 (10 Jaspal 2019 13:00) (21 - 26)  SpO2: 95% (10 Jaspal 2019 13:00) (84% - 100%)  Height (cm): 185.42 (06-10 @ 11:24)  Weight (kg): 107.8 (06-10 @ 11:24)  BMI (kg/m2): 31.4 (06-10 @ 11:24)  BSA (m2): 2.32 (06-10 @ 11:24)      GEN: respiratory distress  HEENT: normocephalic and atraumatic. EOMI. PERRL.  Anicteric. + BIPAP  NECK: Supple.   LUNGS: Coarse BS B/L  HEART: Regular rate and rhythm   ABDOMEN: Soft, nontender, and nondistended.  Positive bowel sounds.    : No CVA tenderness  EXTREMITIES: Without any edema.  MSK: No joint swelling  NEUROLOGIC: Awake, moves extremities   PSYCHIATRIC:  unable to assess  SKIN: No Rash      Labs:  06-10    141  |  92<L>  |  29.0<H>  ----------------------------<  120<H>  4.1   |  38.0<H>  |  0.83    Ca    9.1      10 Jaspal 2019 06:11  Mg     2.0     06-10    TPro  8.2  /  Alb  2.9<L>  /  TBili  0.4  /  DBili  x   /  AST  13  /  ALT  8   /  AlkPhos  70  06-10                          11.1   16.5  )-----------( 240      ( 10 Jaspal 2019 06:11 )             37.1       PT/INR - ( 10 Jaspal 2019 06:11 )   PT: 13.8 sec;   INR: 1.19 ratio         PTT - ( 10 Jaspal 2019 06:11 )  PTT:32.5 sec    LIVER FUNCTIONS - ( 10 Jaspal 2019 06:11 )  Alb: 2.9 g/dL / Pro: 8.2 g/dL / ALK PHOS: 70 U/L / ALT: 8 U/L / AST: 13 U/L / GGT: x           CARDIAC MARKERS ( 10 Jaspal 2019 06:11 )  x     / 0.05 ng/mL / 35 U/L / x     / x              EXAM:  CT CHEST                        PROCEDURE DATE:  06/10/2019    INTERPRETATION:  Exam Date: 6/10/2019 11:05 AM  Clinical Information: Shortness of breath  Technique:  CT of the chest was performed with axial images obtained from   the thoracic to the inlet to the bilateral adrenal glands without IV   contrast. Maximum intensity projection images were obtained.  Comparison:  6/1/2019, 4/25/2019, 8/20/2015  FINDINGS:  Lungs, Airways and Pleura: Extensive mucous plugging of the distal right   bronchus intermedius extending into the right middle lobe and right lower   lobe segmental bronchi. Extensive consolidative changes in the right   lower lobe with associated mild pleural effusion may represent   atelectasis and or pneumonia. Additional subsegmental consolidative   changes in the right middle lobe and posterior right upper lobe.   Calcified posterior left pleural plaque. Centrilobular emphysema.   Biapical pleural parenchymal scarring. Minimal patchy nodularity in the   left lower lobe may represent a multifocal component. Mild volume loss in the right.  Heart and Great Vessels: Atherosclerotic changes of the aorta and   coronary vasculature. Left-sided pacemaker with leads in place. Heart is   mildly prominent. No pericardial effusions.  Mediastinum and Katelyn: Subcentimeter mediastinal lymph nodes.  Neck and Chest Wall: Fluid and/or edema within the right inferior lateral chest wall.  Bones: Degenerative changes and osteopenia.  Upper Abdomen:  Bilateral renal atrophy and renal cortical scarring.   Exophytic right upper pole renal cyst.  IMPRESSION:  Extensive mucous plugging of the right bronchus intermedius extending   into the right middle lobe and right lower lobe segmental bronchi with   multifocal segmental consolidative changes in the right middle lobe and   right lower lobe may represent postobstructive atelectasis and/or   pneumonia with a mild right pleural effusion. Minimal patchy nodularity   in the left lower lobe may represent a component of the multifocal   infectious process.

## 2019-06-10 NOTE — H&P ADULT - HISTORY OF PRESENT ILLNESS
77 y/o male with h/o CHF, Pacemaker, CAD, COPD stage 4, on home oxygen, s/p total right lung decortication, multiloculated right empyema with Peptostreptococcus Asaccharolyticus, who was discharged from [facility] to [facility] 4 days ago, was referred to the ER because of SOB. in the ER, patient in respiratory failure and CHF, required Bi pap. Temp: 100. WBC: 16 00 with Neutro: 87. Lactate: 1.3.PCO2: 64. PO2: 89. Echo on 6/4/19: EF: 40%.

## 2019-06-10 NOTE — PATIENT PROFILE ADULT - FALL HARM RISK
LEGER, hx of back injury/other LEGER, hx of back injury/other/coagulation(Bleeding disorder R/T clinical cond/anti-coags)

## 2019-06-10 NOTE — ED ADULT NURSE REASSESSMENT NOTE - NS ED NURSE REASSESS COMMENT FT1
Pt became agitated , pulling BIPAP off his face ,  still in obvious resp distress, breathing labored  MD made aware, Orders for Ativan received, awaiting pharmacy to verified

## 2019-06-10 NOTE — ED PROVIDER NOTE - OBJECTIVE STATEMENT
pt presents from [facility] for sob recent discahrge [place] for parapneumoinc vs plueral effusion post pigtail that was removed pt endoreses sob. denies fever. denies HA or neck pain. no chest pain. no abd pain. no n/v/d. no urinary f/u/d. no back pain. no motor or sensory deficits. denies illicit drug use. no recent travel. no rash. no other acute issues symptoms or concerns

## 2019-06-10 NOTE — CONSULT NOTE ADULT - ASSESSMENT
79y/o  Male with h/o CHF, Pacemaker, CAD, COPD stage 4, on home oxygen, s/p total right lung decortication, multiloculated right empyema with Peptostreptococcus Asaccharolyticus s/p Zosyn. Patient was recently discharged from [facility] to [facility]. Sent back to the ER on 6/10 due to worsening SOB. In the ER he was noted to be in respiratory distress and was placed on BIPAP.       Acute hypoxic respiratory failure  r/o Pneumonia/Empyema  Mucus plug   COPD      - Blood cultures pending  - RVP ordered  - CT chest with right sided mucus plug, postobstructive atelectasis and/or pneumonia   - Procalcitonin level ordered  - Continue Zosyn  - D/C Vancomycin  - Trend Fever  - Trend Leukocytosis      Will Follow

## 2019-06-11 NOTE — SWALLOW BEDSIDE ASSESSMENT ADULT - ASR SWALLOW ASPIRATION MONITOR
fever/pneumonia/throat clearing/upper respiratory infection/position upright (90Y)/gurgly voice/cough/change of breathing pattern/oral hygiene

## 2019-06-11 NOTE — SWALLOW BEDSIDE ASSESSMENT ADULT - SLP PERTINENT HISTORY OF CURRENT PROBLEM
As per h&p: "male with h/o CHF, Pacemaker, CAD, COPD stage 4, on home oxygen, s/p total right lung decortication, multiloculated right empyema with Peptostreptococcus Asaccharolyticus, who was discharged from [facility] to [facility] 4 days ago, was referred to the ER because of SOB. in the ER, patient in respiratory failure and CHF, required Bi pap."

## 2019-06-11 NOTE — SWALLOW BEDSIDE ASSESSMENT ADULT - SWALLOW EVAL: RECOMMENDED FEEDING/EATING TECHNIQUES
small sips/bites/allow for swallow between intakes/maintain upright posture during/after eating for 30 mins/oral hygiene/crush medication (when feasible)/position upright (90 degrees)

## 2019-06-11 NOTE — SWALLOW BEDSIDE ASSESSMENT ADULT - SLP GENERAL OBSERVATIONS
Pt in bed,awake, alert, pain scale 0/10, Ox2,+high flow nasal cannula, SPO2 98%, spouse Britany present,& pts daughter Guerda arrived from [place] during the evaluation.

## 2019-06-11 NOTE — PROGRESS NOTE ADULT - SUBJECTIVE AND OBJECTIVE BOX
Patient is a 78y old  Male who presents with a chief complaint of SOB (10 Jaspal 2019 14:42)      HPI:  79 y/o male with h/o CHF, Pacemaker, CAD, COPD stage 4, on home oxygen, s/p total right lung decortication, multiloculated right empyema with Peptostreptococcus Asaccharolyticus, who was discharged from [facility] to [facility] 4 days ago, was referred to the ER because of SOB. in the ER, patient in respiratory failure and CHF, required Bi pap. Temp: 100. WBC: 16 00 with Neutro: 87. Lactate: 1.3.PCO2: 64. PO2: 89. Echo on 6/4/19: EF: 40%. (10 Jaspal 2019 09:46)    FAMILY HISTORY:      INTERVAL HPI/OVERNIGHT EVENTS:  Pt. is seen and examined. case d/w attending.  Pt. is more responsive, on high flow. Pt. denies CP, increased SOB, dyspnea, palpitations.  Dopplers of BLE to be done  CT following   ID on board    PAST MEDICAL & SURGICAL HISTORY:  CHF (congestive heart failure)  CAD (coronary artery disease)  HLD (hyperlipidemia)  HTN (hypertension)  COPD (chronic obstructive pulmonary disease)  Stenosis of lumbosacral spine  H/O back injury  Artificial pacemaker      Allergies    No Known Allergies    Intolerances        Vital Signs Last 24 Hrs  T(C): 36.6 (11 Jun 2019 08:34), Max: 37.7 (10 Jaspal 2019 16:00)  T(F): 97.8 (11 Jun 2019 08:34), Max: 99.8 (10 Jaspal 2019 16:00)  HR: 80 (11 Jun 2019 08:43) (69 - 160)  BP: 133/72 (11 Jun 2019 06:14) (114/72 - 146/96)  BP(mean): 91 (11 Jun 2019 06:14) (85 - 95)  RR: 27 (11 Jun 2019 06:27) (21 - 27)  SpO2: 95% (11 Jun 2019 08:43) (93% - 97%)                          10.4   16.1  )-----------( 215      ( 11 Jun 2019 05:03 )             34.9     LIVER FUNCTIONS - ( 11 Jun 2019 05:03 )  Alb: 2.4 g/dL / Pro: 7.5 g/dL / ALK PHOS: 66 U/L / ALT: 6 U/L / AST: 10 U/L / GGT: x           PT/INR - ( 10 Jaspal 2019 06:11 )   PT: 13.8 sec;   INR: 1.19 ratio         PTT - ( 10 Jaspal 2019 06:11 )  PTT:32.5 sec      RADIOLOGY & ADDITIONAL STUDIES:    MEDICATIONS:  acetaminophen   Tablet .. 650 milliGRAM(s) Oral every 6 hours PRN  ALBUTerol/ipratropium for Nebulization 3 milliLiter(s) Nebulizer every 6 hours  aspirin  chewable 81 milliGRAM(s) Oral daily  atorvastatin 20 milliGRAM(s) Oral at bedtime  carvedilol 3.125 milliGRAM(s) Oral every 12 hours  docusate sodium 100 milliGRAM(s) Oral three times a day PRN  enoxaparin Injectable 40 milliGRAM(s) SubCutaneous daily  furosemide   Injectable 20 milliGRAM(s) IV Push two times a day  losartan 25 milliGRAM(s) Oral daily  metoprolol tartrate Injectable 5 milliGRAM(s) IV Push every 6 hours PRN  montelukast 10 milliGRAM(s) Oral daily  pantoprazole  Injectable 40 milliGRAM(s) IV Push daily  piperacillin/tazobactam IVPB. 3.375 Gram(s) IV Intermittent every 8 hours  predniSONE   Tablet 10 milliGRAM(s) Oral daily  senna 2 Tablet(s) Oral at bedtime PRN  sertraline 25 milliGRAM(s) Oral daily  spironolactone 25 milliGRAM(s) Oral daily Patient is a 78y old  Male who presents with a chief complaint of SOB (10 Jaspal 2019 14:42)      HPI:  77 y/o male with h/o CHF, Pacemaker, CAD, COPD stage 4, on home oxygen, s/p total right lung decortication, multiloculated right empyema with Peptostreptococcus Asaccharolyticus, who was discharged from [facility] to [facility] 4 days ago, was referred to the ER because of SOB. in the ER, patient in respiratory failure and CHF, required Bi pap. Temp: 100. WBC: 16 00 with Neutro: 87. Lactate: 1.3.PCO2: 64. PO2: 89. Echo on 6/4/19: EF: 40%. (10 Jaspal 2019 09:46)    FAMILY HISTORY:      INTERVAL HPI/OVERNIGHT EVENTS:  Pt. is seen and examined. case d/w attending.  Pt. is more responsive, on high flow. Pt. denies CP, increased SOB, dyspnea, palpitations.  Dopplers of BLE to be done  Swallow evaluation ordered due to slow and decrease swallow  response  CT following   ID on board    PAST MEDICAL & SURGICAL HISTORY:  CHF (congestive heart failure)  CAD (coronary artery disease)  HLD (hyperlipidemia)  HTN (hypertension)  COPD (chronic obstructive pulmonary disease)  Stenosis of lumbosacral spine  H/O back injury  Artificial pacemaker      Allergies    No Known Allergies    Intolerances        Vital Signs Last 24 Hrs  T(C): 36.6 (11 Jun 2019 08:34), Max: 37.7 (10 Jaspal 2019 16:00)  T(F): 97.8 (11 Jun 2019 08:34), Max: 99.8 (10 Jaspal 2019 16:00)  HR: 80 (11 Jun 2019 08:43) (69 - 160)  BP: 133/72 (11 Jun 2019 06:14) (114/72 - 146/96)  BP(mean): 91 (11 Jun 2019 06:14) (85 - 95)  RR: 27 (11 Jun 2019 06:27) (21 - 27)  SpO2: 95% (11 Jun 2019 08:43) (93% - 97%)                          10.4   16.1  )-----------( 215      ( 11 Jun 2019 05:03 )             34.9     LIVER FUNCTIONS - ( 11 Jun 2019 05:03 )  Alb: 2.4 g/dL / Pro: 7.5 g/dL / ALK PHOS: 66 U/L / ALT: 6 U/L / AST: 10 U/L / GGT: x           PT/INR - ( 10 Jaspal 2019 06:11 )   PT: 13.8 sec;   INR: 1.19 ratio         PTT - ( 10 Jaspal 2019 06:11 )  PTT:32.5 sec      RADIOLOGY & ADDITIONAL STUDIES:    MEDICATIONS:  acetaminophen   Tablet .. 650 milliGRAM(s) Oral every 6 hours PRN  ALBUTerol/ipratropium for Nebulization 3 milliLiter(s) Nebulizer every 6 hours  aspirin  chewable 81 milliGRAM(s) Oral daily  atorvastatin 20 milliGRAM(s) Oral at bedtime  carvedilol 3.125 milliGRAM(s) Oral every 12 hours  docusate sodium 100 milliGRAM(s) Oral three times a day PRN  enoxaparin Injectable 40 milliGRAM(s) SubCutaneous daily  furosemide   Injectable 20 milliGRAM(s) IV Push two times a day  losartan 25 milliGRAM(s) Oral daily  metoprolol tartrate Injectable 5 milliGRAM(s) IV Push every 6 hours PRN  montelukast 10 milliGRAM(s) Oral daily  pantoprazole  Injectable 40 milliGRAM(s) IV Push daily  piperacillin/tazobactam IVPB. 3.375 Gram(s) IV Intermittent every 8 hours  predniSONE   Tablet 10 milliGRAM(s) Oral daily  senna 2 Tablet(s) Oral at bedtime PRN  sertraline 25 milliGRAM(s) Oral daily  spironolactone 25 milliGRAM(s) Oral daily Patient is a 78y old  Male who presents with a chief complaint of SOB (10 Jaspal 2019 14:42)    HPI:  77 y/o male with h/o CHF, Pacemaker, CAD, COPD stage 4, on home oxygen, s/p total right lung decortication, multiloculated right empyema with Peptostreptococcus Asaccharolyticus, who was discharged from [facility] to [facility] 4 days ago, was referred to the ER because of SOB. in the ER, patient in respiratory failure and CHF, required Bi pap. Temp: 100. WBC: 16 00 with Neutro: 87. Lactate: 1.3.PCO2: 64. PO2: 89. Echo on 6/4/19: EF: 40%. (10 Jaspal 2019 09:46)    INTERVAL HPI/OVERNIGHT EVENTS:  Pt. is seen and examined. case d/w attending.  Pt. is more responsive, on high flow. Pt. denies CP, increased SOB, dyspnea, palpitations.  Dopplers of BLE to be done  Swallow evaluation ordered due to slow and decrease swallow  response  CT following   ID on board    PAST MEDICAL & SURGICAL HISTORY:  CHF (congestive heart failure)  CAD (coronary artery disease)  HLD (hyperlipidemia)  HTN (hypertension)  COPD (chronic obstructive pulmonary disease)  Stenosis of lumbosacral spine  H/O back injury  Artificial pacemaker    Allergies  No Known Allergies    Vital Signs   T(C): 36.6 (11 Jun 2019 08:34), Max: 37.7 (10 Jaspal 2019 16:00)  T(F): 97.8 (11 Jun 2019 08:34), Max: 99.8 (10 Jaspal 2019 16:00)  HR: 80 (11 Jun 2019 08:43) (69 - 160)  BP: 133/72 (11 Jun 2019 06:14) (114/72 - 146/96)  BP(mean): 91 (11 Jun 2019 06:14) (85 - 95)  RR: 27 (11 Jun 2019 06:27) (21 - 27)  SpO2: 95% (11 Jun 2019 08:43) (93% - 97%)                        10.4   16.1  )-----------( 215      ( 11 Jun 2019 05:03 )             34.9     LIVER FUNCTIONS - ( 11 Jun 2019 05:03 )  Alb: 2.4 g/dL / Pro: 7.5 g/dL / ALK PHOS: 66 U/L / ALT: 6 U/L / AST: 10 U/L / GGT: x           PT/INR - ( 10 Jaspal 2019 06:11 )   PT: 13.8 sec;   INR: 1.19 ratio         PTT - ( 10 Jaspal 2019 06:11 )  PTT:32.5 sec      RADIOLOGY & ADDITIONAL STUDIES:    MEDICATIONS:  acetaminophen   Tablet .. 650 milliGRAM(s) Oral every 6 hours PRN  ALBUTerol/ipratropium for Nebulization 3 milliLiter(s) Nebulizer every 6 hours  aspirin  chewable 81 milliGRAM(s) Oral daily  atorvastatin 20 milliGRAM(s) Oral at bedtime  carvedilol 3.125 milliGRAM(s) Oral every 12 hours  docusate sodium 100 milliGRAM(s) Oral three times a day PRN  enoxaparin Injectable 40 milliGRAM(s) SubCutaneous daily  furosemide   Injectable 20 milliGRAM(s) IV Push two times a day  losartan 25 milliGRAM(s) Oral daily  metoprolol tartrate Injectable 5 milliGRAM(s) IV Push every 6 hours PRN  montelukast 10 milliGRAM(s) Oral daily  pantoprazole  Injectable 40 milliGRAM(s) IV Push daily  piperacillin/tazobactam IVPB. 3.375 Gram(s) IV Intermittent every 8 hours  predniSONE   Tablet 10 milliGRAM(s) Oral daily  senna 2 Tablet(s) Oral at bedtime PRN  sertraline 25 milliGRAM(s) Oral daily  spironolactone 25 milliGRAM(s) Oral daily

## 2019-06-11 NOTE — SWALLOW BEDSIDE ASSESSMENT ADULT - COMMENTS
+Increased work of breathing noted Noted more of an increased work of breathing for soft consistencies

## 2019-06-11 NOTE — PROGRESS NOTE ADULT - ASSESSMENT
79 y/o male with h/o CHF, Pacemaker, CAD, COPD stage 4, on home oxygen (3 L), s/p total right lung decortication, multiloculated right empyema with Peptostreptococcus Asaccharolyticus, who was readmitted from [facility] for SOB. Found to have extensive mucous plugging of RML and RLL with multifocal segmental consolidative changes- atelectasis v. PNA with a mild R pleural effusion, and LLL nodularity.     PLAN  Encourage coughing, deep breathing and use of incentive spirometry.   Aggressive chest PT.   Supplemental oxygen as needed via hi flow NC. BiPAP as needed.   Daily CXR.   ABX as per ID.   No acute intervention at this time. Will follow for possible bronchoscopy if symptoms worsen.         Naima HOPPER  Thoracic Surgery   #[phone]

## 2019-06-11 NOTE — PROGRESS NOTE ADULT - SUBJECTIVE AND OBJECTIVE BOX
Subjective: Patient changed from BiPAP to high flow NC overnight. Saturating 96% at rest on 50% FiO2 hi-flow. Patient still admits to SOB although overall some what improved. Admits to cough with sputum production. No hemoptysis. Denies fever and chills. No chest pain.     Vital Signs:  Vital Signs Last 24 Hrs  T(C): 36.7 (06-11-19 @ 14:27), Max: 37 (06-11-19 @ 05:03)  T(F): 98.1 (06-11-19 @ 14:27), Max: 98.6 (06-11-19 @ 05:03)  HR: 78 (06-11-19 @ 15:59) (69 - 160)  BP: 120/73 (06-11-19 @ 12:00) (114/72 - 138/73)  RR: 21 (06-11-19 @ 12:00) (21 - 27)  SpO2: 95% (06-11-19 @ 15:59) (93% - 96%) on (O2)  I&O's Detail    10 Jaspal 2019 07:01  -  11 Jun 2019 07:00  --------------------------------------------------------  IN:    Oral Fluid: 115 mL    Solution: 250 mL  Total IN: 365 mL    OUT:    Voided: 625 mL  Total OUT: 625 mL    Total NET: -260 mL      Exam  General: No distress, on [place]  Neurology: Awake and alert  Eyes: Scleras clear, PERRLA/ EOMI, Gross vision intact  ENT: Gross hearing intact, grossly patent pharynx, no stridor  Neck: Neck supple, trachea midline, No JVD  Respiratory: Diminished BS at bases B/L, no wheezes or rhonchi  CV: RRR, S1S2, no murmurs, rubs or gallops  Abdominal: Soft, NT, ND  Extremities: B/L LE edema  Skin: No Rashes, Hematoma, Ecchymosis      Relevant labs, radiology and Medications reviewed                        10.4   16.1  )-----------( 215      ( 11 Jun 2019 05:03 )             34.9     06-11    144  |  93<L>  |  33.0<H>  ----------------------------<  126<H>  4.0   |  38.0<H>  |  1.02    Ca    9.0      11 Jun 2019 05:03  Mg     1.9     06-11    TPro  7.5  /  Alb  2.4<L>  /  TBili  0.4  /  DBili  x   /  AST  10  /  ALT  6   /  AlkPhos  66  06-11    PT/INR - ( 10 Jaspal 2019 06:11 )   PT: 13.8 sec;   INR: 1.19 ratio         PTT - ( 10 Jaspal 2019 06:11 )  PTT:32.5 sec  MEDICATIONS  (STANDING):  ALBUTerol/ipratropium for Nebulization 3 milliLiter(s) Nebulizer every 6 hours  aspirin  chewable 81 milliGRAM(s) Oral daily  atorvastatin 20 milliGRAM(s) Oral at bedtime  carvedilol 3.125 milliGRAM(s) Oral every 12 hours  enoxaparin Injectable 40 milliGRAM(s) SubCutaneous daily  losartan 25 milliGRAM(s) Oral daily  montelukast 10 milliGRAM(s) Oral daily  pantoprazole  Injectable 40 milliGRAM(s) IV Push daily  piperacillin/tazobactam IVPB. 3.375 Gram(s) IV Intermittent every 8 hours  predniSONE   Tablet 10 milliGRAM(s) Oral daily  saccharomyces boulardii 250 milliGRAM(s) Oral two times a day  sertraline 25 milliGRAM(s) Oral daily  spironolactone 25 milliGRAM(s) Oral daily  torsemide 20 milliGRAM(s) Oral daily    MEDICATIONS  (PRN):  acetaminophen   Tablet .. 650 milliGRAM(s) Oral every 6 hours PRN Temp greater or equal to 38C (100.4F)  docusate sodium 100 milliGRAM(s) Oral three times a day PRN Constipation  metoprolol tartrate Injectable 5 milliGRAM(s) IV Push every 6 hours PRN for SBP > 140 and/or HR > 100  senna 2 Tablet(s) Oral at bedtime PRN Constipation        Assessment  78y Male  w/ PAST MEDICAL & SURGICAL HISTORY:  CHF (congestive heart failure)  CAD (coronary artery disease)  HLD (hyperlipidemia)  HTN (hypertension)  COPD (chronic obstructive pulmonary disease)  Stenosis of lumbosacral spine  H/O back injury  Artificial pacemaker  admitted with complaints of Patient is a 78y old  Male who presents with a chief complaint of SOB (11 Jun 2019 10:45)

## 2019-06-11 NOTE — PROGRESS NOTE ADULT - ASSESSMENT
This is 79 YO Man with a PMHx of COPD (on 3L home O2), CHF (EF 45-50%), CAD (non-obstructive on cath 04/19), Moderate AS, loculated pleural effusion s/p pigtail and Right VATS Decortication (04/30/19), HTN, HLD, came to ED due to worsening SOB and LE edema. Patient report SOB for the last 2 weeks, getting worse, initially it was associated with exertion but for the last few days report SOB at rest.  Patient was seen in the cardiology office yesterday and started on Demadex 20mg PO daily, but took his first dose this morning. Patient states that he was unable to sleep due to the SOB and orthopnea, and symptoms were so severe this morning that he came to the ED to be evaluated. Also pt report worsening swelling of LE for the last week, associated with discomfort. In ED pt got lasix 40 IV, urinating, feeling better.     A/P  >Acute on chronic systolic chf - TTE with moderately decreased LVSF, EF 40-45%. Diuresed extensive with -6445 net balance  - Continue PO Demadex, Aldactone  - Continue Coreg, Losartan  - Monitor SNa, if stable, anticipate discharge to [facility] in am      >COPD on home oxygen - not in exacerbation. Compensated respiratory acidosis present.   - c/w nebs, oxygen, incentive spirometry    - NIV for home already arranged for last admission - awaiting delivery    >CAD - non obstructive CAD - s/p cath 04/19  c/w aspirin, statin, beta blocker      >Right loculated effusion- s/p VATS 04/30/2019, cytology negative  Ct chest noted - effusion and consolidation  CT surgery input - mild loculated effusion: no intervention needed at this time  Continue diuresis  Needs aggressive chest PT, incentive spirometry to clear lungs of secretions.  incentive spirometry   procalcitonin 0.20 - monitor off abx    >Moderate AS- Cardio f/u on d/c.     >Hx HTN /HLD- Continue Losartan 25mg, Atorvastatin 40mg , coreg     >DVT ppx- SCD, Lovenox This is 79 YO Man with a PMHx of COPD (on 3L home O2), CHF (EF 45-50%), CAD (non-obstructive on cath 04/19), Moderate AS, loculated pleural effusion s/p pigtail and Right VATS Decortication (04/30/19), HTN, HLD,   multiloculated right empyema with Peptostreptococcus Asaccharolyticus, who was discharged from [facility] to [facility] 4 days ago, was referred to the ER because of SOB. in the ER, patient in respiratory failure and CHF,   required Bi pap. Temp: 100. WBC: 16 00 with Neutro: 87. Lactate: 1.3.PCO2: 64. PO2: 89. Echo on 6/4/19: EF: 40%.     >Acute hypoxic respiratory failure/COPD  r/o Pneumonia/Empyema  Mucus plug   - High flow O2- might need to titrate  - Blood cultures pending  - RVP - not detected  - CT chest with right sided mucus plug, postobstructive atelectasis and/or pneumonia   - Procalcitonin level ordered 0.28- yesterday on 6/10  - Continue Zosyn  - D/C Vancomycin  - Trend Fever- Tmax=99.8  - Trend Leukocytosis =16.1  - BLE dopplers pending  - swallow evaluation ordered    >Acute on chronic systolic chf -   IV lasix. Bi pap. Carvedilol. Losartan.   TTE on last admission with moderately decreased LVSF, EF 40-45%.   - Continue PO Lasix, Spironalactone  - Monitor SNa,       >R/O Pneumonia of right lower lobe due to infectious organism.    Plan: Zosyn and vanco.     >COPD on home oxygen - not in exacerbation. Compensated respiratory acidosis present.   - c/w nebs, oxygen, incentive spirometry    - NIV for home already arranged for last admission - awaiting delivery    >CAD - non obstructive CAD - s/p cath 04/19  c/w aspirin, statin, beta blocker      >Right loculated effusion- s/p VATS 04/30/2019, cytology negative  Ct chest noted - effusion and consolidation  CT surgery input - mild loculated effusion: no intervention needed at this time  Continue diuresis  Needs aggressive chest PT to clear lungs of secretions.  High O2 flow  procalcitonin 0.20 - monitor off abx  ID on board      >Moderate AS- Cardio f/u on d/c.     >Hx HTN /HLD- Continue Losartan 25mg, Atorvastatin 40mg , coreg     >DVT ppx- SCD, Lovenox This is 77 YO Man with a PMHx of COPD (on 3L home O2), CHF (EF 45-50%), CAD (non-obstructive on cath 04/19), Moderate AS, loculated pleural effusion s/p pigtail and Right VATS Decortication (04/30/19), HTN, HLD,   multiloculated right empyema with Peptostreptococcus Asaccharolyticus, who was discharged from [facility] to [facility] 4 days ago, was referred to the ER because of SOB. in the ER, patient in respiratory failure and CHF,   required Bi pap. Temp: 100. WBC: 16 00 with Neutro: 87. Lactate: 1.3.PCO2: 64. PO2: 89. Echo on 6/4/19: EF: 40%.     >Acute hypoxic respiratory failure/COPD  r/o Pneumonia/Empyema  Mucus plug   - High flow O2- might need to titrate  - Blood cultures pending  - RVP - not detected  - CT chest with right sided mucus plug, postobstructive atelectasis and/or pneumonia   - Procalcitonin level ordered 0.28- yesterday on 6/10  - Continue Zosyn  - D/C Vancomycin  - Trend Fever- Tmax=99.8  - Trend Leukocytosis =16.1  - BLE dopplers pending  - swallow evaluation ordered    >Acute on chronic systolic chf -   IV lasix. Bi pap. Carvedilol. Losartan.   TTE on last admission with moderately decreased LVSF, EF 40-45%.   - Continue PO Lasix, Spironalactone  - Monitor SNa,       >R/O Pneumonia of right lower lobe due to infectious organism.    Plan: Zosyn and vanco.     >COPD on home oxygen - not in exacerbation. Compensated respiratory acidosis present.   - c/w nebs, oxygen, incentive spirometry    - NIV for home already arranged for last admission - awaiting delivery    >CAD - non obstructive CAD - s/p cath 04/19  c/w aspirin, statin, beta blocker      >Right loculated effusion- s/p VATS 04/30/2019, cytology negative  Ct chest noted - effusion and consolidation  CT surgery on board  Continue diuresis  Needs aggressive chest PT to clear lungs of secretions.  High O2 flow  procalcitonin 0.20 - monitor off abx  ID on board      >Moderate AS- Cardio f/u on d/c.     >Hx HTN /HLD- Continue Losartan 25mg, Atorvastatin 40mg , coreg     >DVT ppx- SCD, Lovenox This is 79 YO Man with a PMHx of COPD (on 3L home O2), CHF (EF 45-50%), CAD (non-obstructive on cath 04/19), Moderate AS, loculated pleural effusion s/p pigtail and Right VATS Decortication (04/30/19), HTN, HLD,   multiloculated right empyema with Peptostreptococcus Asaccharolyticus, who was discharged from [facility] to [facility] 4 days ago, was referred to the ER because of SOB. in the ER, patient in respiratory failure and CHF,   required Bi pap. Temp: 100. WBC: 16 00 with Neutro: 87. Lactate: 1.3.PCO2: 64. PO2: 89. Echo on 6/4/19: EF: 40%.     >Acute hypoxic respiratory failure/COPD  r/o Pneumonia/Empyema  Mucus plug   - High flow O2- might need to titrate  - Blood cultures pending  - RVP - not detected  - CT chest with right sided mucus plug, postobstructive atelectasis and/or pneumonia   - Procalcitonin level ordered 0.28- yesterday on 6/10  - Continue Zosyn  - D/C Vancomycin  - Trend Fever- Tmax=99.8  - Trend Leukocytosis =16.1  - BLE dopplers pending  - swallow evaluation ordered    >Acute on chronic systolic chf -   IV lasix. Bi pap. Carvedilol. Losartan.   TTE on last admission with moderately decreased LVSF, EF 40-45%.   - Continue PO Lasix, Spironalactone  - Monitor SNa,       >R/O Pneumonia of right lower lobe due to infectious organism.    Plan: Zosyn and vanco.     >COPD on home oxygen - in exacerbation.    - c/w nebs, high flow oxygen      >CAD - non obstructive CAD - s/p cath 04/19  c/w aspirin, statin, beta blocker      >Right loculated effusion- s/p VATS 04/30/2019, cytology negative  Ct chest noted - effusion and consolidation  CT surgery on board  Continue diuresis  Needs aggressive chest PT to clear lungs of secretions.  High O2 flow  procalcitonin 0.28 on 6/10  ID on board      >Moderate AS- Cardio f/u on d/c.     >Hx HTN /HLD- Continue Losartan 25mg, Atorvastatin 40mg , coreg     >DVT ppx- SCD, Lovenox This is 79 YO Man with a PMHx of COPD (on 3L home O2), CHF (EF 45-50%), CAD (non-obstructive on cath 04/19), Moderate AS, loculated pleural effusion s/p pigtail and Right VATS Decortication (04/30/19), HTN, HLD,   multiloculated right empyema with Peptostreptococcus Asaccharolyticus, who was discharged from [facility] to [facility] 4 days ago, was referred to the ER because of SOB. in the ER, patient in respiratory failure and CHF,   required Bi pap. Temp: 100. WBC: 16 00 with Neutro: 87. Lactate: 1.3.PCO2: 64. PO2: 89. Echo on 6/4/19: EF: 40%.     >Acute hypoxic respiratory failure/COPD  r/o Pneumonia/Empyema  Mucus plug   - High flow O2- might need to titrate  - Blood cultures pending  - RVP - not detected  - CT chest with right sided mucus plug, postobstructive atelectasis and/or pneumonia   - Procalcitonin level ordered 0.28- yesterday on 6/10  - Continue Zosyn  - D/C Vancomycin  - Trend Fever- Tmax=99.8  - Trend Leukocytosis =16.1  - BLE dopplers pending  - swallow evaluation ordered    >Acute on chronic systolic chf -   IV lasix. Bi pap. Carvedilol. Losartan.   TTE on last admission with moderately decreased LVSF, EF 40-45%.   - Continue PO Lasix, Spironalactone  - Monitor SNa,       >R/O Pneumonia of right lower lobe due to infectious organism.    Plan: Zosyn and vanco.     >COPD on home oxygen - in exacerbation.    - c/w nebs, high flow oxygen      >CAD - non obstructive CAD - s/p cath 04/19  c/w aspirin, statin, beta blocker      >Right loculated effusion- s/p VATS 04/30/2019, cytology negative  Ct chest noted - effusion and consolidation  CT surgery on board  Continue diuresis  Needs aggressive chest PT to clear lungs of secretions.  High O2 flow  procalcitonin 0.28 on 6/10  ID on board    >Moderate AS- Cardio f/u on d/c.     >Hx HTN /HLD- Continue Losartan 25mg, Atorvastatin 40mg , coreg     >DVT ppx- SCD, Lovenox This is 77 YO Man with a PMHx of COPD (on 3L home O2), CHF (EF 45-50%), CAD (non-obstructive on cath 04/19), Moderate AS, loculated pleural effusion s/p pigtail and Right VATS Decortication (04/30/19), HTN, HLD,   multiloculated right empyema with Peptostreptococcus Asaccharolyticus, who was discharged from [facility] to [facility] 4 days ago, was referred to the ER because of SOB. in the ER, patient in respiratory failure and CHF,   required Bi pap. Temp: 100. WBC: 16 00 with Neutro: 87. Lactate: 1.3.PCO2: 64. PO2: 89. Echo on 6/4/19: EF: 40%.     >Acute hypoxic respiratory failure/COPD  r/o Pneumonia/Empyema  Mucus plug   - High flow O2- might need to titrate  - Blood cultures pending  - RVP - not detected  - CT chest with right sided mucus plug, postobstructive atelectasis and/or pneumonia   - Procalcitonin level ordered 0.28- yesterday on 6/10  - Continue Zosyn  - D/C Vancomycin  - Trend Fever- Tmax=99.8  - Trend Leukocytosis =16.1  - BLE dopplers pending  - swallow evaluation ordered    > Chronic systolic chf -   IV lasix. Bi pap. Carvedilol. Losartan.   TTE on last admission with moderately decreased LVSF, EF 40-45%.   - Continue PO Lasix, Spironalactone  - Monitor SNa,       >R/O Pneumonia of right lower lobe due to infectious organism.    Plan: Zosyn and vanco.     >COPD on home oxygen - in exacerbation.    - c/w nebs, high flow oxygen      >CAD - non obstructive CAD - s/p cath 04/19  c/w aspirin, statin, beta blocker      >Right loculated effusion- s/p VATS 04/30/2019, cytology negative  Ct chest noted - effusion and consolidation  CT surgery on board  Continue diuresis  Needs aggressive chest PT to clear lungs of secretions.  High O2 flow  procalcitonin 0.28 on 6/10  ID on board    >Moderate AS- Cardio f/u on d/c.     >Hx HTN /HLD- Continue Losartan 25mg, Atorvastatin 40mg , coreg     >DVT ppx- SCD, Lovenox This is 77 YO Man with a PMHx of COPD (on 3L home O2), CHF (EF 45-50%), CAD (non-obstructive on cath 04/19), Moderate AS, loculated pleural effusion s/p pigtail and Right VATS Decortication (04/30/19), HTN, HLD,   multiloculated right empyema with Peptostreptococcus Asaccharolyticus, who was discharged from [facility] to [facility] 4 days ago, was referred to the ER because of SOB. in the ER, patient in respiratory failure and CHF,   required Bi pap. Temp: 100. WBC: 16 00 with Neutro: 87. Lactate: 1.3.PCO2: 64. PO2: 89. Echo on 6/4/19: EF: 40%.     >Acute hypoxic respiratory failure/COPD  r/o Pneumonia/Empyema  Mucus plug   - High flow O2- might need to titrate  - Blood cultures pending  - RVP - not detected  - CT chest with right sided mucus plug, postobstructive atelectasis and/or pneumonia   - Procalcitonin level ordered 0.28- yesterday on 6/10  - Continue Zosyn  - D/C Vancomycin  - Trend Fever- Tmax=99.8  - Trend Leukocytosis =16.1  - BLE dopplers pending  - swallow evaluation ordered    > Chronic systolic chf   Diuretic. Bi pap. Carvedilol. Losartan.   TTE on last admission with moderately decreased LVSF, EF 40-45%.   - Continue PO Lasix, Spironalactone  - Monitor SNa,       >R/O Pneumonia of right lower lobe due to infectious organism.    Plan: Zosyn and vanco.     >COPD on home oxygen - in exacerbation.    - c/w nebs, high flow oxygen      >CAD - non obstructive CAD - s/p cath 04/19  c/w aspirin, statin, beta blocker      >Right loculated effusion- s/p VATS 04/30/2019, cytology negative  Ct chest noted - effusion and consolidation  CT surgery on board  Continue diuresis  Needs aggressive chest PT to clear lungs of secretions.  High O2 flow  procalcitonin 0.28 on 6/10  ID on board    >Moderate AS- Cardio f/u on d/c.     >Hx HTN /HLD- Continue Losartan 25mg, Atorvastatin 40mg , coreg     >DVT ppx- SCD, Lovenox

## 2019-06-12 NOTE — PROGRESS NOTE ADULT - SUBJECTIVE AND OBJECTIVE BOX
Patient is a 78y old  Male who presents with a chief complaint of SOB (11 Jun 2019 16:20)      HPI:  77 y/o male with h/o CHF, Pacemaker, CAD, COPD stage 4, on home oxygen, s/p total right lung decortication, multiloculated right empyema with Peptostreptococcus Asaccharolyticus, who was discharged from [facility] to [facility] 4 days ago, was referred to the ER because of SOB. in the ER, patient in respiratory failure and CHF, required Bi pap. Temp: 100. WBC: 16 00 with Neutro: 87. Lactate: 1.3.PCO2: 64. PO2: 89. Echo on 6/4/19: EF: 40%. (10 Jaspal 2019 09:46)    FAMILY HISTORY:      INTERVAL HPI/OVERNIGHT EVENTS:  Pt. is seen and examined. case d/w attending.  Pt. is more responsive, on high flow. Pt. denies CP, increased SOB, dyspnea, palpitations.  CT following   ID on board  Bronchoscopy today    PAST MEDICAL & SURGICAL HISTORY:  CHF (congestive heart failure)  CAD (coronary artery disease)  HLD (hyperlipidemia)  HTN (hypertension)  COPD (chronic obstructive pulmonary disease)  Stenosis of lumbosacral spine  H/O back injury  Artificial pacemaker      Allergies    No Known Allergies    Intolerances        Vital Signs Last 24 Hrs  T(C): 36.5 (12 Jun 2019 13:31), Max: 36.9 (11 Jun 2019 19:37)  T(F): 97.7 (12 Jun 2019 13:31), Max: 98.4 (11 Jun 2019 19:37)  HR: 72 (12 Jun 2019 13:52) (65 - 96)  BP: 145/72 (12 Jun 2019 13:52) (103/67 - 146/71)  BP(mean): 94 (12 Jun 2019 13:52) (79 - 105)  RR: 24 (12 Jun 2019 13:52) (15 - 28)  SpO2: 98% (12 Jun 2019 13:52) (93% - 100%)                                10.3   10.1  )-----------( 220      ( 12 Jun 2019 05:03 )             35.2     LIVER FUNCTIONS - ( 11 Jun 2019 05:03 )  Alb: 2.4 g/dL / Pro: 7.5 g/dL / ALK PHOS: 66 U/L / ALT: 6 U/L / AST: 10 U/L / GGT: x                 RADIOLOGY & ADDITIONAL STUDIES:    MEDICATIONS:  acetaminophen   Tablet .. 650 milliGRAM(s) Oral every 6 hours PRN  ALBUTerol/ipratropium for Nebulization 3 milliLiter(s) Nebulizer every 6 hours  aspirin  chewable 81 milliGRAM(s) Oral daily  atorvastatin 20 milliGRAM(s) Oral at bedtime  carvedilol 3.125 milliGRAM(s) Oral every 12 hours  docusate sodium 100 milliGRAM(s) Oral three times a day  enoxaparin Injectable 40 milliGRAM(s) SubCutaneous daily  ibuprofen  Tablet. 400 milliGRAM(s) Oral every 4 hours PRN  losartan 25 milliGRAM(s) Oral daily  metoprolol tartrate Injectable 5 milliGRAM(s) IV Push every 6 hours PRN  montelukast 10 milliGRAM(s) Oral daily  oxyCODONE    5 mG/acetaminophen 325 mG 1 Tablet(s) Oral every 4 hours PRN  oxyCODONE    5 mG/acetaminophen 325 mG 2 Tablet(s) Oral every 6 hours PRN  pantoprazole  Injectable 40 milliGRAM(s) IV Push daily  piperacillin/tazobactam IVPB. 3.375 Gram(s) IV Intermittent every 8 hours  predniSONE   Tablet 10 milliGRAM(s) Oral daily  saccharomyces boulardii 250 milliGRAM(s) Oral two times a day  senna 2 Tablet(s) Oral at bedtime PRN  sertraline 25 milliGRAM(s) Oral daily  spironolactone 25 milliGRAM(s) Oral daily  torsemide 20 milliGRAM(s) Oral daily Patient is a 78y old  Male who presents with a chief complaint of SOB (11 Jun 2019 16:20)      HPI:  79 y/o male with h/o CHF, Pacemaker, CAD, COPD stage 4, on home oxygen, s/p total right lung decortication, multiloculated right empyema with Peptostreptococcus Asaccharolyticus, who was discharged from [facility] to [facility] 4 days ago, was referred to the ER because of SOB. in the ER, patient in respiratory failure and CHF, required Bi pap. Temp: 100. WBC: 16 00 with Neutro: 87. Lactate: 1.3.PCO2: 64. PO2: 89. Echo on 6/4/19: EF: 40%. (10 Jaspal 2019 09:46)    INTERVAL HPI/OVERNIGHT EVENTS:  Pt. is seen and examined. case d/w attending.  Pt. is more responsive, on high flow. Pt. denies CP, increased SOB, dyspnea, palpitations.  CT following   ID on board  Bronchoscopy today    PAST MEDICAL & SURGICAL HISTORY:  CHF (congestive heart failure)  CAD (coronary artery disease)  HLD (hyperlipidemia)  HTN (hypertension)  COPD (chronic obstructive pulmonary disease)  Stenosis of lumbosacral spine  H/O back injury  Artificial pacemaker      Allergies  No Known Allergies  Intolerances    Vital Signs Last 24 Hrs  T(C): 36.5 (12 Jun 2019 13:31), Max: 36.9 (11 Jun 2019 19:37)  T(F): 97.7 (12 Jun 2019 13:31), Max: 98.4 (11 Jun 2019 19:37)  HR: 72 (12 Jun 2019 13:52) (65 - 96)  BP: 145/72 (12 Jun 2019 13:52) (103/67 - 146/71)  BP(mean): 94 (12 Jun 2019 13:52) (79 - 105)  RR: 24 (12 Jun 2019 13:52) (15 - 28)  SpO2: 98% (12 Jun 2019 13:52) (93% - 100%)                          10.3   10.1  )-----------( 220      ( 12 Jun 2019 05:03 )             35.2     LIVER FUNCTIONS - ( 11 Jun 2019 05:03 )  Alb: 2.4 g/dL / Pro: 7.5 g/dL / ALK PHOS: 66 U/L / ALT: 6 U/L / AST: 10 U/L / GGT: x             MEDICATIONS:  acetaminophen   Tablet .. 650 milliGRAM(s) Oral every 6 hours PRN  ALBUTerol/ipratropium for Nebulization 3 milliLiter(s) Nebulizer every 6 hours  aspirin  chewable 81 milliGRAM(s) Oral daily  atorvastatin 20 milliGRAM(s) Oral at bedtime  carvedilol 3.125 milliGRAM(s) Oral every 12 hours  docusate sodium 100 milliGRAM(s) Oral three times a day  enoxaparin Injectable 40 milliGRAM(s) SubCutaneous daily  ibuprofen  Tablet. 400 milliGRAM(s) Oral every 4 hours PRN  losartan 25 milliGRAM(s) Oral daily  metoprolol tartrate Injectable 5 milliGRAM(s) IV Push every 6 hours PRN  montelukast 10 milliGRAM(s) Oral daily  oxyCODONE    5 mG/acetaminophen 325 mG 1 Tablet(s) Oral every 4 hours PRN  oxyCODONE    5 mG/acetaminophen 325 mG 2 Tablet(s) Oral every 6 hours PRN  pantoprazole  Injectable 40 milliGRAM(s) IV Push daily  piperacillin/tazobactam IVPB. 3.375 Gram(s) IV Intermittent every 8 hours  predniSONE   Tablet 10 milliGRAM(s) Oral daily  saccharomyces boulardii 250 milliGRAM(s) Oral two times a day  senna 2 Tablet(s) Oral at bedtime PRN  sertraline 25 milliGRAM(s) Oral daily  spironolactone 25 milliGRAM(s) Oral daily  torsemide 20 milliGRAM(s) Oral daily Patient is a 78y old  Male who presents with a chief complaint of SOB (11 Jun 2019 16:20)      HPI:  77 y/o male with h/o CHF, Pacemaker, CAD, COPD stage 4, on home oxygen, s/p total right lung decortication, multiloculated right empyema with Peptostreptococcus Asaccharolyticus, who was discharged from [facility] to [facility] 4 days ago, was referred to the ER because of SOB. in the ER, patient in respiratory failure and CHF, required Bi pap. Temp: 100. WBC: 16 00 with Neutro: 87. Lactate: 1.3.PCO2: 64. PO2: 89. Echo on 6/4/19: EF: 40%. (10 Jaspal 2019 09:46)    INTERVAL HPI/OVERNIGHT EVENTS:  Pt. is seen and examined. case d/w attending.  Pt. is more responsive, on high flow. Pt. denies CP, denies dyspnea, palpitations.  still with + SOB  CT following   ID on board  Bronchoscopy today    PAST MEDICAL & SURGICAL HISTORY:  CHF (congestive heart failure)  CAD (coronary artery disease)  HLD (hyperlipidemia)  HTN (hypertension)  COPD (chronic obstructive pulmonary disease)  Stenosis of lumbosacral spine  H/O back injury  Artificial pacemaker      Allergies  No Known Allergies  Intolerances    Vital Signs Last 24 Hrs  T(C): 36.5 (12 Jun 2019 13:31), Max: 36.9 (11 Jun 2019 19:37)  T(F): 97.7 (12 Jun 2019 13:31), Max: 98.4 (11 Jun 2019 19:37)  HR: 72 (12 Jun 2019 13:52) (65 - 96)  BP: 145/72 (12 Jun 2019 13:52) (103/67 - 146/71)  BP(mean): 94 (12 Jun 2019 13:52) (79 - 105)  RR: 24 (12 Jun 2019 13:52) (15 - 28)  SpO2: 98% (12 Jun 2019 13:52) (93% - 100%)                          10.3   10.1  )-----------( 220      ( 12 Jun 2019 05:03 )             35.2     LIVER FUNCTIONS - ( 11 Jun 2019 05:03 )  Alb: 2.4 g/dL / Pro: 7.5 g/dL / ALK PHOS: 66 U/L / ALT: 6 U/L / AST: 10 U/L / GGT: x             MEDICATIONS:  acetaminophen   Tablet .. 650 milliGRAM(s) Oral every 6 hours PRN  ALBUTerol/ipratropium for Nebulization 3 milliLiter(s) Nebulizer every 6 hours  aspirin  chewable 81 milliGRAM(s) Oral daily  atorvastatin 20 milliGRAM(s) Oral at bedtime  carvedilol 3.125 milliGRAM(s) Oral every 12 hours  docusate sodium 100 milliGRAM(s) Oral three times a day  enoxaparin Injectable 40 milliGRAM(s) SubCutaneous daily  ibuprofen  Tablet. 400 milliGRAM(s) Oral every 4 hours PRN  losartan 25 milliGRAM(s) Oral daily  metoprolol tartrate Injectable 5 milliGRAM(s) IV Push every 6 hours PRN  montelukast 10 milliGRAM(s) Oral daily  oxyCODONE    5 mG/acetaminophen 325 mG 1 Tablet(s) Oral every 4 hours PRN  oxyCODONE    5 mG/acetaminophen 325 mG 2 Tablet(s) Oral every 6 hours PRN  pantoprazole  Injectable 40 milliGRAM(s) IV Push daily  piperacillin/tazobactam IVPB. 3.375 Gram(s) IV Intermittent every 8 hours  predniSONE   Tablet 10 milliGRAM(s) Oral daily  saccharomyces boulardii 250 milliGRAM(s) Oral two times a day  senna 2 Tablet(s) Oral at bedtime PRN  sertraline 25 milliGRAM(s) Oral daily  spironolactone 25 milliGRAM(s) Oral daily  torsemide 20 milliGRAM(s) Oral daily

## 2019-06-12 NOTE — PROGRESS NOTE ADULT - ASSESSMENT
This is 79 YO Man with a PMHx of COPD (on 3L home O2), CHF (EF 45-50%), CAD (non-obstructive on cath 04/19), Moderate AS, loculated pleural effusion s/p pigtail and Right VATS Decortication (04/30/19), HTN, HLD,   multiloculated right empyema with Peptostreptococcus Asaccharolyticus, who was discharged from [facility] to [facility] 4 days ago, was referred to the ER because of SOB. in the ER, patient in respiratory failure and CHF,   required Bi pap. Temp: 100. WBC: 16 00 with Neutro: 87. Lactate: 1.3.PCO2: 64. PO2: 89. Echo on 6/4/19: EF: 40%.     >Acute hypoxic respiratory failure/COPD  r/o Pneumonia/Empyema  Mucus plug   - High flow O2- might need to titrate  - Blood cultures pending  - RVP - not detected  - CT chest with right sided mucus plug, postobstructive atelectasis and/or pneumonia   - Procalcitonin level ordered 0.28- yesterday on 6/10  - Continue Zosyn  - D/C Vancomycin  - Trend Fever- Tmax=99.8  - Trend Leukocytosis =16.1  - BLE dopplers pending  - swallow evaluation ordered    > Chronic systolic chf   Diuretic. Bi pap. Carvedilol. Losartan.   TTE on last admission with moderately decreased LVSF, EF 40-45%.   - Continue PO Lasix, Spironalactone  - Monitor SNa,       >R/O Pneumonia of right lower lobe due to infectious organism.    Plan: Zosyn and vanco.     >COPD on home oxygen - in exacerbation.    - c/w nebs, high flow oxygen      >CAD - non obstructive CAD - s/p cath 04/19  c/w aspirin, statin, beta blocker      >Right loculated effusion- s/p VATS 04/30/2019, cytology negative  Ct chest noted - effusion and consolidation  CT surgery on board  Continue diuresis  Needs aggressive chest PT to clear lungs of secretions.  High O2 flow  procalcitonin 0.28 on 6/10  ID on board    >Moderate AS- Cardio f/u on d/c.     >Hx HTN /HLD- Continue Losartan 25mg, Atorvastatin 40mg , coreg     >DVT ppx- SCD, Lovenox This is 79 YO Man with a PMHx of COPD (on 3L home O2), CHF (EF 45-50%), CAD (non-obstructive on cath 04/19), Moderate AS, loculated pleural effusion s/p pigtail and Right VATS Decortication (04/30/19), HTN, HLD,   multiloculated right empyema with Peptostreptococcus Asaccharolyticus, who was discharged from [facility] to [facility] 4 days ago, was referred to the ER because of SOB. in the ER, patient in respiratory failure and CHF,   required Bi pap. Temp: 100. WBC: 16 00 with Neutro: 87. Lactate: 1.3.PCO2: 64. PO2: 89. Echo on 6/4/19: EF: 40%.     >Acute hypoxic respiratory failure/COPD  - r/o Pneumonia/Empyema  - Mucus plug   - High flow O2- might need to titrate  - Blood cultures pending  - RVP - not detected  - CT chest with right sided mucus plug, postobstructive atelectasis and/or pneumonia   - Procalcitonin level ordered 0.28- yesterday on 6/10  - Continue Zosyn  - D/C Vancomycin  - Trend Fever- Tmax=99.8  - Trend Leukocytosis =16.1  - BLE dopplers pending  - swallow evaluation ordered    > Chronic systolic chf   Diuretic. Bi pap. Carvedilol. Losartan.   TTE on last admission with moderately decreased LVSF, EF 40-45%.   - Continue PO Torsemide, Spironalactone  - Monitor SNa,     >COPD on home oxygen - in exacerbation.    - c/w nebs, high flow oxygen    >CAD - non obstructive CAD - s/p cath 04/19  c/w aspirin, statin, beta blocker      >Right loculated effusion- s/p VATS 04/30/2019, cytology negative  Ct chest noted - effusion and consolidation  CT surgery on board  Continue diuresis  Needs aggressive chest PT to clear lungs of secretions.  High O2 flow  procalcitonin 0.28 on 6/10  ID on board    >Moderate AS- Cardio f/u on d/c.     >Hx HTN /HLD- Continue Losartan 25mg, Atorvastatin 40mg , coreg     >DVT ppx- SCD, Lovenox

## 2019-06-12 NOTE — PROCEDURE NOTE - NSPROCDETAILS_GEN_ALL_CORE
dressing applied/flushes easily/blood seen on insertion/secured in place/sterile technique, catheter placed/location identified, draped/prepped, sterile technique used

## 2019-06-12 NOTE — CHART NOTE - NSCHARTNOTEFT_GEN_A_CORE
Delayed entry note: called by RN for chest pain, ordered EKG, BMP, CE and sublingual nitro.  Per RN, pt didn't have much improvement after nitro, was given Tylenol and chest pain began to subside.  EKG showing unchanged RBBB, no electrolyte derangements and cardiac enzymes negative.  Came to bedside, pt on BiPAP, not endorsing any pain at this time.

## 2019-06-13 NOTE — PROGRESS NOTE ADULT - SUBJECTIVE AND OBJECTIVE BOX
Subjective: Patient feeling better since bronchoscopy. Tolerating nasal cannula (3L) today. Right old chest tube site draining (started 6/11 PM). Denies chest pain, fever, or chills. No SOB today. States cough improved.     Vital Signs:  Vital Signs Last 24 Hrs  T(C): 36.9 (06-13-19 @ 08:54), Max: 36.9 (06-13-19 @ 08:54)  T(F): 98.4 (06-13-19 @ 08:54), Max: 98.4 (06-13-19 @ 08:54)  HR: 62 (06-13-19 @ 11:54) (62 - 84)  BP: 106/48 (06-13-19 @ 11:54) (106/48 - 145/72)  RR: 24 (06-13-19 @ 11:54) (16 - 24)  SpO2: 96% (06-13-19 @ 11:54) (96% - 99%) on (O2)    I&O's Detail    12 Jun 2019 07:01  -  13 Jun 2019 07:00  --------------------------------------------------------  IN:    Oral Fluid: 600 mL    Solution: 275 mL  Total IN: 875 mL    OUT:    Voided: 1751 mL  Total OUT: 1751 mL    Total NET: -876 mL      Exam  General: NAD  Neurology: Awake, nonfocal  Eyes: Scleras clear, PERRLA/ EOMI, Gross vision intact  ENT: Gross hearing intact, grossly patent pharynx, no stridor  Neck: Neck supple, trachea midline, No JVD  Respiratory: CTA B/L, diminished BS at right base, no wheezing/rales/rhonchi  CV: RRR, S1S2, no murmurs, rubs or gallops  Abdominal: Soft, NT, ND  Extremities: No edema  Skin: No Rashes, Hematoma, Ecchymosis  Psych: Oriented x 3, normal affect  Incisions: Right lateral old chest tube site slightly open and draining minimal seropurulent fluid, more expressed with palpation, nontender, minimal erythema, 2.5 inch tract packed with 1/4" iodoform packing.       Relevant labs, radiology and Medications reviewed                        9.6    9.6   )-----------( 261      ( 13 Jun 2019 05:10 )             32.6     06-13    145  |  94<L>  |  39.0<H>  ----------------------------<  134<H>  4.0   |  43.0<H>  |  1.21    Ca    8.9      13 Jun 2019 05:10  Mg     2.3     06-13        MEDICATIONS  (STANDING):  ALBUTerol/ipratropium for Nebulization 3 milliLiter(s) Nebulizer every 6 hours  aspirin  chewable 81 milliGRAM(s) Oral daily  atorvastatin 20 milliGRAM(s) Oral at bedtime  carvedilol 3.125 milliGRAM(s) Oral every 12 hours  docusate sodium 100 milliGRAM(s) Oral three times a day  enoxaparin Injectable 40 milliGRAM(s) SubCutaneous daily  losartan 25 milliGRAM(s) Oral daily  montelukast 10 milliGRAM(s) Oral daily  pantoprazole  Injectable 40 milliGRAM(s) IV Push daily  piperacillin/tazobactam IVPB. 3.375 Gram(s) IV Intermittent every 8 hours  predniSONE   Tablet 10 milliGRAM(s) Oral daily  saccharomyces boulardii 250 milliGRAM(s) Oral two times a day  sertraline 25 milliGRAM(s) Oral daily  spironolactone 25 milliGRAM(s) Oral daily  torsemide 20 milliGRAM(s) Oral daily    MEDICATIONS  (PRN):  acetaminophen   Tablet .. 650 milliGRAM(s) Oral every 6 hours PRN Temp greater or equal to 38C (100.4F)  ibuprofen  Tablet. 400 milliGRAM(s) Oral every 4 hours PRN Moderate Pain (4 - 6)  metoprolol tartrate Injectable 5 milliGRAM(s) IV Push every 6 hours PRN for SBP > 140 and/or HR > 100  oxyCODONE    5 mG/acetaminophen 325 mG 1 Tablet(s) Oral every 4 hours PRN Moderate Pain (4 - 6)  oxyCODONE    5 mG/acetaminophen 325 mG 2 Tablet(s) Oral every 6 hours PRN Severe Pain (7 - 10)  senna 2 Tablet(s) Oral at bedtime PRN Constipation        Assessment  78y Male  w/ PAST MEDICAL & SURGICAL HISTORY:  CHF (congestive heart failure)  CAD (coronary artery disease)  HLD (hyperlipidemia)  HTN (hypertension)  COPD (chronic obstructive pulmonary disease)  Stenosis of lumbosacral spine  H/O back injury  Artificial pacemaker   78y old  Male who presents with a chief complaint of SOB (12 Jun 2019 14:41)

## 2019-06-13 NOTE — PROGRESS NOTE ADULT - SUBJECTIVE AND OBJECTIVE BOX
Patient is a 78y old  Male who presents with a chief complaint of SOB (11 Jun 2019 16:20)    HPI: 79 y/o male with h/o CHF, Pacemaker, CAD, COPD stage 4, on home oxygen, s/p total right lung decortication, multiloculated right empyema with Peptostreptococcus Asaccharolyticus, who was discharged from [facility] to [facility]placed by [facility] 4 days ago, was referred to the ER because of SOB. in the ER, patient in respiratory failure and CHF, required Bi pap. Temp: 100. WBC: 16 00 with Neutro: 87. Lactate: 1.3.PCO2: 64. PO2: 89. Echo on 6/4/19: EF: 40%. (10 Jaspal 2019 09:46)    INTERVAL HPI/OVERNIGHT EVENTS:  Pt. is seen and examined  S/p bronchoscopy yesterday with CT surgery   Pt has been weaned off of HFNC, currently sating 98% on 3L NC  Still with some SOB but overall improve  Denies additional medical complaints   ROS negative  VSS     Vital Signs Last 24 Hrs  T(C): 36.9 (13 Jun 2019 08:54), Max: 36.9 (13 Jun 2019 08:54)  T(F): 98.4 (13 Jun 2019 08:54), Max: 98.4 (13 Jun 2019 08:54)  HR: 62 (13 Jun 2019 11:54) (62 - 84)  BP: 106/48 (13 Jun 2019 11:54) (106/48 - 145/72)  BP(mean): 88 (13 Jun 2019 05:28) (88 - 99)  RR: 24 (13 Jun 2019 11:54) (16 - 24)  SpO2: 96% (13 Jun 2019 11:54) (96% - 99%)    LABS                                   9.6    9.6   )-----------( 261      ( 13 Jun 2019 05:10 )             32.6     06-13    145  |  94<L>  |  39.0<H>  ----------------------------<  134<H>  4.0   |  43.0<H>  |  1.21    Ca    8.9      13 Jun 2019 05:10  Mg     2.3     06-13 Patient is a 78y old  Male who presents with a chief complaint of SOB (11 Jun 2019 16:20)    HPI: 79 y/o male with h/o CHF, Pacemaker, CAD, COPD stage 4, on home oxygen, s/p total right lung decortication, multiloculated right empyema with Peptostreptococcus Asaccharolyticus, who was discharged from [facility] to [facility] 4 days ago, was referred to the ER because of SOB. in the ER, patient in respiratory failure and CHF, required Bi pap. Temp: 100. WBC: 16 00 with Neutro: 87. Lactate: 1.3.PCO2: 64. PO2: 89. Echo on 6/4/19: EF: 40%. (10 Jaspal 2019 09:46)    INTERVAL HPI/OVERNIGHT EVENTS:  Pt. is seen and examined  S/p bronchoscopy yesterday with CT surgery   Pt has been weaned off of HFNC, currently sating 98% on 3L NC  Still with some SOB but overall improve  Denies additional medical complaints   ROS negative  VSS     Vital Signs Last 24 Hrs  T(C): 36.9 (13 Jun 2019 08:54), Max: 36.9 (13 Jun 2019 08:54)  T(F): 98.4 (13 Jun 2019 08:54), Max: 98.4 (13 Jun 2019 08:54)  HR: 62 (13 Jun 2019 11:54) (62 - 84)  BP: 106/48 (13 Jun 2019 11:54) (106/48 - 145/72)  BP(mean): 88 (13 Jun 2019 05:28) (88 - 99)  RR: 24 (13 Jun 2019 11:54) (16 - 24)  SpO2: 96% (13 Jun 2019 11:54) (96% - 99%)    PHYSICAL EXAM  General: NAD  Neurology: Awake, nonfocal  Eyes: Scleras clear, PERRLA/ EOMI  ENT: Gross hearing intact, grossly patent pharynx, no stridor  Neck: Neck supple, trachea midline, No JVD  Respiratory: CTA B/L, diminished BS at right base, no wheezing/rales/rhonchi  CV: RRR, S1S2, no murmurs, rubs or gallops  Abdominal: Soft, NT, ND  Extremities: No edema  Skin: No Rashes, Hematoma, Ecchymosis  Psych: Oriented x 3, normal affect      LABS                                   9.6    9.6   )-----------( 261      ( 13 Jun 2019 05:10 )             32.6     06-13    145  |  94<L>  |  39.0<H>  ----------------------------<  134<H>  4.0   |  43.0<H>  |  1.21    Ca    8.9      13 Jun 2019 05:10  Mg     2.3     06-13

## 2019-06-13 NOTE — PHYSICAL THERAPY INITIAL EVALUATION ADULT - ADDITIONAL COMMENTS
Pt lives on ground floor of daughter's house with his spouse. There are no steps to enter and none inside, he can stay on 1 level. He was not using and DME. Spouse has limited ability to physically assist pt and daughter works during the day.

## 2019-06-13 NOTE — PROGRESS NOTE ADULT - ASSESSMENT
77 y/o male with h/o CHF, Pacemaker, CAD, COPD stage 4, on home oxygen, s/p total right lung decortication, multiloculated right empyema with Peptostreptococcus Asaccharolyticus, who was discharged from [facility] to [facility] 4 days ago, was referred to the ER because of SOB. Found to have extensive mucous plugging of RML and RLL with multifocal segmental consolidative changes, atelectasis v. PNA with a mild R pleural effusion, and LLL nodularity. In the ER, patient in respiratory failure and CHF, required BIPAP. Pt was seen by CT surgery, s/p bronchoscopy. On IV abx for PNA.     1. Acute hypoxic respiratory failure/COPD  - CT chest reviewed with right sided mucus plug, postobstructive atelectasis and/or pneumonia  - Likely secondary to extensive mucous plugging/PNA  - S/p bronchoscopy with CT surgery on 6/12 and cultures NGTD  - Needs aggressive chest PT to clear lungs of secretions.  - Daily CXR  - Supplemental o2 being titrated. Weaned off of HFNC and now maintaining sats on 3L NC  - Continue IV abx   - Trend fever  - Leukocytosis resolve   - Blood cultures form 6/10 negative  - BLE dopplers negative     2. Chronic systolic chf   - Continue cardiac meds as prescribed   - TTE on last admission with moderately decreased LVSF, EF 40-45%.     3. COPD  - c/w nebs, supplemental o2 (on home oxygen) and prednisone    4. CAD - non obstructive CAD  - s/p cath 04/19  - c/w aspirin, statin, beta blocker      5. Right loculated effusion  - s/p VATS 04/30/2019 with chest tube, cytology negative  - Today CTsx changed packing to right lateral chest wound from^    6. Moderate AS  - Cardio f/u on d/c.     7. Hx HTN /HLD  - Continue home meds   - monitor bp     >DVT ppx- SCD, Lovenox 77 y/o male with h/o CHF, Pacemaker, CAD, COPD stage 4, on home oxygen, s/p total right lung decortication, multiloculated right empyema with Peptostreptococcus Asaccharolyticus, who was discharged from [facility] to [facility] 4 days ago, was referred to the ER because of SOB. Found to have extensive mucous plugging of RML and RLL with multifocal segmental consolidative changes, atelectasis v. PNA with a mild R pleural effusion, and LLL nodularity. In the ER, patient in respiratory failure and CHF, required BIPAP. Pt was seen by CT surgery, s/p bronchoscopy. On IV abx for PNA.     1. Acute hypoxic respiratory failure/COPD  - CT chest reviewed with right sided mucus plug, postobstructive atelectasis and/or pneumonia  - Likely secondary to extensive mucous plugging/PNA  - S/p bronchoscopy with CT surgery on 6/12 and cultures NGTD, follow cultures   - Needs aggressive chest PT to clear lungs of secretions.  - Daily CXR  - Supplemental o2 being titrated. Weaned off of HFNC and now maintaining sats on 3L NC  - Continue IV abx   - Trend fever  - Leukocytosis resolve   - Blood cultures form 6/10 negative  - BLE dopplers negative     2. Chronic systolic chf   - Continue cardiac meds as prescribed   - TTE on last admission with moderately decreased LVSF, EF 40-45%.     3. COPD  - c/w nebs, supplemental o2 (on home oxygen) and prednisone    4. CAD - non obstructive CAD  - s/p cath 04/19  - c/w aspirin, statin, beta blocker      5. Right loculated effusion  - s/p VATS 04/30/2019 with chest tube, cytology negative  - Today CTsx changed packing to right lateral chest wound from^    6. Moderate AS  - Cardio f/u on d/c.     7. Hx HTN /HLD  - Continue home meds   - monitor bp     >DVT ppx- SCD, Lovenox 79 y/o male with h/o CHF, Pacemaker, CAD, COPD stage 4, on home oxygen, s/p total right lung decortication, multiloculated right empyema with Peptostreptococcus Asaccharolyticus, who was discharged from [facility] to [facility] 4 days ago, was referred to the ER because of SOB. Found to have extensive mucous plugging of RML and RLL with multifocal segmental consolidative changes, atelectasis v. PNA with a mild R pleural effusion, and LLL nodularity. In the ER, patient in respiratory failure and CHF, required BIPAP. Pt was seen by CT surgery, s/p bronchoscopy. On IV abx for PNA.     1. Acute hypoxic respiratory failure/COPD  - CT chest reviewed with right sided mucus plug, postobstructive atelectasis and/or pneumonia  - Likely secondary to extensive mucous plugging/PNA  - S/p bronchoscopy with CT surgery on 6/12 and cultures NGTD, follow cultures   - Needs aggressive chest PT to clear lungs of secretions.  - Daily CXR  - Supplemental o2 being titrated. Weaned off of HFNC and now maintaining sats on 3L NC  - Continue IV abx   - Trend fever  - Leukocytosis resolve   - Blood cultures form 6/10 negative  - BLE dopplers negative     2. Chronic systolic chf   - Continue cardiac meds as prescribed   - TTE on last admission with moderately decreased LVSF, EF 40-45%.     3. COPD  - c/w nebs, supplemental o2 (on home oxygen) and prednisone    4. CAD - non obstructive CAD  - s/p cath 04/19, non-obstructive coronary arteries, non ischemic depressed LVEF   - c/w aspirin, statin, beta blocker      5. Right loculated effusion  - s/p VATS 04/30/2019 with chest tube, cytology negative  - Today CTsx changed packing to right lateral chest wound from^    6. Moderate AS  - Cardio f/u on d/c.     7. Hx HTN /HLD  - Continue home meds   - monitor bp     >DVT ppx- SCD, Lovenox 79 y/o male with h/o CHF, Pacemaker, CAD, COPD stage 4, on home oxygen, with recent admission to [facility] in april 2019 s/p total right lung decortication for multiloculated right empyema with Peptostreptococcus Asaccharolyticus, who was discharged from [facility] to [facility] 4 days ago, was referred to the ER because of SOB. Found to have extensive mucous plugging of RML and RLL with multifocal segmental consolidative changes, atelectasis v. PNA with a mild R pleural effusion, and LLL nodularity. In the ER, patient in respiratory failure and CHF, required BIPAP. Pt was seen by CT surgery, s/p bronchoscopy. On IV abx for PNA.     1. Acute hypoxic respiratory failure/COPD  - CT chest reviewed with right sided mucus plug, postobstructive atelectasis and/or pneumonia  - Likely secondary to extensive mucous plugging/PNA  - S/p bronchoscopy with CT surgery on 6/12 and cultures NGTD, follow cultures   - Needs aggressive chest PT to clear lungs of secretions.  - Daily CXR  - Supplemental o2 being titrated. Weaned off of HFNC and now maintaining sats on 3L NC  - Continue IV abx   - Trend fever  - Leukocytosis resolve   - Blood cultures form 6/10 negative  - BLE dopplers negative     2. Chronic systolic chf   - Continue cardiac meds as prescribed   - TTE on last admission with moderately decreased LVSF, EF 40-45%.     3. COPD  - c/w nebs, supplemental o2 (on home oxygen) and prednisone    4. CAD - non obstructive CAD  - s/p cath 04/19, non-obstructive coronary arteries, non ischemic depressed LVEF   - c/w aspirin, statin, beta blocker      5. Right loculated effusion  - s/p VATS 04/30/2019 with chest tube, cytology negative  - Today CTsx changed packing to right lateral chest wound from^    6. Moderate AS  - Cardio f/u on d/c.     7. Hx HTN /HLD  - Continue home meds   - monitor bp     >DVT ppx- SCD, Lovenox 79 y/o male with h/o CHF, Pacemaker, CAD, COPD stage 4, on home oxygen, with recent admission to [facility] in april 2019 s/p total right lung decortication for multiloculated right empyema with Peptostreptococcus Asaccharolyticus, who [facility], was referred to the ER because of SOB. Found to have extensive mucous plugging of RML and RLL with multifocal segmental consolidative changes, atelectasis v. PNA with a mild R pleural effusion, and LLL nodularity. In the ER, patient in respiratory failure and CHF, required BIPAP. Pt was seen by CT surgery, s/p bronchoscopy. On IV abx for PNA.     1. Acute hypoxic respiratory failure/COPD  - CT chest reviewed with right sided mucus plug, postobstructive atelectasis and/or pneumonia  - Likely secondary to extensive mucous plugging/PNA  - S/p bronchoscopy with CT surgery on 6/12 and cultures NGTD, follow cultures   - Needs aggressive chest PT to clear lungs of secretions.  - Daily CXR  - Supplemental o2 being titrated. Weaned off of HFNC and now maintaining sats on 3L NC  - Continue IV abx   - Trend fever  - Leukocytosis resolve   - Blood cultures form 6/10 negative  - BLE dopplers negative     2. Chronic systolic chf   - Continue cardiac meds as prescribed   - TTE on last admission with moderately decreased LVSF, EF 40-45%.     3. COPD  - c/w nebs, supplemental o2 (on home oxygen) and prednisone    4. CAD - non obstructive CAD  - s/p cath 04/19, non-obstructive coronary arteries, non ischemic depressed LVEF   - c/w aspirin, statin, beta blocker      5. Right loculated effusion  - s/p VATS 04/30/2019 with chest tube, cytology negative  - Today CTsx changed packing to right lateral chest wound     6. Moderate AS  - Cardio f/u on d/c.     7. Hx HTN /HLD  - Continue home meds   - monitor bp     >DVT ppx- SCD, Lovenox

## 2019-06-13 NOTE — PROGRESS NOTE ADULT - ATTENDING COMMENTS
Patient was seen and examined at bedside around 11 am. SOB is improving. Complaining of intermittent cough. Denies chest pain, palpitations, nausea or vomiting. Appetite is good. Improved breath sounds on right side on exam. Dressing at site of previous chest tube - no active discharge.  Continue current treatment.  CTS following.  PT recommending TOMMY.
Patient was seen and examined earlier this morning. Was doing well. Breathing is same, requiring high flow oxygen. Decreased air entry on right side.  He is s/p Bronchoscopy now. Currently sleeping.  Continue current treatment.   Repeat CXR and Labs in am.
Patient was seen and examined at bedside around 10 am. Feeling slightly better today. On High Flow NC. Decreased breath sounds on right side.  Continue current treatment.   Diet: Mechanical soft with thin liquids as per Speech Therapist.  Pending further recommendations from CTS.

## 2019-06-13 NOTE — PHYSICAL THERAPY INITIAL EVALUATION ADULT - PERTINENT HX OF CURRENT PROBLEM, REHAB EVAL
Pt is an 77 y/o male who presented from [facility] with SOB. (+) acute respiratory failure.

## 2019-06-13 NOTE — PROGRESS NOTE ADULT - ASSESSMENT
79 y/o male with h/o CHF, Pacemaker, CAD, COPD stage 4, on home oxygen (3 L), s/p total right lung decortication for multiloculated right empyema with Peptostreptococcus Asaccharolyticus on 4/30; who was readmitted from [facility] for SOB. Found to have extensive mucous plugging of RML and RLL with multifocal segmental consolidative changes- atelectasis v. PNA with a mild R pleural effusion, and LLL nodularity. S/P bronchoscopy 6/12. Cultures NGTD. 6/13 R lateral chest wound packed.       PLAN  Neuro: Pain management  Pulm: Encourage coughing, deep breathing and use of incentive spirometry. Wean to baseline supplemental oxygen as able. Daily CXR. C/W home COPD meds (albuterol, Singulair prednisone).  Cardio: Monitor telemetry/alarms. C/W ASA.   GI: Tolerating dysphagia diet. Continue stool softeners.  Renal: Monitor urine output, supplement electrolytes as needed.  Vasc: Lovenox SC/SCDs for DVT prophylaxis  Heme: Trend H/H.  ID: WBC 9. Day 4 of Zosyn. Change packing daily (1/4" iodoform packing) to right lateral wound. FU bronch cultures.   Therapy: OOB/ambulate. PT eval.   Disposition: Transfer to  today    D/W Dr. Agnelica Braun PA  Thoracic Surgery   #[phone]

## 2019-06-14 NOTE — PROGRESS NOTE ADULT - MS EXT PE MLT D E PC
no clubbing/pedal edema/no cyanosis
pedal edema/no cyanosis/no clubbing
no cyanosis/normal/no clubbing

## 2019-06-14 NOTE — PROGRESS NOTE ADULT - ASSESSMENT
79 y/o male with h/o CHF, Pacemaker, CAD, COPD stage 4, on home oxygen (3 L), s/p total right lung decortication for multiloculated right empyema with Peptostreptococcus Asaccharolyticus on 4/30; who was readmitted from [facility] for SOB. Found to have extensive mucous plugging of RML and RLL with multifocal segmental consolidative changes- atelectasis v. PNA with a mild R pleural effusion, and LLL nodularity. S/P bronchoscopy 6/12. Cultures NGTD. 6/13 R lateral chest wound packed. 6/14 chest packed daily, without gross pux       PLAN  Neuro: Pain management  Pulm: Encourage coughing, deep breathing and use of incentive spirometry. Wean to baseline supplemental oxygen as able. Daily CXR. C/W home COPD meds (albuterol, Singulair prednisone).  Cardio: Monitor telemetry/alarms. C/W ASA.   GI: Tolerating dysphagia diet. Continue stool softeners.  Renal: Monitor urine output, supplement electrolytes as needed.  Vasc: Lovenox SC/SCDs for DVT prophylaxis  Heme: Trend H/H.  ID: WBC 9. Day 5of Zosyn. Change packing daily (1/4" iodoform packing) to right lateral wound. FU bronch cultures.   Therapy: OOB/ambulate. PT eval.   Disposition: TOMMY fontenot

## 2019-06-14 NOTE — PROGRESS NOTE ADULT - SUBJECTIVE AND OBJECTIVE BOX
Heart failure    HPI:  79 y/o male with h/o CHF, Pacemaker, CAD, COPD stage 4, on home oxygen, s/p total right lung decortication, multiloculated right empyema with Peptostreptococcus Asaccharolyticus, who was discharged from [facility] to [facility] 4 days ago, was referred to the ER because of SOB. in the ER, patient in respiratory failure and CHF, required Bi pap. Temp: 100. WBC: 16 00 with Neutro: 87. Lactate: 1.3.PCO2: 64. PO2: 89. Echo on 6/4/19: EF: 40%. (10 Jaspal 2019 09:46)    Interval History:  Patient was seen and examined at bedside around 8:45 am. SOB is improving.   Denies chest pain, palpitations, headache, dizziness, visual symptoms, nausea, vomiting or abdominal pain.  As per RN, right chest wall wound is still draining serosanguinous discharge.     ROS:  As per interval history otherwise unremarkable.    PHYSICAL EXAM:  Vital Signs   T(C): 36.9 (14 Jun 2019 15:47), Max: 36.9 (14 Jun 2019 10:50)  T(F): 98.5 (14 Jun 2019 15:47), Max: 98.5 (14 Jun 2019 15:47)  HR: 78 (14 Jun 2019 17:39) (46 - 86)  BP: 142/92 (14 Jun 2019 17:39) (119/72 - 142/92)  RR: 20 (14 Jun 2019 15:47) (18 - 21)  SpO2: 98% (14 Jun 2019 15:47) (96% - 98%)  General: Elderly male sitting in bed comfortably. No acute distress  HEENT: EOMI. Clear conjunctivae. Moist mucus membrane  Neck: Supple.   Chest: Air entry improving bilaterally - no wheezing, rales or rhonchi. No chest wall tenderness.  Heart: Normal S1 & S2. RRR.   Abdomen: Soft. Non-tender. Non-distended. + BS  Ext: No pedal edema. No calf tenderness   Neuro: Active and alert. No focal deficit. No speech disorder  Skin: Warm and Dry  Psychiatry: Normal mood and affect    I&O's Summary    13 Jun 2019 07:01  -  14 Jun 2019 07:00  --------------------------------------------------------  IN: 200 mL / OUT: 1325 mL / NET: -1125 mL    14 Jun 2019 07:01  -  14 Jun 2019 18:16  --------------------------------------------------------  IN: 845 mL / OUT: 975 mL / NET: -130 mL    MEDICATIONS  (STANDING):  ALBUTerol/ipratropium for Nebulization 3 milliLiter(s) Nebulizer every 6 hours  aspirin  chewable 81 milliGRAM(s) Oral daily  atorvastatin 20 milliGRAM(s) Oral at bedtime  carvedilol 3.125 milliGRAM(s) Oral every 12 hours  docusate sodium 100 milliGRAM(s) Oral three times a day  enoxaparin Injectable 40 milliGRAM(s) SubCutaneous daily  losartan 25 milliGRAM(s) Oral daily  montelukast 10 milliGRAM(s) Oral daily  pantoprazole  Injectable 40 milliGRAM(s) IV Push daily  piperacillin/tazobactam IVPB. 3.375 Gram(s) IV Intermittent every 8 hours  predniSONE   Tablet 10 milliGRAM(s) Oral daily  saccharomyces boulardii 250 milliGRAM(s) Oral two times a day  sertraline 25 milliGRAM(s) Oral daily  spironolactone 25 milliGRAM(s) Oral daily  torsemide 20 milliGRAM(s) Oral daily    MEDICATIONS  (PRN):  acetaminophen   Tablet .. 650 milliGRAM(s) Oral every 6 hours PRN Temp greater or equal to 38C (100.4F)  ibuprofen  Tablet. 400 milliGRAM(s) Oral every 4 hours PRN Moderate Pain (4 - 6)  metoprolol tartrate Injectable 5 milliGRAM(s) IV Push every 6 hours PRN for SBP > 140 and/or HR > 100  oxyCODONE    5 mG/acetaminophen 325 mG 1 Tablet(s) Oral every 4 hours PRN Moderate Pain (4 - 6)  oxyCODONE    5 mG/acetaminophen 325 mG 2 Tablet(s) Oral every 6 hours PRN Severe Pain (7 - 10)  senna 2 Tablet(s) Oral at bedtime PRN Constipation  zaleplon 5 milliGRAM(s) Oral at bedtime PRN Insomnia    LABS:                        10.3   8.6   )-----------( 229      ( 14 Jun 2019 06:19 )             34.3     06-14    147<H>  |  95<L>  |  35.0<H>  ----------------------------<  94  3.4<L>   |  41.0<H>  |  0.98    Ca    9.3      14 Jun 2019 06:19  Mg     2.1     06-14    RADIOLOGY & ADDITIONAL STUDIES:  Reviewed

## 2019-06-14 NOTE — PROGRESS NOTE ADULT - SUBJECTIVE AND OBJECTIVE BOX
SUBJECTIVE   "I feel better"  without acute complaints   found eating breakfast in bed     INTERIM HISTORY SIGNIFICANT FOR   right sided chest wound packed daily   drainage decreasing   minimal serousang drainage found on examination site without gross pus or discharge     Patient is a 78y old  Male who presents with a chief complaint of SOB (13 Jun 2019 13:01)    HPI:  77 y/o male with h/o CHF, Pacemaker, CAD, COPD stage 4, on home oxygen, s/p total right lung decortication, multiloculated right empyema with Peptostreptococcus Asaccharolyticus, who was discharged from [facility] to [facility] 4 days ago, was referred to the ER because of SOB. in the ER, patient in respiratory failure and CHF, required Bi pap. Temp: 100. WBC: 16 00 with Neutro: 87. Lactate: 1.3.PCO2: 64. PO2: 89. Echo on 6/4/19: EF: 40%. (10 Jaspal 2019 09:46)    OBJECTIVE  PAST MEDICAL & SURGICAL HISTORY:  CHF (congestive heart failure)  CAD (coronary artery disease)  HLD (hyperlipidemia)  HTN (hypertension)  COPD (chronic obstructive pulmonary disease)  Stenosis of lumbosacral spine  H/O back injury  Artificial pacemaker    No Known Allergies    Home Medications:  acetaminophen 325 mg oral tablet: 2 tab(s) orally every 6 hours, As needed, Temp greater or equal to 38C (100.4F), Mild Pain (1 - 3) (01 Jun 2019 14:44)  albuterol:  inhaled  (01 Jun 2019 14:44)  aspirin 81 mg oral delayed release tablet: 1 tab(s) orally once a day (07 Jun 2019 11:05)  carvedilol 3.125 mg oral tablet: 1 tab(s) orally every 12 hours (07 Jun 2019 11:05)  docusate sodium 100 mg oral capsule: 1 cap(s) orally 3 times a day (01 Jun 2019 14:44)  guaiFENesin 600 mg oral tablet, extended release: 1 tab(s) orally every 12 hours, As needed, Cough (07 Jun 2019 11:06)  Lipitor:  orally  (01 Jun 2019 14:44)  losartan:  orally  (01 Jun 2019 14:44)  predniSONE 10 mg oral tablet: 1 tab(s) orally once a day (01 Jun 2019 14:44)  senna oral tablet: 2 tab(s) orally once a day (at bedtime) (01 Jun 2019 14:44)  sertraline 25 mg oral tablet: 1 tab(s) orally once a day (07 Jun 2019 11:05)  Singulair:  orally  (01 Jun 2019 14:44)  spironolactone 25 mg oral tablet: 1 tab(s) orally once a day (07 Jun 2019 11:05)  torsemide 20 mg oral tablet: 1 tab(s) orally once a day (07 Jun 2019 11:05)  Zanaflex:  orally  (01 Jun 2019 14:44)    VITALS  Currently in sinus rhythm with vitals as below  ICU Vital Signs Last 24 Hrs  T(C): 36.9 (14 Jun 2019 10:50), Max: 36.9 (14 Jun 2019 10:50)  T(F): 98.4 (14 Jun 2019 10:50), Max: 98.4 (14 Jun 2019 10:50)  HR: 74 (14 Jun 2019 10:50) (46 - 86)  BP: 119/72 (14 Jun 2019 10:50) (119/72 - 155/93)  BP(mean): --  ABP: --  ABP(mean): --  RR: 18 (14 Jun 2019 10:50) (18 - 25)  SpO2: 96% (14 Jun 2019 09:22) (92% - 100%)    Adult Advanced Hemodynamics Last 24 Hrs  CVP(mm Hg): --  CVP(cm H2O): --  CO: --  CI: --  PA: --  PA(mean): --  PCWP: --  SVR: --  SVRI: --  PVR: --  PVRI: --  LABS                        10.3   8.6   )-----------( 229      ( 14 Jun 2019 06:19 )             34.3     Culture - Acid Fast - Bronchial w/Smear (collected 13 Jun 2019 18:20)  Source: .Bronchial Bronchial lavage    Culture - Bronchial (collected 12 Jun 2019 14:47)  Source: .Bronchial Bronchial lavage  Gram Stain (12 Jun 2019 23:21):    Moderate White blood cells    No organisms seen  Preliminary Report (13 Jun 2019 16:55):    No growth at 1 day.    Culture in progress    06-14    147<H>  |  95<L>  |  35.0<H>  ----------------------------<  94  3.4<L>   |  41.0<H>  |  0.98    Ca    9.3      14 Jun 2019 06:19  Mg     2.1     06-14    CAPILLARY BLOOD GLUCOSE              IN/OUT    06-13-19 @ 07:01  -  06-14-19 @ 07:00  --------------------------------------------------------  IN: 200 mL / OUT: 1325 mL / NET: -1125 mL    06-14-19 @ 07:01  -  06-14-19 @ 14:35  --------------------------------------------------------  IN: 795 mL / OUT: 550 mL / NET: 245 mL      IMAGING  personally reviewed imaging   Xray Chest 1 View- PORTABLE-Routine:    EXAM:  XR CHEST PORTABLE ROUTINE 1V                          PROCEDURE DATE:  06/14/2019          INTERPRETATION:  CHEST AP PORTABLE:    History: s/p bronch 6/12.     Date and time of exam: 6/14/2019 4:05 AM.    Technique: A single AP view of the chest was obtained.    Comparison exam: 6/13/2019 12:57 PM.    Findings:  The left lung is clear. Right pleural effusion and right basilar   atelectasis, unchanged. Cardio megaly, unchanged. No hilar or mediastinal   abnormality..    Impression:  Stable exam without significant change since the previous study..                YARI PRADO M.D., ATTENDING RADIOLOGIST  This document has been electronically signed. Jun 14 2019  9:04AM               (06-14-19 @ 05:37)    CURRENT MEDICATIONS  MEDICATIONS  (STANDING):  ALBUTerol/ipratropium for Nebulization 3 milliLiter(s) Nebulizer every 6 hours  aspirin  chewable 81 milliGRAM(s) Oral daily  atorvastatin 20 milliGRAM(s) Oral at bedtime  carvedilol 3.125 milliGRAM(s) Oral every 12 hours  docusate sodium 100 milliGRAM(s) Oral three times a day  enoxaparin Injectable 40 milliGRAM(s) SubCutaneous daily  losartan 25 milliGRAM(s) Oral daily  montelukast 10 milliGRAM(s) Oral daily  pantoprazole  Injectable 40 milliGRAM(s) IV Push daily  piperacillin/tazobactam IVPB. 3.375 Gram(s) IV Intermittent every 8 hours  predniSONE   Tablet 10 milliGRAM(s) Oral daily  saccharomyces boulardii 250 milliGRAM(s) Oral two times a day  sertraline 25 milliGRAM(s) Oral daily  spironolactone 25 milliGRAM(s) Oral daily  torsemide 20 milliGRAM(s) Oral daily    MEDICATIONS  (PRN):  acetaminophen   Tablet .. 650 milliGRAM(s) Oral every 6 hours PRN Temp greater or equal to 38C (100.4F)  ibuprofen  Tablet. 400 milliGRAM(s) Oral every 4 hours PRN Moderate Pain (4 - 6)  metoprolol tartrate Injectable 5 milliGRAM(s) IV Push every 6 hours PRN for SBP > 140 and/or HR > 100  oxyCODONE    5 mG/acetaminophen 325 mG 1 Tablet(s) Oral every 4 hours PRN Moderate Pain (4 - 6)  oxyCODONE    5 mG/acetaminophen 325 mG 2 Tablet(s) Oral every 6 hours PRN Severe Pain (7 - 10)  senna 2 Tablet(s) Oral at bedtime PRN Constipation

## 2019-06-14 NOTE — PROGRESS NOTE ADULT - ASSESSMENT
77y/o  Male with h/o CHF, Pacemaker, CAD, COPD stage 4, on home oxygen, s/p total right lung decortication, multiloculated right empyema with Peptostreptococcus Asaccharolyticus s/p Zosyn. Patient was recently discharged from [facility] to [facility]. Sent back to the ER on 6/10 due to worsening SOB. In the ER he was noted to be in respiratory distress and was placed on BIPAP.       Acute hypoxic respiratory failure  r/o Pneumonia/Empyema  Mucus plug   COPD      - Blood cultures Negative  - RVP negative  - CT chest with right sided mucus plug, postobstructive atelectasis and/or pneumonia   - S/P Bronch 6/12  - Bronch cultures so far negative  - Procalcitonin level  0.28  - Continue Zosyn, till 6/16/19  - Trend Fever  - Trend Leukocytosis      Will Sign off. Please call PRN.

## 2019-06-14 NOTE — PROGRESS NOTE ADULT - SUBJECTIVE AND OBJECTIVE BOX
[facility]  INFECTIOUS DISEASES AND INTERNAL MEDICINE at [place]  =======================================================  Onesimo Arauz MD  Diplomates American Board of Internal Medicine and Infectious Diseases  Tel: [phone]      Fax: [phone]  =======================================================    JOHN CIFUENTES [id number]    Follow up: Pneumonia      Allergies:  No Known Allergies      Antibiotics:  piperacillin/tazobactam IVPB. 3.375 Gram(s) IV Intermittent every 8 hours        REVIEW OF SYSTEMS:  CONSTITUTIONAL:  No Fever or chills  HEENT:   No diplopia or blurred vision.  No earache, sore throat or runny nose.  CARDIOVASCULAR:  No pressure, squeezing, strangling, tightness, heaviness or aching about the chest, neck, axilla or epigastrium.  RESPIRATORY:  improved shortness of breath  GASTROINTESTINAL:  No nausea, vomiting or diarrhea.  GENITOURINARY:  No dysuria, frequency or urgency.  MUSCULOSKELETAL:  no joint aches, no muscle pain  SKIN:  No change in skin, hair or nails.  NEUROLOGIC:  No Headaches, seizures or weakness.  PSYCHIATRIC:  No disorder of thought or mood.  ENDOCRINE:  No heat or cold intolerance  HEMATOLOGICAL:  No easy bruising or bleeding.       Physical Exam:  Vital Signs Last 24 Hrs  T(C): 36.9 (14 Jun 2019 10:50), Max: 36.9 (14 Jun 2019 10:50)  T(F): 98.4 (14 Jun 2019 10:50), Max: 98.4 (14 Jun 2019 10:50)  HR: 74 (14 Jun 2019 10:50) (46 - 86)  BP: 119/72 (14 Jun 2019 10:50) (119/72 - 155/93)  RR: 18 (14 Jun 2019 10:50) (18 - 25)  SpO2: 96% (14 Jun 2019 09:22) (92% - 100%)      GEN: NAD, pleasant  HEENT: normocephalic and atraumatic. EOMI. PERRL.  Anicteric   NECK: Supple.   LUNGS: Diffuse expiratory wheeze   HEART: Regular rate and rhythm  ABDOMEN: Soft, nontender, and nondistended.  Positive bowel sounds.    : No CVA tenderness  EXTREMITIES: Without any edema.  MSK: no joint swelling  NEUROLOGIC: Awake alert, following commands and answers questions   PSYCHIATRIC: Appropriate affect .  SKIN: No Rash      Labs:  06-14    147<H>  |  95<L>  |  35.0<H>  ----------------------------<  94  3.4<L>   |  41.0<H>  |  0.98    Ca    9.3      14 Jun 2019 06:19  Mg     2.1     06-14                          10.3   8.6   )-----------( 229      ( 14 Jun 2019 06:19 )             34.3       Procalcitonin, Serum (06.10.19 @ 19:20)    Procalcitonin, Serum: 0.28: Reference range change as of 3/21/2019 ng/mL      RECENT CULTURES:  06-12 @ 14:47 .Bronchial Bronchial lavage     No growth at 2 days.  No Routine respiratory angeli present  Moderate White blood cells  No organisms seen      06-10 @ 19:20 .Blood     No growth at 48 hours      06-10 @ 17:15    RVP  NotDetec      06-10 @ 06:14 .Blood     No growth at 48 hours

## 2019-06-14 NOTE — PROGRESS NOTE ADULT - ASSESSMENT
77 y/o male with h/o CHF, Pacemaker, CAD, COPD stage 4, on home oxygen, with recent admission to [facility] in april 2019 s/p total right lung decortication for multiloculated right empyema with Peptostreptococcus Asaccharolyticus, who was discharged from [facility] to [facility] 4 days ago, was referred to the ER because of SOB. Found to have extensive mucous plugging of RML and RLL with multifocal segmental consolidative changes, atelectasis v. PNA with a mild R pleural effusion, and LLL nodularity. In the ER, patient in respiratory failure and CHF, required BIPAP. Pt was seen by CT surgery, s/p bronchoscopy. On IV abx for PNA.     1. Acute hypoxic respiratory failure  - CT chest reviewed with right sided mucus plug, postobstructive atelectasis and/or pneumonia  - Likely secondary to extensive mucous plugging/PNA  - S/p bronchoscopy with CT surgery on 6/12 and cultures negative  - Needs aggressive chest PT to clear lungs of secretions.  - Daily CXR  - Supplemental O2 being titrated. Weaned off of HFNC and now maintaining sats on 3L NC  - Continue Zosyn till 6/16/19  - Trend fever  - Leukocytosis resolve   - Blood cultures form 6/10 negative  - BLE dopplers negative     2. Right loculated effusion  - s/p VATS 04/30/2019 with chest tube, cytology negative  - Today CTsx changed packing to right lateral chest wound     3. COPD  - c/w nebs, supplemental o2 (on home oxygen) and prednisone    4. Chronic Systolic CHF  - Continue Torsemide, Aldactone, Losartan and Coreg   - TTE on last admission with moderately decreased LVSF, EF 40-45%.     5. CAD - non obstructive CAD  - s/p cath 04/19, non-obstructive coronary arteries, non ischemic depressed LVEF   - c/w aspirin, statin, beta blocker      6. Moderate AS  - Cardio f/u on d/c.     7. Hx HTN /HLD  - Continue Losartan, Coreg and Lipitor  - monitor bp     DVT Prophylaxis -- Lovenox 40 mg    Dispo: [facility] likely in 3-4 days

## 2019-06-14 NOTE — PROGRESS NOTE ADULT - PROBLEM SELECTOR PROBLEM 3
R/O Pneumonia of right lower lobe due to infectious organism
R/O Pneumonia of right lower lobe due to infectious organism

## 2019-06-15 NOTE — PROGRESS NOTE ADULT - SUBJECTIVE AND OBJECTIVE BOX
Heart failure    HPI:  79 y/o male with h/o CHF, Pacemaker, CAD, COPD stage 4, on home oxygen, s/p total right lung decortication, multiloculated right empyema with Peptostreptococcus Asaccharolyticus, who was discharged from [facility] to [facility] 4 days ago, was referred to the ER because of SOB. in the ER, patient in respiratory failure and CHF, required Bi pap. Temp: 100. WBC: 16 00 with Neutro: 87. Lactate: 1.3.PCO2: 64. PO2: 89. Echo on 6/4/19: EF: 40%. (10 Jaspal 2019 09:46)    Interval History:  Patient was seen and examined at bedside  SOB is improving. On NC  Denies chest pain, palpitations, headache, dizziness, visual symptoms, nausea, vomiting or abdominal pain.      ROS:  As per interval history otherwise unremarkable.    PHYSICAL EXAM:  Vital Signs   T(C): 36.9 (14 Jun 2019 15:47), Max: 36.9 (14 Jun 2019 10:50)  T(F): 98.5 (14 Jun 2019 15:47), Max: 98.5 (14 Jun 2019 15:47)  HR: 78 (14 Jun 2019 17:39) (46 - 86)  BP: 142/92 (14 Jun 2019 17:39) (119/72 - 142/92)  RR: 20 (14 Jun 2019 15:47) (18 - 21)  SpO2: 98% (14 Jun 2019 15:47) (96% - 98%)  General: Elderly male sitting in bed comfortably. No acute distress  HEENT: EOMI. Clear conjunctivae. Moist mucus membrane  Neck: Supple.   Chest: Air entry improving bilaterally - no wheezing, rales or rhonchi. No chest wall tenderness. Chest wall wound C/D/I  Heart: Normal S1 & S2. RRR.   Abdomen: Soft. Non-tender. Non-distended. + BS  Ext: No pedal edema. No calf tenderness   Neuro: Active and alert. No focal deficit. No speech disorder  Skin: Warm and Dry  Psychiatry: Normal mood and affect    I&O's Summary    13 Jun 2019 07:01  -  14 Jun 2019 07:00  --------------------------------------------------------  IN: 200 mL / OUT: 1325 mL / NET: -1125 mL    14 Jun 2019 07:01  -  14 Jun 2019 18:16  --------------------------------------------------------  IN: 845 mL / OUT: 975 mL / NET: -130 mL    MEDICATIONS  (STANDING):  ALBUTerol/ipratropium for Nebulization 3 milliLiter(s) Nebulizer every 6 hours  aspirin  chewable 81 milliGRAM(s) Oral daily  atorvastatin 20 milliGRAM(s) Oral at bedtime  carvedilol 3.125 milliGRAM(s) Oral every 12 hours  docusate sodium 100 milliGRAM(s) Oral three times a day  enoxaparin Injectable 40 milliGRAM(s) SubCutaneous daily  losartan 25 milliGRAM(s) Oral daily  montelukast 10 milliGRAM(s) Oral daily  pantoprazole  Injectable 40 milliGRAM(s) IV Push daily  piperacillin/tazobactam IVPB. 3.375 Gram(s) IV Intermittent every 8 hours  predniSONE   Tablet 10 milliGRAM(s) Oral daily  saccharomyces boulardii 250 milliGRAM(s) Oral two times a day  sertraline 25 milliGRAM(s) Oral daily  spironolactone 25 milliGRAM(s) Oral daily  torsemide 20 milliGRAM(s) Oral daily    MEDICATIONS  (PRN):  acetaminophen   Tablet .. 650 milliGRAM(s) Oral every 6 hours PRN Temp greater or equal to 38C (100.4F)  ibuprofen  Tablet. 400 milliGRAM(s) Oral every 4 hours PRN Moderate Pain (4 - 6)  metoprolol tartrate Injectable 5 milliGRAM(s) IV Push every 6 hours PRN for SBP > 140 and/or HR > 100  oxyCODONE    5 mG/acetaminophen 325 mG 1 Tablet(s) Oral every 4 hours PRN Moderate Pain (4 - 6)  oxyCODONE    5 mG/acetaminophen 325 mG 2 Tablet(s) Oral every 6 hours PRN Severe Pain (7 - 10)  senna 2 Tablet(s) Oral at bedtime PRN Constipation  zaleplon 5 milliGRAM(s) Oral at bedtime PRN Insomnia    LABS:                        10.3   8.6   )-----------( 229      ( 14 Jun 2019 06:19 )             34.3     06-14    147<H>  |  95<L>  |  35.0<H>  ----------------------------<  94  3.4<L>   |  41.0<H>  |  0.98    Ca    9.3      14 Jun 2019 06:19  Mg     2.1     06-14    RADIOLOGY & ADDITIONAL STUDIES:  Reviewed

## 2019-06-15 NOTE — PROGRESS NOTE ADULT - ASSESSMENT
79 y/o male with h/o CHF, Pacemaker, CAD, COPD stage 4, on home oxygen, with recent admission to [facility] in april 2019 s/p total right lung decortication for multiloculated right empyema with Peptostreptococcus Asaccharolyticus, who was discharged from [facility] to [facility] 4 days ago, was referred to the ER because of SOB. Found to have extensive mucous plugging of RML and RLL with multifocal segmental consolidative changes, atelectasis v. PNA with a mild R pleural effusion, and LLL nodularity. In the ER, patient in respiratory failure and CHF, required BIPAP. Pt was seen by CT surgery, s/p bronchoscopy. On IV abx for PNA.     1. Acute hypoxic respiratory failure  - CT chest reviewed with right sided mucus plug, postobstructive atelectasis and/or pneumonia  - Likely secondary to extensive mucous plugging/PNA  - S/p bronchoscopy with CT surgery on 6/12 and cultures negative  - Needs aggressive chest PT to clear lungs of secretions.  - Daily CXR  - Supplemental O2 being titrated. Weaned off of HFNC and now maintaining sats on 3L NC  - Continue Zosyn till 6/16/19  - Trend fever  - Leukocytosis resolve   - Blood cultures form 6/10 negative  - BLE dopplers negative     2. Right loculated effusion  - s/p VATS 04/30/2019 with chest tube, cytology negative  - Today CTsx changed packing to right lateral chest wound     3. COPD  - c/w nebs, supplemental o2 (on home oxygen) and prednisone    4. Chronic Systolic CHF  - Continue Torsemide, Aldactone, Losartan and Coreg   - TTE on last admission with moderately decreased LVSF, EF 40-45%.     5. CAD - non obstructive CAD  - s/p cath 04/19, non-obstructive coronary arteries, non ischemic depressed LVEF   - c/w aspirin, statin, beta blocker      6. Moderate AS  - Cardio f/u on d/c.     7. Hx HTN /HLD  - Continue Losartan, Coreg and Lipitor  - monitor bp     DVT Prophylaxis -- Lovenox 40 mg    Dispo: [facility] likely in 3-4 days

## 2019-06-15 NOTE — DIETITIAN INITIAL EVALUATION ADULT. - OTHER INFO
Pt admitted for pneumonia, pleural effusion, CHF, COPD. Pt recently discharged from this hospital last week for similar issue. Per SLP evaluation (6/11)- Pt needs mechanical soft diet (Pt dislikes the food) however intake good 100% PO. Noted 3+ BLLE edema. Pt declined further diet education (received last week during admission).

## 2019-06-16 NOTE — PROGRESS NOTE ADULT - SUBJECTIVE AND OBJECTIVE BOX
Heart failure    HPI:  77 y/o male with h/o CHF, Pacemaker, CAD, COPD stage 4, on home oxygen, s/p total right lung decortication, multiloculated right empyema with Peptostreptococcus Asaccharolyticus, who was discharged from [facility] to [facility] 4 days ago, was referred to the ER because of SOB. in the ER, patient in respiratory failure and CHF, required Bi pap. Temp: 100. WBC: 16 00 with Neutro: 87. Lactate: 1.3.PCO2: 64. PO2: 89. Echo on 6/4/19: EF: 40%. (10 Jaspal 2019 09:46)    Interval History:  Patient was seen and examined at bedside  SOB is improving. On NC.  No overnight issues reported   Denies chest pain, palpitations, headache, dizziness, visual symptoms, nausea, vomiting or abdominal pain.      ROS:  As per interval history otherwise unremarkable.    PHYSICAL EXAM:  Vital Signs   T(C): 36.9 (14 Jun 2019 15:47), Max: 36.9 (14 Jun 2019 10:50)  T(F): 98.5 (14 Jun 2019 15:47), Max: 98.5 (14 Jun 2019 15:47)  HR: 78 (14 Jun 2019 17:39) (46 - 86)  BP: 142/92 (14 Jun 2019 17:39) (119/72 - 142/92)  RR: 20 (14 Jun 2019 15:47) (18 - 21)  SpO2: 98% (14 Jun 2019 15:47) (96% - 98%)      General: Elderly male sitting in bed comfortably. No acute distress  HEENT: EOMI. Clear conjunctivae. Moist mucus membrane  Neck: Supple.   Chest: Air entry improving bilaterally - no wheezing, rales or rhonchi. No chest wall tenderness. Chest wall wound C/D/I  Heart: Normal S1 & S2. RRR.   Abdomen: Soft. Non-tender. Non-distended. + BS  Ext: No pedal edema. No calf tenderness   Neuro: Active and alert. No focal deficit. No speech disorder  Skin: Warm and Dry  Psychiatry: Normal mood and affect    I&O's Summary    13 Jun 2019 07:01  -  14 Jun 2019 07:00  --------------------------------------------------------  IN: 200 mL / OUT: 1325 mL / NET: -1125 mL    14 Jun 2019 07:01  -  14 Jun 2019 18:16  --------------------------------------------------------  IN: 845 mL / OUT: 975 mL / NET: -130 mL    MEDICATIONS  (STANDING):  ALBUTerol/ipratropium for Nebulization 3 milliLiter(s) Nebulizer every 6 hours  aspirin  chewable 81 milliGRAM(s) Oral daily  atorvastatin 20 milliGRAM(s) Oral at bedtime  carvedilol 3.125 milliGRAM(s) Oral every 12 hours  docusate sodium 100 milliGRAM(s) Oral three times a day  enoxaparin Injectable 40 milliGRAM(s) SubCutaneous daily  losartan 25 milliGRAM(s) Oral daily  montelukast 10 milliGRAM(s) Oral daily  pantoprazole  Injectable 40 milliGRAM(s) IV Push daily  piperacillin/tazobactam IVPB. 3.375 Gram(s) IV Intermittent every 8 hours  predniSONE   Tablet 10 milliGRAM(s) Oral daily  saccharomyces boulardii 250 milliGRAM(s) Oral two times a day  sertraline 25 milliGRAM(s) Oral daily  spironolactone 25 milliGRAM(s) Oral daily  torsemide 20 milliGRAM(s) Oral daily    MEDICATIONS  (PRN):  acetaminophen   Tablet .. 650 milliGRAM(s) Oral every 6 hours PRN Temp greater or equal to 38C (100.4F)  ibuprofen  Tablet. 400 milliGRAM(s) Oral every 4 hours PRN Moderate Pain (4 - 6)  metoprolol tartrate Injectable 5 milliGRAM(s) IV Push every 6 hours PRN for SBP > 140 and/or HR > 100  oxyCODONE    5 mG/acetaminophen 325 mG 1 Tablet(s) Oral every 4 hours PRN Moderate Pain (4 - 6)  oxyCODONE    5 mG/acetaminophen 325 mG 2 Tablet(s) Oral every 6 hours PRN Severe Pain (7 - 10)  senna 2 Tablet(s) Oral at bedtime PRN Constipation  zaleplon 5 milliGRAM(s) Oral at bedtime PRN Insomnia    LABS:                        10.3   8.6   )-----------( 229      ( 14 Jun 2019 06:19 )             34.3     06-14    147<H>  |  95<L>  |  35.0<H>  ----------------------------<  94  3.4<L>   |  41.0<H>  |  0.98    Ca    9.3      14 Jun 2019 06:19  Mg     2.1     06-14    RADIOLOGY & ADDITIONAL STUDIES:  Reviewed

## 2019-06-16 NOTE — PROGRESS NOTE ADULT - ASSESSMENT
77 y/o male with h/o CHF, Pacemaker, CAD, COPD stage 4, on home oxygen, with recent admission to [facility] in april 2019 s/p total right lung decortication for multiloculated right empyema with Peptostreptococcus Asaccharolyticus, who was discharged from [facility] to [facility] 4 days ago, was referred to the ER because of SOB. Found to have extensive mucous plugging of RML and RLL with multifocal segmental consolidative changes, atelectasis v. PNA with a mild R pleural effusion, and LLL nodularity. In the ER, patient in respiratory failure and CHF, required BIPAP. Pt was seen by CT surgery, s/p bronchoscopy. On IV abx for PNA.     1. Acute hypoxic respiratory failure  - CT chest reviewed with right sided mucus plug, postobstructive atelectasis and/or pneumonia  - Likely secondary to extensive mucous plugging/PNA  - S/p bronchoscopy with CT surgery on 6/12 and cultures negative  - Needs aggressive chest PT to clear lungs of secretions.  - Daily CXR  - Supplemental O2 being titrated. Weaned off of HFNC and now maintaining sats on 3L NC  - Continue Zosyn till 6/16/19  - Trend fever  - Leukocytosis resolve   - Blood cultures form 6/10 negative  - BLE dopplers negative     2. Right loculated effusion  - s/p VATS 04/30/2019 with chest tube, cytology negative  - Today CTsx changed packing to right lateral chest wound     3. COPD  - c/w nebs, supplemental o2 (on home oxygen) and prednisone    4. Chronic Systolic CHF  - Continue Aldactone, Losartan and Coreg   Hold Torsemide as pt now with contraction alkalosis, hypernatremia & increasing BUN, f/u BMP  - TTE on last admission with moderately decreased LVSF, EF 40-45%.     5. CAD - non obstructive CAD  - s/p cath 04/19, non-obstructive coronary arteries, non ischemic depressed LVEF   - c/w aspirin, statin, beta blocker      6. Moderate AS  - Cardio f/u on d/c.     7. Hx HTN /HLD  - Continue Losartan, Coreg and Lipitor  - monitor bp     NSVT on tele- 5 beats, asymptomatic. Keep K > 4, Mag > 2. c/w coreg.  TTE on last admission with moderately decreased LVSF, EF 40-45%.     DVT Prophylaxis -- Lovenox 40 mg    Dispo: [facility] in am

## 2019-06-16 NOTE — CHART NOTE - NSCHARTNOTEFT_GEN_A_CORE
No further plans from a CT surgery standpoint.  Will sign off.  Please reconsult as necessary.  Discussed with Dr. Dominguez.

## 2019-06-17 NOTE — PROGRESS NOTE ADULT - NSHPATTENDINGPLANDISCUSS_GEN_ALL_CORE
Dr Coe
Patient, CTS and RN
Patient, and RN
Patient, and RN
pt, rn, CM
Patient, PA and RN
Patient, CTS, PA and RN
Patient, PA and RN

## 2019-06-17 NOTE — DISCHARGE NOTE PROVIDER - CARE PROVIDERS DIRECT ADDRESSES
,[email or web address]

## 2019-06-17 NOTE — DISCHARGE NOTE PROVIDER - NSDCCPCAREPLAN_GEN_ALL_CORE_FT
PRINCIPAL DISCHARGE DIAGNOSIS  Diagnosis: CHF (congestive heart failure)  Assessment and Plan of Treatment: Hold Torsemide 20 daily as pt now with contraction alkalosis, hypernatremia & increasing BUN; hypernatremia now resolved. Resume torsemide when resolution of contraction alkalosis or as clinically indicated. Follow up with your primary doctor within 1 week of discharge & cardiology. Consider adding demadex if no change in alkalosis      SECONDARY DISCHARGE DIAGNOSES  Diagnosis: Pneumonia  Assessment and Plan of Treatment: Follow up with your primary doctor within 1 week of discharge    Diagnosis: Pleural effusion  Assessment and Plan of Treatment: DSD Dressing changes prn when soiled as per CTS. Follow up with your primary doctor within 1 week of discharge & CTS

## 2019-06-17 NOTE — DISCHARGE NOTE PROVIDER - HOSPITAL COURSE
77 y/o male with h/o CHF, Pacemaker, CAD, COPD stage 4, on home oxygen, with recent admission to [facility] in april 2019 s/p total right lung decortication for multiloculated right empyema with Peptostreptococcus Asaccharolyticus, who was discharged from [facility] to [facility] 4 days ago, was referred to the ER because of SOB. Found to have extensive mucous plugging of RML and RLL with multifocal segmental consolidative changes, atelectasis v. PNA with a mild R pleural effusion, and LLL nodularity. In the ER, patient in respiratory failure and CHF, required BIPAP. Pt was seen by CT surgery, s/p bronchoscopy. On IV abx for PNA.         1. Acute hypoxic respiratory failure    - CT chest reviewed with right sided mucus plug, postobstructive atelectasis and/or pneumonia    - Likely secondary to extensive mucous plugging/PNA    - S/p bronchoscopy with CT surgery on 6/12 and cultures negative.     - Needs aggressive chest PT to clear lungs of secretions.    - Supplemental O2 being titrated. Weaned off of HFNC and now maintaining sats on 3L NC    - Continue Zosyn till 6/16/19    - Trend fever    - Leukocytosis resolve     - Blood cultures form 6/10 negative    - BLE dopplers negative             2. Right loculated effusion    - s/p VATS 04/30/2019 with chest tube, cytology negative    - CTsx changed packing to right lateral chest wound     CTS cleared for d/c. DSD Dressing changes prn when soiled as per CTS        3. COPD    - c/w nebs, supplemental o2 (on home oxygen) and prednisone        4. Chronic Systolic CHF    - Continue Aldactone, Losartan and Coreg     Hold Torsemide 20 daily as pt now with contraction alkalosis, hypernatremia & increasing BUN; hypernatremia now resolved. Resume torsemide when resolution of contraction alkalosis or as clinically indicated.     - TTE on last admission with moderately decreased LVSF, EF 40-45%.         5. CAD - non obstructive CAD    - s/p cath 04/19, non-obstructive coronary arteries, non ischemic depressed LVEF     - c/w aspirin, statin, beta blocker          6. Moderate AS    - Cardio f/u on d/c.         7. Hx HTN /HLD    - Continue Losartan, Coreg and Lipitor    - monitor bp         NSVT on tele- 5 beats, asymptomatic. Keep K > 4, Mag > 2. c/w coreg.  TTE on last admission with moderately decreased LVSF, EF 40-45%.         DVT Prophylaxis -- Lovenox 40 mg        Dispo: [facility] in am 77 y/o male with h/o CHF, Pacemaker, CAD, COPD stage 4, on home oxygen, with recent admission to [facility] in april 2019 s/p total right lung decortication for multiloculated right empyema with Peptostreptococcus Asaccharolyticus, who was discharged from [facility] to [facility] 4 days ago, was referred to the ER because of SOB. Found to have extensive mucous plugging of RML and RLL with multifocal segmental consolidative changes, atelectasis v. PNA with a mild R pleural effusion, and LLL nodularity. In the ER, patient in respiratory failure and CHF, required BIPAP. Pt was seen by CT surgery, s/p bronchoscopy. On IV abx for PNA.         1. Acute hypoxic respiratory failure    - CT chest reviewed with right sided mucus plug, postobstructive atelectasis and/or pneumonia    - Likely secondary to extensive mucous plugging/PNA    - S/p bronchoscopy with CT surgery on 6/12 and cultures negative.     - Needs aggressive chest PT to clear lungs of secretions.    - Supplemental O2 being titrated. Weaned off of HFNC and now maintaining sats on 3L NC    - Zosyn completed on 6/16/19    - Trend fever    - Leukocytosis resolve     - Blood cultures form 6/10 negative    - BLE dopplers negative             2. Right loculated effusion    - s/p VATS 04/30/2019 with chest tube, cytology negative    - CTsx changed packing to right lateral chest wound     CTS cleared for d/c. DSD Dressing changes prn when soiled as per CTS        3. COPD    - c/w nebs, supplemental o2 (on home oxygen) and prednisone        4. Chronic Systolic CHF- euvolemic    - Continue Aldactone, Losartan and Coreg     Hold Torsemide 20 daily as pt now with contraction alkalosis, hypernatremia & increasing BUN; hypernatremia now resolved. Resume torsemide when resolution of contraction alkalosis or as clinically indicated.     - TTE on last admission with moderately decreased LVSF, EF 40-45%.         5. CAD - non obstructive CAD    - s/p cath 04/19, non-obstructive coronary arteries, non ischemic depressed LVEF     - c/w aspirin, statin, beta blocker          6. Moderate AS    - Cardio f/u on d/c.         7. Hx HTN /HLD    - Continue Losartan, Coreg and Lipitor    - monitor bp         NSVT on tele- 5 beats, asymptomatic. Keep K > 4, Mag > 2. c/w coreg.  TTE on last admission with moderately decreased LVSF, EF 40-45%.         DVT Prophylaxis -- Lovenox 40 mg        VSS. Medically stable for d/c to [facility]

## 2019-06-17 NOTE — PROGRESS NOTE ADULT - PROVIDER SPECIALTY LIST ADULT
Hospitalist
Infectious Disease
Thoracic Surgery
Hospitalist
Hospitalist

## 2019-06-17 NOTE — PROGRESS NOTE ADULT - ASSESSMENT
79 y/o male with h/o CHF, Pacemaker, CAD, COPD stage 4, on home oxygen, with recent admission to [facility] in april 2019 s/p total right lung decortication for multiloculated right empyema with Peptostreptococcus Asaccharolyticus, who was discharged from [facility] to [facility] 4 days ago, was referred to the ER because of SOB. Found to have extensive mucous plugging of RML and RLL with multifocal segmental consolidative changes, atelectasis v. PNA with a mild R pleural effusion, and LLL nodularity. In the ER, patient in respiratory failure and CHF, required BIPAP. Pt was seen by CT surgery, s/p bronchoscopy. On IV abx for PNA.     1. Acute hypoxic respiratory failure  - CT chest reviewed with right sided mucus plug, postobstructive atelectasis and/or pneumonia  - Likely secondary to extensive mucous plugging/PNA  - S/p bronchoscopy with CT surgery on 6/12 and cultures negative.   - Needs aggressive chest PT to clear lungs of secretions.  - Supplemental O2 being titrated. Weaned off of HFNC and now maintaining sats on 3L NC  - Zosyn completed on 6/16/19  - Trend fever  - Leukocytosis resolve   - Blood cultures form 6/10 negative  - BLE dopplers negative       2. Right loculated effusion  - s/p VATS 04/30/2019 with chest tube, cytology negative  - CTsx changed packing to right lateral chest wound   CTS cleared for d/c. DSD Dressing changes prn when soiled as per CTS    3. COPD  - c/w nebs, supplemental o2 (on home oxygen) and prednisone    4. Chronic Systolic CHF- euvolemic  - Continue Aldactone, Losartan and Coreg   Hold Torsemide 20 daily as pt now with contraction alkalosis, hypernatremia & increasing BUN; hypernatremia now resolved. Resume torsemide when resolution of contraction alkalosis or as clinically indicated.   - TTE on last admission with moderately decreased LVSF, EF 40-45%.     5. CAD - non obstructive CAD  - s/p cath 04/19, non-obstructive coronary arteries, non ischemic depressed LVEF   - c/w aspirin, statin, beta blocker      6. Moderate AS  - Cardio f/u on d/c.     7. Hx HTN /HLD  - Continue Losartan, Coreg and Lipitor  - monitor bp     NSVT on tele- 5 beats, asymptomatic. Keep K > 4, Mag > 2. c/w coreg.  TTE on last admission with moderately decreased LVSF, EF 40-45%.     DVT Prophylaxis -- Lovenox 40 mg    Dispo: await auth for [facility]

## 2019-06-17 NOTE — DISCHARGE NOTE PROVIDER - CARE PROVIDER_API CALL
Cam Dominguez (MD)  Surgery; Thoracic Surgery  [address] [facility]  [address] [place] [address]  Phone: [phone]  Fax: [phone]  Follow Up Time:     Liseth Casas)  Cardiology; Cardiovascular Disease; Internal Medicine  [address] [place] [address]  Phone: [phone]  Fax: [phone]  Follow Up Time:     Khalif Fuentes)  Infectious Disease; Internal Medicine  [address] [facility], [address] [place] [address]  Phone: [phone]  Fax: [phone]  Follow Up Time:     Veto Cedillo (DO)  Critical Care Medicine; Internal Medicine; Pulmonary Disease  [address] [facility], [facility] [address]  [place] [address]  Phone: [phone]  Fax: [phone]  Follow Up Time:

## 2019-06-17 NOTE — PROGRESS NOTE ADULT - SUBJECTIVE AND OBJECTIVE BOX
Heart failure    HPI:  79 y/o male with h/o CHF, Pacemaker, CAD, COPD stage 4, on home oxygen, s/p total right lung decortication, multiloculated right empyema with Peptostreptococcus Asaccharolyticus, who was discharged from [facility] to [facility] 4 days ago, was referred to the ER because of SOB. in the ER, patient in respiratory failure and CHF, required Bi pap. Temp: 100. WBC: 16 00 with Neutro: 87. Lactate: 1.3.PCO2: 64. PO2: 89. Echo on 6/4/19: EF: 40%. (10 Jaspal 2019 09:46)    Interval History:  Patient was seen and examined at bedside  SOB is improving. On 2-3L NC.  No overnight issues reported   Denies chest pain, palpitations, headache, dizziness, visual symptoms, nausea, vomiting or abdominal pain.      ROS:  As per interval history otherwise unremarkable.    PHYSICAL EXAM:  Vital Signs   T(C): 36.9 (14 Jun 2019 15:47), Max: 36.9 (14 Jun 2019 10:50)  T(F): 98.5 (14 Jun 2019 15:47), Max: 98.5 (14 Jun 2019 15:47)  HR: 78 (14 Jun 2019 17:39) (46 - 86)  BP: 142/92 (14 Jun 2019 17:39) (119/72 - 142/92)  RR: 20 (14 Jun 2019 15:47) (18 - 21)  SpO2: 98% (14 Jun 2019 15:47) (96% - 98%)      General: Elderly male sitting in bed comfortably. No acute distress  HEENT: EOMI. Clear conjunctivae. Moist mucus membrane  Neck: Supple.   Chest: Air entry improving bilaterally - no wheezing, rales or rhonchi. No chest wall tenderness. Chest wall wound dressing C/D/I  Heart: Normal S1 & S2. RRR.   Abdomen: Soft. Non-tender. Non-distended. + BS  Ext: No pedal edema. No calf tenderness   Neuro: Active and alert. No focal deficit. No speech disorder  Skin: Warm and Dry  Psychiatry: Normal mood and affect    I&O's Summary    13 Jun 2019 07:01  -  14 Jun 2019 07:00  --------------------------------------------------------  IN: 200 mL / OUT: 1325 mL / NET: -1125 mL    14 Jun 2019 07:01  -  14 Jun 2019 18:16  --------------------------------------------------------  IN: 845 mL / OUT: 975 mL / NET: -130 mL    MEDICATIONS  (STANDING):  ALBUTerol/ipratropium for Nebulization 3 milliLiter(s) Nebulizer every 6 hours  aspirin  chewable 81 milliGRAM(s) Oral daily  atorvastatin 20 milliGRAM(s) Oral at bedtime  carvedilol 3.125 milliGRAM(s) Oral every 12 hours  docusate sodium 100 milliGRAM(s) Oral three times a day  enoxaparin Injectable 40 milliGRAM(s) SubCutaneous daily  losartan 25 milliGRAM(s) Oral daily  montelukast 10 milliGRAM(s) Oral daily  pantoprazole  Injectable 40 milliGRAM(s) IV Push daily  piperacillin/tazobactam IVPB. 3.375 Gram(s) IV Intermittent every 8 hours  predniSONE   Tablet 10 milliGRAM(s) Oral daily  saccharomyces boulardii 250 milliGRAM(s) Oral two times a day  sertraline 25 milliGRAM(s) Oral daily  spironolactone 25 milliGRAM(s) Oral daily  torsemide 20 milliGRAM(s) Oral daily    MEDICATIONS  (PRN):  acetaminophen   Tablet .. 650 milliGRAM(s) Oral every 6 hours PRN Temp greater or equal to 38C (100.4F)  ibuprofen  Tablet. 400 milliGRAM(s) Oral every 4 hours PRN Moderate Pain (4 - 6)  metoprolol tartrate Injectable 5 milliGRAM(s) IV Push every 6 hours PRN for SBP > 140 and/or HR > 100  oxyCODONE    5 mG/acetaminophen 325 mG 1 Tablet(s) Oral every 4 hours PRN Moderate Pain (4 - 6)  oxyCODONE    5 mG/acetaminophen 325 mG 2 Tablet(s) Oral every 6 hours PRN Severe Pain (7 - 10)  senna 2 Tablet(s) Oral at bedtime PRN Constipation  zaleplon 5 milliGRAM(s) Oral at bedtime PRN Insomnia    LABS:                        10.3   8.6   )-----------( 229      ( 14 Jun 2019 06:19 )             34.3     06-14    147<H>  |  95<L>  |  35.0<H>  ----------------------------<  94  3.4<L>   |  41.0<H>  |  0.98    Ca    9.3      14 Jun 2019 06:19  Mg     2.1     06-14    RADIOLOGY & ADDITIONAL STUDIES:  Reviewed

## 2019-06-17 NOTE — DISCHARGE NOTE PROVIDER - PROVIDER TOKENS
PROVIDER:[TOKEN:[[id number]]],PROVIDER:[TOKEN:[[id number]]],PROVIDER:[TOKEN:[[id number]]],PROVIDER:[TOKEN:[[id number]]]

## 2019-06-18 NOTE — DISCHARGE NOTE NURSING/CASE MANAGEMENT/SOCIAL WORK - NSDCPEEMAIL_GEN_ALL_CORE
[facility] for Tobacco Control email [email or web address]

## 2019-06-18 NOTE — DISCHARGE NOTE NURSING/CASE MANAGEMENT/SOCIAL WORK - NSDCPEWEB_GEN_ALL_CORE
[facility] for Tobacco Control website --- [email or web address] website --- [email or web address]

## 2019-06-18 NOTE — DISCHARGE NOTE NURSING/CASE MANAGEMENT/SOCIAL WORK - NSDCDPATPORTLINK_GEN_ALL_CORE
You can access the [facility] Patient Portal, offered by [facility], by registering with the following website: [email or web address] Psychiatric Center

## 2019-07-05 NOTE — CHART NOTE - NSCHARTNOTEFT_GEN_A_CORE
Received a call from the core lab that pt's BAL was +ve for AFB. MTB probe is going to be done on Tuesday and they will notify me with the results. Pt called on [phone] to inform. Await call back.

## 2019-07-15 NOTE — HISTORY OF PRESENT ILLNESS
[FreeTextEntry1] : \demarcus Laws was in the office for a followup visit today. He has a history of a decortication several months ago and a followup bronchoscopy a few months after that. He has not been seen in my office or by infectious disease since that time as he was in rehabilitation. He feels well and has no specific complaints at this point but does have intermittent murky drainage from a nonhealing chest tube site. In addition, his followup bronchoscopy grew ELLIOTT.

## 2019-07-15 NOTE — PHYSICAL EXAM
[Neck Appearance] : the appearance of the neck was normal [Neck Cervical Mass (___cm)] : no neck mass was observed [Jugular Venous Distention Increased] : there was no jugular-venous distention [Thyroid Diffuse Enlargement] : the thyroid was not enlarged [Thyroid Nodule] : there were no palpable thyroid nodules [Heart Rate And Rhythm] : heart rate was normal and rhythm regular [Heart Sounds] : normal S1 and S2 [Heart Sounds Gallop] : no gallops [Murmurs] : no murmurs [Heart Sounds Pericardial Friction Rub] : no pericardial rub [FreeTextEntry1] : There is murky material draining from the inferior chest tube site. No significant erythema is present. [Bowel Sounds] : normal bowel sounds [Abdomen Soft] : soft [Abdomen Tenderness] : non-tender [] : no hepato-splenomegaly [Abdomen Mass (___ Cm)] : no abdominal mass palpated [No CVA Tenderness] : no ~M costovertebral angle tenderness [No Spinal Tenderness] : no spinal tenderness [No Focal Deficits] : no focal deficits [Oriented To Time, Place, And Person] : oriented to person, place, and time [Impaired Insight] : insight and judgment were intact [Affect] : the affect was normal

## 2019-07-15 NOTE — ASSESSMENT
[FreeTextEntry1] : Veto is an 78-year-old male with multiple medical problems who underwent a decortication 3 months ago that was followed up by a bronchoscopy one month ago for near complete atelectasis of one lung. He has not been seen in my office since discharge but by his report has had murky drainage for some time. At this point I do believe a followup CT scan of the chest is essential to determine if any residual fluid collections exist. In addition, I am starting him on antibiotics at this point and will be arranging an infectious disease appointment in the very near future.\par \par Thank you for allowing me to participate in the care of your patient.\par \par Cam Dominguez MD\[facility] Department of Cardiovascular and Thoracic Surgery\par \demarcus Romero and Rhonda Burr\[facility] [employer] [facility]\par

## 2019-07-15 NOTE — DATA REVIEWED
[No studies available for review at this time.] : No studies available for review at this time. [FreeTextEntry1] : Cultures reviewed above

## 2019-07-30 PROBLEM — Z22.39 MYCOBACTERIUM AVIUM INTRACELLULARE COLONIZATION: Status: ACTIVE | Noted: 2019-01-01

## 2019-07-30 PROBLEM — J86.9 EMPYEMA, RIGHT: Status: ACTIVE | Noted: 2019-01-01

## 2019-08-20 NOTE — PHYSICAL EXAM
[] : no respiratory distress [Auscultation Breath Sounds / Voice Sounds] : lungs were clear to auscultation bilaterally [Heart Rate And Rhythm] : heart rate was normal and rhythm regular [Heart Sounds] : normal S1 and S2 [Heart Sounds Gallop] : no gallops [Murmurs] : no murmurs [Heart Sounds Pericardial Friction Rub] : no pericardial rub [FreeTextEntry1] : There is a small opening in the upper chest tube site with no gross evidence of infection

## 2019-08-20 NOTE — ASSESSMENT
[FreeTextEntry1] : Veto is an 78-year-old male with a history of a decortication the past who presents now with a draining chest tube site. There is no gross evidence of infection but I have recommended he start antibiotics based on the history of bacteria in the chest prior. In addition, I will be obtaining a CT scan of the chest to ensure no undrained pockets exist. I would like to see him in one week's time for followup.\par \par Thank you for allowing me to participate in the care of your patient.\par \par Cam Dominguez MD\[facility] Department of Cardiovascular and Thoracic Surgery\par \par Nick and Rhonda Burr\[facility] [employer] at \[facility]\""/[facility]\par

## 2019-08-20 NOTE — HISTORY OF PRESENT ILLNESS
[FreeTextEntry1] : \demarcus Laws was in the office today at his request. His wounds have been well-healed blood 48 hours ago developed swelling and ultimately significant drainage. He denies fever, chills or any other infectious complaints.

## 2019-08-28 NOTE — ASSESSMENT
[FreeTextEntry1] : Veto is an 78-year-old male with a history of a decortication the past who presents now with a draining chest tube site. There is no gross evidence of infection.  He is currently on antibiotics which will extend for another week. I will be reviewing his chest CT images are available. I would like to see him in 2 weeks for an office followup. \par \[facility] Thank you for allowing me to participate in the care of your patient.\par \par Cam Dominguez MD\[facility] Department of Cardiovascular and Thoracic Surgery\[facility] \demarcus Romero and Rhonda Burr\[facility] [employer] at [facility]

## 2019-08-28 NOTE — PHYSICAL EXAM
[] : no respiratory distress [Auscultation Breath Sounds / Voice Sounds] : lungs were clear to auscultation bilaterally [Heart Rate And Rhythm] : heart rate was normal and rhythm regular [Heart Sounds] : normal S1 and S2 [Heart Sounds Gallop] : no gallops [Murmurs] : no murmurs [Heart Sounds Pericardial Friction Rub] : no pericardial rub [FreeTextEntry1] :  no gross evidence of infection

## 2019-08-28 NOTE — HISTORY OF PRESENT ILLNESS
[FreeTextEntry1] : \demarcus Laws was in the office today at his request. His drainage has decreased significantly with antibiotics. He did undergo a CT scan but unfortunately the images are unable to be reviewed at this time. He otherwise has no need changes in his medical conditions.

## 2019-08-30 PROBLEM — R06.09 DYSPNEA ON EXERTION: Status: ACTIVE | Noted: 2017-08-18

## 2019-08-30 PROBLEM — J44.9 COPD, SEVERE: Status: ACTIVE | Noted: 2019-01-01

## 2019-08-30 PROBLEM — J96.11 CHRONIC RESPIRATORY FAILURE WITH HYPOXIA: Status: ACTIVE | Noted: 2019-01-01

## 2019-08-30 NOTE — CONSULT LETTER
[Dear  ___] : Dear  [unfilled], [Consult Letter:] : I had the pleasure of evaluating your patient, [unfilled]. [Please see my note below.] : Please see my note below. [Consult Closing:] : Thank you very much for allowing me to participate in the care of this patient.  If you have any questions, please do not hesitate to contact me. [Sincerely,] : Sincerely, [Boni Schneider MD] : Boni Schneider MD

## 2019-08-30 NOTE — ASSESSMENT
[FreeTextEntry1] :   ES COPD\par    Mostly remodelled asthma (stiff, permanently constricted airways)\par Cor pulmonale  \par Severe obstruction\par Respiratory failure\par Needs NIV to improve ventilation and oxygenation to relieve cor pulmonale and to avoid hospitalization or death\par Bipap was considered but rejected due to the progressive nature of his disease\par Post VATS pleural drain - site draining stopped\par ELLIOTT noted

## 2019-08-30 NOTE — REASON FOR VISIT
Implemented All Universal Safety Interventions:  [place] to call system. Call bell, personal items and telephone within reach. Instruct patient to call for assistance. Room bathroom lighting operational. Non-slip footwear when patient is off stretcher. Physically safe environment: no spills, clutter or unnecessary equipment. Stretcher in lowest position, wheels locked, appropriate side rails in place. [Follow-Up] : a follow-up visit [COPD] : COPD

## 2019-09-11 NOTE — HISTORY OF PRESENT ILLNESS
[FreeTextEntry1] : \demarcus Laws was in the office today for a followup visit. His wound has stopped draining and he overall feels well.

## 2019-09-11 NOTE — ASSESSMENT
[FreeTextEntry1] : Veto is an 78-year-old male with a recent empyema and draining chest tube site that has now resolved. He is doing well and can see me on a p.r.n. basis going forward.\par \par Thank you for allowing me to take care of your patient.\par \par Cam Dominguez MD\par Department of Cardiovascular and Thoracic Surgery\par \[facility] Nick and Rhonda Burr\[facility] [employer] [facility]\par

## 2019-09-11 NOTE — PHYSICAL EXAM
[Neck Appearance] : the appearance of the neck was normal [Neck Cervical Mass (___cm)] : no neck mass was observed [Jugular Venous Distention Increased] : there was no jugular-venous distention [Thyroid Diffuse Enlargement] : the thyroid was not enlarged [Thyroid Nodule] : there were no palpable thyroid nodules [] : no respiratory distress [Auscultation Breath Sounds / Voice Sounds] : lungs were clear to auscultation bilaterally [Heart Rate And Rhythm] : heart rate was normal and rhythm regular [Heart Sounds] : normal S1 and S2 [Heart Sounds Gallop] : no gallops [Murmurs] : no murmurs [Heart Sounds Pericardial Friction Rub] : no pericardial rub [Examination Of The Chest] : the chest was normal in appearance [Chest Visual Inspection Thoracic Asymmetry] : no chest asymmetry [Diminished Respiratory Excursion] : normal chest expansion [FreeTextEntry1] : Wounds are healed

## 2019-09-26 NOTE — HISTORY OF PRESENT ILLNESS
[FreeTextEntry1] : Mr CIFUENTES is an 78-year-old male with a history of CHF, pacemaker, CAD, COPD stage IV on home oxygen status post total right lung decortication in April 2019 and multiloculated right empyema with Peptostreptococcus Asaccharolyticus treated with Zosyn. 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 \par  \par He has been following up in the office for a non healing chest tube site that had healed but now is developing what appears to be an accumulation under the skin at the site.

## 2019-09-26 NOTE — ASSESSMENT
[FreeTextEntry1] : Mr. Bergeron has had a complicated course postoperatively for a wound infection.  The patient appears at the very least have a superficial wound infection which is worsening on oral antibiotics.  I am recommending he be admitted with IV antibiotics.  We will also obtain a cat scan of the chest.

## 2019-09-26 NOTE — H&P ADULT - ASSESSMENT
78M h/o pna/empyema returns with a chest wall collection. Patient also p/w back pain with history of benign spinal tumor resected.     Overall Plan  MRI thoracic spine tomorrow  Neuro surg eval  Consider eval liver lesion with MRI as well.   ID consult      Addendum: Dr Abbott has reviewed CT and patient is currently clinically stable and therefore will not send patient for emergency MRI.

## 2019-09-26 NOTE — H&P ADULT - HISTORY OF PRESENT ILLNESS
78M h/o pneumonia, and empyema drained and decort done earlier this year. Patient also with h/o COPD and req 2 L NC at home. Patient reports drainage from right chest tube site 2-3 weeks ago "opened up again" after having been closed for some time. Patient also reports severe back pain over 1-2 weeks for which he got a script for a muscle relaxant. He followed up in the office today with Dr Abbott for chest tube drainage, which has been improved, and swelling/redness of site, which has been worsening and is now signficiant. Patient directly admitted to floor for further workup and evaluation of chest wall infection.

## 2019-09-26 NOTE — H&P ADULT - NSHPLABSRESULTS_GEN_ALL_CORE
< from: CT Chest No Cont (09.26.19 @ 16:50) >      IMPRESSION:     In comparison with 8/26/2019, new 6.2 x 4.2 cm right lower lateral chest   wall subcutaneous fluid collection adjacent to the right 8th rib.    Small loculated right pleural effusion is slightly increased. Underlying   right pleural thickening.    Atelectasis of the basilar right lower lobe is is slightly increased.     Narrowing of the right lower lobe bronchus and obliteration of   posterior-lateral basilar segments of right lower lobe bronchus possibly   secondary to retained secretions. Recommend attention on follow-up.    Emphysema.    6 cm indistinct hypodensity within the posterior right lobe of the liver.   Recommend further evaluation with MRI or ultrasound.    Destructive lytic lesion within T4 vertebral body with extension into the   spinal canal likely metastatic. Recommend further evaluation with   contrast-enhanced MRI of thoracic spine.    These findings were discussed with RUCHI CEVALLOS, on 9/26/2019 5:11 PM   with read back.    < end of copied text >                          12.2   13.06 )-----------( 156      ( 26 Sep 2019 15:35 )             40.7       09-26    143  |  99  |  31.0<H>  ----------------------------<  161<H>  4.5   |  35.0<H>  |  0.88    Ca    9.3      26 Sep 2019 15:35    TPro  7.2  /  Alb  3.0<L>  /  TBili  0.3<L>  /  DBili  x   /  AST  11  /  ALT  5   /  AlkPhos  90  09-26

## 2019-09-26 NOTE — H&P ADULT - ADDITIONAL PE
right lateral chest wall is approaching firm, slightly fluctuant, tender and erythematous, extendid from above upper incision to right hip.

## 2019-09-26 NOTE — PHYSICAL EXAM
[Neck Appearance] : the appearance of the neck was normal [Neck Cervical Mass (___cm)] : no neck mass was observed [Respiration, Rhythm And Depth] : normal respiratory rhythm and effort [] : no respiratory distress [Exaggerated Use Of Accessory Muscles For Inspiration] : no accessory muscle use [Auscultation Breath Sounds / Voice Sounds] : lungs were clear to auscultation bilaterally [Examination Of The Chest] : the chest was normal in appearance [Chest Visual Inspection Thoracic Asymmetry] : no chest asymmetry [Bowel Sounds] : normal bowel sounds [FreeTextEntry1] : right chest incision site-edematous, mild tenderness, no overt drainage [Abdomen Soft] : soft [Abdomen Tenderness] : non-tender [Cervical Lymph Nodes Enlarged Posterior Bilaterally] : posterior cervical [Cervical Lymph Nodes Enlarged Anterior Bilaterally] : anterior cervical [Supraclavicular Lymph Nodes Enlarged Bilaterally] : supraclavicular [Oriented To Time, Place, And Person] : oriented to person, place, and time

## 2019-09-27 NOTE — CONSULT NOTE ADULT - SUBJECTIVE AND OBJECTIVE BOX
Safety Risk - Non-Violent Restraints    • Patient will remain free from self-harm Not Progressing          CARDIOVASCULAR - ADULT    • Maintains optimal cardiac output and hemodynamic stability Progressing    • Absence of cardiac arrhythmias or at baseline [facility]  INFECTIOUS DISEASES AND INTERNAL MEDICINE at [place]  =======================================================  Onesimo Henderson MD  Diplomates American Board of Internal Medicine and Infectious Diseases  Tel: [phone]      Fax: [phone]  =======================================================      [id number]  JOHN CIFUENTES     CC: Patient is a 78y old  Male who presents with a chief complaint of infected chest tube site (27 Sep 2019 13:33)    77y/o  Male h/o CHF, Pacemaker, CAD, COPD stage 4, on home oxygen, s/p total right lung decortication, multiloculated right empyema with Peptostreptococcus Asaccharolyticus s/p Zosyn. Patient was seen as outpatient for ELLIOTT in culture at which time he had no symptoms. Patient was well up until recently when he noticed drainage from right chest tube site 2-3 weeks ago after having been closed for some time. Patient also reports severe back pain over 1-2 weeks for which he got a script for a muscle relaxant. Patient admitted to floor for further workup and evaluation of chest wall infection. Patient was started on IV Vancomycin and Zosyn. ID input requested.       Past Medical & Surgical Hx:  CHF (congestive heart failure)  CAD (coronary artery disease)  HLD (hyperlipidemia)  HTN (hypertension)  COPD (chronic obstructive pulmonary disease)  Stenosis of lumbosacral spine  H/O back injury  Artificial pacemaker      Social Hx:  Former smoker      FAMILY HISTORY:  No family history of medical problems in mother or father.       Allergies  No Known Allergies      Antibiotics:  piperacillin/tazobactam IVPB.. 3.375 Gram(s) IV Intermittent every 8 hours  vancomycin  IVPB 1250 milliGRAM(s) IV Intermittent every 12 hours       REVIEW OF SYSTEMS:  CONSTITUTIONAL:  No Fever or chills  HEENT:  No diplopia or blurred vision.  No earache, sore throat or runny nose.  CARDIOVASCULAR:  No pressure, squeezing, strangling, tightness, heaviness or aching about the chest, neck, axilla or epigastrium.  RESPIRATORY:  No cough, shortness of breath  GASTROINTESTINAL:  No nausea, vomiting or diarrhea.  GENITOURINARY:  No dysuria, frequency or urgency.   MUSCULOSKELETAL:  no joint aches, no muscle pain  SKIN:  No change in skin, hair or nails.  NEUROLOGIC:  No Headaches, seizures  PSYCHIATRIC:  No disorder of thought or mood.  ENDOCRINE:  No heat or cold intolerance  HEMATOLOGICAL:  No easy bruising or bleeding.       Physical Exam:  Vital Signs Last 24 Hrs  T(C): 36.8 (27 Sep 2019 11:00), Max: 36.8 (27 Sep 2019 11:00)  T(F): 98.2 (27 Sep 2019 11:00), Max: 98.2 (27 Sep 2019 11:00)  HR: 58 (27 Sep 2019 11:00) (58 - 79)  BP: 119/73 (27 Sep 2019 11:00) (114/60 - 119/73)  RR: 18 (27 Sep 2019 11:00) (18 - 18)  SpO2: 97% (27 Sep 2019 11:00) (95% - 98%)  Height (cm): 185.4 ( @ 05:18)  Weight (kg): 108.6 ( @ 05:18)  BMI (kg/m2): 31.6 ( @ 05:18)  BSA (m2): 2.32 ( @ 05:18)      GEN: NAD, pleasant  HEENT: normocephalic and atraumatic. EOMI. PERRL.  Anicteric  NECK: Supple.   LUNGS: Decreased basal BS B/L   HEART: Regular rate and rhythm  ABDOMEN: Soft, nontender, and nondistended.  Positive bowel sounds.    : No CVA tenderness  EXTREMITIES: Without any edema.  MSK: No joint swelling  NEUROLOGIC: No Focal Deficits  PSYCHIATRIC: Appropriate affect .  SKIN: No Rash        Labs:      142  |  98  |  32.0<H>  ----------------------------<  100  4.6   |  32.0<H>  |  1.08    Ca    9.2      27 Sep 2019 06:08    TPro  7.0  /  Alb  3.0<L>  /  TBili  0.3<L>  /  DBili  x   /  AST  10  /  ALT  <5  /  AlkPhos  85                            13.3   12.93 )-----------( 181      ( 27 Sep 2019 06:08 )             41.1       PT/INR - ( 26 Sep 2019 15:35 )   PT: 15.1 sec;   INR: 1.30 ratio         PTT - ( 26 Sep 2019 15:35 )  PTT:31.4 sec  Urinalysis Basic - ( 27 Sep 2019 06:58 )    Color: Yellow / Appearance: Clear / S.020 / pH: x  Gluc: x / Ketone: Negative  / Bili: Negative / Urobili: Negative mg/dL   Blood: x / Protein: 15 mg/dL / Nitrite: Negative   Leuk Esterase: Negative / RBC: Negative /HPF / WBC 0-2   Sq Epi: x / Non Sq Epi: Occasional / Bacteria: Occasional      LIVER FUNCTIONS - ( 27 Sep 2019 06:08 )  Alb: 3.0 g/dL / Pro: 7.0 g/dL / ALK PHOS: 85 U/L / ALT: <5 U/L / AST: 10 U/L / GGT: x               < from: Xray Chest 1 View- PORTABLE-Urgent (19 @ 14:37) >  EXAM:  XR CHEST PORTABLE URGENT 1V                          PROCEDURE DATE:  2019          INTERPRETATION:  Portable chest radiograph        CLINICAL INFORMATION:   RIGHT pleural effusion. Status post drainage.   Assess for pneumothorax.    TECHNIQUE:  Portable  AP view of the chest was obtained.    COMPARISON: 2019 available for review.    FINDINGS:   The lungs  show no significant change again demonstrating RIGHT lower   lobe moderate effusion and/or airspace consolidation appearing RIGHT   upper lobe and LEFT lung parenchyma grossly clear. Note the LEFT   hemidiaphragm elevated. Posterior lung base cannot be assessed due to   elevated diaphragm. Lateral radiograph recommended if clinically   warranted.        Cardiac device wire leads are within right atrium and right ventricle.    The  heart is enlarged in transverse diameter. No hilar mass. Trachea   midline.   Visualized osseous structures are intact.        IMPRESSION:   No interval change..    < end of copied text >      < from: CT Chest No Cont (19 @ 16:50) >  IMPRESSION:     In comparison with 2019, new 6.2 x 4.2 cm right lower lateral chest   wall subcutaneous fluid collection adjacent to the right 8th rib.    Small loculated right pleural effusion is slightly increased. Underlying   right pleural thickening.    Atelectasis of the basilar right lower lobe is is slightly increased.     Narrowing of the right lower lobe bronchus and obliteration of   posterior-lateral basilar segments of right lower lobe bronchus possibly   secondary to retained secretions. Recommend attention on follow-up.    Emphysema.    6 cm indistinct hypodensity within the posterior right lobe of the liver.   Recommend further evaluation with MRI or ultrasound.    Destructive lytic lesion within T4 vertebral body with extension into the   spinal canal likely metastatic. Recommend further evaluation with   contrast-enhanced MRI of thoracic spine.    < end of copied text >

## 2019-09-27 NOTE — CONSULT NOTE ADULT - ASSESSMENT
79y/o  Male h/o CHF, Pacemaker, CAD, COPD stage 4, on home oxygen, s/p total right lung decortication, multiloculated right empyema with Peptostreptococcus Asaccharolyticus s/p Zosyn. Patient was seen as outpatient for ELLIOTT in culture at which time he had no symptoms. Patient was well up until recently when he noticed drainage from right chest tube site 2-3 weeks ago after having been closed for some time. Patient also reports severe back pain over 1-2 weeks for which he got a script for a muscle relaxant. Patient admitted to floor for further workup and evaluation of chest wall infection. Patient was started on IV Vancomycin and Zosyn.      Cellulitis and abscess of old chest tube site  r/o Metastatic disease       - Blood cultures pending  - s/o I&D of abscess 9/27/19, cultures peding  - CT Chest reviewed  - Continue Vancomycin   - Monitor trough  - Continue Zosyn  - Follow up cultures  - Trend Fever  - Trend Leukocytosis      Will Follow

## 2019-09-27 NOTE — PROGRESS NOTE ADULT - ASSESSMENT
78M h/o CHF, CAD, HLD, HTN, COPD, pna/empyema s/p decortication 4/30/19, returns with a chest wall collection and likely recurrent empyema. Patient also p/w back pain with history of benign spinal tumor resected.

## 2019-09-27 NOTE — CONSULT NOTE ADULT - ASSESSMENT
78yM PMH CHF, CAD, HLD, HTN, COPD, lumbosacral spine stenosis, PPM, h/o PNA and empyema s/p drain and decort earlier this year, presents to [facility] with drainage from right chest tube site  - No focal neurologic deficit    PLAN:  - D/w Dr. Arias  - Q4 neuro checks  - MRI T spine w/wo contrast pending- d/w CT surgery PA, PPM is MR compatible  - Called office of patient's spine surgeon- awaiting call back to confirm/discuss previous thoracic surgery  - Pain control PRN  - Further recommendations pending imaging results  - DVT ppx  - Supportive care/further medical management per primary team

## 2019-09-27 NOTE — CONSULT NOTE ADULT - SUBJECTIVE AND OBJECTIVE BOX
HISTORY OF PRESENT ILLNESS:   78yM PMH CHF, CAD, HLD, HTN, COPD, lumbosacral spine stenosis, PPM, h/o PNA and empyema s/p drain and decort earlier this year, presents to [facility] with drainage from right chest tube site. Also reports severe upper thoracic back pain, in between his shoulder blades, found with T4 lytic lesion on CT chest. Denies paresthesias, issues with ambulation, weakness.    Reports history of thoracic spine surgery with Dr. Blas Le in [place] for a benign tumor years ago    PAST MEDICAL & SURGICAL HISTORY:  CHF (congestive heart failure)  CAD (coronary artery disease)  HLD (hyperlipidemia)  HTN (hypertension)  COPD (chronic obstructive pulmonary disease)  Stenosis of lumbosacral spine  H/O back injury  Artificial pacemaker    SOCIAL HISTORY:  Tobacco Use: Denies  EtOH use:  Denies    Allergies  No Known Allergies    REVIEW OF SYSTEMS  Negative except as noted in HPI    HOME MEDICATIONS:  Home Medications:  acetaminophen 325 mg oral tablet: 2 tab(s) orally every 6 hours, As needed, Temp greater or equal to 38C (100.4F), Mild Pain (1 - 3) (2019 14:44)  aspirin 81 mg oral delayed release tablet: 1 tab(s) orally once a day (2019 11:05)  atorvastatin 20 mg oral tablet: 1 tab(s) orally once a day (at bedtime) (2019 11:33)  carvedilol 3.125 mg oral tablet: 1 tab(s) orally every 12 hours (2019 11:33)  docusate sodium 100 mg oral capsule: 1 cap(s) orally 3 times a day (2019 14:44)  ipratropium-albuterol 0.5 mg-2.5 mg/3 mLinhalation solution: 3 milliliter(s) inhaled every 6 hours (2019 11:33)  losartan 25 mg oral tablet: 1 tab(s) orally once a day (2019 11:33)  montelukast 10 mg oral tablet: 1 tab(s) orally once a day (2019 11:33)  oxycodone-acetaminophen 5 mg-325 mg oral tablet: 2 tab(s) orally every 6 hours, As needed, Severe Pain (7 - 10) (2019 11:33)  predniSONE 10 mg oral tablet: 1 tab(s) orally once a day (2019 14:44)  senna oral tablet: 2 tab(s) orally once a day (at bedtime) (2019 14:44)  sertraline 25 mg oral tablet: 1 tab(s) orally once a day (2019 11:33)  spironolactone 25 mg oral tablet: 1 tab(s) orally once a day (2019 11:33)    MEDICATIONS:  Antibiotics:  piperacillin/tazobactam IVPB.. 3.375 Gram(s) IV Intermittent every 8 hours  vancomycin  IVPB      vancomycin  IVPB 1250 milliGRAM(s) IV Intermittent every 12 hours    Neuro:  acetaminophen   Tablet .. 650 milliGRAM(s) Oral every 6 hours  sertraline 25 milliGRAM(s) Oral daily    Anticoagulation:  aspirin enteric coated 81 milliGRAM(s) Oral daily    OTHER:  atorvastatin 20 milliGRAM(s) Oral at bedtime  carvedilol 3.125 milliGRAM(s) Oral every 12 hours  docusate sodium 100 milliGRAM(s) Oral three times a day  furosemide    Tablet 40 milliGRAM(s) Oral daily  lidocaine 1% Injectable 10 milliLiter(s) Local Injection once  montelukast 10 milliGRAM(s) Oral daily  pantoprazole    Tablet 40 milliGRAM(s) Oral before breakfast  predniSONE   Tablet 10 milliGRAM(s) Oral daily  saccharomyces boulardii 250 milliGRAM(s) Oral two times a day  spironolactone 25 milliGRAM(s) Oral daily    IVF:  sodium chloride 0.9% lock flush 3 milliLiter(s) IV Push every 8 hours    Vital Signs Last 24 Hrs  T(C): 36.8 (27 Sep 2019 11:00), Max: 36.8 (27 Sep 2019 11:00)  T(F): 98.2 (27 Sep 2019 11:00), Max: 98.2 (27 Sep 2019 11:00)  HR: 58 (27 Sep 2019 11:00) (58 - 79)  BP: 119/73 (27 Sep 2019 11:00) (113/67 - 119/73)  BP(mean): --  RR: 18 (27 Sep 2019 11:00) (18 - 18)  SpO2: 97% (27 Sep 2019 11:00) (95% - 98%)    PHYSICAL EXAM:  GENERAL: NAD, well-groomed, well-developed  HEAD: Atraumatic, normocephalic  MENTAL STATUS: Awake, alert, appropriately conversant. Following commands  MOTOR: strength 5/5 b/l upper and lower extremities  SENSATION: grossly intact to light touch all extremities  EXTREMITIES: pitting edema b/l     LABS:                        13.3   12.93 )-----------( 181      ( 27 Sep 2019 06:08 )             41.1         142  |  98  |  32.0<H>  ----------------------------<  100  4.6   |  32.0<H>  |  1.08    Ca    9.2      27 Sep 2019 06:08    TPro  7.0  /  Alb  3.0<L>  /  TBili  0.3<L>  /  DBili  x   /  AST  10  /  ALT  <5  /  AlkPhos  85      PT/INR - ( 26 Sep 2019 15:35 )   PT: 15.1 sec;   INR: 1.30 ratio       PTT - ( 26 Sep 2019 15:35 )  PTT:31.4 sec  Urinalysis Basic - ( 27 Sep 2019 06:58 )    Color: Yellow / Appearance: Clear / S.020 / pH: x  Gluc: x / Ketone: Negative  / Bili: Negative / Urobili: Negative mg/dL   Blood: x / Protein: 15 mg/dL / Nitrite: Negative   Leuk Esterase: Negative / RBC: Negative /HPF / WBC 0-2   Sq Epi: x / Non Sq Epi: Occasional / Bacteria: Occasional    RADIOLOGY & ADDITIONAL STUDIES:  CT Chest No Cont (19 @ 16:50)  IMPRESSION:   In comparison with 2019, new 6.2 x 4.2 cm right lower lateral chest   wall subcutaneous fluid collection adjacent to the right 8th rib.  Small loculated right pleural effusion is slightly increased. Underlying   right pleural thickening.  Atelectasis of the basilar right lower lobe is is slightly increased.   Narrowing of the right lower lobe bronchus and obliteration of   posterior-lateral basilar segments of right lower lobe bronchus possibly   secondary to retained secretions. Recommend attention on follow-up.  Emphysema.  6 cm indistinct hypodensity within the posterior right lobe of the liver.   Recommend further evaluation with MRI or ultrasound.  Destructive lytic lesion within T4 vertebral body with extension into the   spinal canal likely metastatic. Recommend further evaluation with   contrast-enhanced MRI of thoracic spine.  These findings were discussed with RUCHI CEVALLOS, on 2019 5:11 PM   with read back.

## 2019-09-27 NOTE — CONSULT NOTE ADULT - ATTENDING COMMENTS
NSGY Attg:    see above    patient seen and examined    c/o mid back pain    Exam:  alert  appropriately conversant  PERRL  EOMI  FS TML  HOWARD to command  LE 5/5  no sensory level   no clonus  patient is ambulatory with assistance or assistive device    CT torso -- T4 lesion involving body and right pedicle with epidural extension    A/P:  agree with above  MRI thoracic spine w and wo contrast pending

## 2019-09-27 NOTE — PROGRESS NOTE ADULT - SUBJECTIVE AND OBJECTIVE BOX
Subjective: "I think I am feeling a little better" NAD seated in chair. On ambulation patient has significant dyspnea and oxygen requirement. Patient denies chest pain but does have right chest wall tenderness. Denies N/V, lightheadedness, fevers or chills.     VITAL SIGNS  Vital Signs Last 24 Hrs  T(C): 36.8 (09-27-19 @ 11:00), Max: 36.8 (09-27-19 @ 11:00)  T(F): 98.2 (09-27-19 @ 11:00), Max: 98.2 (09-27-19 @ 11:00)  HR: 58 (09-27-19 @ 11:00) (58 - 79)  BP: 119/73 (09-27-19 @ 11:00) (113/67 - 119/73)  RR: 18 (09-27-19 @ 11:00) (18 - 18)  SpO2: 97% (09-27-19 @ 11:00) (95% - 98%)  on 4L NC              Telemetry/Alarms:  SR BBB  LVEF: Juli 40-45%    MEDICATIONS  acetaminophen   Tablet .. 650 milliGRAM(s) Oral every 6 hours  aspirin enteric coated 81 milliGRAM(s) Oral daily  atorvastatin 20 milliGRAM(s) Oral at bedtime  carvedilol 3.125 milliGRAM(s) Oral every 12 hours  docusate sodium 100 milliGRAM(s) Oral three times a day  furosemide    Tablet 40 milliGRAM(s) Oral daily  montelukast 10 milliGRAM(s) Oral daily  pantoprazole    Tablet 40 milliGRAM(s) Oral before breakfast  piperacillin/tazobactam IVPB.. 3.375 Gram(s) IV Intermittent every 8 hours  predniSONE   Tablet 10 milliGRAM(s) Oral daily  saccharomyces boulardii 250 milliGRAM(s) Oral two times a day  sertraline 25 milliGRAM(s) Oral daily  sodium chloride 0.9% lock flush 3 milliLiter(s) IV Push every 8 hours  spironolactone 25 milliGRAM(s) Oral daily  vancomycin  IVPB      vancomycin  IVPB 1250 milliGRAM(s) IV Intermittent every 12 hours      PHYSICAL EXAM  General: well nourished, well developed, no acute distress  Neurology: alert and oriented x 3, nonfocal, no gross deficits  Respiratory: diminished right base and slightly diminished left base with scattered crackles.  CV: regular rate and rhythm, normal S1, S2 + systolic murmur  Abdomen: soft, nontender, nondistended, positive bowel sounds, last bowel movement 9/26  Extremities: warm, well perfused. 3+ pitting edema. + DP pulses  Right lateral chest wall with decreased surrounding erythema but with fluctuance and increased swelling and erythema over superior chest tube/port site. Two other inferior lateral incisions healed. See other note for I&D        09-26 @ 07:01 - 09-27 @ 07:00  --------------------------------------------------------  IN: 700 mL / OUT: 300 mL / NET: 400 mL    09-27 @ 07:01 - 09-27 @ 15:00  --------------------------------------------------------  IN: 915 mL / OUT: 450 mL / NET: 465 mL        Weights:  Daily Height in cm: 185.42 (27 Sep 2019 05:18)    Daily   Admit Wt: Drug Dosing Weight  Height (cm): 185.4 (27 Sep 2019 05:18)  Weight (kg): 108.6 (27 Sep 2019 05:18)  BMI (kg/m2): 31.6 (27 Sep 2019 05:18)  BSA (m2): 2.32 (27 Sep 2019 05:18)    All laboratory results, radiology and medications reviewed.    LABS  09-27    142  |  98  |  32.0<H>  ----------------------------<  100  4.6   |  32.0<H>  |  1.08    Ca    9.2      27 Sep 2019 06:08    TPro  7.0  /  Alb  3.0<L>  /  TBili  0.3<L>  /  DBili  x   /  AST  10  /  ALT  <5  /  AlkPhos  85  09-27                                 13.3   12.93 )-----------( 181      ( 27 Sep 2019 06:08 )             41.1          PT/INR - ( 26 Sep 2019 15:35 )   PT: 15.1 sec;   INR: 1.30 ratio         PTT - ( 26 Sep 2019 15:35 )  PTT:31.4 sec  Bilirubin Total, Serum: 0.3 mg/dL (09-27 @ 06:08)  Bilirubin Total, Serum: 0.3 mg/dL (09-26 @ 15:35)    CAPILLARY BLOOD GLUCOSE               Today's CXR: < from: Xray Chest 1 View AP/PA. (09.26.19 @ 14:05) >    FINDINGS:   The lungs  show RIGHT lower lobe and moderate effusion and/or a basilar   atelectasis. An there is prominent bronchovascular markings in the   perihilar and parenchyma.    The heart and mediastinum are within normal limits.    Visualized osseous structures are intact.        IMPRESSION:   A RIGHT lower lobe moderate pleural effusion and/or   airspace consolidation..      < end of copied text >      Today's EKG: < from: 12 Lead ECG (06.11.19 @ 23:43) >    Diagnosis Line   Sinus rhythm with Premature atrial complexes with Aberrant conduction  Left axis deviation  Right bundle branch block  Septal infarct , age undetermined  Abnormal ECG    Confirmed by Isaac Acosta ([id number]) on 6/12/2019 11:04:44 AM    < end of copied text >      PAST MEDICAL & SURGICAL HISTORY:  CHF (congestive heart failure)  CAD (coronary artery disease)  HLD (hyperlipidemia)  HTN (hypertension)  COPD (chronic obstructive pulmonary disease)  Stenosis of lumbosacral spine  H/O back injury  Artificial pacemaker

## 2019-09-28 NOTE — PROGRESS NOTE ADULT - SUBJECTIVE AND OBJECTIVE BOX
NEUROSURGERY PROGRESS NOTE:    Pt admitted for chest empyema and was found to have T4 lytic lesion with epidural extension on CT Chest.   pt seen w Dr Arias and states is at baseline, denied any new or worsening sensorimotor changes  -headache  - Nausea / - Vomiting  denies weakness  denies numbness/ tingling      MEDICATIONS  (STANDING):  acetaminophen   Tablet .. 650 milliGRAM(s) Oral every 6 hours  aspirin enteric coated 81 milliGRAM(s) Oral daily  atorvastatin 20 milliGRAM(s) Oral at bedtime  carvedilol 3.125 milliGRAM(s) Oral every 12 hours  docusate sodium 100 milliGRAM(s) Oral three times a day  furosemide    Tablet 40 milliGRAM(s) Oral daily  magnesium sulfate  IVPB 2 Gram(s) IV Intermittent once  montelukast 10 milliGRAM(s) Oral daily  pantoprazole    Tablet 40 milliGRAM(s) Oral before breakfast  piperacillin/tazobactam IVPB.. 3.375 Gram(s) IV Intermittent every 8 hours  potassium chloride    Tablet ER 40 milliEquivalent(s) Oral once  predniSONE   Tablet 10 milliGRAM(s) Oral daily  saccharomyces boulardii 250 milliGRAM(s) Oral two times a day  sertraline 25 milliGRAM(s) Oral daily  sodium chloride 0.9% lock flush 3 milliLiter(s) IV Push every 8 hours  spironolactone 25 milliGRAM(s) Oral daily  vancomycin  IVPB      vancomycin  IVPB 1250 milliGRAM(s) IV Intermittent every 12 hours    MEDICATIONS  (PRN):  sorbitol 70% Solution 30 milliLiter(s) Oral once PRN constipation    Allergies    No Known Allergies    Intolerances      Vital Signs Last 24 Hrs  T(C): 36.6 (28 Sep 2019 09:00), Max: 36.7 (27 Sep 2019 21:11)  T(F): 97.8 (28 Sep 2019 09:00), Max: 98 (27 Sep 2019 21:11)  HR: 73 (28 Sep 2019 06:12) (64 - 73)  BP: 100/65 (28 Sep 2019 09:00) (93/65 - 126/72)  BP(mean): --  RR: 17 (28 Sep 2019 09:00) (17 - 18)  SpO2: 99% (28 Sep 2019 09:00) (96% - 100%)          PHYSICAL EXAM:  GENERAL: NAD, well-groomed, well-developed  HEAD: Atraumatic, normocephalic  MENTAL STATUS: Awake, alert, appropriately conversant. Following commands  MOTOR: strength 5/5 b/l upper and lower extremities  SENSATION: grossly intact to light touch all extremities  EXTREMITIES: pitting edema b/l       LABS:                        11.8   10.96 )-----------( 196      ( 28 Sep 2019 06:31 )             40.0         145  |  97<L>  |  33.0<H>  ----------------------------<  84  3.7   |  39.0<H>  |  1.26    Ca    9.0      28 Sep 2019 06:31  Phos  3.0       Mg     1.8         TPro  6.6  /  Alb  3.0<L>  /  TBili  0.3<L>  /  DBili  x   /  AST  10  /  ALT  <5  /  AlkPhos  84      PT/INR - ( 26 Sep 2019 15:35 )   PT: 15.1 sec;   INR: 1.30 ratio         PTT - ( 26 Sep 2019 15:35 )  PTT:31.4 sec  Urinalysis Basic - ( 27 Sep 2019 06:58 )    Color: Yellow / Appearance: Clear / S.020 / pH: x  Gluc: x / Ketone: Negative  / Bili: Negative / Urobili: Negative mg/dL   Blood: x / Protein: 15 mg/dL / Nitrite: Negative   Leuk Esterase: Negative / RBC: Negative /HPF / WBC 0-2   Sq Epi: x / Non Sq Epi: Occasional / Bacteria: Occasional        RADIOLOGY & ADDITIONAL TESTS:  no new NSx images available for review       Time Spent with patient/ education: 15 mins

## 2019-09-28 NOTE — PROGRESS NOTE ADULT - SUBJECTIVE AND OBJECTIVE BOX
Subjective: When asked how he feels, patient replies "what does it matter". Patient feels he only receives bad news lately. Patient on O2 via NC, sitting in the chair at bedside in [facility]. On ambulation patient has significant dyspnea and oxygen requirement. Patient denies chest pain, palpitations, but does have right chest wall tenderness. Denies N/V, lightheadedness, fevers or chills.     VITAL SIGNS  Vital Signs Last 24 Hrs  T(C): 36.6 (19 @ 09:00), Max: 36.7 (19 @ 21:11)  T(F): 97.8 (19 @ 09:00), Max: 98 (19 @ 21:11)  HR: 73 (19 @ 06:12) (64 - 73)  BP: 100/65 (19 @ 09:00) (93/65 - 126/72)  RR: 17 (19 @ 09:00) (17 - 18)  SpO2: 99% (19 @ 09:00) (96% - 100%)  on (O2)              Telemetry/Alarms:  a-paced with underlying sinus rhythm, minimal peaked T waves?    MEDICATIONS  acetaminophen   Tablet .. 650 milliGRAM(s) Oral every 6 hours  aspirin enteric coated 81 milliGRAM(s) Oral daily  atorvastatin 20 milliGRAM(s) Oral at bedtime  carvedilol 3.125 milliGRAM(s) Oral every 12 hours  docusate sodium 100 milliGRAM(s) Oral three times a day  furosemide    Tablet 40 milliGRAM(s) Oral daily  montelukast 10 milliGRAM(s) Oral daily  pantoprazole    Tablet 40 milliGRAM(s) Oral before breakfast  piperacillin/tazobactam IVPB.. 3.375 Gram(s) IV Intermittent every 8 hours  predniSONE   Tablet 10 milliGRAM(s) Oral daily  saccharomyces boulardii 250 milliGRAM(s) Oral two times a day  sertraline 25 milliGRAM(s) Oral daily  sodium chloride 0.9% lock flush 3 milliLiter(s) IV Push every 8 hours  spironolactone 25 milliGRAM(s) Oral daily  vancomycin  IVPB      vancomycin  IVPB 1250 milliGRAM(s) IV Intermittent every 12 hours      PHYSICAL EXAM  General: well nourished, well developed, no acute distress  Neurology: alert and oriented x 3, nonfocal, no gross deficits  Respiratory: diminished right base and slightly diminished left base with scattered crackles.  CV: regular rate and rhythm, normal S1, S2 + systolic murmur  Abdomen: soft, nontender, nondistended, positive bowel sounds, last bowel movement   Extremities: warm, well perfused. 2+ pitting edema with chronic skin changes. + DP pulses  Right lateral chest wall with much decreased surrounding erythema, minimal swelling over superior chest tube/port site. Wound with blood tinged light thin brown drainage. Wound dressing removed, cleaned with chlorhexidine packing removed, Wound re-packed and dressing changed at bedside. Two other inferior lateral incisions healed.        @ :  -   @ 07:00  --------------------------------------------------------  IN: 1255 mL / OUT: 3100 mL / NET: -1845 mL     @ 07: @ 13:57  --------------------------------------------------------  IN: 400 mL / OUT: 400 mL / NET: 0 mL        Weights:  Daily     Daily Weight in k.1 (28 Sep 2019 05:55)  Admit Wt: Drug Dosing Weight  Height (cm): 185.4 (27 Sep 2019 05:18)  Weight (kg): 108.6 (27 Sep 2019 05:18)  BMI (kg/m2): 31.6 (27 Sep 2019 05:18)  BSA (m2): 2.32 (27 Sep 2019 05:18)    All laboratory results, radiology and medications reviewed.    LABS      145  |  97<L>  |  33.0<H>  ----------------------------<  84  3.7   |  39.0<H>  |  1.26    Ca    9.0      28 Sep 2019 06:31  Phos  3.0       Mg     1.8         TPro  6.6  /  Alb  3.0<L>  /  TBili  0.3<L>  /  DBili  x   /  AST  10  /  ALT  <5  /  AlkPhos  84                                   11.8   10.96 )-----------( 196      ( 28 Sep 2019 06:31 )             40.0          PT/INR - ( 26 Sep 2019 15:35 )   PT: 15.1 sec;   INR: 1.30 ratio         PTT - ( 26 Sep 2019 15:35 )  PTT:31.4 sec  Bilirubin Total, Serum: 0.3 mg/dL ( @ 06:31)               Today's CXR: from: Xray Chest 1 View- PORTABLE-Routine (19 @ 06:06)   The lungs demonstrate right pleural effusion unchanged from prior   examination. Mild increased pulmonary vascular congestion is noted.    The heart is difficult to evaluate. Dual-lead pacemaker is noted.      IMPRESSION: Mild increased pulmonary vascular congestion noted. Right   pleural effusion noted unchanged from prior examination dated 2019.         TTE Echo Limited or F/U (19 @ 10:20)     Summary:   1. Left ventricular ejection fraction, by visual estimation, is 40 to   45%.   2. Mildly to moderately decreased segmental left ventricular systolic   function.   3. Entire inferior wall and mid and apical inferior septum are abnormal   as described above.   4. Spectral Doppler shows impaired relaxation pattern of left   ventricular myocardial filling (Grade I diastolic dysfunction).   5. There is mild concentric left ventricular hypertrophy.   6. There is no evidence of pericardial effusion.   7. Moderate mitral annular calcification.   8. Thickening and calcification of the anterior and posterior mitral   valve leaflets.   9. Endocardial visualization was enhanced with intravenous echo contrast.  10. The aortic valve mean gradient is 21.9 mmHg consistent with moderate   aortic stenosis.          PAST MEDICAL & SURGICAL HISTORY:  CHF (congestive heart failure)  CAD (coronary artery disease)  HLD (hyperlipidemia)  HTN (hypertension)  COPD (chronic obstructive pulmonary disease)  Stenosis of lumbosacral spine  H/O back injury  Artificial pacemaker

## 2019-09-28 NOTE — PROGRESS NOTE ADULT - ASSESSMENT
78yM PMH CHF, CAD, HLD, HTN, COPD, lumbosacral spine stenosis, PPM, h/o PNA and empyema s/p drain and decort earlier this year, presents to [facility] with drainage from right chest tube site  - No focal neurologic deficit    PLAN:  - pt seen w Dr. 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

## 2019-09-28 NOTE — PROGRESS NOTE ADULT - PROBLEM SELECTOR PLAN 1
Wound packing and dressing changed today.   Cont IV antibiotics.  ID consult appreciated.  Possible exploratory wash out for next week as per Dr. Hall.

## 2019-09-29 NOTE — PROGRESS NOTE ADULT - SUBJECTIVE AND OBJECTIVE BOX
[facility]  INFECTIOUS DISEASES AND INTERNAL MEDICINE at [place]  =======================================================  Onesimo Henderson MD  Diplomates American Board of Internal Medicine and Infectious Diseases  Telephone [phone]  Fax            [phone]  =======================================================    [id number]  JOHN CIFUENTES   follow up for: RIGHT chest tube site infection  patient seen and examined.     no fevers  cultures negative    I have personally reviewed the labs and data; pertinent labs and data are listed in this note; please see below.   ===================================================  REVIEW OF SYSTEMS:  CONSTITUTIONAL:  No Fever or chills  HEENT:  No diplopia or blurred vision.  No earache, sore throat or runny nose.  CARDIOVASCULAR:  No pressure, squeezing, strangling, tightness, heaviness or aching about the chest, neck, axilla or epigastrium.  RESPIRATORY:  No cough, shortness of breath  GASTROINTESTINAL:  No nausea, vomiting or diarrhea.  GENITOURINARY:  No dysuria, frequency or urgency. No Blood in urine  MUSCULOSKELETAL:  no joint aches, no muscle pain  SKIN:  No change in skin, hair or nails.  NEUROLOGIC:  No Headaches, seizures or weakness.  PSYCHIATRIC:  No disorder of thought or mood.  ENDOCRINE:  No heat or cold intolerance  HEMATOLOGICAL:  No easy bruising or bleeding.   =======================================================  Allergies  No Known Allergies    Antibiotics:  piperacillin/tazobactam IVPB.. 3.375 Gram(s) IV Intermittent every 8 hours  vancomycin  IVPB      vancomycin  IVPB 1250 milliGRAM(s) IV Intermittent every 12 hours    Other medications:  acetaminophen   Tablet .. 650 milliGRAM(s) Oral every 6 hours  aspirin enteric coated 81 milliGRAM(s) Oral daily  atorvastatin 20 milliGRAM(s) Oral at bedtime  carvedilol 3.125 milliGRAM(s) Oral every 12 hours  docusate sodium 100 milliGRAM(s) Oral three times a day  furosemide    Tablet 40 milliGRAM(s) Oral daily  heparin  Injectable 5000 Unit(s) SubCutaneous every 12 hours  montelukast 10 milliGRAM(s) Oral daily  pantoprazole    Tablet 40 milliGRAM(s) Oral before breakfast  predniSONE   Tablet 10 milliGRAM(s) Oral daily  saccharomyces boulardii 250 milliGRAM(s) Oral two times a day  sertraline 25 milliGRAM(s) Oral daily  sodium chloride 0.9% lock flush 3 milliLiter(s) IV Push every 8 hours  spironolactone 25 milliGRAM(s) Oral daily    ======================================================  Physical Exam:  ============  T(F): 98 (29 Sep 2019 08:57), Max: 98.2 (28 Sep 2019 15:30)  HR: 69 (29 Sep 2019 08:57)  BP: 114/68 (29 Sep 2019 08:57)  RR: 18 (29 Sep 2019 08:57)  SpO2: 94% (29 Sep 2019 08:57) (94% - 99%)  temp max in last 48H T(F): , Max: 98.2 (09-28-19 @ 15:30)    General:  No acute distress.  Eye: Pupils are equal, round and reactive to light, Extraocular movements are intact, Normal conjunctiva.  HENT: Normocephalic, Oral mucosa is moist, No pharyngeal erythema, No sinus tenderness.  Neck: Supple, No lymphadenopathy.  Respiratory: Lungs are clear to auscultation, Respirations are non-labored.  RIGHT CHEST WALL DRESSING IN PLACE.    Cardiovascular: Normal rate, Regular rhythm,    Gastrointestinal: Soft, Non-tender, Non-distended, Normal bowel sounds.  Genitourinary: No costovertebral angle tenderness.  Lymphatics: No lymphadenopathy neck,   Musculoskeletal: Normal range of motion, Normal strength.  Integumentary: No rash.  Neurologic: Alert, Oriented, No focal deficits, Cranial Nerves II-XII are grossly intact.  Psychiatric: Appropriate mood & affect.  =======================================================  Labs:                        11.9   10.69 )-----------( 232      ( 29 Sep 2019 06:07 )             40.2       WBC Count: 10.69 K/uL (09-29-19 @ 06:07)  WBC Count: 10.96 K/uL (09-28-19 @ 06:31)  WBC Count: 13.55 K/uL (09-27-19 @ 21:51)  WBC Count: 12.93 K/uL (09-27-19 @ 06:08)  WBC Count: 13.06 K/uL (09-26-19 @ 15:35)      09-29    142  |  95<L>  |  33.0<H>  ----------------------------<  91  3.8   |  37.0<H>  |  1.10    Ca    9.1      29 Sep 2019 06:07  Phos  3.0     09-28  Mg     2.0     09-29    TPro  6.9  /  Alb  3.1<L>  /  TBili  0.3<L>  /  DBili  x   /  AST  11  /  ALT  <5  /  AlkPhos  82  09-29      Culture - Surgical Swab (collected 09-27-19 @ 16:00)  Source: .Surgical Swab chest wall abscess  Gram Stain (09-27-19 @ 18:10):    Few White blood cells    Few Gram Positive Cocci in Pairs and Chains    Culture - Blood (collected 09-26-19 @ 15:40)  Source: .Blood    Culture - Blood (collected 09-26-19 @ 15:40)  Source: .Blood      Creatinine, Serum: 1.10 mg/dL (09-29-19 @ 06:07)  Creatinine, Serum: 1.26 mg/dL (09-28-19 @ 06:31)  Creatinine, Serum: 1.08 mg/dL (09-27-19 @ 06:08)  Creatinine, Serum: 0.88 mg/dL (09-26-19 @ 15:35)          < from: Xray Chest 1 View- PORTABLE-Urgent (09.27.19 @ 14:37) >  EXAM:  XR CHEST PORTABLE URGENT 1V                          PROCEDURE DATE:  09/27/2019          INTERPRETATION:  Portable chest radiograph        CLINICAL INFORMATION:   RIGHT pleural effusion. Status post drainage.   Assess for pneumothorax.    TECHNIQUE:  Portable  AP view of the chest was obtained.    COMPARISON: 9/26/2019 available for review.    FINDINGS:   The lungs  show no significant change again demonstrating RIGHT lower   lobe moderate effusion and/or airspace consolidation appearing RIGHT   upper lobe and LEFT lung parenchyma grossly clear. Note the LEFT   hemidiaphragm elevated. Posterior lung base cannot be assessed due to   elevated diaphragm. Lateral radiograph recommended if clinically   warranted.        Cardiac device wire leads are within right atrium and right ventricle.    The  heart is enlarged in transverse diameter. No hilar mass. Trachea   midline.   Visualized osseous structures are intact.        IMPRESSION:   No interval change..    < end of copied text >      < from: CT Chest No Cont (09.26.19 @ 16:50) >  IMPRESSION:     In comparison with 8/26/2019, new 6.2 x 4.2 cm right lower lateral chest   wall subcutaneous fluid collection adjacent to the right 8th rib.    Small loculated right pleural effusion is slightly increased. Underlying   right pleural thickening.    Atelectasis of the basilar right lower lobe is is slightly increased.     Narrowing of the right lower lobe bronchus and obliteration of   posterior-lateral basilar segments of right lower lobe bronchus possibly   secondary to retained secretions. Recommend attention on follow-up.    Emphysema.    6 cm indistinct hypodensity within the posterior right lobe of the liver.   Recommend further evaluation with MRI or ultrasound.    Destructive lytic lesion within T4 vertebral body with extension into the   spinal canal likely metastatic. Recommend further evaluation with   contrast-enhanced MRI of thoracic spine.    < end of copied text >

## 2019-09-29 NOTE — CONSULT NOTE ADULT - ASSESSMENT
77 y/o M with a PMHx of COPD (on 3L home O2), HFrEF (EF 45-50%), CAD (non-obstructive on cath 04/19), Moderate AS, loculated pleural effusion s/p pigtail and Right VATS Decortication (04/30/19), HTN, HLD, and lumbar stenosis 77 y/o M with a PMHx of COPD (on 3L home O2), HFrEF (EF 45-50%), CAD (non-obstructive on cath 04/19), Moderate AS, loculated pleural effusion s/p pigtail and Right VATS Decortication (04/30/19), HTN, HLD, and lumbar stenosis   CT chest wtih subcutaneous fluid collection adjacent 8th rib small loculated plerual effusion.  Patient has been having back pain and there is Destructive lytic lesion within T4 vertebral body

## 2019-09-29 NOTE — PROGRESS NOTE ADULT - PROBLEM SELECTOR PLAN 1
Wound packing and dressing changed today.   WBC count trending down.  Currently on Vanco/Zosyn.  Cont IV antibiotics as per ID.  ID consult appreciated.  Cultures from I&D of right chest wall abscess pending.   Possible exploratory wash out/thoracotomy for next week as per Dr. Hall.  Patient informed of possible procedure and plan going forward with Dr. Hall at the bedside.  Will need cardio clearance prior to procedure.

## 2019-09-29 NOTE — PROGRESS NOTE ADULT - PROBLEM SELECTOR PLAN 3
SOB/LEGER and LE edema with elevated BNP  Lasix 40 IV x 1  Awaiting Echo results  Consider cardiology consult
Patient with mildly better SOB/LEGER and LE edema with elevated BNP.  Cont O2.  Mg and K repleted today.  Monitor and replete lytes PRN.   Patient refusing his ASA.  Cont to monitor BNP.   Lasix 40 IV x 1 given yesterday, patient states LE edema is much better from his baseline.   Cont daily diuresis with Lasix and aldactone.  Echo results reviewed.  Follow up CXR in am.  Cont tele monitor.  Consider cardiology consult if symptoms worsen.
Patient with mildly better SOB/LEGER and LE edema.   Patient states LE edema is much better from his baseline.   Cont daily diuresis with Lasix and aldactone.  Pro-BNP trending down.  Mg and K last repleted 9/28.  Monitor and replete lytes PRN.   Cont to trend labs.  Cont O2.  Patient refusing his ASA.  Echo done 9/27.  Follow up CXR in am.  Cont tele monitor.  Cardiology consult called for cardiac clearance.

## 2019-09-29 NOTE — PROGRESS NOTE ADULT - PROBLEM SELECTOR PLAN 2
Neurosurgery consult appreciated.   MRI pending once able to get medtronic to put in safe mode (MRI compatible).  Cont pain management.
T4 lytic lesion with epidural extension seen on CT scan.   Neurosurgery consult appreciated.   MRI pending once able to get Bridgtronic to put in safe mode (MRI compatible).  Cont pain management.  Tramadol added for pain management today. Monitor for possible SEs of this medication. Patient has tolerated this medication at this dosage in the past.
Neuro surg consult  MRI pending after cardio clearance for PPM (MRI compatible)  Pain management

## 2019-09-29 NOTE — PROGRESS NOTE ADULT - SUBJECTIVE AND OBJECTIVE BOX
NEUROSURGERY PROGRESS NOTE:    Pt admitted for chest empyema and was found to have T4 lytic lesion with epidural extension on CT Chest.   pt seen w Dr Arias and states is at baseline, denied any new or worsening sensorimotor changes  -headache  - Nausea / - Vomiting  denies weakness  denies numbness/ tingling    MEDICATIONS  (STANDING):  acetaminophen   Tablet .. 650 milliGRAM(s) Oral every 6 hours  aspirin enteric coated 81 milliGRAM(s) Oral daily  atorvastatin 20 milliGRAM(s) Oral at bedtime  carvedilol 3.125 milliGRAM(s) Oral every 12 hours  docusate sodium 100 milliGRAM(s) Oral three times a day  furosemide    Tablet 40 milliGRAM(s) Oral daily  heparin  Injectable 5000 Unit(s) SubCutaneous every 12 hours  montelukast 10 milliGRAM(s) Oral daily  pantoprazole    Tablet 40 milliGRAM(s) Oral before breakfast  piperacillin/tazobactam IVPB.. 3.375 Gram(s) IV Intermittent every 8 hours  predniSONE   Tablet 10 milliGRAM(s) Oral daily  saccharomyces boulardii 250 milliGRAM(s) Oral two times a day  sertraline 25 milliGRAM(s) Oral daily  sodium chloride 0.9% lock flush 3 milliLiter(s) IV Push every 8 hours  spironolactone 25 milliGRAM(s) Oral daily  vancomycin  IVPB      vancomycin  IVPB 1250 milliGRAM(s) IV Intermittent every 12 hours    MEDICATIONS  (PRN):  traMADol 25 milliGRAM(s) Oral daily PRN Moderate Pain (4 - 6)    Allergies    No Known Allergies    Intolerances      Vital Signs Last 24 Hrs  T(C): 36.6 (29 Sep 2019 16:00), Max: 36.7 (29 Sep 2019 08:57)  T(F): 97.9 (29 Sep 2019 16:00), Max: 98 (29 Sep 2019 08:57)  HR: 68 (29 Sep 2019 17:13) (65 - 75)  BP: 118/63 (29 Sep 2019 17:13) (112/69 - 129/61)  BP(mean): --  RR: 18 (29 Sep 2019 16:00) (18 - 18)  SpO2: 98% (29 Sep 2019 16:00) (94% - 99%)        PHYSICAL EXAM:  GENERAL: NAD, well-groomed, well-developed  HEAD: Atraumatic, normocephalic  MENTAL STATUS: Awake, alert, appropriately conversant. Following commands  MOTOR: strength 5/5 b/l upper and lower extremities  SENSATION: grossly intact to light touch all extremities  EXTREMITIES: pitting edema b/l         LABS:                        11.9   10.69 )-----------( 232      ( 29 Sep 2019 06:07 )             40.2     09-29    142  |  95<L>  |  33.0<H>  ----------------------------<  91  3.8   |  37.0<H>  |  1.10    Ca    9.1      29 Sep 2019 06:07  Phos  3.0     09-28  Mg     2.0     09-29    TPro  6.9  /  Alb  3.1<L>  /  TBili  0.3<L>  /  DBili  x   /  AST  11  /  ALT  <5  /  AlkPhos  82  09-29      RADIOLOGY & ADDITIONAL TESTS:  no new NSx images available for review       Time Spent with patient/ education: 15 mins

## 2019-09-29 NOTE — PROGRESS NOTE ADULT - SUBJECTIVE AND OBJECTIVE BOX
Subjective: Patient on O2 via NC, sitting in the chair at bedside in NAD. Family member at bedside. Patient in better spirits today. Uses O2 at home. On ambulation patient has significant dyspnea and oxygen requirement. States he is out of breath at home after walking to his mail box and back. Patient denies chest pain, palpitations, but does have right chest wall tenderness. Denies N/V/D/C, dysuria, lightheadedness, fevers or chills.     VITAL SIGNS  Vital Signs Last 24 Hrs  T(C): 36.7 (09-29-19 @ 08:57), Max: 36.8 (09-28-19 @ 15:30)  T(F): 98 (09-29-19 @ 08:57), Max: 98.2 (09-28-19 @ 15:30)  HR: 69 (09-29-19 @ 08:57) (68 - 77)  BP: 114/68 (09-29-19 @ 08:57) (99/59 - 119/72)  RR: 18 (09-29-19 @ 08:57) (18 - 18)  SpO2: 94% (09-29-19 @ 08:57) (94% - 99%)  on (O2)              Telemetry/Alarms: Sinus with few PVCs, a-paced    MEDICATIONS  acetaminophen   Tablet .. 650 milliGRAM(s) Oral every 6 hours  aspirin enteric coated 81 milliGRAM(s) Oral daily  atorvastatin 20 milliGRAM(s) Oral at bedtime  carvedilol 3.125 milliGRAM(s) Oral every 12 hours  docusate sodium 100 milliGRAM(s) Oral three times a day  furosemide    Tablet 40 milliGRAM(s) Oral daily  heparin  Injectable 5000 Unit(s) SubCutaneous every 12 hours  montelukast 10 milliGRAM(s) Oral daily  pantoprazole    Tablet 40 milliGRAM(s) Oral before breakfast  piperacillin/tazobactam IVPB.. 3.375 Gram(s) IV Intermittent every 8 hours  predniSONE   Tablet 10 milliGRAM(s) Oral daily  saccharomyces boulardii 250 milliGRAM(s) Oral two times a day  sertraline 25 milliGRAM(s) Oral daily  sodium chloride 0.9% lock flush 3 milliLiter(s) IV Push every 8 hours  spironolactone 25 milliGRAM(s) Oral daily  traMADol 25 milliGRAM(s) Oral daily PRN  vancomycin  IVPB      vancomycin  IVPB 1250 milliGRAM(s) IV Intermittent every 12 hours      PHYSICAL EXAM  General: well nourished, well developed, no acute distress on O2 via NC  Neurology: alert and oriented x 3, nonfocal, no gross deficits  Respiratory: diminished right base and slightly diminished left base with scattered crackles.  CV: regular rate and rhythm, normal S1, S2 + systolic murmur  Abdomen: soft, nontender, nondistended, positive bowel sounds  Extremities: warm, well perfused. 2+ LE edema with chronic skin changes. + DP pulses  Right lateral chest wall with much decreased surrounding erythema, minimal swelling over superior chest tube/port site. Wound with blood tinged light thin brown drainage. Wound dressing removed, cleaned with chlorhexidine packing removed, Wound re-packed and dressing changed at bedside. Two other inferior lateral incisions healed.       09-28 @ 07:01 - 09-29 @ 07:00  --------------------------------------------------------  IN: 580 mL / OUT: 1875 mL / NET: -1295 mL    09-29 @ 07:01 - 09-29 @ 10:55  --------------------------------------------------------  IN: 0 mL / OUT: 400 mL / NET: -400 mL        Weights:  Daily     Daily   Admit Wt: Drug Dosing Weight  Height (cm): 185.4 (27 Sep 2019 05:18)  Weight (kg): 108.6 (27 Sep 2019 05:18)  BMI (kg/m2): 31.6 (27 Sep 2019 05:18)  BSA (m2): 2.32 (27 Sep 2019 05:18)    All laboratory results, radiology and medications reviewed.    LABS  09-29    142  |  95<L>  |  33.0<H>  ----------------------------<  91  3.8   |  37.0<H>  |  1.10    Ca    9.1      29 Sep 2019 06:07  Phos  3.0     09-28  Mg     2.0     09-29    TPro  6.9  /  Alb  3.1<L>  /  TBili  0.3<L>  /  DBili  x   /  AST  11  /  ALT  <5  /  AlkPhos  82  09-29                                 11.9   10.69 )-----------( 232      ( 29 Sep 2019 06:07 )             40.2            Bilirubin Total, Serum: 0.3 mg/dL (09-29 @ 06:07)               Today's CXR: Pending        PAST MEDICAL & SURGICAL HISTORY:  CHF (congestive heart failure)  CAD (coronary artery disease)  HLD (hyperlipidemia)  HTN (hypertension)  COPD (chronic obstructive pulmonary disease)  Stenosis of lumbosacral spine  H/O back injury  Artificial pacemaker

## 2019-09-29 NOTE — CONSULT NOTE ADULT - SUBJECTIVE AND OBJECTIVE BOX
77 y/o male with PMH of severe copd, Non obstructive CAD, CHF (EF 45%), HTN, PPM, hx pneumonia and empyema drainage done earlier this year found to have a chest wall infection and loculated effusion.  Scheduled for wash out surgery          PAST MEDICAL HISTORY  CHF   CAD   HLD   HTN  COPD   Stenosis of lumbosacral spine    PSH  PPM    ALLERGIES  NKDA    MEDICATIONS  (STANDING):  acetaminophen   Tablet .. 650 milliGRAM(s) Oral every 6 hours  aspirin enteric coated 81 milliGRAM(s) Oral daily  atorvastatin 20 milliGRAM(s) Oral at bedtime  carvedilol 3.125 milliGRAM(s) Oral every 12 hours  docusate sodium 100 milliGRAM(s) Oral three times a day  furosemide    Tablet 40 milliGRAM(s) Oral daily  heparin  Injectable 5000 Unit(s) SubCutaneous every 12 hours  montelukast 10 milliGRAM(s) Oral daily  pantoprazole    Tablet 40 milliGRAM(s) Oral before breakfast  piperacillin/tazobactam IVPB.. 3.375 Gram(s) IV Intermittent every 8 hours  predniSONE   Tablet 10 milliGRAM(s) Oral daily  saccharomyces boulardii 250 milliGRAM(s) Oral two times a day  sertraline 25 milliGRAM(s) Oral daily  sodium chloride 0.9% lock flush 3 milliLiter(s) IV Push every 8 hours  spironolactone 25 milliGRAM(s) Oral daily  vancomycin  IVPB      vancomycin  IVPB 1250 milliGRAM(s) IV Intermittent every 12 hours      FAMILY HISTORY:  Non contributory    SOCIAL HISTORY:  Former smoker  ALCOHOL:  No etoh    REVIEW OF SYSTEMS:  CONSTITUTIONAL: No fever, weight loss, or fatigue  EYES: No eye pain, visual disturbances, or discharge  ENMT:  No difficulty hearing, tinnitus, vertigo; No sinus or throat pain  NECK: No pain or stiffness  RESPIRATORY: chronically sob on home oxygen  CARDIOVASCULAR: No chest pain, palpitations, passing out, dizziness, or leg swelling  GASTROINTESTINAL: No abdominal or epigastric pain. No nausea, vomiting, or hematemesis; No diarrhea or constipation. No melena or hematochezia.  GENITOURINARY: No dysuria, frequency, hematuria, or incontinence  NEUROLOGICAL: No headaches, memory loss, loss of strength, numbness, or tremors  SKIN: No itching, burning, rashes, or lesions   LYMPH Nodes: No enlarged glands  ENDOCRINE: No heat or cold intolerance; No hair loss  MUSCULOSKELETAL: No joint pain or swelling; No muscle, back, or extremity pain  PSYCHIATRIC: No depression, anxiety, mood swings, or difficulty sleeping  HEME/LYMPH: No easy bruising, or bleeding gums  ALLERY AND IMMUNOLOGIC: No hives or eczema	    Vital Signs Last 24 Hrs  T(C): 36.7 (29 Sep 2019 08:57), Max: 36.8 (28 Sep 2019 15:30)  T(F): 98 (29 Sep 2019 08:57), Max: 98.2 (28 Sep 2019 15:30)  HR: 69 (29 Sep 2019 08:57) (68 - 77)  BP: 114/68 (29 Sep 2019 08:57) (99/59 - 119/72)  BP(mean): --  RR: 18 (29 Sep 2019 08:57) (18 - 18)  SpO2: 94% (29 Sep 2019 08:57) (94% - 99%)      I&O's Detail    28 Sep 2019 07:01  -  29 Sep 2019 07:00  --------------------------------------------------------  IN:    IV PiggyBack: 300 mL    Oral Fluid: 180 mL    Solution: 100 mL  Total IN: 580 mL    OUT:    Voided: 1875 mL  Total OUT: 1875 mL    Total NET: -1295 mL      29 Sep 2019 07:01  -  29 Sep 2019 12:42  --------------------------------------------------------  IN:  Total IN: 0 mL    OUT:    Voided: 400 mL  Total OUT: 400 mL    Total NET: -400 mL      PHYSICAL EXAM:  Appearance: Normal, well nourished	  HEENT:   Normal oral mucosa, PERRL, EOMI, sclera non-icteric	  Lymphatic: No cervical lymphadenopathy  Cardiovascular: Normal S1 S2, No JVD, No cardiac murmurs, No carotid bruits, No peripheral edema  Respiratory: Lungs clear to auscultation	  Psychiatry: A & O x 3, Mood & affect appropriate  Gastrointestinal:  Soft, Non-tender, + BS, no bruits	  Skin: No rashes, No ecchymoses, No cyanosis  Neurologic: Grossly non-focal with full strength in all four extremities  Extremities: Normal range of motion, No clubbing, cyanosis or edema  Vascular: Peripheral pulses palpable 2+ bilaterally      INTERPRETATION OF TELEMETRY:  Nsr with AV pacing    ECG:    LABS:                        11.9   10.69 )-----------( 232      ( 29 Sep 2019 06:07 )             40.2     09-29    142  |  95<L>  |  33.0<H>  ----------------------------<  91  3.8   |  37.0<H>  |  1.10    Ca    9.1      29 Sep 2019 06:07  Phos  3.0     09-28  Mg     2.0     09-29    TPro  6.9  /  Alb  3.1<L>  /  TBili  0.3<L>  /  DBili  x   /  AST  11  /  ALT  <5  /  AlkPhos  82  09-29  I&O's Summary    28 Sep 2019 07:01  -  29 Sep 2019 07:00  --------------------------------------------------------  IN: 580 mL / OUT: 1875 mL / NET: -1295 mL    29 Sep 2019 07:01  -  29 Sep 2019 12:42  --------------------------------------------------------  IN: 0 mL / OUT: 400 mL / NET: -400 mL      BNPSerum Pro-Brain Natriuretic Peptide: 2943 pg/mL (09-29 @ 06:07)    RADIOLOGY & ADDITIONAL STUDIES:  CT of chest  In comparison with 8/26/2019, new 6.2 x 4.2 cm right lower lateral chest   wall subcutaneous fluid collection adjacent to the right 8th rib.  Small loculated right pleural effusion is slightly increased. Underlying   rght pleural thickening.  Atelectasis of the basilar right lower lobe is is slightly increased.   Narrowing of the right lower lobe bronchus and obliteration of   posterior-lateral basilar segments of right lower lobe bronchus possibly   secondary to retained secretions. Recommend attention on follow-up.  Emphysema.  6 cm indistinct hypodensity within the posterior right lobe of the liver.   Recommend further evaluation with MRI or ultrasound.  Destructive lytic lesion within T4 vertebral body with extension into the   spinal canal likely metastatic. Recommend further evaluation with   contrast-enhanced MRI of thoracic spine.    < from: TTE Echo Limited or F/U (09.27.19 @ 10:20) >   1. Left ventricular ejection fraction, by visual estimation, is 40 to   45%.   2. Mildly to moderately decreased segmental left ventricular systolic   function.   3. Entire inferior wall and mid and apical inferior septum are abnormal   as described above.   4. Spectral Doppler shows impaired relaxation pattern of left   ventricular myocardial filling (Grade I diastolic dysfunction).   5. There is mild concentric left ventricular hypertrophy.   6. There is no evidence of pericardial effusion.   7. Moderate mitral annular calcification.   8. Thickening and calcification of the anterior and posterior mitral   valve leaflets.   9. Endocardial visualization was enhanced with intravenous echo contrast.  10. The aortic valve mean gradient is 21.9 mmHg consistent with moderate   aortic stenosis.    < from: Cardiac Cath Lab - Adult (04.26.19 @ 13:54) >  VENTRICLES: Global left ventricular function was severely depressed. EF  estimated was 30 %.  VALVES: AORTIC VALVE: There was moderate aortic stenosis.  CORONARY VESSELS: The coronary circulation is left dominant.  LM:   --  LM: Normal.  LAD:   --  LAD: Normal. The vessel was normal sized, not calcified, and not  tortuous. Angiography showed minor luminal irregularities with no flow  limiting lesions. There was a discrete 30 % stenosis at the ostium of the  vessel segment. The lesion was without evidence of thrombus. There was  PACO grade 3 flow through the vessel (brisk flow).  CX:   --  Circumflex: Normal. The vessel was normal sized, not calcified,  and not tortuous. Angiography showed minor luminal irregularities with no  flow limiting lesions.  RI:   --  Ramus intermedius: Normal. The vessel was normal sized, not  calcified, and not tortuous. Angiography showed minor luminal  irregularities with no flow limiting lesions.  RCA:   --  RCA: Normal. The vessel was normal sized, not calcified, and not  tortuous. Angiography showed minor luminal irregularities with no flow  limiting lesions.  COMPLICATIONS: There were no complications. No complications occurred  during the cath lab visit.  DIAGNOSTIC IMPRESSIONS: There is mild irregularity of the coronary anatomy.  Left ventricular function is abnormal.  LVEF=30%  Moderate Elevation of Right Heart Pressures:  RA=19 mmHg; RV=48/23 mmHg  Moderate Pulmonary Hypertension=56/31 - 37 mmHg  Normal CO (6.04 L/min) and CI (2.55 L/min/m2)  Moderate Aortic Stenosis (CHING=1.46 and AVG=17 mmHg)  Normal Ascending Aorta 77 y/o male with PMH of severe copd, Non obstructive CAD, CHF (EF 45%), HTN, PPM, hx pneumonia and empyema drainage done earlier this year, had chest tube which fell out and site never totally healed. and  found to have a chest wall infection and loculated effusion.  Scheduled for Possible exploratory wash out/thoracotomy.    Denies any recent chest pain, palpitations or syncope    PAST MEDICAL HISTORY  CHF   CAD   HLD   HTN  COPD   Stenosis of lumbosacral spine    PSH  PPM    ALLERGIES  NKDA    MEDICATIONS  (STANDING):  acetaminophen   Tablet .. 650 milliGRAM(s) Oral every 6 hours  aspirin enteric coated 81 milliGRAM(s) Oral daily  atorvastatin 20 milliGRAM(s) Oral at bedtime  carvedilol 3.125 milliGRAM(s) Oral every 12 hours  docusate sodium 100 milliGRAM(s) Oral three times a day  furosemide    Tablet 40 milliGRAM(s) Oral daily  heparin  Injectable 5000 Unit(s) SubCutaneous every 12 hours  montelukast 10 milliGRAM(s) Oral daily  pantoprazole    Tablet 40 milliGRAM(s) Oral before breakfast  piperacillin/tazobactam IVPB.. 3.375 Gram(s) IV Intermittent every 8 hours  predniSONE Tablet 10 milliGRAM(s) Oral daily  saccharomyces boulardii 250 milliGRAM(s) Oral two times a day  sertraline 25 milliGRAM(s) Oral daily  sodium chloride 0.9% lock flush 3 milliLiter(s) IV Push every 8 hours  spironolactone 25 milliGRAM(s) Oral daily  vancomycin  IVPB 1250 milliGRAM(s) IV Intermittent every 12 hours    FAMILY HISTORY:  Non contributory    SOCIAL HISTORY:  Former smoker  ALCOHOL:  No etoh    REVIEW OF SYSTEMS:  CONSTITUTIONAL: No fever, weight loss, or fatigue  RESPIRATORY: chronically sob on home oxygen  CARDIOVASCULAR: No chest pain, palpitations, passing out, dizziness, or leg swelling  GASTROINTESTINAL: No abdominal or epigastric pain. No nausea, vomiting, or hematemesis; No diarrhea or constipation. No melena or hematochezia.  GENITOURINARY: No dysuria, frequency, hematuria, or incontinence  NEUROLOGICAL: No headaches, memory loss, loss of strength, numbness, or tremors  SKIN: No itching, burning, rashes, or lesions   LYMPH Nodes: No enlarged glands  ENDOCRINE: No heat or cold intolerance; No hair loss  MUSCULOSKELETAL: No joint pain or swelling;   ++ pain in upper back  PSYCHIATRIC: No depression, anxiety, mood swings, or difficulty sleeping  	  Vital Signs Last 24 Hrs  T(C): 36.7 (29 Sep 2019 08:57), Max: 36.8 (28 Sep 2019 15:30)  T(F): 98 (29 Sep 2019 08:57), Max: 98.2 (28 Sep 2019 15:30)  HR: 69 (29 Sep 2019 08:57) (68 - 77)  BP: 114/68 (29 Sep 2019 08:57) (99/59 - 119/72)  BP(mean): --  RR: 18 (29 Sep 2019 08:57) (18 - 18)  SpO2: 94% (29 Sep 2019 08:57) (94% - 99%)    I&O's Detail    28 Sep 2019 07:01  -  29 Sep 2019 07:00  --------------------------------------------------------  IN:    IV PiggyBack: 300 mL    Oral Fluid: 180 mL    Solution: 100 mL  Total IN: 580 mL    OUT:    Voided: 1875 mL  Total OUT: 1875 mL    Total NET: -1295 mL      29 Sep 2019 07:01  -  29 Sep 2019 12:42  --------------------------------------------------------  IN:  Total IN: 0 mL    OUT:    Voided: 400 mL  Total OUT: 400 mL    Total NET: -400 mL    PHYSICAL EXAM:  Appearance: Normal, well nourished	  HEENT:   Normal oral mucosa, PERRL, EOMI, sclera non-icteric	  Lymphatic: No cervical lymphadenopathy  Cardiovascular: Normal S1 S2, 2/6 mehran lsb  Respiratory: Lungs clear to auscultation	  Psychiatry: A & O x 3, Mood & affect appropriate  Gastrointestinal:  Soft, Non-tender, + BS, no bruits	  Skin: No rashes, No ecchymoses, No cyanosis  Neurologic: Grossly non-focal with full strength in all four extremities  Extremities: Normal range of motion, No clubbing, cyanosis or edema  Vascular: Peripheral pulses palpable 2+ bilaterally      INTERPRETATION OF TELEMETRY:  Nsr with AV pacing  ECG:  NSR, LAD RBBB  PRWP V3-v5  LABS:                        11.9   10.69 )-----------( 232      ( 29 Sep 2019 06:07 )             40.2     09-29    142  |  95<L>  |  33.0<H>  ----------------------------<  91  3.8   |  37.0<H>  |  1.10    Ca    9.1      29 Sep 2019 06:07  Phos  3.0     09-28  Mg     2.0     09-29    TPro  6.9  /  Alb  3.1<L>  /  TBili  0.3<L>  /  DBili  x   /  AST  11  /  ALT  <5  /  AlkPhos  82  09-29  I&O's Summary    28 Sep 2019 07:01  -  29 Sep 2019 07:00  --------------------------------------------------------  IN: 580 mL / OUT: 1875 mL / NET: -1295 mL    29 Sep 2019 07:01  -  29 Sep 2019 12:42  --------------------------------------------------------  IN: 0 mL / OUT: 400 mL / NET: -400 mL    BNPSerum Pro-Brain Natriuretic Peptide: 2943 pg/mL (09-29 @ 06:07)    RADIOLOGY & ADDITIONAL STUDIES:  CT of chest  In comparison with 8/26/2019, new 6.2 x 4.2 cm right lower lateral chest   wall subcutaneous fluid collection adjacent to the right 8th rib.  Small loculated right pleural effusion is slightly increased. Underlying   rght pleural thickening.  Atelectasis of the basilar right lower lobe is is slightly increased.   Narrowing of the right lower lobe bronchus and obliteration of   posterior-lateral basilar segments of right lower lobe bronchus possibly   secondary to retained secretions. Recommend attention on follow-up.  Emphysema.  6 cm indistinct hypodensity within the posterior right lobe of the liver.   Recommend further evaluation with MRI or ultrasound.  Destructive lytic lesion within T4 vertebral body with extension into the   spinal canal likely metastatic. Recommend further evaluation with   contrast-enhanced MRI of thoracic spine.    < from: TTE Echo Limited or F/U (09.27.19 @ 10:20) >   1. Left ventricular ejection fraction, by visual estimation, is 40 to   45%.   2. Mildly to moderately decreased segmental left ventricular systolic   function.   3. Entire inferior wall and mid and apical inferior septum are abnormal   as described above.   4. Spectral Doppler shows impaired relaxation pattern of left   ventricular myocardial filling (Grade I diastolic dysfunction).   5. There is mild concentric left ventricular hypertrophy.   6. There is no evidence of pericardial effusion.   7. Moderate mitral annular calcification.   8. Thickening and calcification of the anterior and posterior mitral   valve leaflets.   9. Endocardial visualization was enhanced with intravenous echo contrast.  10. The aortic valve mean gradient is 21.9 mmHg consistent with moderate   aortic stenosis.    < from: Cardiac Cath Lab - Adult (04.26.19 @ 13:54) >  VENTRICLES: Global left ventricular function was severely depressed. EF  estimated was 30 %.  VALVES: AORTIC VALVE: There was moderate aortic stenosis.  CORONARY VESSELS: The coronary circulation is left dominant.  LM:   --  LM: Normal.  LAD:   --  LAD: Normal. The vessel was normal sized, not calcified, and not  tortuous. Angiography showed minor luminal irregularities with no flow  limiting lesions. There was a discrete 30 % stenosis at the ostium of the  vessel segment. The lesion was without evidence of thrombus. There was  PACO grade 3 flow through the vessel (brisk flow).  CX:   --  Circumflex: Normal. The vessel was normal sized, not calcified,  and not tortuous. Angiography showed minor luminal irregularities with no  flow limiting lesions.  RI:   --  Ramus intermedius: Normal. The vessel was normal sized, not  calcified, and not tortuous. Angiography showed minor luminal  irregularities with no flow limiting lesions.  RCA:   --  RCA: Normal. The vessel was normal sized, not calcified, and not  tortuous. Angiography showed minor luminal irregularities with no flow  limiting lesions.  COMPLICATIONS: There were no complications. No complications occurred  during the cath lab visit.  DIAGNOSTIC IMPRESSIONS: There is mild irregularity of the coronary anatomy.  Left ventricular function is abnormal.  LVEF=30%  Moderate Elevation of Right Heart Pressures:  RA=19 mmHg; RV=48/23 mmHg  Moderate Pulmonary Hypertension=56/31 - 37 mmHg  Normal CO (6.04 L/min) and CI (2.55 L/min/m2)  Moderate Aortic Stenosis (CHING=1.46 and AVG=17 mmHg)  Normal Ascending Aorta

## 2019-09-29 NOTE — PROGRESS NOTE ADULT - ASSESSMENT
78yM PMH CHF, CAD, HLD, HTN, COPD, lumbosacral spine stenosis, PPM, h/o PNA and empyema s/p drain and decort earlier this year, presents to [facility] with drainage from right chest tube site  - No focal neurologic deficit    PLAN:  - pt seen w Dr. 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

## 2019-09-29 NOTE — CONSULT NOTE ADULT - PROBLEM SELECTOR RECOMMENDATION 9
piperacillin/tazobactam IVPB.. 3.375 Gram(s) IV Intermittent every 8 hours  wound care  RCRI surgical risk score -> Class II of risk for perioperative cardiac events with a risk percentage of 0.9%.  Patient stable for proposed procedure  Due to Moderate AS avoid hypotensive events during surgery

## 2019-09-29 NOTE — PROGRESS NOTE ADULT - ASSESSMENT
77y/o  Male h/o CHF, Pacemaker, CAD, COPD stage 4, on home oxygen, s/p total right lung decortication, multiloculated right empyema with Peptostreptococcus Asaccharolyticus s/p Zosyn. Patient was seen as outpatient for ELLIOTT in culture at which time he had no symptoms. Patient was well up until recently when he noticed drainage from right chest tube site 2-3 weeks ago after having been closed for some time. Patient also reports severe back pain over 1-2 weeks for which he got a script for a muscle relaxant. Patient admitted to floor for further workup and evaluation of chest wall infection. Patient was started on IV Vancomycin and Zosyn.      Cellulitis and abscess of old chest tube site  r/o Metastatic disease     - Blood cultures pending  - s/o I&D of abscess 9/27/19, cultures peding  - CT Chest reviewed    continue Antibiotics:  piperacillin/tazobactam IVPB.. 3.375 Gram(s) IV Intermittent every 8 hours  vancomycin  IVPB 1250 milliGRAM(s) IV Intermittent every 12 hours      - follow up all outstanding cultures  - trend temperature and WBC curve  - repeat cultures from blood and all sources if febrile.

## 2019-09-29 NOTE — PROGRESS NOTE ADULT - PROBLEM SELECTOR PLAN 4
will eventually need MRI abdomen to eval hypodense liver lesion.

## 2019-09-29 NOTE — PROGRESS NOTE ADULT - ASSESSMENT
78M h/o CHF, CAD, HLD, HTN, COPD on home O2, pna/empyema s/p decortication 4/30/19, returns with a Right chest wall collection and likely recurrent empyema. Patient also p/w back pain with history of benign spinal tumor resected. T4 lytic lesion with epidural extension seen on CT scan.

## 2019-09-30 NOTE — PROGRESS NOTE ADULT - ASSESSMENT
78M S/P MRI OF THE THORACIC SPINE demonstrates path compression fx of T 4 with epidural extension of disease cause mild cord compression.  PMhx CHF, Pacemaker, CAD, COPD stage 4, on home oxygen, s/p total right lung decortication, multiloculated right empyema with Peptostreptococcus Asaccharolyticus on Zosyn.     Plan  1. Infectious disease following pt empyema. Pt on Zosyn.  2. Thoracic mri appreciated will review with attending.

## 2019-09-30 NOTE — PROGRESS NOTE ADULT - ASSESSMENT
79y/o  Male h/o CHF, Pacemaker, CAD, COPD stage 4, on home oxygen, s/p total right lung decortication, multiloculated right empyema with Peptostreptococcus Asaccharolyticus s/p Zosyn. Patient was seen as outpatient for ELLIOTT in culture at which time he had no symptoms. Patient was well up until recently when he noticed drainage from right chest tube site 2-3 weeks ago after having been closed for some time. Patient also reports severe back pain over 1-2 weeks for which he got a script for a muscle relaxant. Patient admitted to floor for further workup and evaluation of chest wall infection. Patient was started on IV Vancomycin and Zosyn.      Cellulitis and abscess of old chest tube site  r/o Metastatic disease       - Blood cultures no growth   - s/o I&D of abscess 9/27/19, Streptococcus milleri   - CT Chest reviewed  - D/C Vancomycin   - Continue Zosyn  - Follow up cultures  - Trend Fever  - Trend Leukocytosis      Will Follow

## 2019-09-30 NOTE — PROGRESS NOTE ADULT - SUBJECTIVE AND OBJECTIVE BOX
INTERVAL HPI/OVERNIGHT EVENTS:  Pt admitted for chest empyema and was found to have T4 lytic lesion with epidural extension on CT Chest.   pt seen w Dr Arias and states is at baseline, denied any new or worsening sensorimotor changes  -headache  pT DENIES HAVING ANY BACK BI  -MEDICATIONS  (STANDING):  atorvastatin 20 milliGRAM(s) Oral at bedtime  carvedilol 3.125 milliGRAM(s) Oral every 12 hours  docusate sodium 100 milliGRAM(s) Oral three times a day  furosemide    Tablet 40 milliGRAM(s) Oral daily  heparin  Injectable 5000 Unit(s) SubCutaneous every 8 hours  montelukast 10 milliGRAM(s) Oral daily  pantoprazole    Tablet 40 milliGRAM(s) Oral before breakfast  piperacillin/tazobactam IVPB.. 3.375 Gram(s) IV Intermittent every 8 hours  predniSONE   Tablet 10 milliGRAM(s) Oral daily  saccharomyces boulardii 250 milliGRAM(s) Oral two times a day  sertraline 25 milliGRAM(s) Oral daily  sodium chloride 0.9% lock flush 3 milliLiter(s) IV Push every 8 hours  spironolactone 25 milliGRAM(s) Oral daily    MEDICATIONS  (PRN):  acetaminophen   Tablet .. 650 milliGRAM(s) Oral every 6 hours PRN Mild Pain (1 - 3)  ALBUTerol/ipratropium for Nebulization 3 milliLiter(s) Nebulizer every 6 hours PRN Shortness of Breath and/or Wheezing  traMADol 25 milliGRAM(s) Oral daily PRN Moderate Pain (4 - 6)      Allergies    No Known Allergies    Intolerances          Vital Signs Last 24 Hrs  T(C): 36.7 (30 Sep 2019 15:45), Max: 36.8 (29 Sep 2019 22:22)  T(F): 98 (30 Sep 2019 15:45), Max: 98.3 (29 Sep 2019 22:22)  HR: 73 (30 Sep 2019 15:45) (63 - 73)  BP: 122/51 (30 Sep 2019 15:45) (109/63 - 122/51)  BP(mean): --  RR: 18 (30 Sep 2019 15:45) (18 - 18)  SpO2: 100% (30 Sep 2019 15:45) (96% - 100%) BMI (kg/m2): 31.6 (09-27-19 @ 05:18)      PHYSICAL EXAM  GENERAL: NAD, well-groomed, well-developed  HEAD:  Atraumatic, Normocephalic  EYES: EOMI, PERRLA, conjunctiva and sclera clear  ENMT: No tonsillar erythema, exudates, or enlargement; Moist mucous membranes, Good dentition, No lesions  NECK: Supple, No JVD, Normal thyroid  NERVOUS SYSTEM:  Alert & Oriented X3, Good concentration; Motor Strength 5/5 B/L upper and lower extremities; DTRs 2+ intact and symmetric    LABS:                          11.7   10.96 )-----------( 233      ( 30 Sep 2019 02:54 )             38.9     09-30    143  |  97<L>  |  39.0<H>  ----------------------------<  106  4.2   |  37.0<H>  |  1.15    Ca    8.7      30 Sep 2019 02:54  Mg     2.0     09-30    TPro  6.9  /  Alb  3.1<L>  /  TBili  0.3<L>  /  DBili  x   /  AST  11  /  ALT  <5  /  AlkPhos  82  09-29        I&O's Detail    29 Sep 2019 07:01  -  30 Sep 2019 07:00  --------------------------------------------------------  IN:    IV PiggyBack: 100 mL    Oral Fluid: 60 mL  Total IN: 160 mL    OUT:    Voided: 1750 mL  Total OUT: 1750 mL    Total NET: -1590 mL      30 Sep 2019 07:01  -  30 Sep 2019 16:07  --------------------------------------------------------  IN:    Oral Fluid: 240 mL  Total IN: 240 mL    OUT:    Voided: 550 mL  Total OUT: 550 mL    Total NET: -310 mL        RADIOLOGY & ADDITIONAL TESTS:  < from: MR Thoracic Spine w/wo IV Cont (09.30.19 @ 13:31) >   EXAM:  MR SPINE THORACIC WAW IC                        PROCEDURE DATE:  09/30/2019    IMPRESSION: Pathologic compression fracture of T4 with epidural extension   of disease causing mild cord compression. No cord signal abnormality.   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  < end of copied text >    < from: CT Chest No Cont (09.26.19 @ 16:50) >   EXAM:  CT CHEST                        PROCEDURE DATE:  09/26/2019    IMPRESSION:   In comparison with 8/26/2019, new 6.2 x 4.2 cm right lower lateral chest   wall subcutaneous fluid collection adjacent to the right 8th rib.    Small loculated right pleural effusion is slightly increased. Underlying   right pleural thickening.  Atelectasis of the basilar right lower lobe is is slightly increased.   Narrowing of the right lower lobe bronchus and obliteration of   < end of copied text >  < from: CT Chest w/o Cont (08.20.15 @ 14:09) >    IMPRESSION: 7 mm nodule associated with the right major fissure likely a   lymph node. Comparison with prior studies are recommended and if images   are made available, an addendum can be issued. Alternatively a six-month   follow-up CT can be performed to document stability.    Afew scattered bilateral 2 mm nodules which may represent foci of mucus   impacted small airways can be monitored on the follow-up chest CT if no   prior studies are available for comparison..    Mild emphysema.  < end of copied text >    < from: CT Chest w/o Cont (08.20.15 @ 14:09) >  IMPRESSION: 7 mm nodule associated with the right major fissure likely a   lymph node. Comparison with prior studies are recommended and if images   are made available, an addendum can be issued. Alternatively a six-month   follow-up CT can be performed to document stability.  Afew scattered bilateral 2 mm nodules which may represent foci of mucus   impacted small airways can be monitored on the follow-up chest CT if no   prior studies are available for comparison..  Mild emphysema.        < end of copied text >

## 2019-09-30 NOTE — PROGRESS NOTE ADULT - PROBLEM SELECTOR PLAN 1
See above. See above.    Addendum to above. Please hold Aspirin for possible thoracic surgical intervention. Imaging to be reviewed.

## 2019-09-30 NOTE — PROGRESS NOTE ADULT - SUBJECTIVE AND OBJECTIVE BOX
Subjective: Patient comfortable with no complaints this AM. Denies fever, chills, SOB, and pain.     Vital Signs:  Vital Signs Last 24 Hrs  T(C): 36.4 (09-30-19 @ 05:15), Max: 36.8 (09-29-19 @ 22:22)  T(F): 97.5 (09-30-19 @ 05:15), Max: 98.3 (09-29-19 @ 22:22)  HR: 70 (09-30-19 @ 05:15) (65 - 70)  BP: 120/59 (09-30-19 @ 05:15) (109/63 - 129/61)  RR: 18 (09-30-19 @ 05:15) (18 - 18)  SpO2: 99% (09-30-19 @ 05:15) (96% - 99%) on (O2)    I&O's Detail    29 Sep 2019 07:01  -  30 Sep 2019 07:00  --------------------------------------------------------  IN:    IV PiggyBack: 100 mL    Oral Fluid: 60 mL  Total IN: 160 mL    OUT:    Voided: 1750 mL  Total OUT: 1750 mL    Total NET: -1590 mL          Telemetry/Alarms: Sinus few PVCS and 5 beats VTach O/N  General: WN/WD NAD  Neurology: Awake, nonfocal, HOWARD x 4  Eyes: Scleras clear, PERRLA/ EOMI, Gross vision intact  ENT: Gross hearing intact, grossly patent pharynx, no stridor  Neck: Neck supple, trachea midline, No JVD  Respiratory: Course BS throughout  CV: RRR, S1S2, systolic murmur  Abdominal: Soft, NT, ND  Extremities: B/L edema  Skin: No Rashes, Hematoma, Ecchymosis  Psych: Oriented x 3, normal affect  Incisions: R chest incision open and packed, packing changed today, purulent discharge       Relevant labs, radiology and Medications reviewed                        11.7   10.96 )-----------( 233      ( 30 Sep 2019 02:54 )             38.9     09-30    143  |  97<L>  |  39.0<H>  ----------------------------<  106  4.2   |  37.0<H>  |  1.15    Ca    8.7      30 Sep 2019 02:54  Mg     2.0     09-30    TPro  6.9  /  Alb  3.1<L>  /  TBili  0.3<L>  /  DBili  x   /  AST  11  /  ALT  <5  /  AlkPhos  82  09-29      MEDICATIONS  (STANDING):  aspirin enteric coated 81 milliGRAM(s) Oral daily  atorvastatin 20 milliGRAM(s) Oral at bedtime  carvedilol 3.125 milliGRAM(s) Oral every 12 hours  docusate sodium 100 milliGRAM(s) Oral three times a day  furosemide    Tablet 40 milliGRAM(s) Oral daily  heparin  Injectable 5000 Unit(s) SubCutaneous every 8 hours  montelukast 10 milliGRAM(s) Oral daily  pantoprazole    Tablet 40 milliGRAM(s) Oral before breakfast  piperacillin/tazobactam IVPB.. 3.375 Gram(s) IV Intermittent every 8 hours  predniSONE   Tablet 10 milliGRAM(s) Oral daily  saccharomyces boulardii 250 milliGRAM(s) Oral two times a day  sertraline 25 milliGRAM(s) Oral daily  sodium chloride 0.9% lock flush 3 milliLiter(s) IV Push every 8 hours  spironolactone 25 milliGRAM(s) Oral daily  vancomycin  IVPB      vancomycin  IVPB 1250 milliGRAM(s) IV Intermittent every 12 hours    MEDICATIONS  (PRN):  acetaminophen   Tablet .. 650 milliGRAM(s) Oral every 6 hours PRN Mild Pain (1 - 3)  ALBUTerol/ipratropium for Nebulization 3 milliLiter(s) Nebulizer every 6 hours PRN Shortness of Breath and/or Wheezing  traMADol 25 milliGRAM(s) Oral daily PRN Moderate Pain (4 - 6)        Assessment  78y Male  w/ PAST MEDICAL & SURGICAL HISTORY:  CHF (congestive heart failure)  CAD (coronary artery disease)  HLD (hyperlipidemia)  HTN (hypertension)  COPD (chronic obstructive pulmonary disease)  Stenosis of lumbosacral spine  H/O back injury  Artificial pacemaker  admitted with complaints of Patient is a 78y old  Male who presents with a chief complaint of infected chest tube site (29 Sep 2019 20:19)

## 2019-09-30 NOTE — PROGRESS NOTE ADULT - ASSESSMENT
78M h/o CHF, CAD, HLD, HTN, COPD on home O2, pna/empyema s/p decortication 4/30/19, returns with a Right chest wall collection and likely recurrent empyema. Patient also p/w back pain with history of benign spinal tumor resected. T4 lytic lesion with epidural extension seen on CT scan.     PLAN  Neuro: Pain management. MRI T spine today. NSX following.   Pulm: Encourage coughing, deep breathing and use of incentive spirometry. Supplemental oxygen PRN. Daily CXR. Duonebs PRN. Change Kerlix packing daily to chest wound.   Cardio: Monitor telemetry/alarms. Stable from cardiac standpoint for operative intervention as per cards consult.   GI: Tolerating diet. Continue stool softeners.  Renal: Monitor urine output, supplement electrolytes as needed  Vasc: Heparin SC/SCDs for DVT prophylaxis  Heme: Stable H/H.  ID: C/W Vanco and Zosyn. ID following.   Therapy: OOB/ambulate. PT consult placed.   Disposition: OR planning for chest washout this week.     Discussed with Cardiothoracic Team at AM rounds.    Naima HOPPER  Thoracic Surgery

## 2019-10-01 NOTE — PROGRESS NOTE ADULT - ASSESSMENT
79y/o  Male h/o CHF, Pacemaker, CAD, COPD stage 4, on home oxygen, s/p total right lung decortication, multiloculated right empyema with Peptostreptococcus Asaccharolyticus s/p Zosyn. Patient was seen as outpatient for ELLIOTT in culture at which time he had no symptoms. Patient was well up until recently when he noticed drainage from right chest tube site 2-3 weeks ago after having been closed for some time. Patient also reports severe back pain over 1-2 weeks for which he got a script for a muscle relaxant. Patient admitted to floor for further workup and evaluation of chest wall infection. Patient was started on IV Vancomycin and Zosyn.      Cellulitis and abscess of old chest tube site  r/o Metastatic disease       - Blood cultures no growth   - s/o I&D of abscess 9/27/19, Streptococcus milleri   - CT Chest reviewed  - Continue Zosyn  - Follow up cultures  - Trend Fever  - Trend Leukocytosis      Will Follow

## 2019-10-01 NOTE — PROGRESS NOTE ADULT - SUBJECTIVE AND OBJECTIVE BOX
INTERVAL HPI/OVERNIGHT EVENTS:  78yM PMH CHF, CAD, HLD, HTN, COPD, PPM, lumbosacral spine stenosis, h/o T10 low grade schwannoma s/p T9-11 laminectomy and tumor removal by surgeon in [place] in 2017, h/o PNA and empyema s/p drain and decort earlier this year, presents to [facility] with drainage from right chest tube site, also c/o back pain, found with liver lesion, along with T4 pathologic fracture with epidural extension and additional T11 osseous metastasis. Patient seen earlier this AM, continues to have aching back pain, worse with certain positioning. No acute events overnight    Vital Signs Last 24 Hrs  T(C): 36.5 (01 Oct 2019 10:22), Max: 36.9 (30 Sep 2019 21:43)  T(F): 97.7 (01 Oct 2019 10:22), Max: 98.5 (30 Sep 2019 21:43)  HR: 66 (01 Oct 2019 10:22) (66 - 78)  BP: 106/59 (01 Oct 2019 10:22) (106/59 - 140/70)  BP(mean): --  RR: 20 (01 Oct 2019 10:22) (18 - 20)  SpO2: 93% (01 Oct 2019 10:22) (93% - 100%)    PHYSICAL EXAM:  GENERAL: NAD, well-groomed, well-developed  HEAD:  Atraumatic, normocephalic  MENTAL STATUS: Awake, alert, appropriately conversant without aphasia; following commands  CRANIAL NERVES: Face symmetric. Hearing grossly intact. Speech clear  MOTOR: strength 5/5 b/l upper and lower extremities  SENSATION: grossly intact to light touch all extremities  CHEST/LUNG: Nonlabored breathing, no respiratory distress    LABS:                        12.4   11.95 )-----------( 201      ( 01 Oct 2019 06:02 )             41.4     10-01    142  |  96<L>  |  32.0<H>  ----------------------------<  96  4.5   |  36.0<H>  |  1.02    Ca    9.2      01 Oct 2019 06:02  Mg     2.0     09-30 09-30 @ 07:01  -  10-01 @ 07:00  --------------------------------------------------------  IN: 240 mL / OUT: 1100 mL / NET: -860 mL    10-01 @ 07:01  -  10-01 @ 11:00  --------------------------------------------------------  IN: 100 mL / OUT: 650 mL / NET: -550 mL    RADIOLOGY & ADDITIONAL TESTS:  MR Thoracic Spine w/wo IV Cont (09.30.19 @ 13:31)  IMPRESSION: Pathologic compression fracture of T4 with epidural extension   of disease causing mild cord compression. No cord signal abnormality.   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    CT Chest No Cont (09.26.19 @ 16:50)  IMPRESSION:   In comparison with 8/26/2019, new 6.2 x 4.2 cm right lower lateral chest   wall subcutaneous fluid collection adjacent to the right 8th rib.  Small loculated right pleural effusion is slightly increased. Underlying   right pleural thickening.  Atelectasis of the basilar right lower lobe is is slightly increased.   Narrowing of the right lower lobe bronchus and obliteration of   posterior-lateral basilar segments of right lower lobe bronchus possibly   secondary to retained secretions. Recommend attention on follow-up.  Emphysema.  6 cm indistinct hypodensity within the posterior right lobe of the liver.   Recommend further evaluation with MRI or ultrasound.  Destructive lytic lesion within T4 vertebral body with extension into the   spinal canal likely metastatic. Recommend further evaluation with   contrast-enhanced MRI of thoracic spine.  These findings were discussed with RUCHI CEVALLOS, on 9/26/2019 5:11 PM   with read back.

## 2019-10-01 NOTE — PROGRESS NOTE ADULT - SUBJECTIVE AND OBJECTIVE BOX
[facility]  INFECTIOUS DISEASES AND INTERNAL MEDICINE at [place]  =======================================================  Onesimo Henderson MD  Diplomates American Board of Internal Medicine and Infectious Diseases  Tel: [phone]      Fax: [phone]  =======================================================    JOHN CIFUENTES [id number]    Follow up: Cellulitis and abscess of old chest tube site    no fever or chills  no diarrhea   no abdominal pain       Allergies:  No Known Allergies      Antibiotics:  piperacillin/tazobactam IVPB.. 3.375 Gram(s) IV Intermittent every 8 hours        REVIEW OF SYSTEMS:  CONSTITUTIONAL:  No Fever or chills  HEENT:  No diplopia or blurred vision.  No earache, sore throat or runny nose.  CARDIOVASCULAR:  No pressure, squeezing, strangling, tightness, heaviness or aching about the chest, neck, axilla or epigastrium.  RESPIRATORY:  No cough, shortness of breath  GASTROINTESTINAL:  No nausea, vomiting or diarrhea.  GENITOURINARY:  No dysuria, frequency or urgency.   MUSCULOSKELETAL:  no joint aches, no muscle pain  SKIN:  No change in skin, hair or nails.  NEUROLOGIC:  No Headaches, seizures  PSYCHIATRIC:  No disorder of thought or mood.  ENDOCRINE:  No heat or cold intolerance  HEMATOLOGICAL:  No easy bruising or bleeding.       Physical Exam:  Vital Signs Last 24 Hrs  T(C): 36.5 (01 Oct 2019 10:22), Max: 36.9 (30 Sep 2019 21:43)  T(F): 97.7 (01 Oct 2019 10:22), Max: 98.5 (30 Sep 2019 21:43)  HR: 66 (01 Oct 2019 10:22) (66 - 78)  BP: 106/59 (01 Oct 2019 10:22) (106/59 - 140/70)  RR: 20 (01 Oct 2019 10:22) (18 - 20)  SpO2: 93% (01 Oct 2019 10:22) (93% - 100%)      GEN: NAD, pleasant  HEENT: normocephalic and atraumatic. EOMI. PERRL.  Anicteric  NECK: Supple.   LUNGS: Decreased basal BS B/L   HEART: Regular rate and rhythm  ABDOMEN: Soft, nontender, and nondistended.  Positive bowel sounds.    : No CVA tenderness  EXTREMITIES: Without any edema.  MSK: No joint swelling  NEUROLOGIC: No Focal Deficits  PSYCHIATRIC: Appropriate affect .  SKIN: No Rash      Labs:  10-01    142  |  96<L>  |  32.0<H>  ----------------------------<  96  4.5   |  36.0<H>  |  1.02    Ca    9.2      01 Oct 2019 06:02  Mg     2.0     09-30                 12.4   11.95 )-----------( 201      ( 01 Oct 2019 06:02 )             41.4       RECENT CULTURES:  09-27 @ 16:01 .Other     No growth to date  Culture in progress      09-27 @ 16:00 .Surgical Swab chest wall abscess Streptococcus milleri, viridans group    Numerous Streptococcus milleri, viridans group  Culture in progress  Few White blood cells  Few Gram Positive Cocci in Pairs and Chains      09-26 @ 15:40 .Blood     No growth at 48 hours

## 2019-10-01 NOTE — PROGRESS NOTE ADULT - ASSESSMENT
78M h/o CHF, CAD, HLD, HTN, COPD on home O2, pna/empyema s/p decortication 4/30/19, returns with a Right chest wall collection and likely recurrent empyema. Patient also p/w back pain with history of benign spinal tumor resected. Incidental T4 lytic lesion with epidural extension and 6cm hypodensity in liver seen on CT scan. MRI spine revealed pathologic comp fracture suspicious of metastasis on T4, T11 and possibly T6. Chest abscess growing Strep milleri.     PLAN  Neuro: Pain management. Patient refusing any NSX intervention at this time. TLSO brace when OOB- awaiting brace fitting/delivery. Discussed with NSX resident need for status comment on stability of fractures for operative clearance for VATS procedure performed in lateral decubitus position with trendelenburg   Pulm: Encourage coughing, deep breathing and use of incentive spirometry. Supplemental oxygen PRN. Daily CXR. Duonebs PRN. Change Kerlix packing daily to chest wound.   Cardio: Monitor telemetry/alarms. Stable from cardiac standpoint for operative intervention as per cards consult. Cont to hold ASA for surgical intervention.   GI: Tolerating diet. Continue stool softeners.  Renal: Monitor urine output, supplement electrolytes as needed  Vasc: Heparin SC/SCDs for DVT prophylaxis  Heme: Stable H/H. Will need further workup if amendable with biopsy of spine or liver lesion, and possible Onc consult.   ID: C/W Zosyn. ID following.   Therapy: OOB/ambulate.   Disposition: OR planning for chest washout this week if patient in agreement. Home PT needed upon discharge at this time.     Discussed with Cardiothoracic Team at AM rounds.    Naima HOPPER  Thoracic Surgery

## 2019-10-01 NOTE — PROGRESS NOTE ADULT - SUBJECTIVE AND OBJECTIVE BOX
Subjective: Patient frustrated and upset. Concerned about tolerating multiple surgeries and not interested in any thoracic surgical intervention at this time, but expressed he will consider the options. Denies fever, chills, SOB.     Vital Signs:  Vital Signs Last 24 Hrs  T(C): 36.5 (10-01-19 @ 10:22), Max: 36.9 (09-30-19 @ 21:43)  T(F): 97.7 (10-01-19 @ 10:22), Max: 98.5 (09-30-19 @ 21:43)  HR: 66 (10-01-19 @ 10:22) (66 - 78)  BP: 106/59 (10-01-19 @ 10:22) (106/59 - 140/70)  RR: 20 (10-01-19 @ 10:22) (18 - 20)  SpO2: 93% (10-01-19 @ 10:22) (93% - 100%) on (O2)    I&O's Detail    30 Sep 2019 07:01  -  01 Oct 2019 07:00  --------------------------------------------------------  IN:    Oral Fluid: 240 mL  Total IN: 240 mL    OUT:    Voided: 1100 mL  Total OUT: 1100 mL    Total NET: -860 mL      01 Oct 2019 07:01  -  01 Oct 2019 15:11  --------------------------------------------------------  IN:    Oral Fluid: 480 mL    Solution: 150 mL  Total IN: 630 mL    OUT:    Voided: 1250 mL  Total OUT: 1250 mL    Total NET: -620 mL        Exam  Telemetry/Alarms: Occasional PVC  General: NAD  Neurology: Awake, nonfocal, HOWARD x 4  Eyes: Scleras clear, PERRLA/ EOMI, Gross vision intact  ENT: Gross hearing intact, grossly patent pharynx, no stridor  Neck: Neck supple, trachea midline, No JVD  Respiratory: Course BS throughout  CV: RRR, S1S2, systolic murmur  Abdominal: Soft, NT, ND  Extremities: B/L edema  Skin: No Rashes, Hematoma, Ecchymosis  Psych: Oriented x 3, normal affect  Incisions: R chest incision open and packed, packing changed today, purulent discharge     Relevant labs, radiology and Medications reviewed                        12.4   11.95 )-----------( 201      ( 01 Oct 2019 06:02 )             41.4     10-01    142  |  96<L>  |  32.0<H>  ----------------------------<  96  4.5   |  36.0<H>  |  1.02    Ca    9.2      01 Oct 2019 06:02  Mg     2.0     09-30        MEDICATIONS  (STANDING):  atorvastatin 20 milliGRAM(s) Oral at bedtime  carvedilol 3.125 milliGRAM(s) Oral every 12 hours  docusate sodium 100 milliGRAM(s) Oral three times a day  furosemide    Tablet 40 milliGRAM(s) Oral daily  heparin  Injectable 5000 Unit(s) SubCutaneous every 8 hours  montelukast 10 milliGRAM(s) Oral daily  pantoprazole    Tablet 40 milliGRAM(s) Oral before breakfast  piperacillin/tazobactam IVPB.. 3.375 Gram(s) IV Intermittent every 8 hours  predniSONE   Tablet 10 milliGRAM(s) Oral daily  saccharomyces boulardii 250 milliGRAM(s) Oral two times a day  sertraline 25 milliGRAM(s) Oral daily  sodium chloride 0.9% lock flush 3 milliLiter(s) IV Push every 8 hours  spironolactone 25 milliGRAM(s) Oral daily    MEDICATIONS  (PRN):  acetaminophen   Tablet .. 650 milliGRAM(s) Oral every 6 hours PRN Mild Pain (1 - 3)  ALBUTerol/ipratropium for Nebulization 3 milliLiter(s) Nebulizer every 6 hours PRN Shortness of Breath and/or Wheezing  traMADol 25 milliGRAM(s) Oral daily PRN Moderate Pain (4 - 6)        Assessment  78y Male  w/ PAST MEDICAL & SURGICAL HISTORY:  CHF (congestive heart failure)  CAD (coronary artery disease)  HLD (hyperlipidemia)  HTN (hypertension)  COPD (chronic obstructive pulmonary disease)  Stenosis of lumbosacral spine  H/O back injury  Artificial pacemaker  admitted with complaints of Patient is a 78y old  Male who presents with a chief complaint of infected chest tube site (01 Oct 2019 10:59)

## 2019-10-01 NOTE — PROGRESS NOTE ADULT - PROBLEM SELECTOR PLAN 1
Fit/disp TLSO and interface socks.  All went without incident.  Fit is proper, demonstrated/discussed use.  Pt. to use as directed by   Please call [facility] with any questions.  [phone].

## 2019-10-01 NOTE — PROGRESS NOTE ADULT - ASSESSMENT
Pt. requires TLSO with rigid anterior, posterior and lateral panels, interface socks.  Device to limit ROM, support weakened spine, reduce discomfort, promote healing.  Socks to prevent skin breakdown.

## 2019-10-01 NOTE — PROGRESS NOTE ADULT - ASSESSMENT
78yM PMH CHF, CAD, HLD, HTN, COPD, PPM, lumbosacral spine stenosis, h/o T10 low grade schwannoma s/p T9-11 laminectomy and tumor removal by surgeon in [place] in 2017, h/o PNA and empyema s/p drain and decort earlier this year, presents to [facility] with drainage from right chest tube site, also c/o back pain, found with liver lesion, along with T4 pathologic fracture with epidural extension and additional T11 osseous metastasis  - Neuro exam stable  - MRI findings discussed earlier this AM with patient with Dr. Arias earlier this AM. Patient has expressed no interest in surgical intervention at this time, but would consider possible biopsy of liver lesion or spine lesion to determine if he is a candidate for possible radiation therapy    PLAN:  - D/w Dr. Arias  - Q4 neuro checks while in house  - TLSO when OOB- will have orthotist bring to bedside  - Patient does not want neurosurgical intervention  - ID following  - Once cleared from infectious disease perspective, consider biopsy of liver vs spine with IR if patient remains amenable to biopsy to determine pathology and ultimately have hem/onc and rad/onc follow and determine if patient is a candidate for systemic/radiation therapy if patient is interested in such treatment  - Supportive care/further medical management per primary team 78yM PMH CHF, CAD, HLD, HTN, COPD, PPM, lumbosacral spine stenosis, h/o T10 low grade schwannoma s/p T9-11 laminectomy and tumor removal by surgeon in [place] in 2017, h/o PNA and empyema s/p drain and decort earlier this year, presents to [facility] with drainage from right chest tube site, also c/o back pain, found with liver lesion, along with T4 pathologic fracture with epidural extension and additional T11 osseous metastasis  - Neuro exam stable  - MRI findings discussed earlier this AM with patient with Dr. Arias earlier this AM. Patient has expressed no interest in surgical intervention at this time, but would consider possible biopsy of liver lesion or spine lesion to determine if he is a candidate for possible radiation therapy    PLAN:  - D/w Dr. Arias  - Q4 neuro checks while in house  - TLSO when OOB- will have orthotist bring to bedside  - Patient does not want neurosurgical intervention  - ID following  - Once cleared from infectious disease perspective, consider biopsy of liver vs spine with IR if patient remains amenable to biopsy to determine pathology and ultimately have hem/onc and rad/onc follow and determine if patient is a candidate for systemic/radiation therapy if patient is interested in such treatment  - Supportive care/further medical management per primary team  - No further inpatient neurosurgical recommendations  - Reconsult PRN 78yM PMH CHF, CAD, HLD, HTN, COPD, PPM, lumbosacral spine stenosis, h/o T10 low grade schwannoma s/p T9-11 laminectomy and tumor removal by surgeon in [place] in 2017, h/o PNA and empyema s/p drain and decort earlier this year, presents to [facility] with drainage from right chest tube site, also c/o back pain, found with liver lesion, along with T4 pathologic fracture with epidural extension and additional T11 osseous metastasis  - Neuro exam stable  - MRI findings discussed earlier this AM with patient with Dr. Arias earlier this AM. Patient has expressed no interest in surgical intervention at this time, but would consider possible biopsy of liver lesion or spine lesion to determine if he is a candidate for possible radiation therapy    PLAN:  - D/w Dr. Arias  - Q4 neuro checks while in house  - TLSO when OOB- will have orthotist bring to bedside  - Patient does not want neurosurgical intervention  - ID following  - Once cleared from infectious disease perspective, consider biopsy of liver vs spine with IR if patient remains amenable to biopsy to determine pathology and ultimately have hem/onc and rad/onc follow and determine if patient is a candidate for systemic/radiation therapy if patient is interested in such treatment  - Supportive care/further medical management per primary team

## 2019-10-02 NOTE — PROGRESS NOTE ADULT - SUBJECTIVE AND OBJECTIVE BOX
[facility]  INFECTIOUS DISEASES AND INTERNAL MEDICINE at [place]  =======================================================  Onesimo Henderson MD  Diplomates American Board of Internal Medicine and Infectious Diseases  Tel: [phone]      Fax: [phone]  =======================================================    JOHN CIFUENTES [id number]    Follow up: Cellulitis and abscess of old chest tube site    no fever or chills  no diarrhea   no abdominal pain       Allergies:  No Known Allergies      Antibiotics:  piperacillin/tazobactam IVPB.. 3.375 Gram(s) IV Intermittent every 8 hours        REVIEW OF SYSTEMS:  CONSTITUTIONAL:  No Fever or chills  HEENT:  No diplopia or blurred vision.  No earache, sore throat or runny nose.  CARDIOVASCULAR:  No pressure, squeezing, strangling, tightness, heaviness or aching about the chest, neck, axilla or epigastrium.  RESPIRATORY:  No cough, shortness of breath  GASTROINTESTINAL:  No nausea, vomiting or diarrhea.  GENITOURINARY:  No dysuria, frequency or urgency.   MUSCULOSKELETAL:  no joint aches, no muscle pain  SKIN:  No change in skin, hair or nails.  NEUROLOGIC:  No Headaches, seizures  PSYCHIATRIC:  No disorder of thought or mood.  ENDOCRINE:  No heat or cold intolerance  HEMATOLOGICAL:  No easy bruising or bleeding.       Physical Exam:  Vital Signs Last 24 Hrs  T(C): 36.8 (02 Oct 2019 10:10), Max: 37.2 (01 Oct 2019 22:58)  T(F): 98.2 (02 Oct 2019 10:10), Max: 98.9 (01 Oct 2019 22:58)  HR: 83 (02 Oct 2019 10:10) (63 - 106)  BP: 105/54 (02 Oct 2019 10:10) (103/62 - 145/84)  RR: 20 (02 Oct 2019 10:10) (18 - 20)  SpO2: 94% (02 Oct 2019 10:10) (94% - 99%)      GEN: NAD, pleasant  HEENT: normocephalic and atraumatic. EOMI. PERRL.  Anicteric  NECK: Supple.   LUNGS: Decreased basal BS B/L   HEART: Regular rate and rhythm  ABDOMEN: Soft, nontender, and nondistended.  Positive bowel sounds.    : No CVA tenderness  EXTREMITIES: Without any edema.  MSK: No joint swelling  NEUROLOGIC: No Focal Deficits  PSYCHIATRIC: Appropriate affect .  SKIN: No Rash      Labs:  10-02    140  |  94<L>  |  34.0<H>  ----------------------------<  110  3.8   |  35.0<H>  |  1.32<H>    Ca    9.0      02 Oct 2019 06:14  Mg     2.5     10-02               12.2   13.06 )-----------( 256      ( 02 Oct 2019 06:14 )             40.1     RECENT CULTURES:  09-27 @ 16:01 .Other     No growth to date  Culture in progress      09-27 @ 16:00 .Surgical Swab chest wall abscess Streptococcus milleri, viridans group    Numerous Streptococcus milleri, viridans group  Culture in progress  Few White blood cells  Few Gram Positive Cocci in Pairs and Chains      09-26 @ 15:40 .Blood     No growth at 5 days.

## 2019-10-02 NOTE — CHART NOTE - NSCHARTNOTEFT_GEN_A_CORE
Upon Nutritional Assessment by the Registered Dietitian your patient was determined to meet criteria / has evidence of the following diagnosis/diagnoses:             [x ]  Moderate Protein Calorie Malnutrition          Findings as based on:  •  Comprehensive nutrition assessment and consultation  •  Calorie counts (nutrient intake analysis)  •  Food acceptance and intake status from observations by staff  •  Follow up  •  Patient education  •  Intervention secondary to interdisciplinary rounds  •   concerns      Treatment:    The following diet has been recommended:  RX: Ensure bid    PROVIDER Section:     By signing this assessment you are acknowledging and agree with the diagnosis/diagnoses assigned by the Registered Dietitian    Comments:

## 2019-10-02 NOTE — DIETITIAN INITIAL EVALUATION ADULT. - OTHER INFO
Pt with h/o CHF, CAD, HLD, HTN, COPD on home O2, pna/empyema s/p decortication 4/30/19, returns with a Right chest wall collection and likely recurrent empyema.  Spoke with pt and family.  Pt reports fair po intake at meals.  Pt also states fair po intake over the past month due to decreased appetite.  Pt denies wt loss.  Discussed DASH/TLC meal plan with pt and encouraged adequate protein intake at meals.  Pt/family verbalized understanding.

## 2019-10-02 NOTE — DIETITIAN INITIAL EVALUATION ADULT. - PERTINENT LABORATORY DATA
10-02 Na140 mmol/L Glu 110 mg/dL K+ 3.8 mmol/L Cr  1.32 mg/dL<H> BUN 34.0 mg/dL<H> Phos n/a   Alb n/a   PAB n/a

## 2019-10-02 NOTE — DIETITIAN INITIAL EVALUATION ADULT. - MALNUTRITION
NFPE performed- moderate muscle wasting at temples, clavicle, shoulder.  Mild fat loss in orbital region. moderate (acute)

## 2019-10-02 NOTE — PROGRESS NOTE ADULT - ASSESSMENT
78 yr Male scheduled for I and d Right chest wall and Pleural space placement of CT 10/3  Pt seen is ASA 3 Mallampati 2.Chart reviewed.  Cardiac clearance in chart.

## 2019-10-02 NOTE — PROGRESS NOTE ADULT - ASSESSMENT
78M h/o CHF, CAD, HLD, HTN, COPD on home O2, pna/empyema s/p decortication 4/30/19, returns with a Right chest wall collection and likely recurrent empyema. Patient also p/w back pain with history of benign spinal tumor resected. Incidental T4 lytic lesion with epidural extension and 6cm hypodensity in liver seen on CT scan. MRI spine revealed pathologic comp fracture suspicious of metastasis on T4, T11 and possibly T6. Chest abscess growing Strep milleri.     PLAN  Neuro: Pain management. Patient refusing any NSX intervention at this time. TLSO brace when OOB- awaiting brace fitting/delivery. Discussed with NSX resident need for status comment on stability of fractures for operative clearance for VATS procedure performed in lateral decubitus position with trendelenburg   Pulm: Encourage coughing, deep breathing and use of incentive spirometry. Supplemental oxygen PRN. Daily CXR. Duonebs PRN. Change Kerlix packing daily to chest wound.   Cardio: Monitor telemetry/alarms. Stable from cardiac standpoint for operative intervention as per cards consult. Cont to hold ASA for surgical intervention.   GI: Tolerating diet. Continue stool softeners.  Renal: Monitor urine output, supplement electrolytes as needed  Vasc: Heparin SC/SCDs for DVT prophylaxis  Heme: Stable H/H. Will need further workup if amendable with biopsy of spine or liver lesion, and possible Onc consult.   ID: C/W Zosyn. ID following.   Therapy: OOB/ambulate.   Disposition: OR planning for chest washout tomorrow- patient in agreement   Plan to set up liver biopsy with IR on Friday to assess the Liver lesion on CT scan     Discussed with Cardiothoracic Team at AM rounds.

## 2019-10-02 NOTE — PROGRESS NOTE ADULT - SUBJECTIVE AND OBJECTIVE BOX
Subjective:  Pt in bed NAD NO issues overnight     VITAL SIGNS  T(C): 36.8 (10-02-19 @ 10:10), Max: 37.2 (10-01-19 @ 22:58)  HR: 83 (10-02-19 @ 10:10) (63 - 106)  BP: 105/54 (10-02-19 @ 10:10) (103/62 - 145/84)  RR: 20 (10-02-19 @ 10:10) (18 - 20)  SpO2: 94% (10-02-19 @ 10:10) (94% - 99%) 2 L NC           Daily     Daily Weight in k.4 (02 Oct 2019 14:12)  Admit Wt: Drug Dosing Weight  Height (cm): 185.4 (27 Sep 2019 05:18)  Weight (kg): 108.6 (27 Sep 2019 05:18)    Telemetry:   SR   LVEF:  45%    MEDICATIONS  acetaminophen   Tablet .. 650 milliGRAM(s) Oral every 6 hours PRN  ALBUTerol/ipratropium for Nebulization 3 milliLiter(s) Nebulizer every 6 hours PRN  atorvastatin 20 milliGRAM(s) Oral at bedtime  carvedilol 3.125 milliGRAM(s) Oral every 12 hours  docusate sodium 100 milliGRAM(s) Oral three times a day  furosemide    Tablet 40 milliGRAM(s) Oral daily  heparin  Injectable 5000 Unit(s) SubCutaneous every 8 hours  montelukast 10 milliGRAM(s) Oral daily  pantoprazole    Tablet 40 milliGRAM(s) Oral before breakfast  piperacillin/tazobactam IVPB.. 3.375 Gram(s) IV Intermittent every 8 hours  predniSONE   Tablet 10 milliGRAM(s) Oral daily  saccharomyces boulardii 250 milliGRAM(s) Oral two times a day  sertraline 25 milliGRAM(s) Oral daily  sodium chloride 0.9% lock flush 3 milliLiter(s) IV Push every 8 hours  sodium chloride 0.9%. 1000 milliLiter(s) IV Continuous <Continuous>  spironolactone 25 milliGRAM(s) Oral daily  traMADol 25 milliGRAM(s) Oral daily PRN    MEDICATIONS  (PRN):  acetaminophen   Tablet .. 650 milliGRAM(s) Oral every 6 hours PRN Mild Pain (1 - 3)  ALBUTerol/ipratropium for Nebulization 3 milliLiter(s) Nebulizer every 6 hours PRN Shortness of Breath and/or Wheezing  traMADol 25 milliGRAM(s) Oral daily PRN Moderate Pain (4 - 6)        PHYSICAL EXAM  General:  Alert Awake NAD   CV: RRR S1 S2   Abdomen:  soft NT ND +BS  Extremities: trace edema b/l LE   Inc:  R chest incision open and packed, packing changed today, purulent discharge         I&O's Detail    01 Oct 2019 07:  -  02 Oct 2019 07:00  --------------------------------------------------------  IN:    Oral Fluid: 1440 mL    Solution: 150 mL  Total IN: 1590 mL    OUT:    Voided: 2375 mL  Total OUT: 2375 mL    Total NET: -785 mL      02 Oct 2019 07:  -  02 Oct 2019 15:06  --------------------------------------------------------  IN:    Oral Fluid: 600 mL  Total IN: 600 mL    OUT:    Voided: 1425 mL  Total OUT: 1425 mL    Total NET: -825 mL          LABS  10-02    140  |  94<L>  |  34.0<H>  ----------------------------<  110  3.8   |  35.0<H>  |  1.32<H>    Ca    9.0      02 Oct 2019 06:14  Mg     2.5     10-02                                   12.2   13.06 )-----------( 256      ( 02 Oct 2019 06:14 )             40.1                     CAPILLARY BLOOD GLUCOSE               Today's CXR: < from: Xray Chest 1 View- PORTABLE-Routine (10.02.19 @ 05:14) >    Technique: A single AP viewof the chest was obtained.    Comparison exam: 10/1/2019 4:33 AM.    Findings:  Right pleural effusion, unchanged. No evidence of pneumothorax. The left   lung is clear without evidence of pleural effusion. The heart is   enlarged..    Impression:  Stable exam without significant change since the previous study..    < end of copied text >      PAST MEDICAL & SURGICAL HISTORY:  CHF (congestive heart failure)  CAD (coronary artery disease)  HLD (hyperlipidemia)  HTN (hypertension)  COPD (chronic obstructive pulmonary disease)  Stenosis of lumbosacral spine  H/O back injury  Artificial pacemaker

## 2019-10-02 NOTE — DIETITIAN INITIAL EVALUATION ADULT. - ETIOLOGY
related to inadequate protein energy intake with recent decrease in appetite following multiple hospital admissions

## 2019-10-02 NOTE — PROGRESS NOTE ADULT - ASSESSMENT
77y/o  Male h/o CHF, Pacemaker, CAD, COPD stage 4, on home oxygen, s/p total right lung decortication, multiloculated right empyema with Peptostreptococcus Asaccharolyticus s/p Zosyn. Patient was seen as outpatient for ELLIOTT in culture at which time he had no symptoms. Patient was well up until recently when he noticed drainage from right chest tube site 2-3 weeks ago after having been closed for some time. Patient also reports severe back pain over 1-2 weeks for which he got a script for a muscle relaxant. Patient admitted to floor for further workup and evaluation of chest wall infection. Patient was started on IV Vancomycin and Zosyn.      Cellulitis and abscess of old chest tube site  r/o Metastatic disease       - Blood cultures no growth   - s/o I&D of abscess 9/27/19, Streptococcus milleri   - Planned for OR tomorrow by Thoracic Surgery   - CT Chest reviewed  - Continue Zosyn  - Follow up cultures  - Trend Fever  - Trend Leukocytosis      Will Follow

## 2019-10-03 NOTE — PROGRESS NOTE ADULT - ASSESSMENT
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 Arias   - Pt OK to be in left lateral decubitus position for OR   - continue TLSO when OOB  - Patient does not want neurosurgical intervention  -Continue to coordinate care w/ primary team 78yM w/ lumbosacral spine stenosis, h/o T10 low grade schwannoma s/p T9-11 laminectomy and tumor removal by surgeon in [place] in 2017, w/ c/o back pain, found with liver lesion, along with T4 pathologic fracture with epidural extension and additional T11 osseous metastasis    - Discussed w/ Dr. Arias   - Pt OK to be in left lateral decubitus position for OR   - continue TLSO when OOB  - Continu Q4 hr neuro checks   - Patient does not want neurosurgical intervention  -Continue to coordinate care w/ primary team

## 2019-10-03 NOTE — PROGRESS NOTE ADULT - SUBJECTIVE AND OBJECTIVE BOX
POST-OP Check     Subjective: Patient s/p R chest washout with R CT placement and wound VAC. Patient denies pain. Sitting in bed comfortably eating. Voided post-op. Denies fever, chills, cough, SOB, CP, HA, dizziness. Is contemplating the idea of repeat MRI for liver lesion needed for IR guided biopsy.     Vital Signs:  Vital Signs Last 24 Hrs  T(C): 36.4 (10-03-19 @ 17:12), Max: 36.8 (10-03-19 @ 12:45)  T(F): 97.6 (10-03-19 @ 17:12), Max: 98.2 (10-03-19 @ 12:45)  HR: 62 (10-03-19 @ 17:12) (60 - 73)  BP: 130/75 (10-03-19 @ 17:12) (105/56 - 147/92)  RR: 20 (10-03-19 @ 17:12) (8 - 20)  SpO2: 100% (10-03-19 @ 17:12) (94% - 100%) on (O2)    I&O's Detail    02 Oct 2019 07:01  -  03 Oct 2019 07:00  --------------------------------------------------------  IN:    Oral Fluid: 700 mL    sodium chloride 0.9%: 400 mL    Solution: 225 mL  Total IN: 1325 mL    OUT:    Voided: 2275 mL  Total OUT: 2275 mL    Total NET: -950 mL      03 Oct 2019 07:01  -  03 Oct 2019 18:17  --------------------------------------------------------  IN:    lactated ringers.: 100 mL    Oral Fluid: 80 mL  Total IN: 180 mL    OUT:    Voided: 250 mL  Total OUT: 250 mL    Total NET: -70 mL        Exam  Telemetry/Alarms: No acute events  General: NAD  Neurology: Awake, nonfocal, HOWARD x 4  Eyes: Scleras clear, PERRLA/ EOMI, Gross vision intact  ENT: Gross hearing intact, grossly patent pharynx, no stridor  Neck: Neck supple, trachea midline, No JVD  Respiratory: Diminished at right base  CV: RRR, S1S2, no murmurs, rubs or gallops  Abdominal: Soft, NT, ND  Extremities: B/L LE edema  Psych: Oriented x 3, normal affect  Incisions: C/D/I, Wound VAC in place with continuos suction   Tubes: R CT to suction, with serosang output, no air leak visualized     Relevant labs, radiology and Medications reviewed                        12.9   10.90 )-----------( 199      ( 03 Oct 2019 06:00 )             41.9     10-03    145  |  99  |  37.0<H>  ----------------------------<  95  4.5   |  36.0<H>  |  1.27    Ca    9.5      03 Oct 2019 06:00  Mg     2.5     10-02        MEDICATIONS  (STANDING):  ALBUTerol/ipratropium for Nebulization 3 milliLiter(s) Nebulizer every 6 hours  atorvastatin 20 milliGRAM(s) Oral at bedtime  carvedilol 3.125 milliGRAM(s) Oral every 12 hours  docusate sodium 100 milliGRAM(s) Oral three times a day  furosemide    Tablet 40 milliGRAM(s) Oral daily  montelukast 10 milliGRAM(s) Oral daily  pantoprazole    Tablet 40 milliGRAM(s) Oral before breakfast  piperacillin/tazobactam IVPB.. 3.375 Gram(s) IV Intermittent every 8 hours  predniSONE   Tablet 10 milliGRAM(s) Oral daily  saccharomyces boulardii 250 milliGRAM(s) Oral two times a day  sertraline 25 milliGRAM(s) Oral daily  sodium chloride 0.9% lock flush 3 milliLiter(s) IV Push every 8 hours  spironolactone 25 milliGRAM(s) Oral daily    MEDICATIONS  (PRN):  acetaminophen   Tablet .. 650 milliGRAM(s) Oral every 6 hours PRN Mild Pain (1 - 3)  traMADol 25 milliGRAM(s) Oral daily PRN Moderate Pain (4 - 6)        Assessment  78y Male  w/ PAST MEDICAL & SURGICAL HISTORY:  CHF (congestive heart failure)  CAD (coronary artery disease)  HLD (hyperlipidemia)  HTN (hypertension)  COPD (chronic obstructive pulmonary disease)  Stenosis of lumbosacral spine  H/O back injury  Artificial pacemaker  admitted with complaints of Patient is a 78y old  Male who presents with a chief complaint of infected chest tube site (03 Oct 2019 12:42)  patient underwent Thoracoscopic insertion of chest tube into right pleural space  Wound VAC placement

## 2019-10-03 NOTE — PROGRESS NOTE ADULT - ASSESSMENT
78M h/o CHF, CAD, HLD, HTN, COPD on home O2, pna/empyema s/p decortication 4/30/19, returns with a Right chest wall collection and likely recurrent empyema. Patient also p/w back pain with history of benign spinal tumor resected. Incidental T4 lytic lesion with epidural extension and 6cm hypodensity in liver seen on CT scan. MRI spine revealed pathologic comp fracture suspicious of metastasis on T4, T11 and possibly T6. Chest abscess growing Strep milleri. CT A/P revealed 6x6cm necrotic liver mass v. abscess. Patient s/p R VATS, chest washout, R CT placement and wound VAC 10/3.      PLAN  Neuro: Pain management. Patient refusing any NSX intervention at this time. TLSO brace when OOB.  Pulm: Encourage coughing, deep breathing and use of incentive spirometry. Supplemental oxygen PRN. Daily CXR. Duonebs PRN. Next VAC change to be performed 10/6.   Cardio: Monitor telemetry/alarms. Cont to hold ASA for any intervention regarding liver lesion.   GI: Tolerating diet. Continue stool softeners. IVL at MN.   Renal: Monitor urine output, supplement electrolytes as needed  Vasc: Lovenox SC/SCDs for DVT prophylaxis.  Heme: Stable H/H. Will need further workup if amendable with MRI and biopsy liver lesion.   ID: C/W Zosyn. ID following.   Therapy: OOB/ambulate. PT re-eval.   Disposition: Previously recommending Home PT needed upon discharge at this time.     Discussed with Cardiothoracic Team at AM rounds.    Naima HOPPER  Thoracic Surgery

## 2019-10-03 NOTE — PROGRESS NOTE ADULT - ATTENDING COMMENTS
NSGY Attg:    see above    patient seen and examined    LE 5/5    MRI reviewed     will d/w medical team and ID
NSGY Attg:    see above    patient seen and examined    ambulating without assistance    LE 5/5 bilaterally    MRI T spine w and wo contrast pending
NSGY Attg:    see above    patient seen and examined    no new complaints    LE 5/5    awaiting MRI T spine w and wo contrast
NSGY Attg:    see above    patient seen and examined    LE 5/5    no absolute neurosurgical contraindication for positioning for drainage planned by CT surgery  patient states he would consider a liver bx if clinically appropriate but reiterates that he is not amenable to consideration of spine surgery  pain control  brace when OOB  additional work-up and treatment per CT surg, oncology, radiation oncology, palliative care  recall prn
NSGY Attg:    see above    patient seen and examined    LE 5/5    MRI T-spine w and wo contrast reviewed and discussed with the patient    At this time, the patient is refusing consideration of spine surgery/neurosurgical intervention.  I have explained the imaging findings.  Defer to primary team/ID regarding drainage of chest collection.   - consider biopsy if patient amenable to tissue diagnosis  - consider onc/rad onc eval  - TLSO brace when OOB
Patient seen and examined. Radiology MRI results reviewed, concerning for potential metastatic disease to the spine.     I purposed to the Patient that from a thoracic surgery standpoint, we would do and incision and drainage of the wound, clean up the pleural space surgically. Given his age, comorbid conditions (cannot ambulate without feeling SOB etc) that a big procedure involving a redo decortication/thoracotomy is probably not the best option for him. Likely leave a large bore drain in the posterior pleural space and let the wound heal by secondary intention.     The patient today was very frustrated. He stated that he does not want any procedures done, spine or thoracic. I told him there is no other way we can clean up the infected area.  He will need daily wound care/wound vac etc to maintain the wound. And he stated he does not want any of this.   I am uncertain of the etiology of the spine lesions.     Will obtain a CT abd/pelvis to rule out metastatic disease else where in the body.

## 2019-10-03 NOTE — PROGRESS NOTE ADULT - ASSESSMENT
77y/o  Male h/o CHF, Pacemaker, CAD, COPD stage 4, on home oxygen, s/p total right lung decortication, multiloculated right empyema with Peptostreptococcus Asaccharolyticus s/p Zosyn. Patient was seen as outpatient for ELLIOTT in culture at which time he had no symptoms. Patient was well up until recently when he noticed drainage from right chest tube site 2-3 weeks ago after having been closed for some time. Patient also reports severe back pain over 1-2 weeks for which he got a script for a muscle relaxant. Patient admitted to floor for further workup and evaluation of chest wall infection. Patient was started on IV Vancomycin and Zosyn.      Cellulitis and abscess of old chest tube site  r/o Metastatic disease       - Blood cultures no growth   - s/o I&D of abscess 9/27/19, Streptococcus milleri   - Planned for OR by Thoracic Surgery   - CT Chest reviewed  - Continue Zosyn  - Follow up cultures  - Trend Fever  - Trend Leukocytosis      Will Follow 77y/o  Male h/o CHF, Pacemaker, CAD, COPD stage 4, on home oxygen, s/p total right lung decortication, multiloculated right empyema with Peptostreptococcus Asaccharolyticus s/p Zosyn. Patient was seen as outpatient for ELLIOTT in culture at which time he had no symptoms. Patient was well up until recently when he noticed drainage from right chest tube site 2-3 weeks ago after having been closed for some time. Patient also reports severe back pain over 1-2 weeks for which he got a script for a muscle relaxant. Patient admitted to floor for further workup and evaluation of chest wall infection. Patient was started on IV Vancomycin and Zosyn.      Cellulitis and abscess of old chest tube site  r/o Metastatic disease   Depressed       - Blood cultures no growth   - s/o I&D of abscess 9/27/19, Streptococcus milleri   - Planned for OR by Thoracic Surgery   - CT Chest reviewed  - Continue Zosyn  - Follow up cultures  - Trend Fever  - Trend Leukocytosis  - Consider palliative care eval    Will Follow

## 2019-10-03 NOTE — BRIEF OPERATIVE NOTE - NSICDXBRIEFPROCEDURE_GEN_ALL_CORE_FT
PROCEDURES:  Thoracoscopic insertion of chest tube into right pleural space 03-Oct-2019 15:09:40 right sided pleural washout Yamile Simeon  Wound VAC placement 03-Oct-2019 15:07:23 6 cm x 3 cm   2.5 cm deep   placed to -125 Yamile Simeon

## 2019-10-03 NOTE — PROGRESS NOTE ADULT - SUBJECTIVE AND OBJECTIVE BOX
[facility]  INFECTIOUS DISEASES AND INTERNAL MEDICINE at [place]  =======================================================  Onesimo Henderson MD  Diplomates American Board of Internal Medicine and Infectious Diseases  Tel: [phone]      Fax: [phone]  =======================================================    JOHN CIFUENTES [id number]    Follow up: Cellulitis and abscess of old chest tube site    no fever or chills  no diarrhea   no abdominal pain     Planned for OR today    Allergies:  No Known Allergies      Antibiotics:  piperacillin/tazobactam IVPB.. 3.375 Gram(s) IV Intermittent every 8 hours        REVIEW OF SYSTEMS:  CONSTITUTIONAL:  No Fever or chills  HEENT:  No diplopia or blurred vision.  No earache, sore throat or runny nose.  CARDIOVASCULAR:  No pressure, squeezing, strangling, tightness, heaviness or aching about the chest, neck, axilla or epigastrium.  RESPIRATORY:  No cough, shortness of breath  GASTROINTESTINAL:  No nausea, vomiting or diarrhea.  GENITOURINARY:  No dysuria, frequency or urgency.   MUSCULOSKELETAL:  no joint aches, no muscle pain  SKIN:  No change in skin, hair or nails.  NEUROLOGIC:  No Headaches, seizures  PSYCHIATRIC:  No disorder of thought or mood.  ENDOCRINE:  No heat or cold intolerance  HEMATOLOGICAL:  No easy bruising or bleeding.       Physical Exam:  Vital Signs Last 24 Hrs  T(C): 36.4 (03 Oct 2019 06:52), Max: 36.6 (02 Oct 2019 23:18)  T(F): 97.6 (03 Oct 2019 06:52), Max: 97.8 (02 Oct 2019 23:18)  HR: 73 (03 Oct 2019 06:52) (66 - 96)  BP: 147/92 (03 Oct 2019 06:52) (121/71 - 147/92)  RR: 20 (03 Oct 2019 06:52) (17 - 20)  SpO2: 100% (03 Oct 2019 06:52) (98% - 100%)      GEN: NAD, pleasant  HEENT: normocephalic and atraumatic. EOMI. PERRL.  Anicteric  NECK: Supple.   LUNGS: Decreased basal BS B/L   HEART: Regular rate and rhythm  ABDOMEN: Soft, nontender, and nondistended.  Positive bowel sounds.    : No CVA tenderness  EXTREMITIES: Without any edema.  MSK: No joint swelling  NEUROLOGIC: No Focal Deficits  PSYCHIATRIC: Appropriate affect .  SKIN: No Rash      Labs:  10-03    145  |  99  |  37.0<H>  ----------------------------<  95  4.5   |  36.0<H>  |  1.27    Ca    9.5      03 Oct 2019 06:00  Mg     2.5     10-02                 12.9   10.90 )-----------( 199      ( 03 Oct 2019 06:00 )             41.9       RECENT CULTURES:  09-27 @ 16:01 .Other     No growth to date  Culture in progress      09-27 @ 16:00 .Surgical Swab chest wall abscess Streptococcus milleri, viridans group    Numerous Streptococcus milleri, viridans group  Few White blood cells  Few Gram Positive Cocci in Pairs and Chains      09-26 @ 15:40 .Blood     No growth at 5 days. [facility]  INFECTIOUS DISEASES AND INTERNAL MEDICINE at [place]  =======================================================  Onesimo Henderson MD  Diplomates American Board of Internal Medicine and Infectious Diseases  Tel: [phone]      Fax: [phone]  =======================================================    JOHN CIFUENTES [id number]    Follow up: Cellulitis and abscess of old chest tube site    no fever or chills  no diarrhea   no abdominal pain     Planned for OR today    Allergies:  No Known Allergies      Antibiotics:  piperacillin/tazobactam IVPB.. 3.375 Gram(s) IV Intermittent every 8 hours        REVIEW OF SYSTEMS:  CONSTITUTIONAL:  No Fever or chills  HEENT:  No diplopia or blurred vision.  No earache, sore throat or runny nose.  CARDIOVASCULAR:  No pressure, squeezing, strangling, tightness, heaviness or aching about the chest, neck, axilla or epigastrium.  RESPIRATORY:  No cough, shortness of breath  GASTROINTESTINAL:  No nausea, vomiting or diarrhea.  GENITOURINARY:  No dysuria, frequency or urgency.   MUSCULOSKELETAL:  no joint aches, no muscle pain  SKIN:  No change in skin, hair or nails.  NEUROLOGIC:  No Headaches, seizures  PSYCHIATRIC:  No disorder of thought or mood.  ENDOCRINE:  No heat or cold intolerance  HEMATOLOGICAL:  No easy bruising or bleeding.       Physical Exam:  Vital Signs Last 24 Hrs  T(C): 36.4 (03 Oct 2019 06:52), Max: 36.6 (02 Oct 2019 23:18)  T(F): 97.6 (03 Oct 2019 06:52), Max: 97.8 (02 Oct 2019 23:18)  HR: 73 (03 Oct 2019 06:52) (66 - 96)  BP: 147/92 (03 Oct 2019 06:52) (121/71 - 147/92)  RR: 20 (03 Oct 2019 06:52) (17 - 20)  SpO2: 100% (03 Oct 2019 06:52) (98% - 100%)      GEN: NAD, pleasant  HEENT: normocephalic and atraumatic. EOMI. PERRL.  Anicteric  NECK: Supple.   LUNGS: Decreased basal BS B/L   HEART: Regular rate and rhythm  ABDOMEN: Soft, nontender, and nondistended.  Positive bowel sounds.    : No CVA tenderness  EXTREMITIES: Without any edema.  MSK: No joint swelling  NEUROLOGIC: No Focal Deficits  PSYCHIATRIC: Depressed   SKIN: No Rash      Labs:  10-03    145  |  99  |  37.0<H>  ----------------------------<  95  4.5   |  36.0<H>  |  1.27    Ca    9.5      03 Oct 2019 06:00  Mg     2.5     10-02                 12.9   10.90 )-----------( 199      ( 03 Oct 2019 06:00 )             41.9       RECENT CULTURES:  09-27 @ 16:01 .Other     No growth to date  Culture in progress      09-27 @ 16:00 .Surgical Swab chest wall abscess Streptococcus milleri, viridans group    Numerous Streptococcus milleri, viridans group  Few White blood cells  Few Gram Positive Cocci in Pairs and Chains      09-26 @ 15:40 .Blood     No growth at 5 days.

## 2019-10-03 NOTE — PROGRESS NOTE ADULT - SUBJECTIVE AND OBJECTIVE BOX
HPI:  78M h/o pneumonia, and empyema drained and decort done earlier this year. Patient also with h/o COPD and req 2 L NC at home. Patient reports drainage from right chest tube site 2-3 weeks ago "opened up again" after having been closed for some time. Patient also reports severe back pain over 1-2 weeks for which he got a script for a muscle relaxant. He followed up in the office today with Dr Abbott for chest tube drainage, which has been improved, and swelling/redness of site, which has been worsening and is now signficiant. Patient directly admitted to floor for further workup and evaluation of chest wall infection. (26 Sep 2019 22:25)        INTERVAL HPI/OVERNIGHT EVENTS:  Pt w/ hx of c/o back pain, found with liver lesion, along with T4 pathologic fracture with epidural extension and additional T11 osseous metastasis. Pt seen sitting in chair. No acute events overnight. Discussion w/ pt and neurosurgery team revealed that pt may be amenable to liver biopsy, however continues to refuse spinal intervention.       Vital Signs Last 24 Hrs  T(C): 36.5 (03 Oct 2019 10:31), Max: 36.6 (02 Oct 2019 23:18)  T(F): 97.7 (03 Oct 2019 10:31), Max: 97.8 (02 Oct 2019 23:18)  HR: 65 (03 Oct 2019 10:31) (65 - 96)  BP: 119/78 (03 Oct 2019 10:31) (119/78 - 147/92)  BP(mean): --  RR: 20 (03 Oct 2019 10:31) (17 - 20)  SpO2: 94% (03 Oct 2019 10:31) (94% - 100%)      PHYSICAL EXAM:  GENERAL: NAD, well-groomed, well-developed  HEAD:  Atraumatic, normocephalic  Mental Status: AAO x3;  Appropriately conversant without aphasia, follows commands  CRANIAL NERVES: Face symmetric. Hearing grossly intact. Speech clear  MOTOR: strength 5/5 b/l upper and lower extremities  SENSATION: grossly intact to light touch all extremities  CHEST/LUNG: +breath sounds bilaterally; no rales, rhonchi, wheezing  HEART: +S1/+S2  ABDOMEN: Soft, nontender, nondistended  SKIN: Warm, dry; no rashes or lesions      LABS:                        12.9   10.90 )-----------( 199      ( 03 Oct 2019 06:00 )             41.9     10-03    145  |  99  |  37.0<H>  ----------------------------<  95  4.5   |  36.0<H>  |  1.27    Ca    9.5      03 Oct 2019 06:00  Mg     2.5     10-02            10-02 @ 07:01  -  10-03 @ 07:00  --------------------------------------------------------  IN: 1325 mL / OUT: 2275 mL / NET: -950 mL    10-03 @ 07:01  -  10-03 @ 12:45  --------------------------------------------------------  IN: 80 mL / OUT: 0 mL / NET: 80 mL        RADIOLOGY & ADDITIONAL TESTS:  MR Thoracic Spine w/wo IV Cont (09.30.19 @ 13:31)  IMPRESSION: Pathologic compression fracture of T4 with epidural extension   of disease causing mild cord compression. No cord signal abnormality.   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    CT Chest No Cont (09.26.19 @ 16:50)  IMPRESSION:   In comparison with 8/26/2019, new 6.2 x 4.2 cm right lower lateral chest   wall subcutaneous fluid collection adjacent to the right 8th rib.  Small loculated right pleural effusion is slightly increased. Underlying   right pleural thickening.  Atelectasis of the basilar right lower lobe is is slightly increased.   Narrowing of the right lower lobe bronchus and obliteration of   posterior-lateral basilar segments of right lower lobe bronchus possibly   secondary to retained secretions. Recommend attention on follow-up.  Emphysema.  6 cm indistinct hypodensity within the posterior right lobe of the liver.   Recommend further evaluation with MRI or ultrasound.  Destructive lytic lesion within T4 vertebral body with extension into the   spinal canal likely metastatic. Recommend further evaluation with   contrast-enhanced MRI of thoracic spine.  These findings were discussed with RUCHI CEVALLOS on 9/26/2019 5:11 PM   with read back.

## 2019-10-03 NOTE — PRE-OP CHECKLIST - PATIENT PROBLEMS/NEEDS
Uses oxygen at home/Patient expressed no known problems or needs
Patient expressed no known problems or needs

## 2019-10-04 NOTE — PHYSICAL THERAPY INITIAL EVALUATION ADULT - ASR WT BEARING STATUS EVAL
no weight-bearing restrictions
no weight-bearing restrictions/pt without imitations at the time of eval

## 2019-10-04 NOTE — PHYSICAL THERAPY INITIAL EVALUATION ADULT - GENERAL OBSERVATIONS, REHAB EVAL
Pt A and O, + chest tube, wound vac, 02, tele, , IV, primafit.
Pt rec'd in room sitting upright in the chair at bedside, breathing 02 via NC

## 2019-10-04 NOTE — PROGRESS NOTE ADULT - SUBJECTIVE AND OBJECTIVE BOX
[facility]  INFECTIOUS DISEASES AND INTERNAL MEDICINE at [place]  =======================================================  Onesimo Henderson MD  Diplomates American Board of Internal Medicine and Infectious Diseases  Tel: [phone]      Fax: [phone]  =======================================================    JOHN CIFUENTES [id number]    Follow up: Cellulitis and abscess of old chest tube site  s/p R VATS, chest washout, R CT placement and wound VAC 10/3      no fever or chills  no diarrhea   no abdominal pain       Allergies:  No Known Allergies      Antibiotics:  piperacillin/tazobactam IVPB.. 3.375 Gram(s) IV Intermittent every 8 hours        REVIEW OF SYSTEMS:  CONSTITUTIONAL:  No Fever or chills  HEENT:  No diplopia or blurred vision.  No earache, sore throat or runny nose.  CARDIOVASCULAR:  No pressure, squeezing, strangling, tightness, heaviness or aching about the chest, neck, axilla or epigastrium.  RESPIRATORY:  No cough, shortness of breath  GASTROINTESTINAL:  No nausea, vomiting or diarrhea.  GENITOURINARY:  No dysuria, frequency or urgency.   MUSCULOSKELETAL:  no joint aches, no muscle pain  SKIN:  No change in skin, hair or nails.  NEUROLOGIC:  No Headaches, seizures  PSYCHIATRIC:  No disorder of thought or mood.  ENDOCRINE:  No heat or cold intolerance  HEMATOLOGICAL:  No easy bruising or bleeding.       Physical Exam:  Vital Signs Last 24 Hrs  T(C): 36.8 (04 Oct 2019 05:50), Max: 36.8 (03 Oct 2019 12:45)  T(F): 98.2 (04 Oct 2019 05:50), Max: 98.2 (03 Oct 2019 12:45)  HR: 63 (04 Oct 2019 05:50) (60 - 105)  BP: 101/60 (04 Oct 2019 05:50) (101/60 - 130/75)  RR: 16 (04 Oct 2019 05:50) (8 - 20)  SpO2: 96% (04 Oct 2019 05:50) (96% - 100%)      GEN: NAD, pleasant  HEENT: normocephalic and atraumatic. EOMI. PERRL.  Anicteric  NECK: Supple.   LUNGS: Decreased basal BS B/L   HEART: Regular rate and rhythm  ABDOMEN: Soft, nontender, and nondistended.  Positive bowel sounds.    : No CVA tenderness  EXTREMITIES: Without any edema.  MSK: No joint swelling  NEUROLOGIC: No Focal Deficits  PSYCHIATRIC: Depressed   SKIN: No Rash      Labs:  10-03    145  |  99  |  37.0<H>  ----------------------------<  95  4.5   |  36.0<H>  |  1.27    Ca    9.5      03 Oct 2019 06:00               12.9   10.90 )-----------( 199      ( 03 Oct 2019 06:00 )             41.9       RECENT CULTURES:  10-03 @ 17:05 .Body Fluid pleural fluid     Culture in progress  Moderate White blood cells  No organisms seen      09-27 @ 16:01 .Other     No growth to date  Culture in progress      09-27 @ 16:00 .Surgical Swab chest wall abscess Streptococcus milleri, viridans group    Numerous Streptococcus milleri, viridans group  Few White blood cells  Few Gram Positive Cocci in Pairs and Chains      09-26 @ 15:40 .Blood     No growth at 5 days. [facility]  INFECTIOUS DISEASES AND INTERNAL MEDICINE at [place]  =======================================================  Onesimo Henderson MD  Diplomates American Board of Internal Medicine and Infectious Diseases  Tel: [phone]      Fax: [phone]  =======================================================    JOHN CIFUENTES [id number]    Follow up: Cellulitis and abscess of old chest tube site  s/p R VATS, chest washout, R CT placement and wound VAC 10/3      no fever or chills  no diarrhea   no abdominal pain       Allergies:  No Known Allergies      Antibiotics:  piperacillin/tazobactam IVPB.. 3.375 Gram(s) IV Intermittent every 8 hours        REVIEW OF SYSTEMS:  CONSTITUTIONAL:  No Fever or chills  HEENT:  No diplopia or blurred vision.  No earache, sore throat or runny nose.  CARDIOVASCULAR:  No pressure, squeezing, strangling, tightness, heaviness or aching about the chest, neck, axilla or epigastrium.  RESPIRATORY:  No cough, shortness of breath  GASTROINTESTINAL:  No nausea, vomiting or diarrhea.  GENITOURINARY:  No dysuria, frequency or urgency.   MUSCULOSKELETAL:  no joint aches, no muscle pain  SKIN:  No change in skin, hair or nails.  NEUROLOGIC:  No Headaches, seizures  PSYCHIATRIC:  No disorder of thought or mood.  ENDOCRINE:  No heat or cold intolerance  HEMATOLOGICAL:  No easy bruising or bleeding.       Physical Exam:  Vital Signs Last 24 Hrs  T(C): 36.8 (04 Oct 2019 05:50), Max: 36.8 (03 Oct 2019 12:45)  T(F): 98.2 (04 Oct 2019 05:50), Max: 98.2 (03 Oct 2019 12:45)  HR: 63 (04 Oct 2019 05:50) (60 - 105)  BP: 101/60 (04 Oct 2019 05:50) (101/60 - 130/75)  RR: 16 (04 Oct 2019 05:50) (8 - 20)  SpO2: 96% (04 Oct 2019 05:50) (96% - 100%)      GEN: NAD, pleasant  HEENT: normocephalic and atraumatic. EOMI. PERRL.  Anicteric  NECK: Supple.   LUNGS: Decreased basal BS B/L. + Chest tube, and wound vac  HEART: Regular rate and rhythm  ABDOMEN: Soft, nontender, and nondistended.  Positive bowel sounds.    : No CVA tenderness  EXTREMITIES: Without any edema.  MSK: No joint swelling  NEUROLOGIC: No Focal Deficits  PSYCHIATRIC: Depressed   SKIN: No Rash      Labs:  10-03    145  |  99  |  37.0<H>  ----------------------------<  95  4.5   |  36.0<H>  |  1.27    Ca    9.5      03 Oct 2019 06:00               12.9   10.90 )-----------( 199      ( 03 Oct 2019 06:00 )             41.9       RECENT CULTURES:  10-03 @ 17:05 .Body Fluid pleural fluid     Culture in progress  Moderate White blood cells  No organisms seen      09-27 @ 16:01 .Other     No growth to date  Culture in progress      09-27 @ 16:00 .Surgical Swab chest wall abscess Streptococcus milleri, viridans group    Numerous Streptococcus milleri, viridans group  Few White blood cells  Few Gram Positive Cocci in Pairs and Chains      09-26 @ 15:40 .Blood     No growth at 5 days.

## 2019-10-04 NOTE — PROGRESS NOTE ADULT - SUBJECTIVE AND OBJECTIVE BOX
Subjective:  Pt in chair NAD.  Refusing MRI of liver as of now.  He wants to speak to his wife     T(C): 36.8 (10-04-19 @ 05:50), Max: 36.8 (10-03-19 @ 12:45)  HR: 63 (10-04-19 @ 05:50) (60 - 105)  BP: 101/60 (10-04-19 @ 05:50) (101/60 - 130/75)  ABP: 128/59 (10-03-19 @ 15:57) (124/58 - 130/58)    SpO2: 96% (10-04-19 @ 05:50) (94% - 100%) 2 L NC   Tele:      10-03    145  |  99  |  37.0<H>  ----------------------------<  95  4.5   |  36.0<H>  |  1.27    Ca    9.5      03 Oct 2019 06:00                                 12.9   10.90 )-----------( 199      ( 03 Oct 2019 06:00 )             41.9                 CAPILLARY BLOOD GLUCOSE               CXR:  < from: Xray Chest 1 View- PORTABLE-Routine (10.04.19 @ 05:07) >  The left lung is clear. Again noted is a right chest tube unchanged in   position since the prior study. There is a right pleural effusion, larger   since the prior study. Noevidence of pneumothorax. No change in   pacemaker or pacemaker wires. Persistent cardiomegaly..    Impression:  Increasing right pleural effusion, otherwise stable.    < end of copied text >          Assessment  Neuro: Alert awake NAD  Pulm: decreased at bases   CV: RRR S1 S2 	  Abd: soft NT ND + BS   Extremities: trace edema b/l LE  INC:  Rt thoracic :  VATS in place with good suction         Assessment:  78yMale    with PAST MEDICAL & SURGICAL HISTORY:  CHF (congestive heart failure)  CAD (coronary artery disease)  HLD (hyperlipidemia)  HTN (hypertension)  COPD (chronic obstructive pulmonary disease)  Stenosis of lumbosacral spine  H/O back injury  Artificial pacemaker        Plan:

## 2019-10-04 NOTE — PHYSICAL THERAPY INITIAL EVALUATION ADULT - PERTINENT HX OF CURRENT PROBLEM, REHAB EVAL
Opening of chest tube insertion site
PT presents to [facility] with reports of opening of previous chest tube drainage site

## 2019-10-04 NOTE — PHYSICAL THERAPY INITIAL EVALUATION ADULT - PLANNED THERAPY INTERVENTIONS, PT EVAL
strengthening/gait training/balance training/bed mobility training/ROM/transfer training
gait training/transfer training

## 2019-10-04 NOTE — PROGRESS NOTE ADULT - ASSESSMENT
79y/o  Male h/o CHF, Pacemaker, CAD, COPD stage 4, on home oxygen, s/p total right lung decortication, multiloculated right empyema with Peptostreptococcus Asaccharolyticus s/p Zosyn. Patient was seen as outpatient for ELLIOTT in culture at which time he had no symptoms. Patient was well up until recently when he noticed drainage from right chest tube site 2-3 weeks ago after having been closed for some time. Patient also reports severe back pain over 1-2 weeks for which he got a script for a muscle relaxant. Patient admitted to floor for further workup and evaluation of chest wall infection. Patient was started on IV Vancomycin and Zosyn.      Cellulitis and abscess of old chest tube site  s/p R VATS, chest washout, R CT placement and wound VAC 10/3  r/o Metastatic disease   Depressed       - Blood cultures no growth   - s/o I&D of abscess 9/27/19, Streptococcus milleri   - s/p R VATS, chest washout, R CT placement and wound VAC 10/3  - CT Chest reviewed  - Continue Zosyn  - Follow up cultures  - Trend Fever  - Trend Leukocytosis  - Consider palliative care eval    Will Follow

## 2019-10-04 NOTE — PHYSICAL THERAPY INITIAL EVALUATION ADULT - ADDITIONAL COMMENTS
----- Message from Destiney Hitchcock sent at 6/30/2017  9:18 AM CDT -----  Contact: Ruth with [facility]   Emilia called and stated she needed to speak to the nurse. She stated that she is checking on physical therapy orders for this pt that date back to 3/29/17. She can be reached at [phone].    Thanks,  TF   
Physician verbal orders that needs to be signed. Advised to fax over information and physician will sign. Will be in your box  
Pt lives in a house with 0 steps to enter and 0 stairs inside.  Pt owns medical equipment: RW  Pt lives with his wife who is available to provide 24hr care.
per patient he owns a RW however does not use one in the house. Pt reports that he is without steps to enter and is on home 02. Pt would like to return home following D/C. Pt states that he has been OOB, ambulating with nursing and nursing assistants to use the bathroom.

## 2019-10-04 NOTE — PHYSICAL THERAPY INITIAL EVALUATION ADULT - REHAB POTENTIAL, PT EVAL
See Care Plan under Instructions
good, to achieve stated therapy goals
good, to achieve stated therapy goals

## 2019-10-04 NOTE — CHART NOTE - NSCHARTNOTEFT_GEN_A_CORE
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 Arias   - Pt OK to be in left lateral decubitus position for OR   - continue TLSO when OOB  - Continu Q4 hr neuro checks   - Patient does not want neurosurgical intervention  -reconsult PRN

## 2019-10-05 NOTE — PROGRESS NOTE ADULT - ASSESSMENT
78M h/o CHF, CAD, HLD, HTN, COPD on home O2, pna/empyema s/p decortication 4/30/19, returns with a Right chest wall collection and likely recurrent empyema. Patient also p/w back pain with history of benign spinal tumor resected. Incidental T4 lytic lesion with epidural extension and 6cm hypodensity in liver seen on CT scan. MRI spine revealed pathologic comp fracture suspicious of metastasis on T4, T11 and possibly T6. Chest abscess growing Strep milleri. CT A/P revealed 6x6cm necrotic liver mass v. abscess. Patient s/p R VATS, chest washout, R CT placement and wound VAC 10/3.      PLAN  Neuro: Pain management. Patient refusing any NSX intervention at this time. TLSO brace when OOB.  Pulm: Encourage coughing, deep breathing and use of incentive spirometry. Supplemental oxygen PRN. Daily CXR. Duonebs PRN. Next VAC change to be performed 10/6.   Cardio: Monitor telemetry/alarms. Cont to hold ASA for any intervention regarding liver lesion.   GI: Tolerating diet. Continue stool softeners.   Renal: Monitor urine output, supplement electrolytes as needed  Vasc: Lovenox SC/SCDs for DVT prophylaxis.  Heme: Stable H/H. Will need further workup if amendable with MRI and biopsy liver lesion.   ID: C/W Zosyn. ID following.   Therapy: OOB/ambulate. PT re-eval.   Disposition: Previously recommending Home PT needed upon discharge at this time.   Maintain CT   Pt amendable to MRI of liver.  Will take place Monday.  Need to contact [place] to coordinate.  Then after MRI, pt will have liver biopsy.      Discussed with Cardiothoracic Team at AM rounds.

## 2019-10-05 NOTE — PROGRESS NOTE ADULT - SUBJECTIVE AND OBJECTIVE BOX
Subjective: Pt in chair .  wife at bedisde.  PT now agreeable to MRI (Monday)     T(C): 36.4 (10-05-19 @ 05:49), Max: 37 (10-04-19 @ 21:05)  HR: 95 (10-05-19 @ 08:37) (69 - 95)  BP: 130/88 (10-05-19 @ 05:49) (106/60 - 130/88)    RR: 18 (10-05-19 @ 05:49) (17 - 18)  SpO2: 98% (10-05-19 @ 08:37) (93% - 99%) RA  Tele:nSR    CHEST TUBE:    RT                           OUTPUT:    60cc  per 24 hours    AIR LEAKS:  [ ] YES [x ] NO          10-05    141  |  97<L>  |  41.0<H>  ----------------------------<  105  4.2   |  34.0<H>  |  1.44<H>    Ca    9.2      05 Oct 2019 08:59                                 11.5   12.47 )-----------( 201      ( 05 Oct 2019 09:07 )             38.5                 CAPILLARY BLOOD GLUCOSE    CXR: < from: Xray Chest 1 View- PORTABLE-Routine (10.04.19 @ 05:07) >  The left lung is clear. Again noted is a right chest tube unchanged in   position since the prior study. There is a right pleural effusion, larger   since the prior study. Noevidence of pneumothorax. No change in   pacemaker or pacemaker wires. Persistent cardiomegaly..    Impression:  Increasing right pleural effusion, otherwise stable.    < end of copied text >      Assessment  Neuro: Alert Awake NAD No issues overnight   Pulm:  decreased at bases   CV: RRR S1 S2   Abd:  Soft NT ND + BS   Extremities: trace edema b/l LE         Assessment:  78yMale    with PAST MEDICAL & SURGICAL HISTORY:  CHF (congestive heart failure)  CAD (coronary artery disease)  HLD (hyperlipidemia)  HTN (hypertension)  COPD (chronic obstructive pulmonary disease)  Stenosis of lumbosacral spine  H/O back injury  Artificial pacemaker        Plan:

## 2019-10-06 NOTE — PROGRESS NOTE ADULT - ASSESSMENT
78M h/o CHF, CAD, HLD, HTN, COPD on home O2, pna/empyema s/p decortication 4/30/19, returns with a Right chest wall collection and likely recurrent empyema. Patient also p/w back pain with history of benign spinal tumor resected. Incidental T4 lytic lesion with epidural extension and 6cm hypodensity in liver seen on CT scan. MRI spine revealed pathologic comp fracture suspicious of metastasis on T4, T11 and possibly T6. Chest abscess growing Strep milleri. CT A/P revealed 6x6cm necrotic liver mass v. abscess. Patient   s/p R VATS, chest washout, R CT placement and wound VAC 10/3.   10/6 VAC changed

## 2019-10-06 NOTE — PROGRESS NOTE ADULT - SUBJECTIVE AND OBJECTIVE BOX
Subjective:  "I was hungry this morning, might be getting better huh?"  OOB chair, wife visited    Vital Signs:  Vital Signs Last 24 Hrs  T(C): 37.1 (10-06-19 @ 11:00), Max: 37.1 (10-06-19 @ 11:00)  T(F): 98.7 (10-06-19 @ 11:00), Max: 98.7 (10-06-19 @ 11:00)  HR: 83 (10-06-19 @ 11:00) (72 - 97)  BP: 104/50 (10-06-19 @ 11:00) (104/50 - 127/95)  RR: 18 (10-06-19 @ 11:00) (18 - 20)  SpO2: 96% (10-06-19 @ 11:00) (87% - 99%) on (O2)    Telemetry/Alarms: Apace  80s    Relevant labs, radiology and Medications reviewed    Pertinent Physical Exam    Neuro: Alert Awake NAD No issues overnight   Pulm:  decreased at bases R post CT with serosang drainage> no air leak  Supplemental O2 in use  CV: RRR S1 S2   Abd:  Soft NT ND + BS Reports BM this am  Extremities: trace edema b/l LE , chr venous pigment changes  Inc R post flank VAC in place to suction ,   Seal intact  Tissue around site w/ minimal soft tissue swelling, no erythema      10-05 @ 07:01  -  10-06 @ 07:00  --------------------------------------------------------  IN:    Oral Fluid: 900 mL    Solution: 125 mL  Total IN: 1025 mL    OUT:    Chest Tube: 16 mL    Voided: 1740 mL  Total OUT: 1756 mL    Total NET: -731 mL          Assessment  78y Male admitted with complaints of Patient is a 78y old  Male who presents with a chief complaint of infected chest tube site       S/P  9/27  patient underwent Thoracoscopic insertion of chest tube into right pleural space w/ Wound VAC placement

## 2019-10-06 NOTE — CHART NOTE - NSCHARTNOTEFT_GEN_A_CORE
Follow up exam after VAC change 445pm revealed mod amt preston blood at VAC site with leaking   Dressing removed, packing w/dry gauze,pressure held approx 2 min  Upon inspection lateral edge wound bed with slow venous bleed> silver nitrate stick  applied by RUCHI Ahn, with hemostasis, Repacked w/ DSD/occlusive dressing  Wound VAC to be deferred as per Dr Abbott  Of note + audible small air leak old CT site>vaseline gauze applied w/ DSD  Waiting CXR  Pt clinically stable, son at bedside

## 2019-10-06 NOTE — PROGRESS NOTE ADULT - PROBLEM SELECTOR PLAN 1
Neuro: Pain management. Patient refusing any NSX intervention at this time. TLSO brace when OOB.  Pulm: Encourage coughing, deep breathing and use of incentive spirometry. Supplemental oxygen PRN. Daily CXR. Duonebs PRN. Next VAC change to be performed 10/9  Cardio: Monitor telemetry/alarms. Cont to hold ASA for any intervention regarding liver lesion.   GI: Tolerating diet. Continue stool softeners.   Renal: Monitor urine output, supplement electrolytes as needed  Vasc: Lovenox SC/SCDs for DVT prophylaxis.  Heme: Stable H/H. Will need further workup if amendable with MRI and biopsy liver lesion.   ID: C/W Zosyn. ID following.   Therapy: OOB/ambulate. PT re-eval.   Disposition: Previously recommending Home PT needed upon discharge at this time.   Maintain CT   Dr Abbott will defer  liver biopsy during hospitalization> will be re addressed as outpt> pt and wife aware and agree  See above.

## 2019-10-06 NOTE — CHART NOTE - NSCHARTNOTEFT_GEN_A_CORE
3pm VAC change R post chest> small black sponge removed> wound approx 2.5 inc length,2" deep  wound bed clean,no odor no drainage  VAC black sponge w/ occlusive dressing applied    R post CT removed> xray reveals tube not in pleural space  CXR to be done

## 2019-10-07 NOTE — CHART NOTE - NSCHARTNOTEFT_GEN_A_CORE
Source: Patient [x ]  Family [ ]   other [ ]    Current Diet: Diet, Regular:   Supplement Feeding Modality:  Oral  Ensure Enlive Cans or Servings Per Day:  1       Frequency:  Two Times a day (10-04-19 @ 09:20)    PO intake:  Pt continues with fair po intake at meals    Current Weight:   (10/4) 226#  (10/3) 237#  (9/27) 235#    % Weight Change - question accuracy of wts, will continue to monitor.  2+ mild edema b/l legs noted.    Pertinent Medications: MEDICATIONS  (STANDING):  ALBUTerol/ipratropium for Nebulization 3 milliLiter(s) Nebulizer every 6 hours  atorvastatin 20 milliGRAM(s) Oral at bedtime  carvedilol 3.125 milliGRAM(s) Oral every 12 hours  docusate sodium 100 milliGRAM(s) Oral three times a day  enoxaparin Injectable 40 milliGRAM(s) SubCutaneous daily  furosemide    Tablet 40 milliGRAM(s) Oral daily  montelukast 10 milliGRAM(s) Oral daily  pantoprazole    Tablet 40 milliGRAM(s) Oral before breakfast  piperacillin/tazobactam IVPB.. 3.375 Gram(s) IV Intermittent every 8 hours  predniSONE   Tablet 10 milliGRAM(s) Oral daily  saccharomyces boulardii 250 milliGRAM(s) Oral two times a day  senna 2 Tablet(s) Oral at bedtime  sertraline 25 milliGRAM(s) Oral daily  sodium chloride 0.9% lock flush 3 milliLiter(s) IV Push every 8 hours  sodium chloride 0.9% lock flush 3 milliLiter(s) IV Push once  spironolactone 25 milliGRAM(s) Oral daily    MEDICATIONS  (PRN):  acetaminophen   Tablet .. 650 milliGRAM(s) Oral every 6 hours PRN Mild Pain (1 - 3)  traMADol 50 milliGRAM(s) Oral every 6 hours PRN Severe Pain (7 - 10)  traMADol 25 milliGRAM(s) Oral every 6 hours PRN Moderate Pain (4 - 6)    Pertinent Labs: CBC Full  -  ( 07 Oct 2019 05:59 )  WBC Count : 12.23 K/uL  RBC Count : 4.63 M/uL  Hemoglobin : 12.1 g/dL  Hematocrit : 40.0 %  Platelet Count - Automated : 211 K/uL  Mean Cell Volume : 86.4 fl  Mean Cell Hemoglobin : 26.1 pg  Mean Cell Hemoglobin Concentration : 30.3 gm/dL  10-07 Na144 mmol/L Glu 96 mg/dL K+ 4.6 mmol/L Cr  1.42 mg/dL<H> BUN 54.0 mg/dL<H> Phos n/a   Alb 3.8 g/dL PAB n/a       Skin: s/p right VATS, chest washout, + wound vac    Nutrition focused physical exam previously conducted - found signs of malnutrition [ ]absent [x ]present    Subcutaneous fat loss: [x ] Orbital fat pads region, [ ]Buccal fat region, [ ]Triceps region,  [ ]Ribs region    Muscle wasting: [x ]Temples region, [x ]Clavicle region, [x ]Shoulder region, [ ]Scapula region, [ ]Interosseous region,  [ ]thigh region, [ ]Calf region    Estimated Needs:   [x ] no change since previous assessment  [ ] recalculated:     Current Nutrition Diagnosis: Pt remains at nutrition risk secondary to moderate protein calorie malnutrition (acute) related to inadequate protein energy intake with recent decrease in appetite following multiple hospital admissions as evidenced by pt meeting <75% estimated energy intake x 1 mo, moderate muscle wasting and mild fat loss.  Pt continues with fair po intake at meals.    Recommendations:   Continue with Ensure tid  RX: MVI and Vit C 500mg daily  Monitor daily wt    Monitoring and Evaluation:   [x ] PO intake [x ] Tolerance to diet prescription [X] Weights  [X] Follow up per protocol [X] Labs:

## 2019-10-07 NOTE — PROGRESS NOTE ADULT - PROBLEM SELECTOR PLAN 1
Neuro: Pain management. Patient refusing any NSX intervention at this time. TLSO brace when OOB.  Pulm: Encourage coughing, deep breathing and use of incentive spirometry. Supplemental oxygen PRN. Daily CXR. Duonebs PRN.   Cardio: Monitor telemetry/alarms.   GI: Tolerating diet. Continue stool softeners.  Defer inpt MRI or workup for liver lesion as per Dr Abbott>pursue as O/P  Renal: Monitor urine output, supplement electrolytes as needed  Vasc: Lovenox SC/SCDs for DVT prophylaxis.  Heme: Stable H/H. Will need further workup if amendable with MRI and biopsy liver lesion.   ID: C/W Zosyn. ID following.   Therapy: OOB/ambulate. PT re-eval.   Disposition: Previously recommending Home PT needed upon discharge at this time.   Dr Abbott will defer  liver biopsy during hospitalization> will be re addressed as outpt> pt and wife aware and agree Neuro: Pain management. Patient refusing any NSX intervention at this time. TLSO brace when OOB.  Pulm: Encourage coughing, deep breathing and use of incentive spirometry. Supplemental oxygen PRN. Daily CXR. Duonebs PRN.   Cardio: Monitor telemetry/alarms.   GI: Tolerating diet. Continue stool softeners.  Defer inpt MRI or workup for liver lesion as per Dr Abbott>pursue as O/P  Renal: Monitor urine output, supplement electrolytes as needed  Vasc: Lovenox SC/SCDs for DVT prophylaxis.  Heme: Stable H/H. Will need further workup if amendable with MRI and biopsy liver lesion.   ID: C/W Zosyn. ID recommends thru 10/30 for empyema>will need PICC  Therapy: OOB/ambulate. PT re-eval.   Disposition: Previously recommending Home PT needed upon discharge at this time.   Dr Abbott will defer  liver biopsy during hospitalization> will be re addressed as outpt> pt and wife aware and agree

## 2019-10-07 NOTE — PROGRESS NOTE ADULT - ASSESSMENT
78M h/o CHF, CAD, HLD, HTN, COPD on home O2, pna/empyema s/p decortication 4/30/19, returns with a Right chest wall collection and likely recurrent empyema. Patient also p/w back pain with history of benign spinal tumor resected. Incidental T4 lytic lesion with epidural extension and 6cm hypodensity in liver seen on CT scan. MRI spine revealed pathologic comp fracture suspicious of metastasis on T4, T11 and possibly T6. Chest abscess growing Strep milleri. CT A/P revealed 6x6cm necrotic liver mass v. abscess. Patient   s/p R VATS, chest washout, R CT placement and wound VAC 10/3.   10/6 VAC changed > subsequently removed d/t preston bleeding at site> removed, venous bleed lateral portion wound bed> silver nitrate to site Placed DSD after discussion w/ Dr Scar Clark d/c  10/7 Wound examined, packing removed, small amount fresh bleeding noted at corner wound bed Dr Dominguez at bedside, examined Will defer VAC at present Packed w/ gauze Occlusive covering  Continue to hold lovenox

## 2019-10-07 NOTE — PROGRESS NOTE ADULT - SUBJECTIVE AND OBJECTIVE BOX
[facility]  INFECTIOUS DISEASES AND INTERNAL MEDICINE at [place]  =======================================================  Onesimo Henderson MD  Diplomates American Board of Internal Medicine and Infectious Diseases  Tel: [phone]      Fax: [phone]  =======================================================    [place] [id number]    Follow up: Cellulitis and abscess of old chest tube site  s/p R VATS, chest washout, R CT placement and wound VAC 10/3      no fever or chills  no diarrhea   no abdominal pain       Allergies:  No Known Allergies      Antibiotics:  piperacillin/tazobactam IVPB.. 3.375 Gram(s) IV Intermittent every 8 hours        REVIEW OF SYSTEMS:  CONSTITUTIONAL:  No Fever or chills  HEENT:  No diplopia or blurred vision.  No earache, sore throat or runny nose.  CARDIOVASCULAR:  No pressure, squeezing, strangling, tightness, heaviness or aching about the chest, neck, axilla or epigastrium.  RESPIRATORY:  No cough, shortness of breath  GASTROINTESTINAL:  No nausea, vomiting or diarrhea.  GENITOURINARY:  No dysuria, frequency or urgency.   MUSCULOSKELETAL:  no joint aches, no muscle pain  SKIN:  No change in skin, hair or nails.  NEUROLOGIC:  No Headaches, seizures  PSYCHIATRIC:  No disorder of thought or mood.  ENDOCRINE:  No heat or cold intolerance  HEMATOLOGICAL:  No easy bruising or bleeding.       Physical Exam:  Vital Signs Last 24 Hrs  T(C): 36.7 (07 Oct 2019 10:20), Max: 36.7 (07 Oct 2019 10:20)  T(F): 98.1 (07 Oct 2019 10:20), Max: 98.1 (07 Oct 2019 10:20)  HR: 79 (07 Oct 2019 14:58) (60 - 110)  BP: 98/61 (07 Oct 2019 10:20) (98/61 - 145/69)  RR: 18 (07 Oct 2019 11:56) (16 - 18)  SpO2: 97% (07 Oct 2019 14:58) (95% - 98%)      GEN: NAD, pleasant  HEENT: normocephalic and atraumatic. EOMI. PERRL.  Anicteric  NECK: Supple.   LUNGS: Decreased basal BS B/L.   HEART: Regular rate and rhythm  ABDOMEN: Soft, nontender, and nondistended.  Positive bowel sounds.    : No CVA tenderness  EXTREMITIES: Without any edema.  MSK: No joint swelling  NEUROLOGIC: No Focal Deficits  PSYCHIATRIC: Depressed   SKIN: No Rash      Labs:  10-07    144  |  95<L>  |  54.0<H>  ----------------------------<  96  4.6   |  35.0<H>  |  1.42<H>    Ca    9.5      07 Oct 2019 05:59  Phos  3.1     10-06  Mg     2.2     10-06    TPro  7.6  /  Alb  3.8  /  TBili  0.3<L>  /  DBili  x   /  AST  19  /  ALT  10  /  AlkPhos  104  10-07                          12.1   12.23 )-----------( 211      ( 07 Oct 2019 05:59 )             40.0       LIVER FUNCTIONS - ( 07 Oct 2019 05:59 )  Alb: 3.8 g/dL / Pro: 7.6 g/dL / ALK PHOS: 104 U/L / ALT: 10 U/L / AST: 19 U/L / GGT: x               RECENT CULTURES:  10-03 @ 17:05 .Body Fluid pleural fluid     No growth at 4 days.  Culture in progress  Moderate White blood cells  No organisms seen      09-27 @ 16:01 .Other     No fungus isolated at 1 week.  Culture in progress      09-27 @ 16:00 .Surgical Swab chest wall abscess Streptococcus milleri, viridans group    Numerous Streptococcus milleri, viridans group  Few White blood cells  Few Gram Positive Cocci in Pairs and Chains      09-26 @ 15:40 .Blood     No growth at 5 days.

## 2019-10-07 NOTE — PROGRESS NOTE ADULT - ASSESSMENT
79y/o  Male h/o CHF, Pacemaker, CAD, COPD stage 4, on home oxygen, s/p total right lung decortication, multiloculated right empyema with Peptostreptococcus Asaccharolyticus s/p Zosyn. Patient was seen as outpatient for ELLIOTT in culture at which time he had no symptoms. Patient was well up until recently when he noticed drainage from right chest tube site 2-3 weeks ago after having been closed for some time. Patient also reports severe back pain over 1-2 weeks for which he got a script for a muscle relaxant. Patient admitted to floor for further workup and evaluation of chest wall infection. Patient was started on IV Vancomycin and Zosyn.      Cellulitis and abscess of old chest tube site  s/p R VATS, chest washout, R CT placement and wound VAC 10/3  r/o Metastatic disease   Depressed       - Blood cultures no growth   - s/o I&D of abscess 9/27/19, Streptococcus milleri   - s/p R VATS, chest washout, R CT placement and wound VAC 10/3  - CT Chest reviewed  - D/C Zosyn  - Start Ceftriaxone  - Follow up cultures  - Trend Fever  - Trend Leukocytosis  - Will need antibiotics till 10/30/19      Will Follow

## 2019-10-07 NOTE — PROGRESS NOTE ADULT - SUBJECTIVE AND OBJECTIVE BOX
Subjective:  "Feeling ok, so how long will I be in hospital ?"  OOB chair, no complaints    Tele:  SR  90s                            T(C): 36.7 (10-07-19 @ 10:20), Max: 37.1 (10-06-19 @ 11:00)  HR: 67 (10-07-19 @ 10:20) (60 - 110)  BP: 98/61 (10-07-19 @ 10:20) (98/61 - 145/69)  RR: 17 (10-07-19 @ 10:20) (16 - 18)  SpO2: 95% (10-07-19 @ 10:20) (95% - 98%)      10-07    144  |  95<L>  |  54.0<H>  ----------------------------<  96  4.6   |  35.0<H>  |  1.42<H>    Ca    9.5      07 Oct 2019 05:59  Phos  3.1     10-06  Mg     2.2     10-06    TPro  7.6  /  Alb  3.8  /  TBili  0.3<L>  /  DBili  x   /  AST  19  /  ALT  10  /  AlkPhos  104  10-07                               12.1   12.23 )-----------( 211      ( 07 Oct 2019 05:59 )             40.0               Assessment  Neuro: Alert Awake NAD No issues overnight   Pulm:  decreased at bases Supplemental O2 in use  CV: RRR S1 S2   Abd:  Soft NT ND + BS Reports BM this am  Extremities: trace edema b/l LE , chr venous pigment changes  Inc R post flank DSD changed w/ Dr Dominguez    Tissue around site w/ minimal soft tissue swelling, no erythema    MEDICATIONS  (STANDING):  ALBUTerol/ipratropium for Nebulization 3 milliLiter(s) Nebulizer every 6 hours  atorvastatin 20 milliGRAM(s) Oral at bedtime  carvedilol 3.125 milliGRAM(s) Oral every 12 hours  docusate sodium 100 milliGRAM(s) Oral three times a day  enoxaparin Injectable 40 milliGRAM(s) SubCutaneous daily  furosemide    Tablet 40 milliGRAM(s) Oral daily  montelukast 10 milliGRAM(s) Oral daily  pantoprazole    Tablet 40 milliGRAM(s) Oral before breakfast  piperacillin/tazobactam IVPB.. 3.375 Gram(s) IV Intermittent every 8 hours  predniSONE   Tablet 10 milliGRAM(s) Oral daily  saccharomyces boulardii 250 milliGRAM(s) Oral two times a day  senna 2 Tablet(s) Oral at bedtime  sertraline 25 milliGRAM(s) Oral daily  sodium chloride 0.9% lock flush 3 milliLiter(s) IV Push every 8 hours  sodium chloride 0.9% lock flush 3 milliLiter(s) IV Push once  spironolactone 25 milliGRAM(s) Oral daily       PAST MEDICAL & SURGICAL HISTORY:  CHF (congestive heart failure)  CAD (coronary artery disease)  HLD (hyperlipidemia)  HTN (hypertension)  COPD (chronic obstructive pulmonary disease)  Stenosis of lumbosacral spine  H/O back injury  Artificial pacemaker

## 2019-10-08 NOTE — PROCEDURE NOTE - NSICDXPROCEDURE_GEN_ALL_CORE_FT
PROCEDURES:  US guided needle placement 08-Oct-2019 09:28:38 Conrado Barrera  US guided vascular access 08-Oct-2019 09:28:22 Patent left basilic vein Conrado Pitt  Midline catheter insertion 08-Oct-2019 09:27:43 4FR 14CM  30CIRC BARD POWER MIDLINE ns flush good heme back left basilic vein Conrado Pitt

## 2019-10-08 NOTE — PROCEDURE NOTE - NSPROCDETAILS_GEN_ALL_CORE
sterile dressing applied/supine position/ultrasound guidance/sterile technique, catheter placed/ultrasound assessment/location identified, draped/prepped, sterile technique used
incision left open, packing placed

## 2019-10-08 NOTE — PROGRESS NOTE ADULT - PROBLEM SELECTOR PLAN 1
Neuro: Pain management. Patient refusing any NSX intervention at this time. TLSO brace when OOB.  Pulm: Encourage coughing, deep breathing and use of incentive spirometry. Supplemental oxygen PRN. Daily CXR. Duonebs PRN.   Cardio: Monitor telemetry/alarms.   GI: Tolerating diet. Continue stool softeners.  Defer inpt MRI or workup for liver lesion as per Dr Abbott>pursue as O/P  Renal: Monitor urine output, supplement electrolytes as needed  Vasc: Lovenox SC/SCDs for DVT prophylaxis.  Heme: Stable H/H. Will need further workup if amendable with MRI and biopsy liver lesion.   ID: C/W Zosyn. ID recommends thru 10/30 for empyema>will need PICC  Therapy: OOB/ambulate. PT re-eval.   Disposition: Previously recommending Home PT needed upon discharge at this time.   Dr Abbott will defer  liver biopsy during hospitalization> will be re addressed as outpt> pt and wife aware and agree Neuro: Pain management. Patient refusing any NSX intervention at this time. TLSO brace when OOB.  Pulm: Encourage coughing, deep breathing and use of incentive spirometry. Supplemental oxygen PRN. Daily CXR. Duonebs PRN.   Cardio: Monitor telemetry/alarms.   GI: Tolerating diet. Continue stool softeners.  Defer inpt MRI or workup for liver lesion as per Dr Abbott>pursue as O/P  Renal: Monitor urine output, supplement electrolytes as needed  Vasc: Lovenox SC/SCDs for DVT prophylaxis.  Heme: Stable H/H. Will need further workup if amendable with MRI and biopsy liver lesion.   ID: C/W Ceftriaxone. ID recommends thru 10/30 for empyema, midline placed  Therapy: OOB/ambulate. PT re-eval.   Disposition: Previously recommending Home PT needed upon discharge at this time.   Dr Abbott will defer  liver biopsy during hospitalization> will be re addressed as outpt> pt and wife aware and agree  For possible discharge WED 10/9  D/W Dr Dominguez

## 2019-10-08 NOTE — PROGRESS NOTE ADULT - SUBJECTIVE AND OBJECTIVE BOX
Subjective:    VITAL SIGNS  Vital Signs Last 24 Hrs  T(C): 36.6 (10-08-19 @ 15:02), Max: 36.8 (10-08-19 @ 10:44)  T(F): 97.8 (10-08-19 @ 15:02), Max: 98.2 (10-08-19 @ 10:44)  HR: 90 (10-08-19 @ 15:19) (73 - 108)  BP: 116/81 (10-08-19 @ 15:02) (115/65 - 128/62)  RR: 18 (10-08-19 @ 15:02) (16 - 18)  SpO2: 96% (10-08-19 @ 15:19) (95% - 100%)  on (O2)              Telemetry/Alarms:    LVEF:     MEDICATIONS  acetaminophen   Tablet .. 650 milliGRAM(s) Oral every 6 hours PRN  ALBUTerol/ipratropium for Nebulization 3 milliLiter(s) Nebulizer every 6 hours  atorvastatin 20 milliGRAM(s) Oral at bedtime  carvedilol 3.125 milliGRAM(s) Oral every 12 hours  cefTRIAXone   IVPB 1000 milliGRAM(s) IV Intermittent every 24 hours  docusate sodium 100 milliGRAM(s) Oral three times a day  enoxaparin Injectable 40 milliGRAM(s) SubCutaneous daily  furosemide    Tablet 40 milliGRAM(s) Oral daily  montelukast 10 milliGRAM(s) Oral daily  pantoprazole    Tablet 40 milliGRAM(s) Oral before breakfast  predniSONE   Tablet 10 milliGRAM(s) Oral daily  saccharomyces boulardii 250 milliGRAM(s) Oral two times a day  senna 2 Tablet(s) Oral at bedtime  sertraline 25 milliGRAM(s) Oral daily  sodium chloride 0.9% lock flush 3 milliLiter(s) IV Push every 8 hours  sodium chloride 0.9% lock flush 3 milliLiter(s) IV Push once  spironolactone 25 milliGRAM(s) Oral daily  traMADol 50 milliGRAM(s) Oral every 6 hours PRN  traMADol 25 milliGRAM(s) Oral every 6 hours PRN      PHYSICAL EXAM  General: well nourished, well developed, no acute distress  Neurology: alert and oriented x 3, nonfocal, no gross deficits  Respiratory: clear to auscultation bilaterally  CV: regular rate and rhythm, normal S1, S2  Abdomen: soft, nontender, nondistended, positive bowel sounds, last bowel movement   Extremities: warm, well perfused. no edema. + DP pulses  Incisions: midline sternal incision, + mepilex, c/d/i. sternum stable.  Chest tubes:   Epicardial Wires:    > EPM (settings) / isolated      10-07 @ 07:01  -  10-08 @ 07:00  --------------------------------------------------------  IN: 766 mL / OUT: 1400 mL / NET: -634 mL    10-08 @ :  -  10-08 @ 19:35  --------------------------------------------------------  IN: 480 mL / OUT: 600 mL / NET: -120 mL        Weights:  Daily     Daily Weight in k.6 (08 Oct 2019 05:22)  Admit Wt: Drug Dosing Weight  Height (cm): 185.42 (03 Oct 2019 12:45)  Weight (kg): 106.6 (03 Oct 2019 12:45)  BMI (kg/m2): 31 (03 Oct 2019 12:45)  BSA (m2): 2.3 (03 Oct 2019 12:45)    All laboratory results, radiology and medications reviewed.    LABS  10-08    146<H>  |  98  |  58.0<H>  ----------------------------<  104  4.4   |  34.0<H>  |  1.16    Ca    9.6      08 Oct 2019 05:45    TPro  7.6  /  Alb  3.7  /  TBili  0.3<L>  /  DBili  x   /  AST  20  /  ALT  10  /  AlkPhos  107  10-08                                 12.4   12.01 )-----------( 228      ( 08 Oct 2019 05:45 )             41.1            Bilirubin Total, Serum: 0.3 mg/dL (10-08 @ 05:45)    CAPILLARY BLOOD GLUCOSE               Today's CXR:    Today's EKG:    PAST MEDICAL & SURGICAL HISTORY:  CHF (congestive heart failure)  CAD (coronary artery disease)  HLD (hyperlipidemia)  HTN (hypertension)  COPD (chronic obstructive pulmonary disease)  Stenosis of lumbosacral spine  H/O back injury  Artificial pacemaker Subjective: "I am doing ok" NAD denies CP, SOB.     VITAL SIGNS  Vital Signs Last 24 Hrs  T(C): 36.6 (10-08-19 @ 15:02), Max: 36.8 (10-08-19 @ 10:44)  T(F): 97.8 (10-08-19 @ 15:02), Max: 98.2 (10-08-19 @ 10:44)  HR: 90 (10-08-19 @ 15:19) (73 - 108)  BP: 116/81 (10-08-19 @ 15:02) (115/65 - 128/62)  RR: 18 (10-08-19 @ 15:02) (16 - 18)  SpO2: 96% (10-08-19 @ 15:19) (95% - 100%)  on RA/O2            Telemetry/Alarms:  /AF? beats  LVEF: 40-45    MEDICATIONS  acetaminophen   Tablet .. 650 milliGRAM(s) Oral every 6 hours PRN  ALBUTerol/ipratropium for Nebulization 3 milliLiter(s) Nebulizer every 6 hours  atorvastatin 20 milliGRAM(s) Oral at bedtime  carvedilol 3.125 milliGRAM(s) Oral every 12 hours  cefTRIAXone   IVPB 1000 milliGRAM(s) IV Intermittent every 24 hours  docusate sodium 100 milliGRAM(s) Oral three times a day  enoxaparin Injectable 40 milliGRAM(s) SubCutaneous daily  furosemide    Tablet 40 milliGRAM(s) Oral daily  montelukast 10 milliGRAM(s) Oral daily  pantoprazole    Tablet 40 milliGRAM(s) Oral before breakfast  predniSONE   Tablet 10 milliGRAM(s) Oral daily  saccharomyces boulardii 250 milliGRAM(s) Oral two times a day  senna 2 Tablet(s) Oral at bedtime  sertraline 25 milliGRAM(s) Oral daily  sodium chloride 0.9% lock flush 3 milliLiter(s) IV Push every 8 hours  sodium chloride 0.9% lock flush 3 milliLiter(s) IV Push once  spironolactone 25 milliGRAM(s) Oral daily  traMADol 50 milliGRAM(s) Oral every 6 hours PRN  traMADol 25 milliGRAM(s) Oral every 6 hours PRN      PHYSICAL EXAM  General: well nourished, well developed, no acute distress  Neurology: alert and oriented x 3, nonfocal, no gross deficits  Respiratory: diminished bilateral bases, worse on right  CV: regular rate and rhythm, normal S1, S2 + systolic murmur  Abdomen: soft, nontender, nondistended, positive bowel sounds   Extremities: warm, well perfused. 1-2+ edema. + DP pulses  Packing removed from right lateral minithoracotomy incision. No bleeding, mixture of purulent tan and bloody drainage on dressing. VAC reapplied to wound          10-07 @ 07:  -  10-08 @ 07:00  --------------------------------------------------------  IN: 766 mL / OUT: 1400 mL / NET: -634 mL    10-08 @ 07:  -  10-08 @ 19:35  --------------------------------------------------------  IN: 480 mL / OUT: 600 mL / NET: -120 mL        Weights:  Daily     Daily Weight in k.6 (08 Oct 2019 05:22)  Admit Wt: Drug Dosing Weight  Height (cm): 185.42 (03 Oct 2019 12:45)  Weight (kg): 106.6 (03 Oct 2019 12:45)  BMI (kg/m2): 31 (03 Oct 2019 12:45)  BSA (m2): 2.3 (03 Oct 2019 12:45)    All laboratory results, radiology and medications reviewed.    LABS  10-08    146<H>  |  98  |  58.0<H>  ----------------------------<  104  4.4   |  34.0<H>  |  1.16    Ca    9.6      08 Oct 2019 05:45    TPro  7.6  /  Alb  3.7  /  TBili  0.3<L>  /  DBili  x   /  AST  20  /  ALT  10  /  AlkPhos  107  10-08                                 12.4   12.01 )-----------( 228      ( 08 Oct 2019 05:45 )             41.1            Bilirubin Total, Serum: 0.3 mg/dL (10-08 @ 05:45)    CAPILLARY BLOOD GLUCOSE               Today's CXR:   < from: Xray Chest 1 View- PORTABLE-Urgent (10.06.19 @ 18:28) >    Single frontal view of the chest demonstrates status post right lower   lobe chest tube. Small right lower lobe effusion/atelectasis, unchanged.   Left-sided dual-chamber pacemaker. The cardiomediastinal silhouette is   enlarged. No acute osseous abnormalities. Overlying EKG leads and wires   are noted    IMPRESSION: Right-sided chest tube removal. Small right lower   lobe effusion/atelectasis.    < end of copied text >      PAST MEDICAL & SURGICAL HISTORY:  CHF (congestive heart failure)  CAD (coronary artery disease)  HLD (hyperlipidemia)  HTN (hypertension)  COPD (chronic obstructive pulmonary disease)  Stenosis of lumbosacral spine  H/O back injury  Artificial pacemaker    CT Surgery    Procedure: Wound VAC change    Patient premedicated for pain. Patient positioned supine in bed. Wound VAC dressing removed, revealing a 5x1x3 cm open wound with 70 % granluation tissue. No bleeding encountered. No tunneling noted.     Wound irrigated with sterile saline and dried with sterile gauze. Chloraprep used on skin surrounding wound. A BLACK SPONGE was carved to the shape and depth of wound and secured to wound with a clear adhesive dressing. Wound VAC connection attached to small opening in top of dressing. Wound VAC suction initiated with good suction, seal and no leak. All edges of the wound are covered. The patient tolerated the procedure well.     Plan for next VAC change in 3 days.

## 2019-10-08 NOTE — PROGRESS NOTE ADULT - ASSESSMENT
78M h/o CHF, CAD, HLD, HTN, COPD on home O2, pna/empyema s/p decortication 4/30/19, returns with a Right chest wall collection and likely recurrent empyema. Patient also p/w back pain with history of benign spinal tumor resected. Incidental T4 lytic lesion with epidural extension and 6cm hypodensity in liver seen on CT scan. MRI spine revealed pathologic comp fracture suspicious of metastasis on T4, T11 and possibly T6. Chest abscess growing Strep milleri. CT A/P revealed 6x6cm necrotic liver mass v. abscess. Patient   s/p R VATS, chest washout, R CT placement and wound VAC 10/3.   10/6 VAC changed > subsequently removed d/t preston bleeding at site> removed, venous bleed lateral portion wound bed> silver nitrate to site Placed DSD after discussion w/ Dr Scar Clark d/c  10/7 Wound examined, packing removed, small amount fresh bleeding noted at corner wound bed Dr Dominguez at bedside, examined Will defer VAC at present Packed w/ gauze Occlusive covering  Continue to hold lovenox 78M h/o CHF, CAD, HLD, HTN, COPD on home O2, pna/empyema s/p decortication 4/30/19, returns with a Right chest wall collection and likely recurrent empyema. Patient also p/w back pain with history of benign spinal tumor resected. Incidental T4 lytic lesion with epidural extension and 6cm hypodensity in liver seen on CT scan. MRI spine revealed pathologic comp fracture suspicious of metastasis on T4, T11 and possibly T6. Chest abscess growing Strep milleri. CT A/P revealed 6x6cm necrotic liver mass v. abscess.     Patient s/p R VATS, chest washout, R CT placement and wound VAC 10/3.     10/6 VAC changed > subsequently removed d/t preston bleeding at site> removed, venous bleed lateral portion wound bed> silver nitrate to site Placed DSD after discussion w/ Dr Scar Clark d/c  10/7 Wound examined, packing removed, small amount fresh bleeding noted at corner wound bed Dr Dominguez at bedside, examined Will defer VAC at present Packed w/ gauze Occlusive covering  Continue to hold lovenox 10/8 bleeding improved, vac replaced, midline placed

## 2019-10-08 NOTE — PROGRESS NOTE ADULT - ASSESSMENT
79y/o  Male h/o CHF, Pacemaker, CAD, COPD stage 4, on home oxygen, s/p total right lung decortication, multiloculated right empyema with Peptostreptococcus Asaccharolyticus s/p Zosyn. Patient was seen as outpatient for ELLIOTT in culture at which time he had no symptoms. Patient was well up until recently when he noticed drainage from right chest tube site 2-3 weeks ago after having been closed for some time. Patient also reports severe back pain over 1-2 weeks for which he got a script for a muscle relaxant. Patient admitted to floor for further workup and evaluation of chest wall infection. Patient was started on IV Vancomycin and Zosyn.      Cellulitis and abscess of old chest tube site  s/p R VATS, chest washout, R CT placement and wound VAC 10/3  Depressed       - Blood cultures no growth   - s/o I&D of abscess 9/27/19, Streptococcus milleri   - s/p R VATS, chest washout, R CT placement and wound VAC 10/3  - CT Chest reviewed  - Continue Ceftriaxone  - Follow up cultures  - Trend Fever  - Trend Leukocytosis  - Will need antibiotics till 10/30/19, Midline in place  - Patient does not want to pursue metastatic work up      Will Follow

## 2019-10-08 NOTE — PROGRESS NOTE ADULT - SUBJECTIVE AND OBJECTIVE BOX
[facility]  INFECTIOUS DISEASES AND INTERNAL MEDICINE at [place]  =======================================================  Onesimo Henderson MD  Diplomates American Board of Internal Medicine and Infectious Diseases  Tel: [phone]      Fax: [phone]  =======================================================    JOHN CIFUENTES [id number]    Follow up: Cellulitis and abscess of old chest tube site  s/p R VATS, chest washout, R CT placement 10/3      no fever or chills  no diarrhea   no abdominal pain       Allergies:  No Known Allergies      Antibiotics:  piperacillin/tazobactam IVPB.. 3.375 Gram(s) IV Intermittent every 8 hours        REVIEW OF SYSTEMS:  CONSTITUTIONAL:  No Fever or chills  HEENT:  No diplopia or blurred vision.  No earache, sore throat or runny nose.  CARDIOVASCULAR:  No pressure, squeezing, strangling, tightness, heaviness or aching about the chest, neck, axilla or epigastrium.  RESPIRATORY:  No cough, shortness of breath  GASTROINTESTINAL:  No nausea, vomiting or diarrhea.  GENITOURINARY:  No dysuria, frequency or urgency.   MUSCULOSKELETAL:  no joint aches, no muscle pain  SKIN:  No change in skin, hair or nails.  NEUROLOGIC:  No Headaches, seizures  PSYCHIATRIC:  No disorder of thought or mood.  ENDOCRINE:  No heat or cold intolerance  HEMATOLOGICAL:  No easy bruising or bleeding.       Physical Exam:  Vital Signs Last 24 Hrs  T(C): 36.8 (08 Oct 2019 10:44), Max: 36.8 (08 Oct 2019 10:44)  T(F): 98.2 (08 Oct 2019 10:44), Max: 98.2 (08 Oct 2019 10:44)  HR: 83 (08 Oct 2019 10:44) (77 - 108)  BP: 115/65 (08 Oct 2019 10:44) (112/72 - 132/64)  RR: 18 (08 Oct 2019 10:44) (16 - 18)  SpO2: 96% (08 Oct 2019 10:44) (95% - 100%)      GEN: NAD, pleasant  HEENT: normocephalic and atraumatic. EOMI. PERRL.  Anicteric  NECK: Supple.   LUNGS: Decreased basal BS B/L.   HEART: Regular rate and rhythm  ABDOMEN: Soft, nontender, and nondistended.  Positive bowel sounds.    : No CVA tenderness  EXTREMITIES: Without any edema.  MSK: No joint swelling  NEUROLOGIC: No Focal Deficits  PSYCHIATRIC: Depressed   SKIN: No Rash      Labs:  10-08    146<H>  |  98  |  58.0<H>  ----------------------------<  104  4.4   |  34.0<H>  |  1.16    Ca    9.6      08 Oct 2019 05:45    TPro  7.6  /  Alb  3.7  /  TBili  0.3<L>  /  DBili  x   /  AST  20  /  ALT  10  /  AlkPhos  107  10-08                          12.4   12.01 )-----------( 228      ( 08 Oct 2019 05:45 )             41.1     LIVER FUNCTIONS - ( 08 Oct 2019 05:45 )  Alb: 3.7 g/dL / Pro: 7.6 g/dL / ALK PHOS: 107 U/L / ALT: 10 U/L / AST: 20 U/L / GGT: x               RECENT CULTURES:  10-03 @ 17:05 .Body Fluid pleural fluid     No growth at 4 days.  Culture in progress  Moderate White blood cells  No organisms seen      09-27 @ 16:01 .Other     No fungus isolated at 1 week.  Culture in progress      09-27 @ 16:00 .Surgical Swab chest wall abscess Streptococcus milleri, viridans group    Numerous Streptococcus milleri, viridans group  Few White blood cells  Few Gram Positive Cocci in Pairs and Chains      09-26 @ 15:40 .Blood     No growth at 5 days.

## 2019-10-09 NOTE — PROGRESS NOTE ADULT - SUBJECTIVE AND OBJECTIVE BOX
Subjective: Patient OOB to chair. Comfortable this AM with no complaints. Tolerating diet and pain controlled. Denies fever, chills, cough. Saturating well on baseline supplemental oxygen.     Vital Signs:  Vital Signs Last 24 Hrs  T(C): 36.3 (10-09-19 @ 10:04), Max: 36.8 (10-08-19 @ 10:44)  T(F): 97.4 (10-09-19 @ 10:04), Max: 98.2 (10-08-19 @ 10:44)  HR: 76 (10-09-19 @ 10:04) (71 - 93)  BP: 94/60 (10-09-19 @ 10:04) (94/60 - 141/73)  RR: 17 (10-09-19 @ 10:04) (17 - 18)  SpO2: 96% (10-09-19 @ 10:04) (95% - 100%) on (O2)    I&O's Detail    08 Oct 2019 07:01  -  09 Oct 2019 07:00  --------------------------------------------------------  IN:    Oral Fluid: 720 mL  Total IN: 720 mL    OUT:    Voided: 1200 mL  Total OUT: 1200 mL    Total NET: -480 mL        Exam  Telemetry/Alarms: No acute events  General: WN/WD NAD  Neurology: Awake, nonfocal, HOWARD x 4  Eyes: Scleras clear, PERRLA/ EOMI, Gross vision intact  ENT: Gross hearing intact, grossly patent pharynx, no stridor  Neck: Neck supple, trachea midline, No JVD  Respiratory: Diminished at right base  CV: S1S2, no murmurs, rubs or gallops  Abdominal: Soft, NT, ND  Extremities: No edema  Skin: No Rashes, Hematoma, Ecchymosis  Psych: Oriented x 3, normal affect  Incisions: C/D/I, VAC in place  Tubes: Removed 10/6    Relevant labs, radiology and Medications reviewed                        12.2   11.90 )-----------( 225      ( 09 Oct 2019 06:12 )             40.5     10-09    143  |  98  |  59.0<H>  ----------------------------<  96  4.3   |  32.0<H>  |  1.20    Ca    9.3      09 Oct 2019 06:12  Mg     2.4     10-09    TPro  7.6  /  Alb  3.7  /  TBili  0.3<L>  /  DBili  x   /  AST  20  /  ALT  10  /  AlkPhos  107  10-08      MEDICATIONS  (STANDING):  ALBUTerol/ipratropium for Nebulization 3 milliLiter(s) Nebulizer every 6 hours  atorvastatin 20 milliGRAM(s) Oral at bedtime  carvedilol 3.125 milliGRAM(s) Oral every 12 hours  cefTRIAXone   IVPB 1000 milliGRAM(s) IV Intermittent every 24 hours  docusate sodium 100 milliGRAM(s) Oral three times a day  enoxaparin Injectable 40 milliGRAM(s) SubCutaneous daily  furosemide    Tablet 40 milliGRAM(s) Oral daily  montelukast 10 milliGRAM(s) Oral daily  pantoprazole    Tablet 40 milliGRAM(s) Oral before breakfast  predniSONE   Tablet 10 milliGRAM(s) Oral daily  saccharomyces boulardii 250 milliGRAM(s) Oral two times a day  senna 2 Tablet(s) Oral at bedtime  sertraline 25 milliGRAM(s) Oral daily  sodium chloride 0.9% lock flush 3 milliLiter(s) IV Push every 8 hours  sodium chloride 0.9% lock flush 3 milliLiter(s) IV Push once  spironolactone 25 milliGRAM(s) Oral daily    MEDICATIONS  (PRN):  acetaminophen   Tablet .. 650 milliGRAM(s) Oral every 6 hours PRN Mild Pain (1 - 3)  traMADol 50 milliGRAM(s) Oral every 6 hours PRN Severe Pain (7 - 10)  traMADol 25 milliGRAM(s) Oral every 6 hours PRN Moderate Pain (4 - 6)        Assessment  78y Male  w/ PAST MEDICAL & SURGICAL HISTORY:  CHF (congestive heart failure)  CAD (coronary artery disease)  HLD (hyperlipidemia)  HTN (hypertension)  COPD (chronic obstructive pulmonary disease)  Stenosis of lumbosacral spine  H/O back injury  Artificial pacemaker  admitted with complaints of Patient is a 78y old  Male who presents with a chief complaint of infected chest tube site (07 Oct 2019 10:57)   patient underwent US guided needle placement  US guided vascular access  Midline catheter insertion  Thoracoscopic insertion of chest tube into right pleural space  Wound VAC placement

## 2019-10-09 NOTE — PROGRESS NOTE ADULT - ASSESSMENT
78M h/o CHF, CAD, HLD, HTN, COPD on home O2, pna/empyema s/p decortication 4/30/19, returns with a Right chest wall collection and likely recurrent empyema. Patient also p/w back pain with history of benign spinal tumor resected. Incidental T4 lytic lesion with epidural extension and 6cm hypodensity in liver seen on CT scan. MRI spine revealed pathologic comp fracture suspicious of metastasis on T4, T11 and possibly T6. Chest abscess growing Strep milleri. CT A/P revealed 6x6cm necrotic liver mass v. abscess.   10/3 Patient s/p R VATS, chest washout, R CT placement and wound VAC.    10/6 VAC changed > subsequently removed d/t preston bleeding at site> removed, venous bleed lateral portion wound bed> silver nitrate to site Placed DSD after discussion w/ Dr Abbott Lovenodamien d/c  10/7 Wound examined, packing removed, small amount fresh bleeding noted at corner wound bed Dr Dominguez at bedside, examined Will defer VAC at present Packed w/ gauze Occlusive covering  Continue to hold lovenox  10/8 bleeding improved, vac replaced, midline placed      PLAN  Neuro: Pain management with tylenol and tramadol. TLSO brace. Patient refusing any NSX surgical intervention for thoracic vertebral compression fractures.   Pulm: Encourage coughing, deep breathing and use of incentive spirometry. Wean to baseline supplemental oxygen as able. Daily CXR.   Cardio: Monitor telemetry/alarms  GI: Tolerating diet. Continue stool softeners. Liver lesion with outpatient follow up.   Renal: Monitor urine output, supplement electrolytes as needed  Vasc: Heparin SC/SCDs for DVT prophylaxis  Heme: Stable H/H.  ID: Ceftriaxone through 10/30 via midline. ID outpatient follow up.   Therapy: OOB/ambulate. Home with assist.   Tubes: Change VAC 3x/week  Disposition: Aim to D/C to home tomorrow with VNS for VAC and IV BAX.     Discussed with Cardiothoracic Team at AM rounds.    Naima HOPPER  Thoracic Surgery

## 2019-10-09 NOTE — PROGRESS NOTE ADULT - SUBJECTIVE AND OBJECTIVE BOX
[facility]  INFECTIOUS DISEASES AND INTERNAL MEDICINE at [place]  =======================================================  Onesimo Henderson MD  Diplomates American Board of Internal Medicine and Infectious Diseases  Tel: [phone]      Fax: [phone]  =======================================================    JOHN CIFUENTES [id number]    Follow up: Cellulitis and abscess of old chest tube site  s/p R VATS, chest washout, R CT placement 10/3      no fever or chills  no diarrhea   no abdominal pain       Allergies:  No Known Allergies      Antibiotics:  cefTRIAXone   IVPB 1000 milliGRAM(s) IV Intermittent every 24 hours        REVIEW OF SYSTEMS:  CONSTITUTIONAL:  No Fever or chills  HEENT:  No diplopia or blurred vision.  No earache, sore throat or runny nose.  CARDIOVASCULAR:  No pressure, squeezing, strangling, tightness, heaviness or aching about the chest, neck, axilla or epigastrium.  RESPIRATORY:  No cough, shortness of breath  GASTROINTESTINAL:  No nausea, vomiting or diarrhea.  GENITOURINARY:  No dysuria, frequency or urgency.   MUSCULOSKELETAL:  no joint aches, no muscle pain  SKIN:  No change in skin, hair or nails.  NEUROLOGIC:  No Headaches, seizures  PSYCHIATRIC:  No disorder of thought or mood.  ENDOCRINE:  No heat or cold intolerance  HEMATOLOGICAL:  No easy bruising or bleeding.       Physical Exam:  Vital Signs Last 24 Hrs  T(C): 36.3 (09 Oct 2019 10:04), Max: 36.8 (08 Oct 2019 20:54)  T(F): 97.4 (09 Oct 2019 10:04), Max: 98.2 (08 Oct 2019 20:54)  HR: 76 (09 Oct 2019 10:04) (71 - 93)  BP: 94/60 (09 Oct 2019 10:04) (94/60 - 141/73)  BP(mean): 93 (08 Oct 2019 15:02) (93 - 93)  RR: 17 (09 Oct 2019 10:04) (17 - 18)  SpO2: 96% (09 Oct 2019 10:04) (95% - 100%)      GEN: NAD, pleasant  HEENT: normocephalic and atraumatic. EOMI. PERRL.  Anicteric  NECK: Supple.   LUNGS: Decreased basal BS B/L.   HEART: Regular rate and rhythm  ABDOMEN: Soft, nontender, and nondistended.  Positive bowel sounds.    : No CVA tenderness  EXTREMITIES: Without any edema.  MSK: No joint swelling  NEUROLOGIC: No Focal Deficits  PSYCHIATRIC: Depressed   SKIN: No Rash      Labs:  10-09    143  |  98  |  59.0<H>  ----------------------------<  96  4.3   |  32.0<H>  |  1.20    Ca    9.3      09 Oct 2019 06:12  Mg     2.4     10-09    TPro  7.6  /  Alb  3.7  /  TBili  0.3<L>  /  DBili  x   /  AST  20  /  ALT  10  /  AlkPhos  107  10-08                          12.2   11.90 )-----------( 225      ( 09 Oct 2019 06:12 )             40.5       LIVER FUNCTIONS - ( 08 Oct 2019 05:45 )  Alb: 3.7 g/dL / Pro: 7.6 g/dL / ALK PHOS: 107 U/L / ALT: 10 U/L / AST: 20 U/L / GGT: x               RECENT CULTURES:  10-03 @ 17:05 .Body Fluid pleural fluid     No growth at 5 days.  Moderate White blood cells  No organisms seen      09-27 @ 16:01 .Other     No fungus isolated at 1 week.  Culture in progress        09-27 @ 16:00 .Surgical Swab chest wall abscess Streptococcus milleri, viridans group    Numerous Streptococcus milleri, viridans group    Few White blood cells  Few Gram Positive Cocci in Pairs and Chains      09-26 @ 15:40 .Blood     No growth at 5 days.

## 2019-10-09 NOTE — PROGRESS NOTE ADULT - ASSESSMENT
77y/o  Male h/o CHF, Pacemaker, CAD, COPD stage 4, on home oxygen, s/p total right lung decortication, multiloculated right empyema with Peptostreptococcus Asaccharolyticus s/p Zosyn. Patient was seen as outpatient for ELLIOTT in culture at which time he had no symptoms. Patient was well up until recently when he noticed drainage from right chest tube site 2-3 weeks ago after having been closed for some time. Patient also reports severe back pain over 1-2 weeks for which he got a script for a muscle relaxant. Patient admitted to floor for further workup and evaluation of chest wall infection. Patient was started on IV Vancomycin and Zosyn.      Cellulitis and abscess of old chest tube site  s/p R VATS, chest washout, R CT placement and wound VAC 10/3  Depressed       - Blood cultures no growth   - s/o I&D of abscess 9/27/19, Streptococcus milleri   - s/p R VATS, chest washout, R CT placement and wound VAC 10/3  - CT Chest reviewed  - Continue Ceftriaxone  - Follow up cultures  - Trend Fever  - Trend Leukocytosis  - Will need antibiotics till 10/30/19, Midline in place  - Patient does not want to pursue metastatic work up      Will Follow

## 2019-10-10 NOTE — PROGRESS NOTE ADULT - NSHPATTENDINGPLANDISCUSS_GEN_ALL_CORE
CT Surgery
Patient

## 2019-10-10 NOTE — PROGRESS NOTE ADULT - ASSESSMENT
78M h/o CHF, CAD, HLD, HTN, COPD on home O2, pna/empyema s/p decortication 4/30/19, returns with a Right chest wall collection and likely recurrent empyema. Patient also p/w back pain with history of benign spinal tumor resected. Incidental T4 lytic lesion with epidural extension and 6cm hypodensity in liver seen on CT scan. MRI spine revealed pathologic comp fracture suspicious of metastasis on T4, T11 and possibly T6. Chest abscess growing Strep milleri. CT A/P revealed 6x6cm necrotic liver mass v. abscess.   10/3 Patient s/p R VATS, chest washout, R CT placement and wound VAC.    10/6 VAC changed > subsequently removed d/t preston bleeding at site> removed, venous bleed lateral portion wound bed> silver nitrate to site Placed DSD after discussion w/ Dr Sacr Rossnodamien d/c  10/7 Wound examined, packing removed, small amount fresh bleeding noted at corner wound bed Dr Dominguez at bedside, examined Will defer VAC at present Packed w/ gauze Occlusive covering  Continue to hold lovenox  10/8 bleeding improved, vac replaced, midline placed      PLAN  Neuro: Pain management with tylenol and tramadol. TLSO brace. Patient refusing any NSX surgical intervention for thoracic vertebral compression fractures.   Pulm: Encourage coughing, deep breathing and use of incentive spirometry. Wean to baseline supplemental oxygen as able. Daily CXR.   Cardio: Monitor telemetry/alarms  GI: Tolerating diet. Continue stool softeners. Liver lesion with outpatient work up.   Renal: Monitor urine output, supplement electrolytes as needed  Vasc: Heparin SC/SCDs for DVT prophylaxis  Heme: Stable H/H.  ID: Ceftriaxone through 10/30 via midline. ID outpatient follow up.   Therapy: OOB/ambulate. Home with assist.   Tubes: Change VAC 3x/week. Awaiting portable VAC delivery and will coordinate with VNS when next change will be. WIll change prior to discharge either today v tomorrow.   Disposition: Aim to D/C to home tomorrow 10/11 with VNS for VAC and IV ABX.     Discussed with Cardiothoracic Team at AM rounds.    Naima HOPPER  Thoracic Surgery

## 2019-10-10 NOTE — PROGRESS NOTE ADULT - SUBJECTIVE AND OBJECTIVE BOX
[facility]  INFECTIOUS DISEASES AND INTERNAL MEDICINE at [place]  =======================================================  Onesimo Henderson MD  Diplomates American Board of Internal Medicine and Infectious Diseases  Tel: [phone]      Fax: [phone]  =======================================================    JOHN CIFUENTES [id number]    Follow up: Cellulitis and abscess of old chest tube site  s/p R VATS, chest washout, R CT placement 10/3      no fever or chills  no diarrhea   no abdominal pain       Allergies:  No Known Allergies      Antibiotics:  cefTRIAXone   IVPB 1000 milliGRAM(s) IV Intermittent every 24 hours        REVIEW OF SYSTEMS:  CONSTITUTIONAL:  No Fever or chills  HEENT:  No diplopia or blurred vision.  No earache, sore throat or runny nose.  CARDIOVASCULAR:  No pressure, squeezing, strangling, tightness, heaviness or aching about the chest, neck, axilla or epigastrium.  RESPIRATORY:  No cough, shortness of breath  GASTROINTESTINAL:  No nausea, vomiting or diarrhea.  GENITOURINARY:  No dysuria, frequency or urgency.   MUSCULOSKELETAL:  no joint aches, no muscle pain  SKIN:  No change in skin, hair or nails.  NEUROLOGIC:  No Headaches, seizures  PSYCHIATRIC:  No disorder of thought or mood.  ENDOCRINE:  No heat or cold intolerance  HEMATOLOGICAL:  No easy bruising or bleeding.       Physical Exam:  Vital Signs Last 24 Hrs  T(C): 36.8 (10 Oct 2019 10:02), Max: 36.8 (09 Oct 2019 15:00)  T(F): 98.3 (10 Oct 2019 10:02), Max: 98.3 (10 Oct 2019 10:02)  HR: 98 (10 Oct 2019 10:02) (75 - 110)  BP: 116/57 (10 Oct 2019 10:02) (106/61 - 160/64)  RR: 17 (10 Oct 2019 10:02) (16 - 18)  SpO2: 96% (10 Oct 2019 10:02) (95% - 100%)      GEN: NAD, pleasant  HEENT: normocephalic and atraumatic. EOMI. PERRL.  Anicteric  NECK: Supple.   LUNGS: Decreased basal BS B/L.   HEART: Regular rate and rhythm  ABDOMEN: Soft, nontender, and nondistended.  Positive bowel sounds.    : No CVA tenderness  EXTREMITIES: Without any edema.  MSK: No joint swelling  NEUROLOGIC: No Focal Deficits  PSYCHIATRIC: Depressed   SKIN: No Rash      Labs:  10-10    145  |  99  |  58.0<H>  ----------------------------<  96  4.6   |  34.0<H>  |  1.40<H>    Ca    9.3      10 Oct 2019 06:17  Mg     2.4     10-09                 11.6   11.14 )-----------( 213      ( 10 Oct 2019 06:17 )             38.9       RECENT CULTURES:  10-04 @ 16:21 Pleural Fl pleural fluid     Culture is being performed.      10-03 @ 17:05 .Body Fluid pleural fluid     No growth at 5 days.  Moderate White blood cells  No organisms seen      09-27 @ 16:01 .Other     No fungus isolated at 1 week.  Culture in progress      09-27 @ 16:00 .Surgical Swab chest wall abscess Streptococcus milleri, viridans group    Numerous Streptococcus milleri, viridans group  Few White blood cells  Few Gram Positive Cocci in Pairs and Chains      09-26 @ 15:40 .Blood     No growth at 5 days.

## 2019-10-10 NOTE — PROGRESS NOTE ADULT - SUBJECTIVE AND OBJECTIVE BOX
Subjective: Patient comfortable OOB to chair with no complaints. Denies SOB, CP, fever, chills, cough.     Vital Signs:  Vital Signs Last 24 Hrs  T(C): 36.6 (10-10-19 @ 06:03), Max: 36.8 (10-09-19 @ 15:00)  T(F): 97.9 (10-10-19 @ 06:03), Max: 98.2 (10-09-19 @ 15:00)  HR: 83 (10-10-19 @ 06:03) (76 - 95)  BP: 142/57 (10-10-19 @ 06:03) (94/60 - 160/64)  RR: 16 (10-10-19 @ 06:03) (16 - 18)  SpO2: 100% (10-10-19 @ 06:03) (95% - 100%) on (O2)    I&O's Detail    09 Oct 2019 07:01  -  10 Oct 2019 07:00  --------------------------------------------------------  IN:    Oral Fluid: 600 mL  Total IN: 600 mL    OUT:    Voided: 1850 mL  Total OUT: 1850 mL    Total NET: -1250 mL          Telemetry/Alarms: Few PVCS, paced   General: WN/WD NAD  Neurology: Awake, nonfocal, HOWARD x 4  Eyes: Scleras clear, PERRLA/ EOMI, Gross vision intact  ENT: Gross hearing intact, grossly patent pharynx, no stridor  Neck: Neck supple, trachea midline, No JVD  Respiratory: Diminished BS at right base  CV: S1S2, no murmurs, rubs or gallops  Abdominal: Soft, NT, ND  Extremities: No edema  Skin: No Rashes, Hematoma, Ecchymosis  Psych: Oriented x 3, normal affect  Incisions: C/D/I, VAC in place      Relevant labs, radiology and Medications reviewed                        11.6 11.14 )-----------( 213      ( 10 Oct 2019 06:17 )             38.9     10-10    145  |  99  |  58.0<H>  ----------------------------<  96  4.6   |  34.0<H>  |  1.40<H>    Ca    9.3      10 Oct 2019 06:17  Mg     2.4     10-09        MEDICATIONS  (STANDING):  ALBUTerol/ipratropium for Nebulization 3 milliLiter(s) Nebulizer every 6 hours  atorvastatin 20 milliGRAM(s) Oral at bedtime  carvedilol 3.125 milliGRAM(s) Oral every 12 hours  cefTRIAXone   IVPB 1000 milliGRAM(s) IV Intermittent every 24 hours  docusate sodium 100 milliGRAM(s) Oral three times a day  enoxaparin Injectable 40 milliGRAM(s) SubCutaneous daily  furosemide    Tablet 40 milliGRAM(s) Oral daily  montelukast 10 milliGRAM(s) Oral daily  pantoprazole    Tablet 40 milliGRAM(s) Oral before breakfast  predniSONE   Tablet 10 milliGRAM(s) Oral daily  saccharomyces boulardii 250 milliGRAM(s) Oral two times a day  senna 2 Tablet(s) Oral at bedtime  sertraline 25 milliGRAM(s) Oral daily  sodium chloride 0.9% lock flush 3 milliLiter(s) IV Push every 8 hours  sodium chloride 0.9% lock flush 3 milliLiter(s) IV Push once  spironolactone 25 milliGRAM(s) Oral daily    MEDICATIONS  (PRN):  acetaminophen   Tablet .. 650 milliGRAM(s) Oral every 6 hours PRN Mild Pain (1 - 3)  traMADol 50 milliGRAM(s) Oral every 6 hours PRN Severe Pain (7 - 10)  traMADol 25 milliGRAM(s) Oral every 6 hours PRN Moderate Pain (4 - 6)        Assessment  78y Male  w/ PAST MEDICAL & SURGICAL HISTORY:  CHF (congestive heart failure)  CAD (coronary artery disease)  HLD (hyperlipidemia)  HTN (hypertension)  COPD (chronic obstructive pulmonary disease)  Stenosis of lumbosacral spine  H/O back injury  Artificial pacemaker  admitted with complaints of Patient is a 78y old  Male who presents with a chief complaint of infected chest tube site (09 Oct 2019 10:15)  patient underwent US guided needle placement  US guided vascular access  Midline catheter insertion  Thoracoscopic insertion of chest tube into right pleural space  Wound VAC placement

## 2019-10-10 NOTE — DISCHARGE NOTE PROVIDER - PROVIDER TOKENS
PROVIDER:[TOKEN:[[id number]]],PROVIDER:[TOKEN:[[id number]]],PROVIDER:[TOKEN:[[id number]]]

## 2019-10-10 NOTE — DISCHARGE NOTE PROVIDER - NSDCFUADDAPPT_GEN_ALL_CORE_FT
Please call for a follow up appointment with your primary care medical doctor upon discharge regarding your recent surgery and hospitalization. Review liver lesion from CT scan on this admission, and recommend MRI abdomen and possible biopsy as further workup if you later wish.     You may follow up with Neurosurgeon regarding your thoracic spine lesions if you wish. Contact info below.     Please follow up with Dr. Hall within 2 weeks.     Please follow up with Dr. Estrella within 2 weeks, call [phone] for an appointment.

## 2019-10-10 NOTE — DISCHARGE NOTE PROVIDER - CARE PROVIDER_API CALL
Lance Arias)  Neurosurgery  [facility] [address]  [place] [address]  Phone: [phone]  Fax: [phone]  Follow Up Time:     Mamadou Estrella)  Infectious Disease; Internal Medicine  [address], [address]  [place] [address]  Phone: [phone]  Fax: [phone]  Follow Up Time:     Gretel Hall)  Thoracic and Cardiac Surgery  [address] [place] [address]  Phone: [phone]  Fax: [phone]  Follow Up Time:

## 2019-10-10 NOTE — DISCHARGE NOTE PROVIDER - NSDCACTIVITY_GEN_ALL_CORE
Walking - Outdoors allowed/Showering allowed/Walking - Indoors allowed/Stairs allowed/No heavy lifting/straining

## 2019-10-10 NOTE — DISCHARGE NOTE PROVIDER - NSDCFUSCHEDAPPT_GEN_ALL_CORE_FT
JOHN CIFUENTES ; 12/17/2019 ; [facility]PP Pul[facility] [address] [facility] JOHN CIFUENTES ; 12/17/2019 ; NPP Pulm[facility] [address] [facility]

## 2019-10-10 NOTE — DISCHARGE NOTE PROVIDER - NSDCFUADDINST_GEN_ALL_CORE_FT
Please do not drive until your pain is well controlled, and you do not need to take pain medications. These medications may make you constipated. If so, please take over the counter stool softeners for symptoms.   NOTIFY YOUR SURGEON IF: You have any bleeding that does not stop, any pus draining from your wound, any fever (over 100.4 F) or chills, shortness of breath, chest pain, or if your pain is not controlled on your discharge pain medications.

## 2019-10-10 NOTE — DISCHARGE NOTE PROVIDER - NSDCCPCAREPLAN_GEN_ALL_CORE_FT
PRINCIPAL DISCHARGE DIAGNOSIS  Diagnosis: Chest wall abscess  Assessment and Plan of Treatment: Chest wall abscess. Outpatient postoperative follow up.      SECONDARY DISCHARGE DIAGNOSES  Diagnosis: Fracture of thoracic spine  Assessment and Plan of Treatment: Follow up with Dr. Arias is amendable to further treatment and or work up.    Diagnosis: Liver lesion  Assessment and Plan of Treatment: Liver lesion. Follow up with your PMD for MRI of abdomen to further investigate lesion if amendable.

## 2019-10-10 NOTE — CHART NOTE - NSCHARTNOTEFT_GEN_A_CORE
Patient's right chest wall wound clean with granulation tissue.   Margins appear clean without signs of infection.   Recommending wound VAC dressing to expedite healing.     Naima HOPPER  Thoracic Surgery Patient's right chest wall wound clean with granulation tissue.   Wound margins are clear without signs of infection.   Recommending wound VAC dressing to expedite healing.     Naima HOPPER  Thoracic Surgery

## 2019-10-10 NOTE — DISCHARGE NOTE PROVIDER - INSTRUCTIONS
Resume usual diet. VAC dressing changes 3x/week. You may shower. Pat Dry incisions. Do not submerge your wounds in water (in tub/pool). Avoid getting your wound with VAC wet.   Activity as tolerated. Increase ambulation daily. Incentive spirometry daily 10x/hour.  Home oxygen as needed.  Home PT.  IV ABX once per day via midline through 10/30. Resume usual diet. VAC dressing changes 3x/week. You may shower. Pat Dry incisions. Do not submerge your wounds in water (in tub/pool). Avoid getting your wound with VAC wet.   Activity as tolerated. Increase ambulation daily. Incentive spirometry daily 10x/hour.  Home oxygen as needed.  Home PT.  IV ABX once per day via midline through 10/30.  Weekly CBC and CMP faxed to [phone].

## 2019-10-10 NOTE — DISCHARGE NOTE PROVIDER - CARE PROVIDERS DIRECT ADDRESSES
[email or web address],[email or web address],DirectAddress_Unknown

## 2019-10-10 NOTE — DISCHARGE NOTE PROVIDER - NSDCCPTREATMENT_GEN_ALL_CORE_FT
PRINCIPAL PROCEDURE  Procedure: Thoracoscopic insertion of chest tube into right pleural space  Findings and Treatment: right sided pleural washout      SECONDARY PROCEDURE  Procedure: Midline catheter insertion  Findings and Treatment: 4FR 14CM  30CIRC BARD POWER MIDLINE ns flush good heme back left basilic vein

## 2019-10-10 NOTE — DISCHARGE NOTE PROVIDER - HOSPITAL COURSE
78M h/o pneumonia, and empyema drained and decort done April 2019. Patient also with h/o COPD and req 2 L NC at home. Patient reports drainage from right chest tube site 2-3 weeks ago "opened up again" after having been closed for some time. Patient also reports severe back pain over 1-2 weeks for which he got a script for a muscle relaxant. He followed up in the office today with Dr Abbott for chest tube drainage, which has been improved, and swelling/redness of site, which has been worsening and is now signficiant. Patient directly admitted to floor for further workup and evaluation of chest wall infection.        CT Chest revealed: New 6.2 x 4.2 cm right lower lateral chest wall subcutaneous fluid collection adjacent to the right 8th rib. Small loculated right pleural effusion is slightly increased. Underlying right pleural thickening. Atelectasis of the basilar right lower lobe is is slightly increased. Narrowing of the right lower lobe bronchus and obliteration of posterior-lateral basilar segments of right lower lobe bronchus possibly secondary to retained secretions. Emphysema. 6 cm indistinct hypodensity within the posterior right lobe of the liver. Recommend further evaluation with MRI or ultrasound. Destructive lytic lesion within T4 vertebral body with extension into the spinal canal likely metastatic.         MRI spine performed for T4 lytic lesion revealed Pathologic compression fracture of T4 with epidural extension of disease causing mild cord compression. No cord signal abnormality. 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 Possible lesion at T6.         CT A/P performed to r/o causes of metastatic appearing lesions, revealed Status post drainage of right subcutaneous lower thoracic fluid collection since 9/26/2019. There appears to be communication with the     pleural space. Right pleural effusion significantly smaller since 9/26/2019. Small amount of air within the right pleural space as well. Low dense right hepatic lobe lesion. Necrotic solid mass versus abscess. Evidence of prior vascular surgery involving the right common iliac and external iliac vein.        9/27 I&D revealed Strep milleri. Broad antibiotics changed to ceftriaxone once cultures resulted.          Patient underwent Right chest wall washout performed 10/3. The patient tolerated the procedure well. There were no complications. The patient was extubated in the OR.  The patient was transferred to the PACU in stable condition.  Pathology revealed Pleura, right lung, peel to be chronic inflammation, granulation tissue, old hemorrhage. Negative for malignancy.        VAC was removed POD 3 for bleeding which resolved with silver nitrate cautery. VAC replaced 10/8 (POD 5). CT removed POD 10/6. Patient continued on IV ABX as per ID. VNS in place for ceftriaxone q 24H via midline. Midline placed 10/8.         Patient refused further workup or operative intervention of thoracic spine and liver lesions noted on scans above. 10/1 TLSO brace fitted. PT eval recommended home PT. Last VAC change performed day of discharge.         At the time of discharge, the patient was hemodynamically stable, was tolerating PO diet, was voiding urine and passing stool, was ambulating, and was comfortable with adequate pain control. The patient was instructed to follow up with SUSAN Stewart and his PMD within 1-2 weeks after discharge from the hospital. The patient and family felt comfortable with discharge. The patient was discharged to home. The patient had no other issues.

## 2019-10-11 NOTE — DISCHARGE NOTE NURSING/CASE MANAGEMENT/SOCIAL WORK - NSDCFUADDAPPT_GEN_ALL_CORE_FT
Please call for a follow up appointment with your primary care medical doctor upon discharge regarding your recent surgery and hospitalization. Review liver lesion from CT scan on this admission, and recommend MRI abdomen and possible biopsy as further workup if you later wish.     You may follow up with Neurosurgeon regarding your thoracic spine lesions if you wish. Contact info below.     Please follow up with Dr. Hall within 2 weeks.     Please follow up with Dr. Estrella within 2 weeks, call [phone] for an appointment.

## 2019-10-11 NOTE — PROGRESS NOTE ADULT - REASON FOR ADMISSION
infected chest tube site

## 2019-10-11 NOTE — PROGRESS NOTE ADULT - PROBLEM SELECTOR PROBLEM 1
Chest wall abscess
Lytic lesion of bone on x-ray
Chest wall abscess
Chest wall abscess

## 2019-10-11 NOTE — DISCHARGE NOTE NURSING/CASE MANAGEMENT/SOCIAL WORK - PATIENT PORTAL LINK FT
You can access the FollowMyHealth Patient Portal offered by [facility] by registering at the following website: [email or web address]. By joining [employer]’s FollowMyHealth portal, you will also be able to view your health information using other applications (apps) compatible with our system.

## 2019-10-11 NOTE — PROGRESS NOTE ADULT - PROVIDER SPECIALTY LIST ADULT
Anesthesia
CT Surgery
Infectious Disease
Neurosurgery
Orthopedics
Thoracic Surgery

## 2019-10-11 NOTE — PROGRESS NOTE ADULT - SUBJECTIVE AND OBJECTIVE BOX
Subjective: Patient comfortable OOB to chair with no complaints. Denies SOB, CP, cough, fever, chills.     Vital Signs:  Vital Signs Last 24 Hrs  T(C): 36.4 (10-11-19 @ 05:29), Max: 36.8 (10-10-19 @ 10:02)  T(F): 97.6 (10-11-19 @ 05:29), Max: 98.3 (10-10-19 @ 10:02)  HR: 94 (10-11-19 @ 08:40) (75 - 110)  BP: 142/77 (10-11-19 @ 05:29) (116/57 - 142/77)  RR: 20 (10-11-19 @ 05:29) (17 - 20)  SpO2: 96% (10-11-19 @ 08:40) (95% - 100%) on (O2)    I&O's Detail    10 Oct 2019 07:01  -  11 Oct 2019 07:00  --------------------------------------------------------  IN:    Oral Fluid: 480 mL    Solution: 100 mL  Total IN: 580 mL    OUT:    Voided: 2350 mL  Total OUT: 2350 mL    Total NET: -1770 mL          Telemetry/Alarms: SR with few paced beats  General: WN/WD NAD  Neurology: Awake, nonfocal, HOWARD x 4  Eyes: Scleras clear, PERRLA/ EOMI, Gross vision intact  ENT: Gross hearing intact, grossly patent pharynx, no stridor  Neck: Neck supple, trachea midline, No JVD  Respiratory: CTA B/L, No wheezing, rales, rhonchi  CV: RRR, S1S2, no murmurs, rubs or gallops  Abdominal: Soft, NT, ND  Extremities: No edema  Skin: No Rashes, Hematoma, Ecchymosis  Psych: Oriented x 3, normal affect  Incisions: C/D/I, VAC in place    Relevant labs, radiology and Medications reviewed                        11.6   11.14 )-----------( 213      ( 10 Oct 2019 06:17 )             38.9     10-11    143  |  99  |  54.0<H>  ----------------------------<  98  4.9   |  33.0<H>  |  1.24    Ca    9.3      11 Oct 2019 06:10        MEDICATIONS  (STANDING):  ALBUTerol/ipratropium for Nebulization 3 milliLiter(s) Nebulizer every 6 hours  atorvastatin 20 milliGRAM(s) Oral at bedtime  carvedilol 3.125 milliGRAM(s) Oral every 12 hours  cefTRIAXone   IVPB 1000 milliGRAM(s) IV Intermittent every 24 hours  docusate sodium 100 milliGRAM(s) Oral three times a day  enoxaparin Injectable 40 milliGRAM(s) SubCutaneous daily  montelukast 10 milliGRAM(s) Oral daily  pantoprazole    Tablet 40 milliGRAM(s) Oral before breakfast  predniSONE   Tablet 10 milliGRAM(s) Oral daily  saccharomyces boulardii 250 milliGRAM(s) Oral two times a day  senna 2 Tablet(s) Oral at bedtime  sertraline 25 milliGRAM(s) Oral daily  sodium chloride 0.9% lock flush 3 milliLiter(s) IV Push every 8 hours  sodium chloride 0.9% lock flush 3 milliLiter(s) IV Push once  spironolactone 25 milliGRAM(s) Oral daily    MEDICATIONS  (PRN):  acetaminophen   Tablet .. 650 milliGRAM(s) Oral every 6 hours PRN Mild Pain (1 - 3)  oxyCODONE    IR 2.5 milliGRAM(s) Oral every 4 hours PRN Moderate Pain (4 - 6)  oxyCODONE    IR 5 milliGRAM(s) Oral every 4 hours PRN Severe Pain (7 - 10)        Assessment  78y Male  w/ PAST MEDICAL & SURGICAL HISTORY:  CHF (congestive heart failure)  CAD (coronary artery disease)  HLD (hyperlipidemia)  HTN (hypertension)  COPD (chronic obstructive pulmonary disease)  Stenosis of lumbosacral spine  H/O back injury  Artificial pacemaker  admitted with complaints of Patient is a 78y old  Male who presents with a chief complaint of infected chest tube site (10 Oct 2019 16:39)  patient underwent US guided needle placement  US guided vascular access  Wound VAC placement  Midline catheter insertion  Thoracoscopic insertion of chest tube into right pleural space Subjective: Patient comfortable OOB to chair with no complaints. Denies SOB, CP, cough, fever, chills.     Vital Signs:  Vital Signs Last 24 Hrs  T(C): 36.4 (10-11-19 @ 05:29), Max: 36.8 (10-10-19 @ 10:02)  T(F): 97.6 (10-11-19 @ 05:29), Max: 98.3 (10-10-19 @ 10:02)  HR: 94 (10-11-19 @ 08:40) (75 - 110)  BP: 142/77 (10-11-19 @ 05:29) (116/57 - 142/77)  RR: 20 (10-11-19 @ 05:29) (17 - 20)  SpO2: 96% (10-11-19 @ 08:40) (95% - 100%) on (O2)    I&O's Detail    10 Oct 2019 07:01  -  11 Oct 2019 07:00  --------------------------------------------------------  IN:    Oral Fluid: 480 mL    Solution: 100 mL  Total IN: 580 mL    OUT:    Voided: 2350 mL  Total OUT: 2350 mL    Total NET: -1770 mL          Telemetry/Alarms: SR with few paced beats  General: WN/WD NAD  Neurology: Awake, nonfocal, HOWARD x 4  Eyes: Scleras clear, PERRLA/ EOMI, Gross vision intact  ENT: Gross hearing intact, grossly patent pharynx, no stridor  Neck: Neck supple, trachea midline, No JVD  Respiratory: Diminished at Right base  CV: RRR, S1S2, no murmurs, rubs or gallops  Abdominal: Soft, NT, ND  Extremities: No edema  Skin: No Rashes, Hematoma, Ecchymosis  Psych: Oriented x 3, normal affect  Incisions: C/D/I, VAC in place    Relevant labs, radiology and Medications reviewed                        11.6 11.14 )-----------( 213      ( 10 Oct 2019 06:17 )             38.9     10-11    143  |  99  |  54.0<H>  ----------------------------<  98  4.9   |  33.0<H>  |  1.24    Ca    9.3      11 Oct 2019 06:10        MEDICATIONS  (STANDING):  ALBUTerol/ipratropium for Nebulization 3 milliLiter(s) Nebulizer every 6 hours  atorvastatin 20 milliGRAM(s) Oral at bedtime  carvedilol 3.125 milliGRAM(s) Oral every 12 hours  cefTRIAXone   IVPB 1000 milliGRAM(s) IV Intermittent every 24 hours  docusate sodium 100 milliGRAM(s) Oral three times a day  enoxaparin Injectable 40 milliGRAM(s) SubCutaneous daily  montelukast 10 milliGRAM(s) Oral daily  pantoprazole    Tablet 40 milliGRAM(s) Oral before breakfast  predniSONE   Tablet 10 milliGRAM(s) Oral daily  saccharomyces boulardii 250 milliGRAM(s) Oral two times a day  senna 2 Tablet(s) Oral at bedtime  sertraline 25 milliGRAM(s) Oral daily  sodium chloride 0.9% lock flush 3 milliLiter(s) IV Push every 8 hours  sodium chloride 0.9% lock flush 3 milliLiter(s) IV Push once  spironolactone 25 milliGRAM(s) Oral daily    MEDICATIONS  (PRN):  acetaminophen   Tablet .. 650 milliGRAM(s) Oral every 6 hours PRN Mild Pain (1 - 3)  oxyCODONE    IR 2.5 milliGRAM(s) Oral every 4 hours PRN Moderate Pain (4 - 6)  oxyCODONE    IR 5 milliGRAM(s) Oral every 4 hours PRN Severe Pain (7 - 10)        Assessment  78y Male  w/ PAST MEDICAL & SURGICAL HISTORY:  CHF (congestive heart failure)  CAD (coronary artery disease)  HLD (hyperlipidemia)  HTN (hypertension)  COPD (chronic obstructive pulmonary disease)  Stenosis of lumbosacral spine  H/O back injury  Artificial pacemaker  admitted with complaints of Patient is a 78y old  Male who presents with a chief complaint of infected chest tube site (10 Oct 2019 16:39)  patient underwent US guided needle placement  US guided vascular access  Wound VAC placement  Midline catheter insertion  Thoracoscopic insertion of chest tube into right pleural space

## 2019-10-11 NOTE — PROGRESS NOTE ADULT - ASSESSMENT
78M h/o CHF, CAD, HLD, HTN, COPD on home O2, pna/empyema s/p decortication 4/30/19, returns with a Right chest wall collection and likely recurrent empyema. Patient also p/w back pain with history of benign spinal tumor resected. Incidental T4 lytic lesion with epidural extension and 6cm hypodensity in liver seen on CT scan. MRI spine revealed pathologic comp fracture suspicious of metastasis on T4, T11 and possibly T6. Chest abscess growing Strep milleri. CT A/P revealed 6x6cm necrotic liver mass v. abscess.   10/3 Patient s/p R VATS, chest washout, R CT placement and wound VAC.    10/6 VAC changed > subsequently removed d/t preston bleeding at site> removed, venous bleed lateral portion wound bed> silver nitrate to site Placed DSD after discussion w/ Dr Scar Clark d/c  10/7 Wound examined, packing removed, small amount fresh bleeding noted at corner wound bed Dr Dominguez at bedside, examined Will defer VAC at present Packed w/ gauze Occlusive covering  Continue to hold lovenox  10/8 bleeding improved, vac replaced, midline placed.   10/11 VAC change prior to discharge home.       PLAN  Neuro: Pain management with tylenol and oxy. TLSO brace. Patient refusing any NSX surgical intervention for thoracic vertebral compression fractures.   Pulm: Encourage coughing, deep breathing and use of incentive spirometry. Wean to baseline supplemental oxygen as able. Daily CXR.   Cardio: Monitor telemetry/alarms  GI: Tolerating diet. Continue stool softeners. Liver lesion with outpatient work up.   Renal: Monitor urine output, supplement electrolytes as needed  Vasc: Heparin SC/SCDs for DVT prophylaxis  Heme: Stable H/H.  ID: Ceftriaxone through 10/30 via midline. ID outpatient follow up.   Therapy: OOB/ambulate. Home with assist.   Tubes: Change VAC 3x/week. Will change VAC dressing after patient showers this AM, prior to discharge. VNS due to change Monday as per CM.    Disposition: Aim to D/C to home 10/11 with VNS for VAC and IV ABX.     Discussed with Cardiothoracic Team at AM rounds.    Naima HOPPER  Thoracic Surgery   #[phone]

## 2019-10-28 NOTE — PHYSICAL EXAM
[Heart Rate And Rhythm] : heart rate was normal and rhythm regular [Heart Sounds] : normal S1 and S2 [Heart Sounds Gallop] : no gallops [Murmurs] : no murmurs [Heart Sounds Pericardial Friction Rub] : no pericardial rub [FreeTextEntry1] : His VAC dressing is in place [No Focal Deficits] : no focal deficits [Oriented To Time, Place, And Person] : oriented to person, place, and time [Impaired Insight] : insight and judgment were intact [Affect] : the affect was normal

## 2019-10-28 NOTE — H&P ADULT - RS GEN PE MLT RESP DETAILS PC
diminished breath sounds, R/airway patent/no rales/respirations non-labored/no wheezes/no intercostal retractions/no chest wall tenderness

## 2019-10-28 NOTE — H&P ADULT - NSHPLABSRESULTS_GEN_ALL_CORE
EXAM:  XR CHEST PORTABLE ROUTINE 1V                          PROCEDURE DATE:  10/09/2019      COMPARISON: 10/6/2019 available for review.    FINDINGS: There is no significant change.  The lungs  show residual RIGHT lower lobe mild pleural effusion and/or   airspace consolidation. RIGHT upper lobe and LEFT lung parenchyma clear.    The heart and mediastinum are within normal limits.  Cardiac device wire leads are within right atrium and right ventricle.     Visualized osseous structures are intact.        IMPRESSION:   No interval change..     EXAM:  MR SPINE THORACIC WAW IC             FINDINGS:  Enhancing lesion is demonstrated T4 vertebral body predominantly on in   the right side with epidural extension, compatible with osseous   metastasis. Disease process extends into the right T4 pedicle. There is a   mild to moderate pathologic compression fracture deformity at this level.   Mild spinal canal stenosis. Mild cord compression. No cord signal   abnormality.     Additional abnormal areas of enhancement are demonstrated in in the T11   vertebral body compatible with additional osseous metastasis.     There is a probable hemangioma in the T6 vertebral body measuring   subcentimeter. Differential diagnosis may include metastasis of this   lesion as well.     The patient is status post laminectomies at T9, T10, T11 levels. Small   extradural fluid collection at laminectomy site on image 8, series 5   measures 4.9 x 1.1 x 1.0 cm in its greatest CC, AP, transverse   dimensions. Cannot exclude a pseudomeningocele. Correlate clinically.    Abnormal signal in the left T9 transverse process on image 29, series 8.   Small right lower lobe loculated effusion. Cannot exclude empyema.   Consider contrast enhanced CT of the chest. . Pacemaker leads are noted.    2 cm right renal cortical T2 hyperintensity. This may represent a cyst.   Consider ultrasound. Moderate COPD changes in the visualized lung fields.      IMPRESSION: Pathologic compression fracture of T4 with epidural extension   of disease causing mild cord compression. No cord signal abnormality.   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

## 2019-10-28 NOTE — H&P ADULT - HISTORY OF PRESENT ILLNESS
78M h/o pneumonia, and empyema drained and decort done earlier this year. Patient also with h/o COPD and req 2 L NC at home. Patient reports drainage from right chest tube site 2-3 weeks ago "opened up again" after having been closed for some time. Patient also reports severe back pain over 1-2 weeks for which he got a script for a muscle relaxant. He followed up in the office today with Dr Abbott for chest tube drainage, which has been improved, and swelling/redness of site, which has been worsening and is now signficiant. Patient directly admitted to floor for further workup and evaluation of chest wall infection. 78M h/o CHF, CAD, HTN, HLD, COPD on 2L home O2, pna, empysema s/p decortication 4/30/19,    returns with a Right chest wall collection and likely recurrent empyema. Patient also p/w back pain with history of benign spinal tumor resected. Incidental T4 lytic lesion with epidural extension and 6cm hypodensity in liver seen on CT scan. MRI spine revealed pathologic comp fracture suspicious of metastasis on T4, T11 and possibly T6. Chest abscess growing Strep milleri. CT A/P revealed 6x6cm necrotic liver mass v. abscess.   10/3 Patient s/p R VATS, chest washout, R CT placement and wound VAC.    10/6 VAC changed > subsequently removed d/t preston bleeding at site> removed, venous bleed lateral portion wound bed> silver nitrate to site Placed DSD after discussion w/ Dr Abbott Lovenox d/c  10/7 Wound examined, packing removed, small amount fresh bleeding noted at corner wound bed Dr Dominguez at bedside, examined Will defer VAC at present Packed w/ gauze Occlusive covering  Continue to hold lovenox  10/8 bleeding improved, vac replaced, midline placed.   10/11 VAC change prior to discharge home.       h/o pneumonia, and empyema drained and decort done earlier this year. Patient also with h/o COPD and req 2 L NC at home. Patient reports drainage from right chest tube site 2-3 weeks ago "opened up again" after having been closed for some time. Patient also reports severe back pain over 1-2 weeks for which he got a script for a muscle relaxant. He followed up in the office today with Dr Abbott for chest tube drainage, which has been improved, and swelling/redness of site, which has been worsening and is now signficiant. Patient directly admitted to floor for further workup and evaluation of chest wall infection. 78M h/o CHF, CAD, HTN, HLD, COPD on 2L home O2, pna, empysema s/p decortication 4/30/19, chronic back pain with history s/p benign tumor resection and pathological fracture of T4 presented to  Dr. Dominguez's office today with progressive worsening of constant, sharp 10/10 back pain between the scapula radiating to right chest wall for the past 3 to 4 days. Patient had 3-4/10 back pain for past 3 to 4 weeks, was taking Tramadol and Oxycodone with relief. Lately pain got worsened and limit him from performing his ADLs and stated  having no relief of pain with pain medicine, position change or heat compression.  Patient also had history of 10/10/19 patient had an incidental T4 lytic lesion with epidural extension and 6cm hypodensity in liver and pathologic comp fracture suspicious of metastasis on T4, T11 and possibly T6. Chest abscess growing Strep milleri. CT A/P revealed 6x6cm necrotic liver mass v. abscess. On 10/3 patient had R VATS, chest washout, R CT placement and wound VAC and D/C home on 10/11/19. 78M h/o CHF, CAD, HTN, HLD, COPD on 2L home O2, pna, empysema s/p decortication 4/30/19, chronic back pain with history s/p benign tumor resection and pathological fracture of T4 presented to  Dr. Dominguez's office today with progressive worsening of constant, sharp 10/10 back pain between the scapula radiating to right chest wall for the past 3 to 4 days. Patient had 3-4/10 back pain for past 3 to 4 weeks, was taking Tramadol and Oxycodone with relief. Lately pain got worsened and limited him from performing his ADLs and stated  having no relief of pain with pain medicine, position change or heat compression. Patient also stated having intermittent shock like radiation to kristian. lower extremities. Patient denied any loss of sensation, incontinence of bowel or bladder, fever, chills, nausea, vomiting, diarrhea, long distance travel or sick contact.    Patient had history of an incidental T4 lytic lesion with epidural extension and 6cm hypodensity in liver and pathologic comp fracture suspicious of metastasis on T4, T11 and possibly T6. Chest abscess growing Strep milleri. CT A/P revealed 6x6cm necrotic liver mass v. abscess. On 10/3 patient had R VATS, chest washout, R CT placement and wound VAC and D/C home on 10/11/19.

## 2019-10-28 NOTE — H&P ADULT - PROBLEM SELECTOR PLAN 2
Neuro surgery consult  CT spine stat  Cont. Pain medication Neuro surgery consult  CT spine stat  Cont. Pain medication  Discussed with dr. Dominguez Neuro surgery consult Dr. Arias was called, he is out of office for the week, spoke to Neurosurgery NP Gwen, CT spine is ordered, she will assess the patient after the CT scan and will document the recommendation.  Cont. Pain medication  Discussed with dr. Dominguez

## 2019-10-28 NOTE — H&P ADULT - NSHPSOCIALHISTORY_GEN_ALL_CORE
, lives with his wife, ex. tobacco smoker 60 pack per year quit 20 years ago, social alcohol use, denies illicit drug use.

## 2019-10-28 NOTE — H&P ADULT - SKIN COMMENTS
Brittle Prednisolone skin, easy bruising,  small open clean and dry skin tear right upper extremity.

## 2019-10-28 NOTE — REVIEW OF SYSTEMS
[Feeling Poorly] : feeling poorly [Feeling Tired] : feeling tired [SOB on Exertion] : shortness of breath during exertion

## 2019-10-28 NOTE — H&P ADULT - PROBLEM SELECTOR PLAN 1
Continue Valsartan  BNP stat   Monitor intake output Continue Coreg, Losartan, spirolactone  BNP stat   Monitor intake output

## 2019-10-28 NOTE — HISTORY OF PRESENT ILLNESS
[FreeTextEntry1] : \demarcus Laws was in the office today for a followup visit at his request. He was discharged from the hospital 2 weeks ago with a small VAC dressing in place over a previous surgical wound. He was also found at that time to have a pathological fracture at T4 and is complaining of worsening back pain.

## 2019-10-28 NOTE — CHART NOTE - NSCHARTNOTEFT_GEN_A_CORE
Neurosurgery  NP on CT surgery requesting for the patient to be evaluated for Back pain    78M h/o CHF, CAD, HTN, HLD, COPD on 2L home O2, pna, empysema s/p decortication 4/30/19, chronic back pain with history s/p benign tumor resection and pathological fracture of T4 presented to  Dr. Dominguez's office today with progressive worsening of constant, sharp 10/10 back pain between the scapula radiating to right chest wall for the past 3 to 4 days. Patient had 3-4/10 back pain for past 3 to 4 weeks, was taking Tramadol and Oxycodone with relief. Lately pain got worsened and limit him from performing his ADLs and stated  having no relief of pain with pain medicine, position change or heat compression.  Patient also had history of 10/10/19 patient had an incidental T4 lytic lesion with epidural extension and 6cm hypodensity in liver and pathologic comp fracture suspicious of metastasis on T4, T11 and possibly T6. Chest abscess growing Strep milleri. CT A/P revealed 6x6cm necrotic liver mass v. abscess. On 10/3 patient had R VATS, chest washout, R CT placement and wound VAC and D/C home on 10/11/19.    .Past Medical, Past Surgical, and Family History:  PAST MEDICAL HISTORY:  CAD (coronary artery disease)     CHF (congestive heart failure)     COPD (chronic obstructive pulmonary disease)     H/O back injury     HLD (hyperlipidemia)     HTN (hypertension)     Stenosis of lumbosacral spine.    Pt was seen and pt was sitting in chair without brace.  Pt stated that he was unable to speak due to being on the phone  He was sitting in chair moving lower extrem.      T4 path fx, T11, and poss T6  IMP/ Plan  1. ct of the thoracic spine without contrast.   2. Pt should be wearing brace while upright.   3. pt should be consider for pain consult. Neurosurgery  NP on CT surgery requesting for the patient to be evaluated for Back pain    78M h/o CHF, CAD, HTN, HLD, COPD on 2L home O2, pna, empysema s/p decortication 4/30/19, chronic back pain with history s/p benign tumor resection and pathological fracture of T4 presented to  Dr. Dominguez's office today with progressive worsening of constant, sharp 10/10 back pain between the scapula radiating to right chest wall for the past 3 to 4 days. Patient had 3-4/10 back pain for past 3 to 4 weeks, was taking Tramadol and Oxycodone with relief. Lately pain got worsened and limit him from performing his ADLs and stated  having no relief of pain with pain medicine, position change or heat compression.  Patient also had history of 10/10/19 patient had an incidental T4 lytic lesion with epidural extension and 6cm hypodensity in liver and pathologic comp fracture suspicious of metastasis on T4, T11 and possibly T6. Chest abscess growing Strep milleri. CT A/P revealed 6x6cm necrotic liver mass v. abscess. On 10/3 patient had R VATS, chest washout, R CT placement and wound VAC and D/C home on 10/11/19.    .Past Medical, Past Surgical, and Family History:  PAST MEDICAL HISTORY:  CAD (coronary artery disease)     CHF (congestive heart failure)     COPD (chronic obstructive pulmonary disease)     H/O back injury     HLD (hyperlipidemia)     HTN (hypertension)     Stenosis of lumbosacral spine.    Pt was seen and pt was sitting in chair without brace.  Pt stated that he was unable to speak due to being on the phone  He was sitting in chair moving lower extrem.      T4 path fx, T11, and poss T6  IMP/ Plan  1. ct of the thoracic spine without contrast.   2. Pt should be wearing brace while upright.   3. pt should be consider for pain consult.    NSGY Attg:    see above    patient seen by pa staff; exam refused    imaging reviewed -- progression of thoracic VB lesion with increased extension to left pedicle; official report pending    The patient is known to be from the time of his previous hospitalization.  I was called to evaluated the thoracic spine lesion that was noted on the CT of the chest.  At that time, the patient also underwent an MRI of the thoracic spine without and without contrast.  At that time, I explained the imaging findings to the patient.  The discussion included that the lesion likely represented a malignant focus unrelated to his prior surgery for schwannoma.  The patient was not amenable to consideration of either biopsy of the vertebral body lesion or potential neurosurgical intervention (decompression and stabilization).  I therefore recommended consideration of biopsy of the patient's liver lesion (if deemed appropriate for tissue sampling) as well as TLSO brace, oncology consultation for review of path and treatment options, radiation oncology consultation, and palliative care consultation.  The patient is now readmitted to Dr. Dominguez after complaining of worsening back pain during an outpatient CT surgery visit earlier today.  I discussed the case with Dr. Dominguez.  Given the patient's multiple/extensive medical co-morbities as well as his age, he would likely be a poor/high risk candidate for surgical decompression and stabilization of his thoracic spine lesion.  Additionally, the patient has adamantly refused consideration of intervention on multiple occasions during the last admission as noted above and documented in my previous progress notes.  If the patient is amenable to consideration of additional work-up and treatment of his thoracic spine lesion (with likely ddx of metastatic lesion with unknown primary at this time, myeloma, lymphoma) would recommend repeat CT chest/abd/pelvis w and wo contast as well as repeat MRI of the of the thoracic spine with and without contrast    A/P:  -  will need to re-discuss with patient regarding options for work-up/diagnosis and goals of care  - if patient amenable. consider repeat CT C/A/P with contrast  - consider repeat MRI of thoracic spine with contrast  - consider palliative care consultation   - additional recs pending above and clinical course

## 2019-10-28 NOTE — H&P ADULT - ASSESSMENT
78M h/o CHF, CAD, HTN, HLD, COPD on 2L home O2, pna, empysema s/p decortication 4/30/19, chronic back pain with history s/p benign tumor resection and pathological fracture of T4 presented to  Dr. Dominguez's office today with progressive worsening of constant, sharp 10/10 back pain between the scapula radiating to right chest wall for the past 3 to 4 days. Patient had 3-4/10 back pain for past 3 to 4 weeks, was taking Tramadol and Oxycodone with relief. Lately pain got worsened to an extend that limit his ADLs and stated  having no relief with pain medicine, position change or heat compression. Patient also stated having intermittent shock like radiation to kristian. lower extremities.   [facility] T4 lytic lesion with epidural extension and 6cm hypodensity in liver and pathologic comp fracture suspicious of metastasis on T4, T11 and possibly T6. Chest abscess growing Strep milleri. CT A/P revealed 6x6cm necrotic liver mass v. abscess. On 10/3 patient had R VATS, chest washout, R CT placement and wound VAC and D/C home on 10/11/19. 78M h/o CHF, CAD, HTN, HLD, COPD on 2L home O2, pna, empysema s/p decortication 4/30/19, chronic back pain with history s/p benign tumor resection and pathological fracture of T4 presented to  Dr. Dominguez's office today with progressive worsening of constant, sharp 10/10 back pain between the scapula radiating to right chest wall for the past 3 to 4 days. Patient had 3-4/10 back pain for past 3 to 4 weeks, was taking Tramadol and Oxycodone with relief. Lately pain got worsened to an extend that limit his ADLs and stated  having no relief with pain medicine, position change or heat compression. Patient also stated having intermittent shock like radiation to kristian. lower extremities.     [facility] T4 lytic lesion with epidural extension and 6cm hypodensity in liver and pathologic comp fracture suspicious of metastasis on T4, T11 and possibly T6. Chest abscess growing Strep milleri. CT A/P revealed 6x6cm necrotic liver mass v. abscess. On 10/3 patient had R VATS, chest washout, R CT placement and wound VAC and D/C home on 10/11/19.

## 2019-10-28 NOTE — H&P ADULT - NEGATIVE GENERAL GENITOURINARY SYMPTOMS
no hematuria/no flank pain L/no renal colic/no gas in urine/no flank pain R/no incontinence/no urine discoloration/no bladder infections/no dysuria

## 2019-10-28 NOTE — ASSESSMENT
[FreeTextEntry1] : Veto is an 78-year-old male with multiple medical issues and a chronic infection in the chest and presents now with worsening back pain with a known pathological fracture. Based on the above I recommended he be admitted for further spine evaluation to ensure it is not unstable. Once admitted we will be removing the back wall together as the wound is small and closing well.\par \par Thank you for allowing me to participate in the care of your patient\par \par Cam Dominguez MD\[facility] Department of Cardiovascular and Thoracic Surgery\par \demarcus Romero and Rhonda Burr\[facility] [employer] [facility]\par

## 2019-10-29 NOTE — CONSULT NOTE ADULT - ATTENDING COMMENTS
NSGY Attg:    see above    see my addendum to 10/28 pm even note    patient seen and examined by PA staff    agree with exam as above    case d/w Dr. Dominguez and Dr. King  per Dr. Dominguez, patient now amenable to biopsy and radiation evaluation  recommend TLSO when OOB  additional recs pending path and clinical course

## 2019-10-29 NOTE — CONSULT NOTE ADULT - SUBJECTIVE AND OBJECTIVE BOX
CC: Patient is a 78y old  Male who presents with a chief complaint of upper back pain.   SOURCE: Patient himself, competent   HPI:  78M h/o CHF, CAD, HTN, HLD, COPD on 2L home O2, pna, empyema s/p decortication 4/30/19, chronic back pain with history s/p benign tumor resection and pathological fracture of T4 presented to  Dr. Dominguez's office today with progressive worsening of constant, sharp 10/10 back pain between the scapula radiating to right chest wall does not radiate to lower extremities for the past 3 to 4 days. Patient had 3-4/10 back pain for past 3 to 4 weeks, was taking Tramadol and Oxycodone with relief. Lately pain  worsened and limited him from performing his ADLs and stated  having no relief of pain with pain medicine, position change or heat compression. Patient also stated having intermittent shock like radiation to kristian. lower extremities. Patient denied any loss of sensation, incontinence of bowel or bladder, fever, chills, nausea, vomiting, diarrhea, long distance travel or sick contact. No change sensory,     Patient had history of an incidental T4 lytic lesion with epidural extension and 6cm hypodensity in liver and pathologic comp fracture suspicious of metastasis on T4, T11 and possibly T6. Chest abscess growing Strep milleri. CT A/P revealed 6x6cm necrotic liver mass v. abscess. On 10/3 patient had R VATS, chest washout, R CT placement and wound VAC and D/C home on 10/11/19. (28 Oct 2019 13:41)      PAST MEDICAL:      CHF (congestive heart failure)  CAD (coronary artery disease)  HLD (hyperlipidemia)  HTN (hypertension)  COPD (chronic obstructive pulmonary disease)  Stenosis of lumbosacral spine  empyema  H/O back injury    SURGICAL HISTORY:  Artificial pacemaker  decortication 4/30/19,    SOCIAL HISTORY:  - EtOH, +  [id number]'s 40 yrs, quit 20 yrs ago,  - drugs    FAMILY HISTORY:  No pertinent family history in first degree relatives      ROS:  CONSTITUTIONAL: No fever, weight loss, or fatigue  EYES: No eye pain, visual disturbances, or discharge  ENMT:  No difficulty hearing, tinnitus, vertigo; No sinus or throat pain  NECK: No pain or stiffness  RESPIRATORY: No cough, wheezing, chills or hemoptysis; + shortness of breath wears nc o2  CARDIOVASCULAR: No chest pain, palpitations, dizziness, + bilateral  leg swelling  GASTROINTESTINAL: No abdominal or epigastric pain. No nausea, vomiting, or hematemesis; No diarrhea or constipation. No melena or hematochezia.  GENITOURINARY: No dysuria, frequency, hematuria, or incontinence  NEUROLOGICAL: No headaches, memory loss, loss of strength, numbness, or tremors  SKIN: No itching, burning, rashes, or lesions   LYMPH NODES: No enlarged glands  ENDOCRINE: No heat or cold intolerance; No hair loss  MUSCULOSKELETAL: No joint pain or swelling; No muscle, +chronic upper back pain no  extremity pain  PSYCHIATRIC: No depression, anxiety, mood swings, or difficulty sleeping  HEME/LYMPH: No easy bruising, or bleeding gums  ALLERGY  AND IMMUNOLOGIC: No hives or eczema      MEDICATIONS  (STANDING):  ALBUTerol    90 MICROgram(s) HFA Inhaler 2 Puff(s) Inhalation every 6 hours  aspirin enteric coated 81 milliGRAM(s) Oral daily  atorvastatin 20 milliGRAM(s) Oral at bedtime  carvedilol 3.125 milliGRAM(s) Oral every 12 hours  losartan 100 milliGRAM(s) Oral daily  montelukast 10 milliGRAM(s) Oral daily  pantoprazole    Tablet 40 milliGRAM(s) Oral before breakfast  predniSONE   Tablet 10 milliGRAM(s) Oral daily  sertraline 25 milliGRAM(s) Oral daily  sodium chloride 0.9% lock flush 3 milliLiter(s) IV Push every 8 hours  spironolactone 25 milliGRAM(s) Oral daily  tiotropium 18 MICROgram(s) Capsule 1 Capsule(s) Inhalation daily    MEDICATIONS  (PRN):  acetaminophen   Tablet .. 650 milliGRAM(s) Oral every 6 hours PRN Mild Pain (1 - 3)  traMADol 25 milliGRAM(s) Oral every 6 hours PRN pain >4 on pain scale      Allergies: No Known Allergies      Vital Signs Last 24 Hrs  T(C): 36.7 (29 Oct 2019 05:03), Max: 36.7 (28 Oct 2019 16:17)  T(F): 98.1 (29 Oct 2019 05:03), Max: 98.1 (28 Oct 2019 16:17)  HR: 75 (29 Oct 2019 05:03) (68 - 95)  BP: 131/83 (29 Oct 2019 05:03) (113/75 - 131/83)  BP(mean): --  RR: 99 (29 Oct 2019 05:03) (18 - 99)  SpO2: 98% (28 Oct 2019 21:06) (98% - 100%)    PHYSICAL EXAM:  GENERAL: NAD, well-groomed, well-developed, NC O2 in place  HEAD:  Atraumatic, Normocephalic  EYES: EOMI, PERRLA, conjunctiva and sclera clear  ENMT: Moist mucous membranes, Good dentition  NECK: Supple, No JVD  NERVOUS SYSTEM:  Alert & Oriented X3, Good concentration;   per, eomi, cranial nerves 2-12 intact,   Motor Strength 5/5 B/L upper and lower extremities;  sensory intact all  SPINE: No, C/T/L/S spinal tenderness  CHEST/LUNG:  Bilateral expiratory  rhonchi, no wheezing, left subclavian pacemaker  HEART: Regular rate   ABDOMEN: Soft, Nontender, Nondistended; Bowel sounds present  EXTREMITIES:  2+ Peripheral Pulses, No clubbing, cyanosis, +  4+  edema bilateral lower legs and feet.  LYMPH: No lymphadenopathy noted  SKIN: No rashes or lesions      RADIOLOGY & ADDITIONAL STUDIES:  CT T  SPINE:   Vertebrae: Lytic lesion involving majority of the T4 vertebral body   extending   into the bilateral pedicles, right transverse process and proximal right   fourth   rib.. Lesion extends into the right paravertebral space and spinal canal.   T9-T11 laminectomies. . Adjacent involvement of the right inferior T3   vertebral   body. No acute fracture. Alignment is maintained. This   Discs/Spinal canal/Neural foramina: Significant degenerative changes of the   thoracic spine.   Soft tissues: Unremarkable.   Lungs: Mild emphysematous changes. 10 mm right lower lobe pulmonary nodule.   Pleural space: Trace right pleural effusion/atelectasis.   IMPRESSION:   Lytic lesion involving majority of the T4 vertebral body extending into the   bilateral pedicles, right transverse process and proximal right fourth rib..   Lesion extends into the right paravertebral space and spinal canal. Adjacent   involvement of the right inferior T3 vertebral body.                           11.8   6.82  )-----------( 142      ( 28 Oct 2019 19:41 )             39.1     10-28    141  |  100  |  27.0<H>  ----------------------------<  104  4.4   |  30.0<H>  |  0.91    Ca    9.2      28 Oct 2019 19:41  Phos  2.9     10-28  Mg     1.9     10-28    TPro  6.7  /  Alb  3.7  /  TBili  0.3<L>  /  DBili  x   /  AST  26  /  ALT  12  /  AlkPhos  100  10-28    PT/INR - ( 28 Oct 2019 19:41 )   PT: 12.8 sec;   INR: 1.11 ratio         PTT - ( 28 Oct 2019 19:41 )  PTT:29.2 sec      RADIOLOGY & ADDITIONAL STUDIES:      IMP:  PLAN: DISCUSSED WITH CC: Patient is a 78y old  Male who presents with a chief complaint of upper back pain.   SOURCE: Patient himself, competent   HPI:  78M h/o CHF, CAD, HTN, HLD, COPD on 2L home O2, pna, empyema s/p decortication 4/30/19, chronic back pain with history s/p benign tumor resection and pathological fracture of T4 presented to  Dr. Dominguez's office today with progressive worsening of constant, sharp 10/10 back pain between the scapula radiating to right chest wall does not radiate to lower extremities for the past 3 to 4 days. Patient had 3-4/10 back pain for past 3 to 4 weeks, was taking Tramadol and Oxycodone with relief. Lately pain  worsened and limited him from performing his ADLs and stated  having no relief of pain with pain medicine, position change or heat compression. Patient also stated having intermittent shock like radiation to kristian. lower extremities. Patient denied any loss of sensation, incontinence of bowel or bladder, fever, chills, nausea, vomiting, diarrhea, long distance travel or sick contact. No change sensory,     Patient had history of an incidental T4 lytic lesion with epidural extension and 6cm hypodensity in liver and pathologic comp fracture suspicious of metastasis on T4, T11 and possibly T6. Chest abscess growing Strep milleri. CT A/P revealed 6x6cm necrotic liver mass v. abscess. On 10/3 patient had R VATS, chest washout, R CT placement and wound VAC and D/C home on 10/11/19. (28 Oct 2019 13:41)      PAST MEDICAL:      CHF (congestive heart failure)  CAD (coronary artery disease)  HLD (hyperlipidemia)  HTN (hypertension)  COPD (chronic obstructive pulmonary disease)  Stenosis of lumbosacral spine  empyema  H/O back injury    SURGICAL HISTORY:  Artificial pacemaker  decortication 4/30/19,    SOCIAL HISTORY:  - EtOH, +  [id number]'s 40 yrs, quit 20 yrs ago,  - drugs    FAMILY HISTORY:  No pertinent family history in first degree relatives      ROS:  CONSTITUTIONAL: No fever, weight loss, or fatigue  EYES: No eye pain, visual disturbances, or discharge  ENMT:  No difficulty hearing, tinnitus, vertigo; No sinus or throat pain  NECK: No pain or stiffness  RESPIRATORY: No cough, wheezing, chills or hemoptysis; + shortness of breath wears nc o2  CARDIOVASCULAR: No chest pain, palpitations, dizziness, + bilateral  leg swelling  GASTROINTESTINAL: No abdominal or epigastric pain. No nausea, vomiting, or hematemesis; No diarrhea or constipation. No melena or hematochezia.  GENITOURINARY: No dysuria, frequency, hematuria, or incontinence  NEUROLOGICAL: No headaches, memory loss, loss of strength, numbness, or tremors  SKIN: No itching, burning, rashes, or lesions   LYMPH NODES: No enlarged glands  ENDOCRINE: No heat or cold intolerance; No hair loss  MUSCULOSKELETAL: No joint pain or swelling; No muscle, +chronic upper back pain no  extremity pain  PSYCHIATRIC: No depression, anxiety, mood swings, or difficulty sleeping  HEME/LYMPH: No easy bruising, or bleeding gums  ALLERGY  AND IMMUNOLOGIC: No hives or eczema      MEDICATIONS  (STANDING):  ALBUTerol    90 MICROgram(s) HFA Inhaler 2 Puff(s) Inhalation every 6 hours  aspirin enteric coated 81 milliGRAM(s) Oral daily  atorvastatin 20 milliGRAM(s) Oral at bedtime  carvedilol 3.125 milliGRAM(s) Oral every 12 hours  losartan 100 milliGRAM(s) Oral daily  montelukast 10 milliGRAM(s) Oral daily  pantoprazole    Tablet 40 milliGRAM(s) Oral before breakfast  predniSONE   Tablet 10 milliGRAM(s) Oral daily  sertraline 25 milliGRAM(s) Oral daily  sodium chloride 0.9% lock flush 3 milliLiter(s) IV Push every 8 hours  spironolactone 25 milliGRAM(s) Oral daily  tiotropium 18 MICROgram(s) Capsule 1 Capsule(s) Inhalation daily    MEDICATIONS  (PRN):  acetaminophen   Tablet .. 650 milliGRAM(s) Oral every 6 hours PRN Mild Pain (1 - 3)  traMADol 25 milliGRAM(s) Oral every 6 hours PRN pain >4 on pain scale      Allergies: No Known Allergies      Vital Signs Last 24 Hrs  T(C): 36.7 (29 Oct 2019 05:03), Max: 36.7 (28 Oct 2019 16:17)  T(F): 98.1 (29 Oct 2019 05:03), Max: 98.1 (28 Oct 2019 16:17)  HR: 75 (29 Oct 2019 05:03) (68 - 95)  BP: 131/83 (29 Oct 2019 05:03) (113/75 - 131/83)  BP(mean): --  RR: 99 (29 Oct 2019 05:03) (18 - 99)  SpO2: 98% (28 Oct 2019 21:06) (98% - 100%)    PHYSICAL EXAM:  GENERAL: NAD, well-groomed, well-developed, NC O2 in place  HEAD:  Atraumatic, Normocephalic  EYES: EOMI, PERRLA, conjunctiva and sclera clear  ENMT: Moist mucous membranes, Good dentition  NECK: Supple, No JVD  NERVOUS SYSTEM:  Alert & Oriented X3, Good concentration;   per, eomi, cranial nerves 2-12 intact,   Motor Strength 5/5 B/L upper and lower extremities;  sensory intact all  SPINE: No, C/T/L/S spinal tenderness  CHEST/LUNG:  Bilateral expiratory  rhonchi, no wheezing, left subclavian pacemaker, dry dressing right thorax, post axillary line, t11-12 level.   HEART: Regular rate   ABDOMEN: Soft, Nontender, Nondistended; Bowel sounds present  EXTREMITIES:  2+ Peripheral Pulses, No clubbing, cyanosis, +  4+  edema bilateral lower legs and feet.  LYMPH: No lymphadenopathy noted  SKIN: No rashes or lesions      RADIOLOGY & ADDITIONAL STUDIES:  10/28 CT T  SPINE:   Vertebrae: Lytic lesion involving majority of the T4 vertebral body   extending   into the bilateral pedicles, right transverse process and proximal right   fourth   rib.. Lesion extends into the right paravertebral space and spinal canal.   T9-T11 laminectomies. . Adjacent involvement of the right inferior T3   vertebral   body. No acute fracture. Alignment is maintained. This   Discs/Spinal canal/Neural foramina: Significant degenerative changes of the   thoracic spine.   Soft tissues: Unremarkable.   Lungs: Mild emphysematous changes. 10 mm right lower lobe pulmonary nodule.   Pleural space: Trace right pleural effusion/atelectasis.   IMPRESSION:   Lytic lesion involving majority of the T4 vertebral body extending into the   bilateral pedicles, right transverse process and proximal right fourth rib..   Lesion extends into the right paravertebral space and spinal canal. Adjacent   involvement of the right inferior T3 vertebral body.   Enhancing lesion is demonstrated T4 vertebral body predominantly on in the   right side with epidural extension, compatible with osseous metastasis.   Disease process extends into the right T4 pedicle. There is a mild to   moderate pathologic compression fracture deformity at this level. Mild   spinal canal stenosis. Mild cord compression. No cord signal abnormality.     9/30/19 MRI T SPINE NON CONTRAST:   Enhancing lesion is demonstrated T4 vertebral body predominantly on in the   right side with epidural extension, compatible with osseous metastasis.   Disease process extends into the right T4 pedicle. There is a mild to   moderate pathologic compression fracture deformity at this level. Mild   spinal canal stenosis. Mild cord compression. No cord signal abnormality.  Additional abnormal areas of enhancement are demonstrated in in the T11   vertebral body compatible with additional osseous metastasis.   There is a probable hemangioma in the T6 vertebral body measuring   subcentimeter. Differential diagnosis may include metastasis of this lesion   as well.   The patient is status post laminectomies at T9, T10, T11 levels. Small   extradural fluid collection at laminectomy site on image 8, series 5   measures 4.9 x 1.1 x 1.0 cm in its greatest CC, AP, transverse dimensions.   Cannot exclude a pseudomeningocele. Correlate clinically.   Abnormal signal in the left T9 transverse process on image 29, series 8.   Small right lower lobe loculated effusion. Cannot exclude empyema. Consider   contrast enhanced CT of the chest. . Pacemaker leads are noted.   2 cm right renal cortical T2 hyperintensity. This may represent a cyst.   Consider ultrasound. Moderate COPD changes in the visualized lung fields.   IMPRESSION: Pathologic compression fracture of T4 with epidural extension of   disease causing mild cord compression. No cord signal abnormality.   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                           11.8   6.82  )-----------( 142      ( 28 Oct 2019 19:41 )             39.1     10-28    141  |  100  |  27.0<H>  ----------------------------<  104  4.4   |  30.0<H>  |  0.91    Ca    9.2      28 Oct 2019 19:41  Phos  2.9     10-28  Mg     1.9     10-28    TPro  6.7  /  Alb  3.7  /  TBili  0.3<L>  /  DBili  x   /  AST  26  /  ALT  12  /  AlkPhos  100  10-28    PT/INR - ( 28 Oct 2019 19:41 )   PT: 12.8 sec;   INR: 1.11 ratio         PTT - ( 28 Oct 2019 19:41 )  PTT:29.2 sec

## 2019-10-29 NOTE — CONSULT NOTE ADULT - ASSESSMENT
IMP:  Patient known to Dr Arias.  Neurosurgical consult last month fims were done. Patient was firm that he did not want any neurosurgery.     Patient now presents, post chest tube removal and wound vac removal with worsening back pain.  He still states he is not in position to consider surgery at this time, may in future but needs to recover from pulmonary issue that is more acute.  He wants pain management, wants to know status of spine issues.    CT T SPINE  with Lytic lesion involving majority of the T4 vertebral body extending into the   bilateral pedicles, right transverse process and proximal right fourth rib..   Lesion extends into the right paravertebral space and spinal canal. Adjacent   involvement of the right inferior T3 vertebral body.     On exam he is neuro intact all, no tenderness to palpation of spine but complains pain severe at T4 level. No change to motor/sensory.    Plan:  Discuss findings on CT T spine  Pain management, out patient f/u prn    Patient must wear his brace when out of bed. IMP:  Patient known to Dr Arias.  Neurosurgical consult last month CT, MRI non contrast of t spine were done. Patient was firm that he did not want any neurosurgery at that time.      Patient now presents, post chest tube removal and wound vac removal with worsening back pain.  He still states he is not in position to consider surgery at this time, may in future but needs to recover from pulmonary issue that is more acute.  He wants pain management, wants to know status of spine issues.     10/28  CT T SPINE  with Lytic lesion involving majority of the T4 vertebral body extending into the   bilateral pedicles, right transverse process and proximal right fourth rib..   Lesion extends into the right paravertebral space and spinal canal. Adjacent   involvement of the right inferior T3 vertebral body.   9/30   MRI T spine non contrast with Pathologic compression fracture of T4 with epidural extension of   disease causing mild cord compression. No cord signal abnormality.   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   On exam he is neuro intact all, no tenderness to palpation of spine but complains pain severe at T4 level. No change to motor/sensory.    Plan:  Discuss findings on CT T spine  Pain management, out patient f/u prn    Patient must wear his brace when out of bed.

## 2019-10-29 NOTE — PROGRESS NOTE ADULT - SUBJECTIVE AND OBJECTIVE BOX
Subjective: Patient c/o back pain. OOB to chair this AM. Saturating well on supplemental oxygen. Denies fever and chills.     Vital Signs:  Vital Signs Last 24 Hrs  T(C): 36.6 (10-29-19 @ 11:00), Max: 36.7 (10-28-19 @ 16:17)  T(F): 97.9 (10-29-19 @ 11:00), Max: 98.1 (10-28-19 @ 16:17)  HR: 82 (10-29-19 @ 11:00) (68 - 95)  BP: 129/77 (10-29-19 @ 11:00) (113/75 - 131/83)  RR: 18 (10-29-19 @ 11:00) (18 - 18)  SpO2: 96% (10-29-19 @ 07:26) (96% - 100%) on (O2)    I&O's Detail      Telemetry/Alarms: A paced, HR 63, 98% on supp O2, occasional PVCs, no acute overnight events  General: NAD  Neurology: Awake, nonfocal, HOWARD x 4  Eyes: Scleras clear, PERRLA/ EOMI, Gross vision intact  ENT: Gross hearing intact, grossly patent pharynx, no stridor  Neck: Neck supple, trachea midline, No JVD  Respiratory: CTA B/L, No wheezing, rales, rhonchi  CV: RRR, S1S2, no murmurs, rubs or gallops  Abdominal: Soft, NT, ND  Extremities: No edema  Skin: No Rashes, Hematoma, Ecchymosis  Psych: Oriented x 3, normal affect  Incisions: R VATS incision healing well, dry without discharge. dry sterile dressing placed.       Relevant labs, radiology and Medications reviewed                        11.8   7.75  )-----------( 126      ( 29 Oct 2019 06:27 )             38.0     10-29    142  |  102  |  28.0<H>  ----------------------------<  100  4.9   |  28.0  |  0.96    Ca    9.5      29 Oct 2019 06:27  Phos  2.9     10-28  Mg     1.9     10-28    TPro  6.9  /  Alb  3.7  /  TBili  0.3<L>  /  DBili  x   /  AST  27  /  ALT  12  /  AlkPhos  100  10-29    PT/INR - ( 28 Oct 2019 19:41 )   PT: 12.8 sec;   INR: 1.11 ratio         PTT - ( 28 Oct 2019 19:41 )  PTT:29.2 sec  MEDICATIONS  (STANDING):  ALBUTerol    90 MICROgram(s) HFA Inhaler 2 Puff(s) Inhalation every 6 hours  aspirin enteric coated 81 milliGRAM(s) Oral daily  atorvastatin 20 milliGRAM(s) Oral at bedtime  carvedilol 3.125 milliGRAM(s) Oral every 12 hours  cefTRIAXone   IVPB 1000 milliGRAM(s) IV Intermittent every 24 hours  lidocaine   Patch 1 Patch Transdermal daily  losartan 100 milliGRAM(s) Oral daily  montelukast 10 milliGRAM(s) Oral daily  pantoprazole    Tablet 40 milliGRAM(s) Oral before breakfast  predniSONE   Tablet 10 milliGRAM(s) Oral daily  sertraline 25 milliGRAM(s) Oral daily  sodium chloride 0.9% lock flush 3 milliLiter(s) IV Push every 8 hours  spironolactone 25 milliGRAM(s) Oral daily  tiotropium 18 MICROgram(s) Capsule 1 Capsule(s) Inhalation daily    MEDICATIONS  (PRN):  acetaminophen   Tablet .. 650 milliGRAM(s) Oral every 6 hours PRN Mild Pain (1 - 3)  oxycodone    5 mG/acetaminophen 325 mG 1 Tablet(s) Oral every 4 hours PRN Moderate Pain (4 - 6)  oxycodone    5 mG/acetaminophen 325 mG 2 Tablet(s) Oral every 4 hours PRN Severe Pain (7 - 10)        Assessment  78y Male  w/ PAST MEDICAL & SURGICAL HISTORY:  CHF (congestive heart failure)  CAD (coronary artery disease)  HLD (hyperlipidemia)  HTN (hypertension)  COPD (chronic obstructive pulmonary disease)  Stenosis of lumbosacral spine  H/O back injury  Artificial pacemaker  admitted with complaints of Patient is a 78y old  Male who presents with a chief complaint of .

## 2019-10-29 NOTE — PROGRESS NOTE ADULT - ASSESSMENT
78M h/o CHF, CAD, HTN, HLD, COPD on 2L home O2, pna, emphysema s/p decortication 4/30/19, chronic back pain with history s/p benign tumor resection presented to  Dr. Dominguez's office with progressive worsening of constant, sharp 10/10 back pain between the scapula radiating to right chest wall  and intermittently to B/L LE. Recently underwent R VATS, chest washout, R CT placement and wound VAC on 10/3 for empyema following an initial decortication performed April 2019. 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  CT A/P revealed 6x6cm necrotic liver mass v. abscess. On ceftriaxone daily through 10/30 for prior Chest abscess growing Strep milleri. VAC removed.     PLAN  Neuro: Pain management. Chronic pain consult placed. Orthotics contacted for possible alternative recommendations to TLSO brace as patient noncompliant.   Pulm: Encourage coughing, deep breathing and use of incentive spirometry.    Cardio: Monitor telemetry/alarms. C/W ASA.   GI: Tolerating diet. Continue stool softeners. Possible Liver biopsy with IR pending Rad-Onc plan. If path of spine lesion required for possible palliative radiation, then path of liver lesion may not be necessary to obtain. D/W Dr. Gonzalez.    Renal: Monitor urine output, supplement electrolytes as needed  Vasc: SCDs for DVT prophylaxis. Add chemical DVT ppx if no intervention planned.   Heme: Stable H/H.  ID: Ceftriaxone through 10/30.   Wound care: DSD changed daily.   Therapy: OOB/ambulate with brace. PT eval to be ordered when brace bedside.   Disposition: Palliative consult.     Discussed with Cardiothoracic Team at AM rounds.    Naima HOPPER  Thoracic Surgery   #[phone]

## 2019-10-30 NOTE — PROGRESS NOTE ADULT - SUBJECTIVE AND OBJECTIVE BOX
Subjective:   Pt seen at the bedside.  Pt in chair wife at bedside and both are confused as to the plan.  Pt has back pain and would like more pain medication.      T(C): 36.6 (10-30-19 @ 11:00), Max: 36.8 (10-29-19 @ 15:58)  HR: 69 (10-30-19 @ 11:00) (65 - 85)  BP: 144/83 (10-30-19 @ 11:00) (117/71 - 144/83)    RR: 18 (10-30-19 @ 11:00) (18 - 19)  SpO2: 98% (10-30-19 @ 11:00) (95% - 98%)  Tele: SR             10-30    142  |  101  |  34.0<H>  ----------------------------<  98  4.6   |  28.0  |  1.07    Ca    9.7      30 Oct 2019 06:12  Phos  2.9     10-28  Mg     1.9     10-28    TPro  6.9  /  Alb  3.7  /  TBili  0.3<L>  /  DBili  x   /  AST  27  /  ALT  12  /  AlkPhos  100  10-29                               12.4   9.17  )-----------( 136      ( 30 Oct 2019 06:12 )             39.6        PT/INR - ( 30 Oct 2019 06:12 )   PT: 12.0 sec;   INR: 1.04 ratio         PTT - ( 30 Oct 2019 06:12 )  PTT:33.0 sec         CAPILLARY BLOOD GLUCOSE               CXR:  < from: Xray Chest 1 View AP/PA. (10.29.19 @ 06:00) >  Single frontal view of the chest demonstrates small right-sided pleural   effusion. Left-sided dual-chamber pacemaker.. The cardiomediastinal   silhouette is enlarged. No acute osseous abnormalities. Overlying EKG   leads and wires are noted    IMPRESSION: Small right-sided pleural effusion, unchanged. Cardiomegaly.    < end of copied text >          Assessment  General: mild distress  Neurology: Awake, nonfocal, HOWARD x 4  Respiratory: CTA B/L, No wheezing, rales, rhonchi  CV: RRR, S1S2, no murmurs, rubs or gallops  Abdominal: Soft, NT, ND  Extremities: 1+ edema b/l LE   Skin: No Rashes, Hematoma, Ecchymosis  Psych: Oriented x 3, normal affect  Incisions: R VATS incision healing well, dry without discharge. dry sterile dressing placed.         Assessment:  78yMale    with PAST MEDICAL & SURGICAL HISTORY:  CHF (congestive heart failure)  CAD (coronary artery disease)  HLD (hyperlipidemia)  HTN (hypertension)  COPD (chronic obstructive pulmonary disease)  Stenosis of lumbosacral spine  H/O back injury  Artificial pacemaker        Plan:

## 2019-10-30 NOTE — CONSULT NOTE ADULT - SUBJECTIVE AND OBJECTIVE BOX
Patient is a 78y old  Male who presents with a chief complaint of SOB (30 Oct 2019 13:06)    HPI:  78M h/o CHF, CAD, HTN, HLD, COPD on 2L home O2, pna, empysema s/p decortication 4/30/19, chronic back pain with history s/p benign tumor resection and pathological fracture of T4 presented to  Dr. Dominguez's office 10/28/19 with progressive worsening of constant, sharp 10/10 back pain between the scapula radiating to right chest wall for the past 3 to 4 days. Patient had 3-4/10 back pain for past 3 to 4 weeks, was taking Tramadol and Oxycodone with relief. Lately pain got worsened and limited him from performing his ADLs and stated  having no relief of pain with pain medicine, position change or heat compression. Patient also stated having intermittent shock like radiation to kristian. lower extremities. Patient denied any loss of sensation, incontinence of bowel or bladder, fever, chills, nausea, vomiting, diarrhea, long distance travel or sick contact.    Patient had history of an incidental T4 lytic lesion with epidural extension and 6cm hypodensity in liver and pathologic comp fracture suspicious of metastasis on T4, T11 and possibly T6. Chest abscess growing Strep milleri. CT A/P revealed 6x6cm necrotic liver mass v. abscess. On 10/3 patient had R VATS, chest washout, R CT placement and wound VAC and D/C home on 10/11/19. (28 Oct 2019 13:41)    Per report, he previously underwent T9-T11 laminectomy and removal of a low-grade schwannoma at T10 by surgeon in [place] in 2017.  During last hospitalization,   < from: CT Chest No Cont (09.26.19 @ 16:50) >:  6 cm indistinct hypodensity within the posterior right lobe of the liver. Recommend further evaluation with MRI or ultrasound.  < from: MR Thoracic Spine w/wo IV Cont (09.30.19 @ 13:31) >:   Pathologic compression fracture of T4 with epidural extension of disease causing mild cord compression. No cord signal abnormality. 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  Declined metastatic workup at that time.    During this admission,   < from: CT Thoracic Spine No Cont (10.28.19 @ 20:44) >  IMPRESSION:   1.  Lytic lesion involving majority of the T4 vertebral body extending into the bilateral pedicles, right transverse process, superior and inferior facets and lamina, and proximal right fourth rib. Lesion extends into the right paravertebral space, spinal canal, and right T3-T4 and T4-T5 neuroforamina. Adjacent involvement of the right inferior T3 vertebral body.  2.  Suggestion of lytic lesion in the left T9 transverse process, better seen on prior MRI.  3.  T11 metastases seen on prior MRI are not well visualized on this CT.   4.  Lytic lesion in the L2 vertebral body.    Patient has met with Neurosurgery and Orthopedics, and does not wish to undergo a large surgery at this time.    PAST MEDICAL & SURGICAL HISTORY:  CHF (congestive heart failure)  CAD (coronary artery disease)  HLD (hyperlipidemia)  HTN (hypertension)  COPD (chronic obstructive pulmonary disease)  Stenosis of lumbosacral spine  H/O back injury  Artificial pacemaker  Basal cell skin cancer (nose)  Low-grade schwannoma    FAMILY HISTORY:  No pertinent family history in first degree relatives    ALLERGIES  No Known Allergies    MEDICATIONS  (STANDING):  ALBUTerol    90 MICROgram(s) HFA Inhaler 2 Puff(s) Inhalation every 6 hours  aspirin enteric coated 81 milliGRAM(s) Oral daily  atorvastatin 20 milliGRAM(s) Oral at bedtime  carvedilol 3.125 milliGRAM(s) Oral every 12 hours  enoxaparin Injectable 40 milliGRAM(s) SubCutaneous daily  lidocaine   Patch 1 Patch Transdermal daily  losartan 100 milliGRAM(s) Oral daily  montelukast 10 milliGRAM(s) Oral daily  pantoprazole    Tablet 40 milliGRAM(s) Oral before breakfast  predniSONE   Tablet 10 milliGRAM(s) Oral daily  senna 2 Tablet(s) Oral at bedtime  sertraline 25 milliGRAM(s) Oral daily  sodium chloride 0.9% lock flush 3 milliLiter(s) IV Push every 8 hours  spironolactone 25 milliGRAM(s) Oral daily  tiotropium 18 MICROgram(s) Capsule 1 Capsule(s) Inhalation daily    MEDICATIONS  (PRN):  acetaminophen   Tablet .. 650 milliGRAM(s) Oral every 6 hours PRN Mild Pain (1 - 3)  oxycodone    5 mG/acetaminophen 325 mG 1 Tablet(s) Oral every 4 hours PRN Moderate Pain (4 - 6)  oxycodone    5 mG/acetaminophen 325 mG 2 Tablet(s) Oral every 4 hours PRN Severe Pain (7 - 10)    PHYSICAL EXAM:  Vital Signs Last 24 Hrs  T(C): 36.4 (30 Oct 2019 16:11), Max: 36.7 (29 Oct 2019 20:00)  T(F): 97.6 (30 Oct 2019 16:11), Max: 98 (29 Oct 2019 20:00)  HR: 70 (30 Oct 2019 16:11) (65 - 83)  BP: 140/77 (30 Oct 2019 16:11) (128/64 - 144/83)  BP(mean): --  RR: 18 (30 Oct 2019 16:11) (18 - 19)  SpO2: 99% (30 Oct 2019 16:11) (95% - 99%)    General: Well nourished, well developed, seated comfortably in recliner.  HEENT: NC/AT; EOMI, PERRL, sclera nonicteric; external ears normal; MMM; oropharynx clear and without erythema  CV: RRR; no appreciable r/m/g  Lungs: CTA bilaterally, no increased work of breathing  Abdomen: Soft, NT/ND, bowel sounds present  Extremities: 3+ edema bilateral LE to upper calf  MSK/back: Vertebral spine tender to palpation in upper T-spine.  Well-healed scar along lower T/upper L spine.  Neuro: AAOx3; cranial nerves II-XII intact; strength 5/5 in upper and lower extremities; sensation to light touch in tact bilaterally.  Psych: Full affect; mood congruent  Skin: No visible rashes on limited examination    IMAGING/LABS/PATHOLOGY: I have personally reviewed the relevant labs, pathology, and imaging as noted in the HPI.  In addition,                        12.4   9.17  )-----------( 136      ( 30 Oct 2019 06:12 )             39.6     10-30    142  |  101  |  34.0<H>  ----------------------------<  98  4.6   |  28.0  |  1.07    Ca    9.7      30 Oct 2019 06:12  Phos  2.9     10-28  Mg     1.9     10-28    TPro  6.9  /  Alb  3.7  /  TBili  0.3<L>  /  DBili  x   /  AST  27  /  ALT  12  /  AlkPhos  100  10-29    PT/INR - ( 30 Oct 2019 06:12 )   PT: 12.0 sec;   INR: 1.04 ratio         PTT - ( 30 Oct 2019 06:12 )  PTT:33.0 sec

## 2019-10-30 NOTE — CONSULT NOTE ADULT - SUBJECTIVE AND OBJECTIVE BOX
[facility]  INFECTIOUS DISEASES AND INTERNAL MEDICINE at [place]  =======================================================  Onesimo Henderson MD  Diplomates American Board of Internal Medicine and Infectious Diseases  Tel: [phone]      Fax: [phone]  =======================================================      [id number]  JOHN CIFUENTES     CC: Patient is a 78y old  Male who presents with a chief complaint of SOB (30 Oct 2019 13:06)    79y/o  Male with h/o CHF, CAD, HTN, HLD, COPD on 2L home O2, Pneumonia, empyema s/p decortication 4/30/19, chronic back pain with history s/p benign tumor resection and pathological fracture of T4. Patient presented to Dr. Dominguez's office with progressive worsening of back pain between the scapula radiating to right chest wall for 4 days. Patient was taking Tramadol and Oxycodone with relief. Pain has been worsening and limited him from performing his ADLs. Relief from pain medications was weaning. Patient also stated having intermittent shock like radiation to kristian. lower extremities. Patient denied any loss of sensation, incontinence of bowel or bladder, fever, chills, nausea, vomiting, diarrhea, long distance travel or sick contact. Patient had history of an incidental T4 lytic lesion with epidural extension and 6cm hypodensity in liver and pathologic comp fracture suspicious of metastasis on T4, T11 and possibly T6. Chest abscess growing Strep milleri. On 10/3 patient had R VATS, chest washout, R CT placement and wound VAC and D/C home on 10/11/19 with IV Ceftriaxone to complete 4 weeks which would be 10/30/19. Patient refused metastatic work up on prior visit. Admitted now with back pain. ID input requested.       Past Medical & Surgical Hx:  CHF (congestive heart failure)  CAD (coronary artery disease)  HLD (hyperlipidemia)  HTN (hypertension)  COPD (chronic obstructive pulmonary disease)  Stenosis of lumbosacral spine  H/O back injury  Artificial pacemaker      Social Hx:  Former smoker      FAMILY HISTORY:  No family history of medical problems in mother or father.       Allergies  No Known Allergies      ANTIBIOTICS:   Ceftriaxone        REVIEW OF SYSTEMS:  CONSTITUTIONAL:  No Fever or chills  HEENT:  No diplopia or blurred vision.  No earache, sore throat or runny nose.  CARDIOVASCULAR:  No pressure, squeezing, strangling, tightness, heaviness or aching about the chest, neck, axilla or epigastrium.  RESPIRATORY:  No cough, shortness of breath  GASTROINTESTINAL:  No nausea, vomiting or diarrhea.  GENITOURINARY:  No dysuria, frequency or urgency.   MUSCULOSKELETAL:  no joint aches. + Back pain   SKIN:  No change in skin, hair or nails.  NEUROLOGIC:  No Headaches, seizures  PSYCHIATRIC:  No disorder of thought or mood.  ENDOCRINE:  No heat or cold intolerance  HEMATOLOGICAL:  No easy bruising or bleeding.       Physical Exam:  Vital Signs Last 24 Hrs  T(C): 36.6 (30 Oct 2019 11:00), Max: 36.8 (29 Oct 2019 15:58)  T(F): 97.9 (30 Oct 2019 11:00), Max: 98.3 (29 Oct 2019 15:58)  HR: 65 (30 Oct 2019 13:25) (65 - 83)  BP: 137/70 (30 Oct 2019 13:25) (117/71 - 144/83)  RR: 19 (30 Oct 2019 13:25) (18 - 19)  SpO2: 99% (30 Oct 2019 13:25) (95% - 99%)  Weight (kg): 108.1 (10-29 @ 18:20)      GEN: NAD, pleasant  HEENT: normocephalic and atraumatic. EOMI. PERRL.  Anicteric  NECK: Supple.   LUNGS: Decreased basal BS B/L.   HEART: Regular rate and rhythm  ABDOMEN: Soft, nontender, and nondistended.  Positive bowel sounds.    : No CVA tenderness  EXTREMITIES: Without any edema.  MSK: No joint swelling  NEUROLOGIC: No Focal Deficits  PSYCHIATRIC: Depressed   SKIN: No Rash. Rt sided chest with open wound, clean, no discharge, no erythema, no warmth       Labs:  10-30    142  |  101  |  34.0<H>  ----------------------------<  98  4.6   |  28.0  |  1.07    Ca    9.7      30 Oct 2019 06:12  Phos  2.9     10-28  Mg     1.9     10-28    TPro  6.9  /  Alb  3.7  /  TBili  0.3<L>  /  DBili  x   /  AST  27  /  ALT  12  /  AlkPhos  100  10-29                          12.4   9.17  )-----------( 136      ( 30 Oct 2019 06:12 )             39.6       PT/INR - ( 30 Oct 2019 06:12 )   PT: 12.0 sec;   INR: 1.04 ratio         PTT - ( 30 Oct 2019 06:12 )  PTT:33.0 sec    LIVER FUNCTIONS - ( 29 Oct 2019 06:27 )  Alb: 3.7 g/dL / Pro: 6.9 g/dL / ALK PHOS: 100 U/L / ALT: 12 U/L / AST: 27 U/L / GGT: x               < from: CT Thoracic Spine No Cont (10.28.19 @ 20:44) >  EXAM:  CT THORACIC SPINE                          PROCEDURE DATE:  10/28/2019          INTERPRETATION:  PROCEDURE INFORMATION:   Exam: CT Thoracic Spine Without Contrast   Exam date and time: 10/28/2019 8:38 PM   Clinical history: 78 years old, male; Other: Pain, back     TECHNIQUE:   Imaging protocol: Computed tomography images of the thoracic spine   without   contrast.   3D rendering: MIP reconstructed images were created and reviewed.   Radiation optimization: All CT scans at this facilityuse at least one of   these   dose optimization techniques: automated exposure control; mA and/or kV   adjustment per patient size (includes targeted exams where dose is   matched to   clinical indication); or iterative reconstruction.     COMPARISON:   MR THORACIC SPINE WITHOUT AND WITH IV CONTRAST 9/30/2019 12:50 PM     FINDINGS:   Vertebrae: Lytic lesion involving majority of the T4 vertebral body   extending into the bilateral pedicles, right transverse process, superior   and inferior facetsand lamina, and proximal right fourth rib. Lesion   extends into the right paravertebral space and spinal canal. There is   tumor extension into the right T3-T4 and T4-T5 neuroforamina. T9-T11   laminectomies. Adjacent involvement of the right inferior T3 vertebral   body. Alignment is maintained. Suggestion of lytic lesion in the left T9   transverse process. T11 metastases seen on prior MRI are not well   visualized on this CT. Nonspecific lytic lesion in the L2 vertebral body.  Discs/Spinal canal/Neural foramina: Multilevel anterior osteophytes. No   significant spinal canal or neuroforaminal narrowing at remaining   thoracic levels.    Soft tissues: Unremarkable.   Lungs: Mild emphysematous changes. 10 mm right lower lobe pulmonary   nodule.   Pleural space: Small right pleural effusion/atelectasis.     IMPRESSION:   1.  Lytic lesion involving majority of the T4 vertebral body extending   into the bilateral pedicles, right transverse process, superior and   inferior facets and lamina,and proximal right fourth rib. Lesion extends   into the right paravertebral space, spinal canal, and right T3-T4 and   T4-T5 neuroforamina. Adjacent involvement of the right inferior T3   vertebral body.  2.  Suggestion of lytic lesion in the left T9 transverse process, better   seen on prior MRI.  3.  T11 metastases seen on prior MRI are not well visualized on this CT.   4.  Lytic lesion in the L2 vertebral body.    < end of copied text >

## 2019-10-30 NOTE — CONSULT NOTE ADULT - ASSESSMENT
77y/o  Male with h/o CHF, CAD, HTN, HLD, COPD on 2L home O2, Pneumonia, empyema s/p decortication 4/30/19, chronic back pain with history s/p benign tumor resection and pathological fracture of T4. Patient presented to Dr. Dominguez's office with progressive worsening of back pain between the scapula radiating to right chest wall for 4 days. Patient was taking Tramadol and Oxycodone with relief. Pain has been worsening and limited him from performing his ADLs. Relief from pain medications was weaning. Patient also stated having intermittent shock like radiation to kristian. lower extremities. Patient denied any loss of sensation, incontinence of bowel or bladder, fever, chills, nausea, vomiting, diarrhea, long distance travel or sick contact. Patient had history of an incidental T4 lytic lesion with epidural extension and 6cm hypodensity in liver and pathologic comp fracture suspicious of metastasis on T4, T11 and possibly T6. Chest abscess growing Strep milleri. On 10/3 patient had R VATS, chest washout, R CT placement and wound VAC and D/C home on 10/11/19 with IV Ceftriaxone to complete 4 weeks which would be 10/30/19. Patient refused metastatic work up on prior visit. Admitted now with back pain.     Back pain  Cellulitis and abscess of old chest tube site  s/p R VATS, chest washout, R CT placement and wound VAC 10/3  s/p I&D of abscess 9/27/19, Streptococcus milleri   Depressed     - Back pain work up on going  - Orthopedic spin consult noted   - For Cellulitis and abscess with Streptococcus milleri of old chest tube site  - Continue Ceftriaxone till 10/30/19  - Labs reviewed, no leukocytosis   - On prior admission patient did not want to pursue metastatic work up  - Trend Fever  - Trend Leukocytosis    Will sign off. Please call PRN.

## 2019-10-30 NOTE — PROGRESS NOTE ADULT - SUBJECTIVE AND OBJECTIVE BOX
JOHN CIFUENTES    [id number]    78y      Male    Patient is a 78y old  Male who presents with a chief complaint of empyema (30 Oct 2019 16:25)      INTERVAL HPI/OVERNIGHT EVENTS:    REVIEW OF SYSTEMS:    CONSTITUTIONAL: No fever, some fatigue  RESPIRATORY: No cough, No shortness of breath  CARDIOVASCULAR: No chest pain, palpitations  GASTROINTESTINAL: No abdominal, No nausea, vomiting  NEUROLOGICAL: No headaches,  loss of strength.  MISCELLANEOUS: back pain       Vital Signs Last 24 Hrs  T(C): 36.4 (30 Oct 2019 16:11), Max: 36.7 (29 Oct 2019 20:00)  T(F): 97.6 (30 Oct 2019 16:11), Max: 98 (29 Oct 2019 20:00)  HR: 70 (30 Oct 2019 16:11) (65 - 83)  BP: 140/77 (30 Oct 2019 16:11) (128/64 - 144/83)  RR: 18 (30 Oct 2019 16:11) (18 - 19)  SpO2: 99% (30 Oct 2019 16:11) (95% - 99%)    PHYSICAL EXAM:    GENERAL: Elderly male looking comfortable   HEENT: PERRL, +EOMI  NECK: soft, Supple, No JVD,   CHEST/LUNG: Decrease air entry bilaterally; No wheezing, right sided chest wound, with minimal drainage  HEART: S1S2+, Regular rate and rhythm; No murmurs  ABDOMEN: Soft, Nontender, Nondistended; Bowel sounds present  EXTREMITIES:  1+ Peripheral Pulses, No edema  SKIN: No rashes or lesions  NEURO: AAOX3, no focal deficits, no motor r sensory loss  PSYCH: normal mood      LABS:                        12.4   9.17  )-----------( 136      ( 30 Oct 2019 06:12 )             39.6     10-30    142  |  101  |  34.0<H>  ----------------------------<  98  4.6   |  28.0  |  1.07    Ca    9.7      30 Oct 2019 06:12  Phos  2.9     10-28  Mg     1.9     10-28    TPro  6.9  /  Alb  3.7  /  TBili  0.3<L>  /  DBili  x   /  AST  27  /  ALT  12  /  AlkPhos  100  10-29    PT/INR - ( 30 Oct 2019 06:12 )   PT: 12.0 sec;   INR: 1.04 ratio         PTT - ( 30 Oct 2019 06:12 )  PTT:33.0 sec        I&O's Summary    29 Oct 2019 07:01  -  30 Oct 2019 07:00  --------------------------------------------------------  IN: 290 mL / OUT: 0 mL / NET: 290 mL    30 Oct 2019 07:01  -  30 Oct 2019 19:20  --------------------------------------------------------  IN: 290 mL / OUT: 550 mL / NET: -260 mL        MEDICATIONS  (STANDING):  ALBUTerol    90 MICROgram(s) HFA Inhaler 2 Puff(s) Inhalation every 6 hours  aspirin enteric coated 81 milliGRAM(s) Oral daily  atorvastatin 20 milliGRAM(s) Oral at bedtime  carvedilol 3.125 milliGRAM(s) Oral every 12 hours  enoxaparin Injectable 40 milliGRAM(s) SubCutaneous daily  lidocaine   Patch 1 Patch Transdermal daily  losartan 100 milliGRAM(s) Oral daily  montelukast 10 milliGRAM(s) Oral daily  pantoprazole    Tablet 40 milliGRAM(s) Oral before breakfast  predniSONE   Tablet 10 milliGRAM(s) Oral daily  senna 2 Tablet(s) Oral at bedtime  sertraline 25 milliGRAM(s) Oral daily  sodium chloride 0.9% lock flush 3 milliLiter(s) IV Push every 8 hours  spironolactone 25 milliGRAM(s) Oral daily  tiotropium 18 MICROgram(s) Capsule 1 Capsule(s) Inhalation daily    MEDICATIONS  (PRN):  acetaminophen   Tablet .. 650 milliGRAM(s) Oral every 6 hours PRN Mild Pain (1 - 3)  oxycodone    5 mG/acetaminophen 325 mG 1 Tablet(s) Oral every 4 hours PRN Moderate Pain (4 - 6)  oxycodone    5 mG/acetaminophen 325 mG 2 Tablet(s) Oral every 4 hours PRN Severe Pain (7 - 10) JOHN CIFUENTES    [id number]    78y      Male    Patient is a 78y old  Male who presents with a chief complaint of empyema (30 Oct 2019 16:25)      INTERVAL HPI/OVERNIGHT EVENTS:    Patient is doing ok, he is having pain in the upper back, pain meds some what help him, denies fever, chills, chest pain, nausea, vomiting.     REVIEW OF SYSTEMS:    CONSTITUTIONAL: No fever, some fatigue  RESPIRATORY: No cough, No shortness of breath  CARDIOVASCULAR: No chest pain, palpitations  GASTROINTESTINAL: No abdominal, No nausea, vomiting  NEUROLOGICAL: No headaches,  loss of strength.  MISCELLANEOUS: back pain       Vital Signs Last 24 Hrs  T(C): 36.4 (30 Oct 2019 16:11), Max: 36.7 (29 Oct 2019 20:00)  T(F): 97.6 (30 Oct 2019 16:11), Max: 98 (29 Oct 2019 20:00)  HR: 70 (30 Oct 2019 16:11) (65 - 83)  BP: 140/77 (30 Oct 2019 16:11) (128/64 - 144/83)  RR: 18 (30 Oct 2019 16:11) (18 - 19)  SpO2: 99% (30 Oct 2019 16:11) (95% - 99%)    PHYSICAL EXAM:    GENERAL: Elderly male looking comfortable   HEENT: PERRL, +EOMI  NECK: soft, Supple, No JVD,   CHEST/LUNG: Decrease air entry bilaterally; No wheezing, right sided chest wound, with minimal drainage  HEART: S1S2+, Regular rate and rhythm; No murmurs  ABDOMEN: Soft, Nontender, Nondistended; Bowel sounds present  EXTREMITIES:  1+ Peripheral Pulses, No edema  SKIN: No rashes or lesions  NEURO: AAOX3, no focal deficits, no motor r sensory loss  PSYCH: normal mood      LABS:                        12.4   9.17  )-----------( 136      ( 30 Oct 2019 06:12 )             39.6     10-30    142  |  101  |  34.0<H>  ----------------------------<  98  4.6   |  28.0  |  1.07    Ca    9.7      30 Oct 2019 06:12  Phos  2.9     10-28  Mg     1.9     10-28    TPro  6.9  /  Alb  3.7  /  TBili  0.3<L>  /  DBili  x   /  AST  27  /  ALT  12  /  AlkPhos  100  10-29    PT/INR - ( 30 Oct 2019 06:12 )   PT: 12.0 sec;   INR: 1.04 ratio         PTT - ( 30 Oct 2019 06:12 )  PTT:33.0 sec        I&O's Summary    29 Oct 2019 07:01  -  30 Oct 2019 07:00  --------------------------------------------------------  IN: 290 mL / OUT: 0 mL / NET: 290 mL    30 Oct 2019 07:01  -  30 Oct 2019 19:20  --------------------------------------------------------  IN: 290 mL / OUT: 550 mL / NET: -260 mL        MEDICATIONS  (STANDING):  ALBUTerol    90 MICROgram(s) HFA Inhaler 2 Puff(s) Inhalation every 6 hours  aspirin enteric coated 81 milliGRAM(s) Oral daily  atorvastatin 20 milliGRAM(s) Oral at bedtime  carvedilol 3.125 milliGRAM(s) Oral every 12 hours  enoxaparin Injectable 40 milliGRAM(s) SubCutaneous daily  lidocaine   Patch 1 Patch Transdermal daily  losartan 100 milliGRAM(s) Oral daily  montelukast 10 milliGRAM(s) Oral daily  pantoprazole    Tablet 40 milliGRAM(s) Oral before breakfast  predniSONE   Tablet 10 milliGRAM(s) Oral daily  senna 2 Tablet(s) Oral at bedtime  sertraline 25 milliGRAM(s) Oral daily  sodium chloride 0.9% lock flush 3 milliLiter(s) IV Push every 8 hours  spironolactone 25 milliGRAM(s) Oral daily  tiotropium 18 MICROgram(s) Capsule 1 Capsule(s) Inhalation daily    MEDICATIONS  (PRN):  acetaminophen   Tablet .. 650 milliGRAM(s) Oral every 6 hours PRN Mild Pain (1 - 3)  oxycodone    5 mG/acetaminophen 325 mG 1 Tablet(s) Oral every 4 hours PRN Moderate Pain (4 - 6)  oxycodone    5 mG/acetaminophen 325 mG 2 Tablet(s) Oral every 4 hours PRN Severe Pain (7 - 10)

## 2019-10-30 NOTE — CONSULT NOTE ADULT - PROBLEM SELECTOR RECOMMENDATION 9
1. Offer Tylenol 975mg PO q8hrs ATC x3 days, PRN mild pain thereafter  2. Oxycodone IR 5/10mg PO q4hrs PRN moderate/severe pain, respectively  - Maximize use of this regimen by offering a dose at least q6hrs. Patient may refuse.  - IV opioids at discretion of Primary team  3. Add Oxycontin 10mg PO, 1st dose now  - Offer BID, starting at 10PM tonight, thereafter  4. Add Lyrica 25mg PO daily, in the AM  - Offer Lyrica 50mg PO QHS
Patient has been discussed with orthopedics regarding surgical intervention.  He declined feeling it would be "too much" to undergo.  Pain Management following  discussed with CTS NP, Barry - Radiation Oncology was consulted  Called Lindy Ca RN  to check if consult received at [facility]
-Evaluated by Neurosurgery and Orthopedics; patient does not wish to undergo surgery at this time.  -History of low-grade schwannoma s/p resection in 2017 in [place]; unlikely to be related to current radiographic findings.  -Also history of basal cell skin cancer on nose; unlikely to be related.  -Discussed utility of histologic diagnosis of malignancy; recommend IR-guided biopsy.  -Also discussed logistics, rationale, risks, benefits, and alternatives of palliative radiation to the T-spine.  Ideally, RT would follow histologic diagnosis.  However, if patient develops neurologic weakness or progressive symptoms, may need to intervene sooner.  Patient aware.  -Additionally, patient report unable to lay flat due to pain.  Patient aware that RT treatment would be in supine position, and will need Pain Medicine to help optimize analgesia regimen.

## 2019-10-30 NOTE — CONSULT NOTE ADULT - CONSULT REASON
Abscess of old chest tube site with Streptococcus milleri
Goals of Care
Lytic lesion involving majority of the T4 vertebral body extending into the   bilateral pedicles, right transverse process and proximal right fourth rib..   Lesion extends into the right paravertebral space and spinal canal. Adjacent   involvement of the right inferior T3 vertebral body.
T-spine lesion
HPI:  78M h/o CHF, CAD, HTN, HLD, COPD on 2L home O2, pna, empysema s/p decortication 4/30/19, chronic back pain with history s/p benign tumor resection and pathological fracture of T4 presented to  Dr. Dominguez's office today with progressive worsening of constant, sharp 10/10 back pain between the scapula radiating to right chest wall for the past 3 to 4 days. Patient had 3-4/10 back pain for past 3 to 4 weeks, was taking Tramadol and Oxycodone with relief. Lately pain got worsened and limited him from performing his ADLs and stated  having no relief of pain with pain medicine, position change or heat compression. Patient also stated having intermittent shock like radiation to kristian. lower extremities. Patient denied any loss of sensation, incontinence of bowel or bladder, fever, chills, nausea, vomiting, diarrhea, long distance travel or sick contact.    Patient had history of an incidental T4 lytic lesion with epidural extension and 6cm hypodensity in liver and pathologic comp fracture suspicious of metastasis on T4, T11 and possibly T6. Chest abscess growing Strep milleri. CT A/P revealed 6x6cm necrotic liver mass v. abscess. On 10/3 patient had R VATS, chest washout, R CT placement and wound VAC and D/C home on 10/11/19. (28 Oct 2019 13:41) <END of Copied Text>    Team requests assistance with pain regimen for report of inadequate analgesia with current regimen. Per MAR her received 2 doses of Percocet (2 tablets) in the last 24hrs, 1 dose of IV Tylenol and Tramadol.

## 2019-10-30 NOTE — CONSULT NOTE ADULT - SUBJECTIVE AND OBJECTIVE BOX
VERBAL REPORT: "The pill didn't help enough."  Patient describes ongoing mid-back pain, inadequately relieved by Oxycodone IR. This makes it difficult for him to get out of the chair to restroom. He denies side effects from the pain medications.     PAIN SCORE: 8/10                  SCALE USED: VNRS    Allergies  No Known Allergies      PAST MEDICAL & SURGICAL HISTORY:  CHF (congestive heart failure)  CAD (coronary artery disease)  HLD (hyperlipidemia)  HTN (hypertension)  COPD (chronic obstructive pulmonary disease)  Stenosis of lumbosacral spine  H/O back injury  Artificial pacemaker      MEDICATIONS  (STANDING):  ALBUTerol    90 MICROgram(s) HFA Inhaler 2 Puff(s) Inhalation every 6 hours  aspirin enteric coated 81 milliGRAM(s) Oral daily  atorvastatin 20 milliGRAM(s) Oral at bedtime  carvedilol 3.125 milliGRAM(s) Oral every 12 hours  enoxaparin Injectable 40 milliGRAM(s) SubCutaneous daily  HYDROmorphone  Injectable 0.5 milliGRAM(s) IV Push once  lidocaine   Patch 1 Patch Transdermal daily  losartan 100 milliGRAM(s) Oral daily  montelukast 10 milliGRAM(s) Oral daily  oxyCODONE  ER Tablet 10 milliGRAM(s) Oral once  pantoprazole    Tablet 40 milliGRAM(s) Oral before breakfast  predniSONE   Tablet 10 milliGRAM(s) Oral daily  senna 2 Tablet(s) Oral at bedtime  sertraline 25 milliGRAM(s) Oral daily  sodium chloride 0.9% lock flush 3 milliLiter(s) IV Push every 8 hours  spironolactone 25 milliGRAM(s) Oral daily  tiotropium 18 MICROgram(s) Capsule 1 Capsule(s) Inhalation daily    MEDICATIONS  (PRN):  acetaminophen   Tablet .. 650 milliGRAM(s) Oral every 6 hours PRN Mild Pain (1 - 3)  oxycodone    5 mG/acetaminophen 325 mG 1 Tablet(s) Oral every 4 hours PRN Moderate Pain (4 - 6)  oxycodone    5 mG/acetaminophen 325 mG 2 Tablet(s) Oral every 4 hours PRN Severe Pain (7 - 10)      PHYSICAL EXAM:  GENERAL: NAD, well-groomed, well-developed  HEAD:  Atraumatic, Normocephalic  EYES: EOMI, PERRLA, conjunctiva and sclera clear  NERVOUS SYSTEM:  Alert & Oriented X3, Good concentration; Motor Strength 5/5 B/L upper and lower extremities  CHEST/LUNG: O2 via NC, Clear speech, no SOB  ABDOMEN: Bowel sounds present  MUSCULOSKELETAL: Thoracic and lumbar spine TTP      Vital Signs Last 24 Hrs  T(C): 36.6 (30 Oct 2019 11:00), Max: 36.8 (29 Oct 2019 15:58)  T(F): 97.9 (30 Oct 2019 11:00), Max: 98.3 (29 Oct 2019 15:58)  HR: 69 (30 Oct 2019 11:00) (65 - 85)  BP: 144/83 (30 Oct 2019 11:00) (117/71 - 144/83)  BP(mean): --  RR: 18 (30 Oct 2019 11:00) (18 - 19)  SpO2: 98% (30 Oct 2019 11:00) (95% - 98%)                          12.4   9.17  )-----------( 136      ( 30 Oct 2019 06:12 )             39.6       LIVER FUNCTIONS - ( 29 Oct 2019 06:27 )  Alb: 3.7 g/dL / Pro: 6.9 g/dL / ALK PHOS: 100 U/L / ALT: 12 U/L / AST: 27 U/L / GGT: x             PT/INR - ( 30 Oct 2019 06:12 )   PT: 12.0 sec;   INR: 1.04 ratio    PTT - ( 30 Oct 2019 06:12 )  PTT:33.0 sec

## 2019-10-30 NOTE — CONSULT NOTE ADULT - SUBJECTIVE AND OBJECTIVE BOX
Palliative Medicine Initial Consultation Note    HPI:  78M h/o CHF, CAD, HTN, HLD, COPD on 2L home O2, pna, empysema s/p decortication 4/30/19, chronic back pain with history s/p benign tumor resection and pathological fracture of T4 presented to  Dr. Dominguez's office today with progressive worsening of constant, sharp 10/10 back pain between the scapula radiating to right chest wall for the past 3 to 4 days. Patient had 3-4/10 back pain for past 3 to 4 weeks, was taking Tramadol and Oxycodone with relief. Lately pain got worsened and limited him from performing his ADLs and stated  having no relief of pain with pain medicine, position change or heat compression. Patient also stated having intermittent shock like radiation to kristian. lower extremities. Patient denied any loss of sensation, incontinence of bowel or bladder, fever, chills, nausea, vomiting, diarrhea, long distance travel or sick contact.    Patient had history of an incidental T4 lytic lesion with epidural extension and 6cm hypodensity in liver and pathologic comp fracture suspicious of metastasis on T4, T11 and possibly T6. Chest abscess growing Strep milleri. CT A/P revealed 6x6cm necrotic liver mass v. abscess. On 10/3 patient had R VATS, chest washout, R CT placement and wound VAC and D/C home on 10/11/19.  PERTINENT PMH REVIEWED:  [ x] YES [ ] NO         CAD (coronary artery disease)     CHF (congestive heart failure)     COPD (chronic obstructive pulmonary disease)     H/O back injury     HLD (hyperlipidemia)     HTN (hypertension)     Stenosis of lumbosacral spine.     PAST SURGICAL HISTORY:  Artificial pacemaker.       SOCIAL HISTORY:  EtOH [ ] Yes  [ ]  No                                    Drugs [ ] Yes [x ] No                                   [x ] formersmoker [ ] nonsmoker                                    Admitted from: [ ] home [ ] SNF _________ [ ] [facility] ________    Surrogate/HCP/Guardian: Wife Britany Bergeron  FAMILY HISTORY:  Family history brother his of stomach ca, daughter hx of non hodgkins lymphoma      Baseline ADLs (prior to admission):  Independent [ x] moderately [ ] fully   Dependent   [ ] moderately [ ]fully    MEDICATIONS  (STANDING):  ALBUTerol    90 MICROgram(s) HFA Inhaler 2 Puff(s) Inhalation every 6 hours  aspirin enteric coated 81 milliGRAM(s) Oral daily  atorvastatin 20 milliGRAM(s) Oral at bedtime  carvedilol 3.125 milliGRAM(s) Oral every 12 hours  enoxaparin Injectable 40 milliGRAM(s) SubCutaneous daily  lidocaine   Patch 1 Patch Transdermal daily  losartan 100 milliGRAM(s) Oral daily  montelukast 10 milliGRAM(s) Oral daily  pantoprazole    Tablet 40 milliGRAM(s) Oral before breakfast  predniSONE   Tablet 10 milliGRAM(s) Oral daily  senna 2 Tablet(s) Oral at bedtime  sertraline 25 milliGRAM(s) Oral daily  sodium chloride 0.9% lock flush 3 milliLiter(s) IV Push every 8 hours  spironolactone 25 milliGRAM(s) Oral daily  tiotropium 18 MICROgram(s) Capsule 1 Capsule(s) Inhalation daily    MEDICATIONS  (PRN):  acetaminophen   Tablet .. 650 milliGRAM(s) Oral every 6 hours PRN Mild Pain (1 - 3)  oxycodone    5 mG/acetaminophen 325 mG 1 Tablet(s) Oral every 4 hours PRN Moderate Pain (4 - 6)  oxycodone    5 mG/acetaminophen 325 mG 2 Tablet(s) Oral every 4 hours PRN Severe Pain (7 - 10)      Allergies    No Known Allergies    Intolerances        REVIEW OF SYSTEMS   see HPI    [ ] Unable to obtain due to poor mentation     General: no fevers, no fatigue, no loss of appetite    Skin: no rashes, skin changes  	  Ophthalmologic: no eye pain or blurred vision  	  ENMT:	no sore throat, no ear pain    Respiratory and Thorax: see HPI  Cardiovascular:	no chest pain, no leg swelling    Gastrointestinal:	no  n/v/d,c    Genitourinary:	no FUD    Musculoskeletal: see HPI    Neurological: no seizures, no dizziness    Hematology/Lymphatics: no petechia or purpura	    Endocrine: no polyuria, no polydipsia	      Karnofsky Performance Score/Palliative Performance Status Version 2: 40  %    Vital Signs Last 24 Hrs  T(C): 36.4 (30 Oct 2019 16:11), Max: 36.7 (29 Oct 2019 20:00)  T(F): 97.6 (30 Oct 2019 16:11), Max: 98 (29 Oct 2019 20:00)  HR: 70 (30 Oct 2019 16:11) (65 - 83)  BP: 140/77 (30 Oct 2019 16:11) (128/64 - 144/83)  BP(mean): --  RR: 18 (30 Oct 2019 16:11) (18 - 19)  SpO2: 99% (30 Oct 2019 16:11) (95% - 99%)    PHYSICAL EXAM:    General: WD man AOx3 NAD  HEENT: [x ] normal  [ ] dry mouth  [ ] ET tube/trach    Lungs: [x ] comfortable [ ] tachypnea/labored breathing  [ ] excessive secretions    CV: [ x] normal  [ ] tachycardia    GI: [x ] normal  [ ] distended  [ ] tender  [ ] no BS               [ ] PEG/NG/OG tube    : x[ ] normal  [ ] incontinent  [ ] oliguria/anuria  [ ] wu    MSK: [ ] normal  [ x] weakness  [ ] edema             [ ] ambulatory  [ ] bedbound/wheelchair bound    Skin: [ ] normal  [ ] pressure ulcers- Stage_____  [ ]x no rash    LABS:                        12.4   9.17  )-----------( 136      ( 30 Oct 2019 06:12 )             39.6     10-30    142  |  101  |  34.0<H>  ----------------------------<  98  4.6   |  28.0  |  1.07    Ca    9.7      30 Oct 2019 06:12  Phos  2.9     10-28  Mg     1.9     10-28    TPro  6.9  /  Alb  3.7  /  TBili  0.3<L>  /  DBili  x   /  AST  27  /  ALT  12  /  AlkPhos  100  10-29    PT/INR - ( 30 Oct 2019 06:12 )   PT: 12.0 sec;   INR: 1.04 ratio         PTT - ( 30 Oct 2019 06:12 )  PTT:33.0 sec    I&O's Summary    29 Oct 2019 07:01  -  30 Oct 2019 07:00  --------------------------------------------------------  IN: 290 mL / OUT: 0 mL / NET: 290 mL    30 Oct 2019 07:01  -  30 Oct 2019 16:17  --------------------------------------------------------  IN: 290 mL / OUT: 0 mL / NET: 290 mL        RADIOLOGY & ADDITIONAL STUDIES:    < from: CT Thoracic Spine No Cont (10.28.19 @ 20:44) >  Pleural space: Small right pleural effusion/atelectasis.     IMPRESSION:   1.  Lytic lesion involving majority of the T4 vertebral body extending   into the bilateral pedicles, right transverse process, superior and   inferior facets and lamina,and proximal right fourth rib. Lesion extends   into the right paravertebral space, spinal canal, and right T3-T4 and   T4-T5 neuroforamina. Adjacent involvement of the right inferior T3   vertebral body.  2.  Suggestion of lytic lesion in the left T9 transverse process, better   seen on prior MRI.  3.  T11 metastases seen on prior MRI are not well visualized on this CT.   4.  Lytic lesion in the L2 vertebral body.      < end of copied text >      < from: CT Abdomen and Pelvis w/ Oral Cont and w/ IV Cont (10.01.19 @ 22:38) >     EXAM:  CT ABDOMEN AND PELVIS OC IC                          PROCEDURE DATE:  10/01/2019          INTERPRETATION:  FINAL REPORT:    PROCEDURE INFORMATION:   Exam: CT Abdomen and pelvis with contrast   Exam date and time: 10/1/2019 10:25 PM   Clinical history: 78 years old, male; Abdominal pain; Acute. Further   evaluation of liver lesion discovered on recent chest CT scan.    TECHNIQUE:   Imaging protocol: Computed tomography of the abdomen and pelvis with   intravenous contrast.   Contrast material: OMNI 300; Contrast volume: 97 ml; Contrast route: IV;      COMPARISON:   Upper abdominal images from a prior chest CT scan 9/26/2019.    FINDINGS:   There is a pleural calcification at the left lung base. The left lung   base is otherwise clear. There is atelectasis at the right lung base,   less pronounced since the prior study. Right pleural effusion, smaller   since 9/26/2019. The subcutaneous fluid collection identified along the   right lateral inferior chest wall on 9/26/2019 has beendrained since the   prior study. There is the suggestion of communication of this   subcutaneous residual space with the pleural space. Please correlate with   recent surgical procedure.    There is a large low density lesion within the posterior aspect of the   right lobe of the liver measuring 6.3 x 6.8 cm. Differential respiratory   necrotic solid mass versus abscess.    The spleen is not enlarged and demonstrates no focal abnormality. The   pancreatic contour is unremarkable without evidence of mass, inflammation   or ductal dilatation. The adrenal glands demonstrate normal size and   contour.    The kidneys reveal a normal enhanced morphology.    Atherosclerotic changes in the abdominal aorta. No evidence of aneurysm.    No evidence of retroperitoneal or pelvic lymphadenopathy.    There is a stent involving the right common iliac vein extending into the   right external iliac vein.    Prostatic calcifications. No evidence of significant prostatic   enlargement. The bladder is unremarkable.    Colonic diverticulosis. No evidence of diverticulitis. The appendix is   visualized and is normal.    Evaluation of the abdominal wall demonstrates a small midline umbilical   ventral hernia containing fat.    No significant osseous abnormality.    IMPRESSION:     Status post drainage of right subcutaneous lower thoracic fluid   collection since 9/26/2019. There appears to be communication with the   pleural space. Right pleural effusion significantly smaller since   9/26/2019. Small amount of air within the right pleural space as well.    Low dense right hepatic lobe lesion. Necrotic solid mass versus abscess.    Evidence of prior vascular surgery involving the right common iliac and   external iliac vein.        < end of copied text >      ADVANCE DIRECTIVES:  [ ] YES [ ]x NO   DNR [ ] YES [x ] NO  Completed on:                     MOLST  [ ] YES [x ] NO   Completed on:  Living Will  [ ] YES [x ] NO   Completed on:    Thank you for the opportunity to assist with the care of this patient.   [facility] [phone].

## 2019-10-30 NOTE — CONSULT NOTE ADULT - ASSESSMENT
78 year old gentleman with recent admissions for recurrent empyema of the right chest wall, presenting with increased upper back pain and noted on imaging to have a pathologic compression fracture at T4 with mild cord compression not causing cord signal abnormality.  Also with 6 cm liver hypodensity.

## 2019-10-30 NOTE — PROGRESS NOTE ADULT - ASSESSMENT
78M h/o CHF, CAD, HTN, HLD, COPD on 2L home O2, pna, emphysema s/p decortication 4/30/19, chronic back pain with history s/p benign tumor resection presented to  Dr. Dominguez's office with progressive worsening of constant, sharp 10/10 back pain between the scapula radiating to right chest wall  and intermittently to B/L LE. Recently underwent R VATS, chest washout, R CT placement and wound VAC on 10/3 for empyema following an initial decortication performed April 2019. 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  CT A/P revealed 6x6cm necrotic liver mass v. abscess. On ceftriaxone daily through 10/30 for prior Chest abscess growing Strep milleri. VAC removed.     PLAN  Neuro: Pain management. Chronic pain consult placed. Orthotics contacted for possible alternative recommendations to TLSO brace as patient noncompliant.   Pulm: Encourage coughing, deep breathing and use of incentive spirometry.    Cardio: Monitor telemetry/alarms. C/W ASA.   GI: Tolerating diet. Continue stool softeners. Possible Liver biopsy with IR pending Rad-Onc plan. If path of spine lesion required for possible palliative radiation, then path of liver lesion may not be necessary to obtain. D/W Dr. Gonzalez.    Renal: Monitor urine output, supplement electrolytes as needed  Vasc: SCDs for DVT prophylaxis. Add chemical DVT ppx if no intervention planned.   Heme: Stable H/H.  ID: Ceftriaxone through 10/30.   Wound care: DSD changed daily.   Therapy: OOB/ambulate with brace. PT eval to be ordered when brace bedside.   Disposition: Palliative consult.     Discussed with Cardiothoracic Team at AM rounds. 78 M with Hx of CHF, CAD, HTN, HLD, COPD on 2L home O2, pna, emphysema s/p decortication 4/30/19 ,Recently underwent R VATS, chest washout, R CT placement and wound VAC on 10/3 for empyema following an initial decortication performed April 2019. Last admission, chronic back pain with history s/p benign tumor resection presented to  Dr. Dominguez's office with progressive worsening of constant, sharp 10/10 back pain between the scapula radiating to right chest wall  and intermittently to B/L LE.  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 abscess, patient has been seen by Orthospine, as per them    based upon his statement of lower extremity weakness paresthesias and sharp pain going down his legs when he arches his back, this gentleman has essentially an unstable upper thoracic spine and recommended surgical management to him, resume recommending there is chance of impending paralysis is definitely an outcome at this point in time patient states based upon his past medical history he does not want an invasive spine surgery at this point in time patient wishes more for a biopsy and radiation, patient has been seen by radiation oncology and will be getting follow up from IR for possible biopsy,   patient has Hx of chest wall wound, he was discharged on IV ceftriaxone daily and has been continued  through 10/30 for prior Chest abscess growing Strep milleri. VAC removed form the chest wound, he refused liver biopsy last time when she was here, but wants now.   CT surgery requested transfer under medicine service for further care, being accepted under medicine.       Plan:     Back pain due to  fracture suspicious of metastasis on T4, T11 and possibly T6, CT A/P revealed 6x6cm necrotic liver mass v. abscess: Patient has been seen by   ortho spine, being followed by Pain management team and has been seen  by radiation oncology, will follow along, If path of spine lesion required for possible palliative radiation, then path of liver lesion may not be necessary to obtain. D/W Dr. Gonzalez.    possible alternative recommendations to TLSO brace as patient noncompliant, will discuss with orthospine team.     Liver lesions: Will monitor LFTs, will discuss with patient and team to see if we need to call Oncology team.     Hx of Empyema and decortication in setting of COPD: supplemental oxygen, Encourage coughing, deep breathing and use of incentive spirometry, further management as per CT surgery team, completed Ceftriaxone therapy, wound care for chest wall wound, will provide Duoneb, Inhalers and oral steroids.          Hx of CAD: will continue with ASA, Statins, BB.     Hx of HTN: will continue with home meds, will monitor bp.     Hx of CHF: Looks euvolemic, will continue with spironolactone, BB.       DVT prophylaxis: will add chemical DVT ppx if no intervention planned, mean while continue SCDs.      Disposition: Palliative consult called by CT surgery team.

## 2019-10-30 NOTE — PROGRESS NOTE ADULT - SUBJECTIVE AND OBJECTIVE BOX
CC: Patient is a 78y old  Male who presents with a chief complaint of upper back pain.   SOURCE: Patient himself, competent   HPI:  78M h/o CHF, CAD, HTN, HLD, COPD on 2L home O2, pna, empyema s/p decortication 4/30/19, chronic back pain with history s/p benign tumor resection and pathological fracture of T4 presented to  Dr. Dominguez's office today with progressive worsening of constant, sharp 10/10 back pain between the scapula radiating to right chest wall does not radiate to lower extremities for the past 3 to 4 days. Patient had 3-4/10 back pain for past 3 to 4 weeks, was taking Tramadol and Oxycodone with relief. Lately pain  worsened and limited him from performing his ADLs and stated  having no relief of pain with pain medicine, position change or heat compression. Patient also stated having intermittent shock like radiation to kristian. lower extremities. Patient denied any loss of sensation, incontinence of bowel or bladder, fever, chills, nausea, vomiting, diarrhea, long distance travel or sick contact. No change sensory,     Patient had history of an incidental T4 lytic lesion with epidural extension and 6cm hypodensity in liver and pathologic comp fracture suspicious of metastasis on T4, T11 and possibly T6. Chest abscess growing Strep milleri. CT A/P revealed 6x6cm necrotic liver mass v. abscess. On 10/3 patient had R VATS, chest washout, R CT placement and wound VAC and D/C home on 10/11/19. (28 Oct 2019 13:41)      PAST MEDICAL:      CHF (congestive heart failure)  CAD (coronary artery disease)  HLD (hyperlipidemia)  HTN (hypertension)  COPD (chronic obstructive pulmonary disease)  Stenosis of lumbosacral spine  empyema  H/O back injury    SURGICAL HISTORY: CC: Patient is a 78y old  Male who presents with a chief complaint of upper back pain.   SOURCE: Patient himself, competent   HPI:  78M h/o CHF, CAD, HTN, HLD, COPD on 2L home O2, pna, empyema s/p decortication 4/30/19, chronic back pain with history s/p benign tumor resection and pathological fracture of T4 presented to  Dr. Dominguez's office today with progressive worsening of constant, sharp 10/10 back pain between the scapula radiating to right chest wall does not radiate to lower extremities for the past 3 to 4 days. Patient had 3-4/10 back pain for past 3 to 4 weeks, was taking Tramadol and Oxycodone with relief. Lately pain  worsened and limited him from performing his ADLs and stated  having no relief of pain with pain medicine, position change or heat compression. Patient also stated having intermittent shock like radiation to kristian. lower extremities. Patient denied any loss of sensation, incontinence of bowel or bladder, fever, chills, nausea, vomiting, diarrhea, long distance travel or sick contact. No change sensory,     Patient had history of an incidental T4 lytic lesion with epidural extension and 6cm hypodensity in liver and pathologic comp fracture suspicious of metastasis on T4, T11 and possibly T6. Chest abscess growing Strep milleri. CT A/P revealed 6x6cm necrotic liver mass v. abscess. On 10/3 patient had R VATS, chest washout, R CT placement and wound VAC and D/C home on 10/11/19. (28 Oct 2019 13:41)      PAST MEDICAL:      CHF (congestive heart failure)  CAD (coronary artery disease)  HLD (hyperlipidemia)  HTN (hypertension)  COPD (chronic obstructive pulmonary disease)  Stenosis of lumbosacral spine  empyema  H/O back injury    SURGICAL HISTORY:  Artificial pacemaker  decortication 4/30/19,    SOCIAL HISTORY:  - EtOH, +  [id number]'s 40 yrs, quit 20 yrs ago,  - drugs    FAMILY HISTORY:  No pertinent family history in first degree relatives      ROS:  CONSTITUTIONAL: No fever, weight loss, or fatigue  EYES: No eye pain, visual disturbances, or discharge  ENMT:  No difficulty hearing, tinnitus, vertigo; No sinus or throat pain  NECK: No pain or stiffness  RESPIRATORY: No cough, wheezing, chills or hemoptysis; + shortness of breath wears nc o2  CARDIOVASCULAR: No chest pain, palpitations, dizziness, + bilateral  leg swelling  GASTROINTESTINAL: No abdominal or epigastric pain. No nausea, vomiting, or hematemesis; No diarrhea or constipation. No melena or hematochezia.  GENITOURINARY: No dysuria, frequency, hematuria, or incontinence  NEUROLOGICAL: No headaches, memory loss, loss of strength, numbness, or tremors  SKIN: No itching, burning, rashes, or lesions   LYMPH NODES: No enlarged glands  ENDOCRINE: No heat or cold intolerance; No hair loss  MUSCULOSKELETAL: No joint pain or swelling; No muscle, +chronic upper back pain no  extremity pain  PSYCHIATRIC: No depression, anxiety, mood swings, or difficulty sleeping  HEME/LYMPH: No easy bruising, or bleeding gums CC: Patient is a 78y old  Male who presents with a chief complaint of upper back pain.   SOURCE: Patient himself, competent   HPI:  78M h/o CHF, CAD, HTN, HLD, COPD on 2L home O2, pna, empyema s/p decortication 4/30/19, chronic back pain with history s/p benign tumor resection and pathological fracture of T4 presented to  Dr. Dominguez's office today with progressive worsening of constant, sharp 10/10 back pain between the scapula radiating to right chest wall does not radiate to lower extremities for the past 3 to 4 days. Patient had 3-4/10 back pain for past 3 to 4 weeks, was taking Tramadol and Oxycodone with relief. Lately pain  worsened and limited him from performing his ADLs and stated  having no relief of pain with pain medicine, position change or heat compression. Patient also stated having intermittent shock like radiation to kristian. lower extremities. Patient denied any loss of sensation, incontinence of bowel or bladder, fever, chills, nausea, vomiting, diarrhea, long distance travel or sick contact. No change sensory,     Patient had history of an incidental T4 lytic lesion with epidural extension and 6cm hypodensity in liver and pathologic comp fracture suspicious of metastasis on T4, T11 and possibly T6. Chest abscess growing Strep milleri. CT A/P revealed 6x6cm necrotic liver mass v. abscess. On 10/3 patient had R VATS, chest washout, R CT placement and wound VAC and D/C home on 10/11/19. (28 Oct 2019 13:41)      PAST MEDICAL:      CHF (congestive heart failure)  CAD (coronary artery disease)  HLD (hyperlipidemia)  HTN (hypertension)  COPD (chronic obstructive pulmonary disease)  Stenosis of lumbosacral spine  empyema  H/O back injury    SURGICAL HISTORY:  Artificial pacemaker  decortication 4/30/19,    SOCIAL HISTORY:  - EtOH, +  [id number]'s 40 yrs, quit 20 yrs ago,  - drugs    FAMILY HISTORY:  No pertinent family history in first degree relatives      ROS:  CONSTITUTIONAL: No fever, weight loss, or fatigue  EYES: No eye pain, visual disturbances, or discharge  ENMT:  No difficulty hearing, tinnitus, vertigo; No sinus or throat pain  NECK: No pain or stiffness  RESPIRATORY: No cough, wheezing, chills or hemoptysis; + shortness of breath wears nc o2  CARDIOVASCULAR: No chest pain, palpitations, dizziness, + bilateral  leg swelling  GASTROINTESTINAL: No abdominal or epigastric pain. No nausea, vomiting, or hematemesis; No diarrhea or constipation. No melena or hematochezia.  GENITOURINARY: No dysuria, frequency, hematuria, or incontinence  NEUROLOGICAL: No headaches, memory loss, loss of strength, numbness, or tremors  SKIN: No itching, burning, rashes, or lesions   LYMPH NODES: No enlarged glands  ENDOCRINE: No heat or cold intolerance; No hair loss  MUSCULOSKELETAL: No joint pain or swelling; No muscle, +chronic upper back pain no  extremity pain  PSYCHIATRIC: No depression, anxiety, mood swings, or difficulty sleeping  HEME/LYMPH: No easy bruising, or bleeding gums  Artificial pacemaker  decortication 4/30/19,    SOCIAL HISTORY:  - EtOH, +  [id number]'s 40 yrs, quit 20 yrs ago,  - drugs    FAMILY HISTORY:  No pertinent family history in first degree relatives      ROS:  CONSTITUTIONAL: No fever, weight loss, or fatigue  EYES: No eye pain, visual disturbances, or discharge  ENMT:  No difficulty hearing, tinnitus, vertigo; No sinus or throat pain  NECK: No pain or stiffness  RESPIRATORY: No cough, wheezing, chills or hemoptysis; + shortness of breath wears nc o2  CARDIOVASCULAR: No chest pain, palpitations, dizziness, + bilateral  leg swelling  GASTROINTESTINAL: No abdominal or epigastric pain. No nausea, vomiting, or hematemesis; No diarrhea or constipation. No melena or hematochezia.  GENITOURINARY: No dysuria, frequency, hematuria, or incontinence  NEUROLOGICAL: No headaches, memory loss, loss of strength, numbness, or tremors  SKIN: No itching, burning, rashes, or lesions   LYMPH NODES: No enlarged glands  ENDOCRINE: No heat or cold intolerance; No hair loss  MUSCULOSKELETAL: No joint pain or swelling; No muscle, +chronic upper back pain no  extremity pain  PSYCHIATRIC: No depression, anxiety, mood swings, or difficulty sleeping  HEME/LYMPH: No easy bruising, or bleeding gums  Artificial pacemaker  decortication 4/30/19,    SOCIAL HISTORY:  - EtOH, +  [id number]'s 40 yrs, quit 20 yrs ago,  - drugs    FAMILY HISTORY:  No pertinent family history in first degree relatives      ROS:  CONSTITUTIONAL: No fever, weight loss, or fatigue  EYES: No eye pain, visual disturbances, or discharge  ENMT:  No difficulty hearing, tinnitus, vertigo; No sinus or throat pain  NECK: No pain or stiffness  RESPIRATORY: No cough, wheezing, chills or hemoptysis; + shortness of breath wears nc o2  CARDIOVASCULAR: No chest pain, palpitations, dizziness, + bilateral  leg swelling  GASTROINTESTINAL: No abdominal or epigastric pain. No nausea, vomiting, or hematemesis; No diarrhea or constipation. No melena or hematochezia.  GENITOURINARY: No dysuria, frequency, hematuria, or incontinence  NEUROLOGICAL: No headaches, memory loss, loss of strength, numbness, or tremors  SKIN: No itching, burning, rashes, or lesions   LYMPH NODES: No enlarged glands  ENDOCRINE: No heat or cold intolerance; No hair loss  MUSCULOSKELETAL: No joint pain or swelling; No muscle, +chronic upper back pain no  extremity pain  PSYCHIATRIC: No depression, anxiety, mood swings, or difficulty sleeping  HEME/LYMPH: No easy bruising, or bleeding gums CC: Patient is a 78y old  Male who presents with a chief complaint of upper back pain.   SOURCE: Patient himself, competent   HPI:  78M h/o CHF, CAD, HTN, HLD, COPD on 2L home O2, pna, empyema s/p decortication 4/30/19, chronic back pain with history s/p benign tumor resection and pathological fracture of T4 presented to  Dr. Dominguez's office today with progressive worsening of constant, sharp 10/10 back pain between the scapula radiating to right chest wall does not radiate to lower extremities for the past 3 to 4 days. Patient had 3-4/10 back pain for past 3 to 4 weeks, was taking Tramadol and Oxycodone with relief. Lately pain  worsened and limited him from performing his ADLs and stated  having no relief of pain with pain medicine, position change or heat compression. Patient also stated having intermittent shock like radiation to kristian. lower extremities. Patient denied any loss of sensation, incontinence of bowel or bladder, fever, chills, nausea, vomiting, diarrhea, long distance travel or sick contact. No change sensory,     Patient had history of an incidental T4 lytic lesion with epidural extension and 6cm hypodensity in liver and pathologic comp fracture suspicious of metastasis on T4, T11 and possibly T6. Chest abscess growing Strep milleri. CT A/P revealed 6x6cm necrotic liver mass v. abscess. On 10/3 patient had R VATS, chest washout, R CT placement and wound VAC and D/C home on 10/11/19. (28 Oct 2019 13:41)      PAST MEDICAL:      CHF (congestive heart failure)  CAD (coronary artery disease)  HLD (hyperlipidemia)  HTN (hypertension)  COPD (chronic obstructive pulmonary disease)  Stenosis of lumbosacral spine  empyema  H/O back injury    SURGICAL HISTORY:  Artificial pacemaker  decortication 4/30/19,    SOCIAL HISTORY:  - EtOH, +  [id number]'s 40 yrs, quit 20 yrs ago,  - drugs    FAMILY HISTORY:  No pertinent family history in first degree relatives      ROS:  CONSTITUTIONAL: No fever, weight loss, or fatigue  EYES: No eye pain, visual disturbances, or discharge  ENMT:  No difficulty hearing, tinnitus, vertigo; No sinus or throat pain  NECK: No pain or stiffness  RESPIRATORY: No cough, wheezing, chills or hemoptysis; + shortness of breath wears nc o2  CARDIOVASCULAR: No chest pain, palpitations, dizziness, + bilateral  leg swelling  GASTROINTESTINAL: No abdominal or epigastric pain. No nausea, vomiting, or hematemesis; No diarrhea or constipation. No melena or hematochezia.  GENITOURINARY: No dysuria, frequency, hematuria, or incontinence  NEUROLOGICAL: No headaches, memory loss, loss of strength, numbness, or tremors  SKIN: No itching, burning, rashes, or lesions   LYMPH NODES: No enlarged glands  ENDOCRINE: No heat or cold intolerance; No hair loss  MUSCULOSKELETAL: No joint pain or swelling; No muscle, +chronic upper back pain no  extremity pain  PSYCHIATRIC: No depression, anxiety, mood swings, or difficulty sleeping  HEME/LYMPH: No easy bruising, or bleeding gums  Artificial pacemaker  decortication 4/30/19,    SOCIAL HISTORY:  - EtOH, +  [id number]'s 40 yrs, quit 20 yrs ago,  - drugs    FAMILY HISTORY:  No pertinent family history in first degree relatives      ROS:  CONSTITUTIONAL: No fever, weight loss, or fatigue  EYES: No eye pain, visual disturbances, or discharge  ENMT:  No difficulty hearing, tinnitus, vertigo; No sinus or throat pain  NECK: No pain or stiffness  RESPIRATORY: No cough, wheezing, chills or hemoptysis; + shortness of breath wears nc o2  CARDIOVASCULAR: No chest pain, palpitations, dizziness, + bilateral  leg swelling  GASTROINTESTINAL: No abdominal or epigastric pain. No nausea, vomiting, or hematemesis; No diarrhea or constipation. No melena or hematochezia.  GENITOURINARY: No dysuria, frequency, hematuria, or incontinence  NEUROLOGICAL: No headaches, memory loss, loss of strength, numbness, or tremors  SKIN: No itching, burning, rashes, or lesions   LYMPH NODES: No enlarged glands  ENDOCRINE: No heat or cold intolerance; No hair loss  MUSCULOSKELETAL: No joint pain or swelling; No muscle, +chronic upper back pain no  extremity pain  PSYCHIATRIC: No depression, anxiety, mood swings, or difficulty sleeping  HEME/LYMPH: No easy bruising, or bleeding gums  Artificial pacemaker  decortication 4/30/19,    SOCIAL HISTORY:  - EtOH, +  [id number]'s 40 yrs, quit 20 yrs ago,  - drugs    FAMILY HISTORY:  No pertinent family history in first degree relatives      ROS:  CONSTITUTIONAL: No fever, weight loss, or fatigue  EYES: No eye pain, visual disturbances, or discharge  ENMT:  No difficulty hearing, tinnitus, vertigo; No sinus or throat pain  NECK: No pain or stiffness  RESPIRATORY: No cough, wheezing, chills or hemoptysis; + shortness of breath wears nc o2  CARDIOVASCULAR: No chest pain, palpitations, dizziness, + bilateral  leg swelling  GASTROINTESTINAL: No abdominal or epigastric pain. No nausea, vomiting, or hematemesis; No diarrhea or constipation. No melena or hematochezia.  GENITOURINARY: No dysuria, frequency, hematuria, or incontinence  NEUROLOGICAL: No headaches, memory loss, loss of strength, numbness, or tremors  SKIN: No itching, burning, rashes, or lesions   LYMPH NODES: No enlarged glands  ENDOCRINE: No heat or cold intolerance; No hair loss  MUSCULOSKELETAL: No joint pain or swelling; No muscle, +chronic upper back pain no  extremity pain  PSYCHIATRIC: No depression, anxiety, mood swings, or difficulty sleeping  HEME/LYMPH: No easy bruising, or bleeding gums  ALLERGY  AND IMMUNOLOGIC: No hives or eczema      MEDICATIONS  (STANDING):  ALBUTerol    90 MICROgram(s) HFA Inhaler 2 Puff(s) Inhalation every 6 hours  aspirin enteric coated 81 milliGRAM(s) Oral daily  atorvastatin 20 milliGRAM(s) Oral at bedtime  carvedilol 3.125 milliGRAM(s) Oral every 12 hours  losartan 100 milliGRAM(s) Oral daily  montelukast 10 milliGRAM(s) Oral daily  pantoprazole    Tablet 40 milliGRAM(s) Oral before breakfast  predniSONE   Tablet 10 milliGRAM(s) Oral daily  sertraline 25 milliGRAM(s) Oral daily  sodium chloride 0.9% lock flush 3 milliLiter(s) IV Push every 8 hours  spironolactone 25 milliGRAM(s) Oral daily  tiotropium 18 MICROgram(s) Capsule 1 Capsule(s) Inhalation daily    MEDICATIONS  (PRN):  acetaminophen   Tablet .. 650 milliGRAM(s) Oral every 6 hours PRN Mild Pain (1 - 3)  traMADol 25 milliGRAM(s) Oral every 6 hours PRN pain >4 on pain scale CC: Patient is a 78y old  Male who presents with a chief complaint of upper back pain.   SOURCE: Patient himself, competent   HPI:  78M h/o CHF, CAD, HTN, HLD, COPD on 2L home O2, pna, empyema s/p decortication 4/30/19, chronic back pain with history s/p benign tumor resection and pathological fracture of T4 presented to  Dr. Dominguez's office today with progressive worsening of constant, sharp 10/10 back pain between the scapula radiating to right chest wall does not radiate to lower extremities for the past 3 to 4 days. Patient had 3-4/10 back pain for past 3 to 4 weeks, was taking Tramadol and Oxycodone with relief. Lately pain  worsened and limited him from performing his ADLs and stated  having no relief of pain with pain medicine, position change or heat compression. Patient also stated having intermittent shock like radiation to kristian. lower extremities. Patient denied any loss of sensation, incontinence of bowel or bladder, fever, chills, nausea, vomiting, diarrhea, long distance travel or sick contact. No change sensory,     Patient had history of an incidental T4 lytic lesion with epidural extension and 6cm hypodensity in liver and pathologic comp fracture suspicious of metastasis on T4, T11 and possibly T6. Chest abscess growing Strep milleri. CT A/P revealed 6x6cm necrotic liver mass v. abscess. On 10/3 patient had R VATS, chest washout, R CT placement and wound VAC and D/C home on 10/11/19. (28 Oct 2019 13:41)      PAST MEDICAL:      CHF (congestive heart failure)  CAD (coronary artery disease)  HLD (hyperlipidemia)  HTN (hypertension)  COPD (chronic obstructive pulmonary disease)  Stenosis of lumbosacral spine  empyema  H/O back injury    SURGICAL HISTORY:  Artificial pacemaker  decortication 4/30/19,    SOCIAL HISTORY:  - EtOH, +  [id number]'s 40 yrs, quit 20 yrs ago,  - drugs    FAMILY HISTORY:  No pertinent family history in first degree relatives      ROS:  CONSTITUTIONAL: No fever, weight loss, or fatigue  EYES: No eye pain, visual disturbances, or discharge  ENMT:  No difficulty hearing, tinnitus, vertigo; No sinus or throat pain  NECK: No pain or stiffness  RESPIRATORY: No cough, wheezing, chills or hemoptysis; + shortness of breath wears nc o2  CARDIOVASCULAR: No chest pain, palpitations, dizziness, + bilateral  leg swelling  GASTROINTESTINAL: No abdominal or epigastric pain. No nausea, vomiting, or hematemesis; No diarrhea or constipation. No melena or hematochezia.  GENITOURINARY: No dysuria, frequency, hematuria, or incontinence  NEUROLOGICAL: No headaches, memory loss, loss of strength, numbness, or tremors  SKIN: No itching, burning, rashes, or lesions   LYMPH NODES: No enlarged glands  ENDOCRINE: No heat or cold intolerance; No hair loss  MUSCULOSKELETAL: No joint pain or swelling; No muscle, +chronic upper back pain no  extremity pain  PSYCHIATRIC: No depression, anxiety, mood swings, or difficulty sleeping  HEME/LYMPH: No easy bruising, or bleeding gums  Artificial pacemaker  decortication 4/30/19,    SOCIAL HISTORY:  - EtOH, +  [id number]'s 40 yrs, quit 20 yrs ago,  - drugs    FAMILY HISTORY:  No pertinent family history in first degree relatives      ROS:  CONSTITUTIONAL: No fever, weight loss, or fatigue  EYES: No eye pain, visual disturbances, or discharge  ENMT:  No difficulty hearing, tinnitus, vertigo; No sinus or throat pain  NECK: No pain or stiffness  RESPIRATORY: No cough, wheezing, chills or hemoptysis; + shortness of breath wears nc o2  CARDIOVASCULAR: No chest pain, palpitations, dizziness, + bilateral  leg swelling  GASTROINTESTINAL: No abdominal or epigastric pain. No nausea, vomiting, or hematemesis; No diarrhea or constipation. No melena or hematochezia.  GENITOURINARY: No dysuria, frequency, hematuria, or incontinence  NEUROLOGICAL: No headaches, memory loss, loss of strength, numbness, or tremors  SKIN: No itching, burning, rashes, or lesions   LYMPH NODES: No enlarged glands  ENDOCRINE: No heat or cold intolerance; No hair loss  MUSCULOSKELETAL: No joint pain or swelling; No muscle, +chronic upper back pain no  extremity pain  PSYCHIATRIC: No depression, anxiety, mood swings, or difficulty sleeping  HEME/LYMPH: No easy bruising, or bleeding gums  Artificial pacemaker  decortication 4/30/19,    SOCIAL HISTORY:  - EtOH, +  [id number]'s 40 yrs, quit 20 yrs ago,  - drugs    FAMILY HISTORY:  No pertinent family history in first degree relatives      ROS:  CONSTITUTIONAL: No fever, weight loss, or fatigue  EYES: No eye pain, visual disturbances, or discharge  ENMT:  No difficulty hearing, tinnitus, vertigo; No sinus or throat pain  NECK: No pain or stiffness  RESPIRATORY: No cough, wheezing, chills or hemoptysis; + shortness of breath wears nc o2  CARDIOVASCULAR: No chest pain, palpitations, dizziness, + bilateral  leg swelling  GASTROINTESTINAL: No abdominal or epigastric pain. No nausea, vomiting, or hematemesis; No diarrhea or constipation. No melena or hematochezia.  GENITOURINARY: No dysuria, frequency, hematuria, or incontinence  NEUROLOGICAL: No headaches, memory loss, loss of strength, numbness, or tremors  SKIN: No itching, burning, rashes, or lesions   LYMPH NODES: No enlarged glands  ENDOCRINE: No heat or cold intolerance; No hair loss  MUSCULOSKELETAL: No joint pain or swelling; No muscle, +chronic upper back pain no  extremity pain  PSYCHIATRIC: No depression, anxiety, mood swings, or difficulty sleeping  HEME/LYMPH: No easy bruising, or bleeding gums  ALLERGY  AND IMMUNOLOGIC: No hives or eczema      MEDICATIONS  (STANDING):  ALBUTerol    90 MICROgram(s) HFA Inhaler 2 Puff(s) Inhalation every 6 hours  aspirin enteric coated 81 milliGRAM(s) Oral daily  atorvastatin 20 milliGRAM(s) Oral at bedtime  carvedilol 3.125 milliGRAM(s) Oral every 12 hours  losartan 100 milliGRAM(s) Oral daily  montelukast 10 milliGRAM(s) Oral daily  pantoprazole    Tablet 40 milliGRAM(s) Oral before breakfast  predniSONE   Tablet 10 milliGRAM(s) Oral daily  sertraline 25 milliGRAM(s) Oral daily  sodium chloride 0.9% lock flush 3 milliLiter(s) IV Push every 8 hours  spironolactone 25 milliGRAM(s) Oral daily  tiotropium 18 MICROgram(s) Capsule 1 Capsule(s) Inhalation daily    MEDICATIONS  (PRN):  acetaminophen   Tablet .. 650 milliGRAM(s) Oral every 6 hours PRN Mild Pain (1 - 3)  traMADol 25 milliGRAM(s) Oral every 6 hours PRN pain >4 on pain scale  ALLERGY  AND IMMUNOLOGIC: No hives or eczema      MEDICATIONS  (STANDING):  ALBUTerol    90 MICROgram(s) HFA Inhaler 2 Puff(s) Inhalation every 6 hours  aspirin enteric coated 81 milliGRAM(s) Oral daily  atorvastatin 20 milliGRAM(s) Oral at bedtime  carvedilol 3.125 milliGRAM(s) Oral every 12 hours  losartan 100 milliGRAM(s) Oral daily  montelukast 10 milliGRAM(s) Oral daily  pantoprazole    Tablet 40 milliGRAM(s) Oral before breakfast  predniSONE   Tablet 10 milliGRAM(s) Oral daily  sertraline 25 milliGRAM(s) Oral daily  sodium chloride 0.9% lock flush 3 milliLiter(s) IV Push every 8 hours  spironolactone 25 milliGRAM(s) Oral daily  tiotropium 18 MICROgram(s) Capsule 1 Capsule(s) Inhalation daily    MEDICATIONS  (PRN):  acetaminophen   Tablet .. 650 milliGRAM(s) Oral every 6 hours PRN Mild Pain (1 - 3)  traMADol 25 milliGRAM(s) Oral every 6 hours PRN pain >4 on pain scale CC: Patient is a 78y old  Male who presents with a chief complaint of upper back pain.   SOURCE: Patient himself, competent   HPI:  78M h/o CHF, CAD, HTN, HLD, COPD on 2L home O2, pna, empyema s/p decortication 4/30/19, chronic back pain with history s/p benign tumor resection and pathological fracture of T4 presented to  Dr. Dominguez's office today with progressive worsening of constant, sharp 10/10 back pain between the scapula radiating to right chest wall does not radiate to lower extremities for the past 3 to 4 days. Patient had 3-4/10 back pain for past 3 to 4 weeks, was taking Tramadol and Oxycodone with relief. Lately pain  worsened and limited him from performing his ADLs and stated  having no relief of pain with pain medicine, position change or heat compression. Patient also stated having intermittent shock like radiation to kristian. lower extremities. Patient denied any loss of sensation, incontinence of bowel or bladder, fever, chills, nausea, vomiting, diarrhea, long distance travel or sick contact. No change sensory,     Patient had history of an incidental T4 lytic lesion with epidural extension and 6cm hypodensity in liver and pathologic comp fracture suspicious of metastasis on T4, T11 and possibly T6. Chest abscess growing Strep milleri. CT A/P revealed 6x6cm necrotic liver mass v. abscess. On 10/3 patient had R VATS, chest washout, R CT placement and wound VAC and D/C home on 10/11/19. (28 Oct 2019 13:41)      PAST MEDICAL:      CHF (congestive heart failure)  CAD (coronary artery disease)  HLD (hyperlipidemia)  HTN (hypertension)  COPD (chronic obstructive pulmonary disease)  Stenosis of lumbosacral spine  empyema  H/O back injury    SURGICAL HISTORY:  Artificial pacemaker  decortication 4/30/19,    SOCIAL HISTORY:  - EtOH, +  [id number]'s 40 yrs, quit 20 yrs ago,  - drugs    FAMILY HISTORY:  No pertinent family history in first degree relatives      ROS:  CONSTITUTIONAL: No fever, weight loss, or fatigue  EYES: No eye pain, visual disturbances, or discharge  ENMT:  No difficulty hearing, tinnitus, vertigo; No sinus or throat pain  NECK: No pain or stiffness  RESPIRATORY: No cough, wheezing, chills or hemoptysis; + shortness of breath wears nc o2  CARDIOVASCULAR: No chest pain, palpitations, dizziness, + bilateral  leg swelling  GASTROINTESTINAL: No abdominal or epigastric pain. No nausea, vomiting, or hematemesis; No diarrhea or constipation. No melena or hematochezia.  GENITOURINARY: No dysuria, frequency, hematuria, or incontinence  NEUROLOGICAL: No headaches, memory loss, loss of strength, numbness, or tremors  SKIN: No itching, burning, rashes, or lesions   LYMPH NODES: No enlarged glands  ENDOCRINE: No heat or cold intolerance; No hair loss  MUSCULOSKELETAL: No joint pain or swelling; No muscle, +chronic upper back pain no  extremity pain  PSYCHIATRIC: No depression, anxiety, mood swings, or difficulty sleeping  HEME/LYMPH: No easy bruising, or bleeding gums  Artificial pacemaker  decortication 4/30/19,    SOCIAL HISTORY:  - EtOH, +  [id number]'s 40 yrs, quit 20 yrs ago,  - drugs    FAMILY HISTORY:  No pertinent family history in first degree relatives      ROS:  CONSTITUTIONAL: No fever, weight loss, or fatigue  EYES: No eye pain, visual disturbances, or discharge  ENMT:  No difficulty hearing, tinnitus, vertigo; No sinus or throat pain  NECK: No pain or stiffness  RESPIRATORY: No cough, wheezing, chills or hemoptysis; + shortness of breath wears nc o2  CARDIOVASCULAR: No chest pain, palpitations, dizziness, + bilateral  leg swelling  GASTROINTESTINAL: No abdominal or epigastric pain. No nausea, vomiting, or hematemesis; No diarrhea or constipation. No melena or hematochezia.  GENITOURINARY: No dysuria, frequency, hematuria, or incontinence  NEUROLOGICAL: No headaches, memory loss, loss of strength, numbness, or tremors  SKIN: No itching, burning, rashes, or lesions   LYMPH NODES: No enlarged glands  ENDOCRINE: No heat or cold intolerance; No hair loss  MUSCULOSKELETAL: No joint pain or swelling; No muscle, +chronic upper back pain no  extremity pain  PSYCHIATRIC: No depression, anxiety, mood swings, or difficulty sleeping  HEME/LYMPH: No easy bruising, or bleeding gums  Artificial pacemaker  decortication 4/30/19,    SOCIAL HISTORY:  - EtOH, +  [id number]'s 40 yrs, quit 20 yrs ago,  - drugs    FAMILY HISTORY:  No pertinent family history in first degree relatives      ROS:  CONSTITUTIONAL: No fever, weight loss, or fatigue  EYES: No eye pain, visual disturbances, or discharge  ENMT:  No difficulty hearing, tinnitus, vertigo; No sinus or throat pain  NECK: No pain or stiffness  RESPIRATORY: No cough, wheezing, chills or hemoptysis; + shortness of breath wears nc o2  CARDIOVASCULAR: No chest pain, palpitations, dizziness, + bilateral  leg swelling  GASTROINTESTINAL: No abdominal or epigastric pain. No nausea, vomiting, or hematemesis; No diarrhea or constipation. No melena or hematochezia.  GENITOURINARY: No dysuria, frequency, hematuria, or incontinence  NEUROLOGICAL: No headaches, memory loss, loss of strength, numbness, or tremors  SKIN: No itching, burning, rashes, or lesions   LYMPH NODES: No enlarged glands  ENDOCRINE: No heat or cold intolerance; No hair loss  MUSCULOSKELETAL: No joint pain or swelling; No muscle, +chronic upper back pain no  extremity pain  PSYCHIATRIC: No depression, anxiety, mood swings, or difficulty sleeping  HEME/LYMPH: No easy bruising, or bleeding gums  ALLERGY  AND IMMUNOLOGIC: No hives or eczema      MEDICATIONS  (STANDING):  ALBUTerol    90 MICROgram(s) HFA Inhaler 2 Puff(s) Inhalation every 6 hours  aspirin enteric coated 81 milliGRAM(s) Oral daily  atorvastatin 20 milliGRAM(s) Oral at bedtime  carvedilol 3.125 milliGRAM(s) Oral every 12 hours  losartan 100 milliGRAM(s) Oral daily  montelukast 10 milliGRAM(s) Oral daily  pantoprazole    Tablet 40 milliGRAM(s) Oral before breakfast  predniSONE   Tablet 10 milliGRAM(s) Oral daily  sertraline 25 milliGRAM(s) Oral daily  sodium chloride 0.9% lock flush 3 milliLiter(s) IV Push every 8 hours  spironolactone 25 milliGRAM(s) Oral daily  tiotropium 18 MICROgram(s) Capsule 1 Capsule(s) Inhalation daily    MEDICATIONS  (PRN):  acetaminophen   Tablet .. 650 milliGRAM(s) Oral every 6 hours PRN Mild Pain (1 - 3)  traMADol 25 milliGRAM(s) Oral every 6 hours PRN pain >4 on pain scale  ALLERGY  AND IMMUNOLOGIC: No hives or eczema      MEDICATIONS  (STANDING):  ALBUTerol    90 MICROgram(s) HFA Inhaler 2 Puff(s) Inhalation every 6 hours  aspirin enteric coated 81 milliGRAM(s) Oral daily  atorvastatin 20 milliGRAM(s) Oral at bedtime  carvedilol 3.125 milliGRAM(s) Oral every 12 hours  losartan 100 milliGRAM(s) Oral daily  montelukast 10 milliGRAM(s) Oral daily  pantoprazole    Tablet 40 milliGRAM(s) Oral before breakfast  predniSONE   Tablet 10 milliGRAM(s) Oral daily  sertraline 25 milliGRAM(s) Oral daily  sodium chloride 0.9% lock flush 3 milliLiter(s) IV Push every 8 hours  spironolactone 25 milliGRAM(s) Oral daily  tiotropium 18 MICROgram(s) Capsule 1 Capsule(s) Inhalation daily    MEDICATIONS  (PRN):  acetaminophen   Tablet .. 650 milliGRAM(s) Oral every 6 hours PRN Mild Pain (1 - 3)  traMADol 25 milliGRAM(s) Oral every 6 hours PRN pain >4 on pain scale    Patient's physical exam  Patient is seen and examined on 4 tower. He is sitting in a chair. Patient states from a constitutional standpoint his legs appear to be worsening over the last 24 hours. He states when he got up to walk to the bathroom and electric shocks and weakness in his legs which I do think is a predictor of impending paralysis given this T4 lytic processes. Pathologic reflexes there is subtle clonus. Manual assessment lotion and motor strength is 4/5 upper extremity motor strength is 5 out of 5. Patient does complain of some subtle paresthesias about the thoracic and abdomen as well as the lower 70s.    CAT scan has been reviewed it shows a lytic distractive process at T4 and basically the entire vertebral bodies been replaced with lytic processes. He is also status post thoracic laminectomy. I believe this is essentially a shock showing unstable upper thoracic spine.    Impression and plan  Lytic lesion L4    Based upon his statement of lower extremity weakness paresthesias and sharp pain going down his legs when he arches his back I do believe this gentleman has essentially an unstable upper thoracic spine and recommended surgical management to him. Resume recommending this I do feel that impending paralysis is definitely an outcome at this point in time patient states based upon his past medical history he does not want an invasive spine surgeon at this point in time patient wishes more for a biopsy and radiation. I do think that is reasonable I did take a fair amount time this morning to discuss impending paralysis based upon this T4 lesion and subjective weakness and again patient wishes for biopsy and radiation medicine.  A long discussion was had with the patient regarding surgical plan. Patient is aware that surgery is elective in nature and is choosing to proceed with surgery. Risks, benefits, alternatives were discussed and all questions, comments and concerns were answered to the patient's satisfaction. The statistical probability of improvement was discussed at length as well as post surgical course.  Literature was provided regarding the specific type of surgery as well as a written surgical consent which the patient will need to sign and return to the office prior to surgical date.    If patient is a smoker, discontinuation of smoking was advised and must be accomplished 6-8 weeks prior to surgery date.  Patient was advised that help with quitting smoking is available through [place] [employer]uit Line and phone number/website was provided, or patient can ask assistance from primary care provider.  Elective surgery will not be performed unless patient complies with this policy.

## 2019-10-30 NOTE — CONSULT NOTE ADULT - PROBLEM SELECTOR RECOMMENDATION 2
1. PALLIATIVE care consult STRONGLY recommended for ongoing management
He is not inclined to pursue chemotherapy if biopsy is malignant.  I explained if that is the case , then may want not  to undergo burden of biopsy if not to receive treatment.  He states he is doing it to "make his wife happy", but would also want to know what stage it is. Given his age, if it was advanced, he would not want to pursue tx.

## 2019-10-30 NOTE — CONSULT NOTE ADULT - ASSESSMENT
78y old Male with known history of benign tumor resection and pathological fracture of T4, s/p R VATS, chest washout on 10/03 for chest abscess growing Strep milleri. Now readmitted on 10/28/19 with progressively worsening back pain. CT Spine indicates T4 tumor extending into surrounding tissues with Lytic lesion at L2. He is presently A&Ox3, NAD, sitting up in chair. O2via NC, significant other present. Pain appreciated, as he slumps over to avoid the pain. He denies relief with Oxycodone IR. Discussed addition of a log acting opioid to assist with effectiveness and improve duration of analgesia. Patient verbalizes understanding.       [facility] Reference # [id number]

## 2019-10-30 NOTE — PROGRESS NOTE ADULT - ASSESSMENT
78M h/o CHF, CAD, HTN, HLD, COPD on 2L home O2, pna, emphysema s/p decortication 4/30/19, chronic back pain with history s/p benign tumor resection presented to  Dr. Dominguez's office with progressive worsening of constant, sharp 10/10 back pain between the scapula radiating to right chest wall  and intermittently to B/L LE. Recently underwent R VATS, chest washout, R CT placement and wound VAC on 10/3 for empyema following an initial decortication performed April 2019. 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  CT A/P revealed 6x6cm necrotic liver mass v. abscess. On ceftriaxone daily through 10/30 for prior Chest abscess growing Strep milleri. VAC removed.     PLAN  Neuro: Pain management. Chronic pain consult placed. Orthotics contacted for possible alternative recommendations to TLSO brace as patient noncompliant.   Pulm: Encourage coughing, deep breathing and use of incentive spirometry.    Cardio: Monitor telemetry/alarms. C/W ASA.   GI: Tolerating diet. Continue stool softeners. Possible Liver biopsy with IR pending Rad-Onc plan. If path of spine lesion required for possible palliative radiation, then path of liver lesion may not be necessary to obtain. D/W Dr. Gonzalez.    Renal: Monitor urine output, supplement electrolytes as needed  Vasc: SCDs for DVT prophylaxis. Add chemical DVT ppx if no intervention planned.   Heme: Stable H/H.  ID: Ceftriaxone through 10/30.   Wound care: DSD changed daily.   Therapy: OOB/ambulate with brace. PT eval to be ordered when brace bedside.   Disposition: Palliative consult.     Discussed with Cardiothoracic Team at AM rounds.

## 2019-10-30 NOTE — CONSULT NOTE ADULT - PROBLEM SELECTOR RECOMMENDATION 5
Met with patient. He understands that lesions on spine and liver suspicisous for cancer. He states he knew about it from last admission but did not pursue follow up. He just had a VATs with chest tube and just  wanted to  "handle things one at a time".   He seems amendable to radiation but was not inclined to surgical intervention.  He is aware of the liver mass and the possiblity of cancer.  He is undergoing biopsy to make his wife happy but overall did not seem inclined to treatment if his cancer is advanced.  Called [facility] Nurse Navigator to check on status of Rad/Onc referral.  Palliative Care will continue to follow. Will try to meet with wife to discuss together with  further GOC.

## 2019-10-30 NOTE — CONSULT NOTE ADULT - ASSESSMENT
78 yr man with h/o CHF, CAD, HTN, HLD, COPD on 2L home O2 , hx of decortication on 4/19 for empyema,  with recent Right VATS with CT 10/3.  Patient found to have lytic bone lesions on thoracic spine and necrotic mass on liver

## 2019-10-30 NOTE — CONSULT NOTE ADULT - PROBLEM SELECTOR RECOMMENDATION 3
1. Monitor continuous O2 saturation with opioid use  - Caution COPD on home O2, requiring increased opioids.
Cont O2 support

## 2019-10-31 NOTE — PROGRESS NOTE ADULT - SUBJECTIVE AND OBJECTIVE BOX
CC:  follow up GOC  INTERVAL HPI/OVERNIGHT EVENTS:    PRESENT SYMPTOMS: SOURCE:  Patient/Family/Team    PAIN SCALE:  0 = none  1 = mild   2 = moderate  3 = severe    Pain: back - 3    Dyspnea:  [ ] YES [x ] NO  Anxiety:  [ ] YES [x ] NO  Fatigue: [x YES [ ] NO  Nausea: [ ] YES [x NO  Loss of Appetite: [ ] YES [x ] NO  Other symptoms: __________    MEDICATIONS  (STANDING):  ALBUTerol    90 MICROgram(s) HFA Inhaler 2 Puff(s) Inhalation every 6 hours  aspirin enteric coated 81 milliGRAM(s) Oral daily  atorvastatin 20 milliGRAM(s) Oral at bedtime  carvedilol 3.125 milliGRAM(s) Oral every 12 hours  enoxaparin Injectable 40 milliGRAM(s) SubCutaneous daily  lidocaine   Patch 1 Patch Transdermal daily  losartan 100 milliGRAM(s) Oral daily  montelukast 10 milliGRAM(s) Oral daily  oxyCODONE  ER Tablet 10 milliGRAM(s) Oral every 12 hours  pantoprazole    Tablet 40 milliGRAM(s) Oral before breakfast  predniSONE   Tablet 10 milliGRAM(s) Oral daily  pregabalin 50 milliGRAM(s) Oral at bedtime  pregabalin 25 milliGRAM(s) Oral <User Schedule>  senna 2 Tablet(s) Oral at bedtime  sertraline 25 milliGRAM(s) Oral daily  sodium chloride 0.9% lock flush 3 milliLiter(s) IV Push every 8 hours  spironolactone 25 milliGRAM(s) Oral daily  tiotropium 18 MICROgram(s) Capsule 1 Capsule(s) Inhalation daily    MEDICATIONS  (PRN):  acetaminophen   Tablet .. 650 milliGRAM(s) Oral every 6 hours PRN Mild Pain (1 - 3)  oxycodone    5 mG/acetaminophen 325 mG 1 Tablet(s) Oral every 4 hours PRN Moderate Pain (4 - 6)  oxycodone    5 mG/acetaminophen 325 mG 2 Tablet(s) Oral every 4 hours PRN Severe Pain (7 - 10)      Allergies    No Known Allergies    Intolerances      Karnofsky Performance Score/Palliative Performance Status Version 2: 40 %    Vital Signs Last 24 Hrs  T(C): 36.4 (31 Oct 2019 10:17), Max: 36.7 (31 Oct 2019 06:17)  T(F): 97.6 (31 Oct 2019 10:17), Max: 98 (31 Oct 2019 06:17)  HR: 69 (31 Oct 2019 10:17) (65 - 93)  BP: 109/69 (31 Oct 2019 10:17) (109/69 - 142/68)  BP(mean): --  RR: 18 (31 Oct 2019 10:17) (18 - 19)  SpO2: 97% (31 Oct 2019 10:17) (95% - 99%)    PHYSICAL EXAM:    General:  AOx3 NAD  HEENT: [x ] normal  [ ] dry mouth  [ ] ET tube/trach    Lungs: [ x] comfortable [ ] tachypnea/labored breathing  [ ] excessive secretions    CV: [ x] normal  [ ] tachycardia    GI: [x ] normal  [ ] distended  [ ] tender  [ ] no BS               [ ] PEG/NG/OG tube    : [x ] normal  [ ] incontinent  [ ] oliguria/anuria  [ ] wu    MSK: [ ] normal  [x ] weakness  [ ] edema             [ ] ambulatory  [ ] bedbound/wheelchair bound    Skin: [ ] normal  [ ] pressure ulcers- Stage_____  [x ] no rash    LABS:                        12.8   10.79 )-----------( 129      ( 31 Oct 2019 06:29 )             41.5     10-31    140  |  99  |  31.0<H>  ----------------------------<  99  4.5   |  29.0  |  0.96    Ca    9.9      31 Oct 2019 06:29  Phos  2.8     10-31  Mg     2.0     10-31      PT/INR - ( 30 Oct 2019 06:12 )   PT: 12.0 sec;   INR: 1.04 ratio         PTT - ( 30 Oct 2019 06:12 )  PTT:33.0 sec    I&O's Summary    30 Oct 2019 07:01  -  31 Oct 2019 07:00  --------------------------------------------------------  IN: 290 mL / OUT: 1000 mL / NET: -710 mL        Thank you for the opportunity to assist with the care of this patient.   [facility] [phone]. CC:  follow up GOC  INTERVAL HPI/OVERNIGHT EVENTS:    PRESENT SYMPTOMS: SOURCE:  Patient/Family/Team    PAIN SCALE:  0 = none  1 = mild   2 = moderate  3 = severe    Pain: back - 3    Dyspnea:  [ ] YES [x ] NO  Anxiety:  [ ] YES [x ] NO  Fatigue: [x YES [ ] NO  Nausea: [ ] YES [x NO  Loss of Appetite: [ ] YES [x ] NO  Other symptoms: __________    MEDICATIONS  (STANDING):  ALBUTerol    90 MICROgram(s) HFA Inhaler 2 Puff(s) Inhalation every 6 hours  aspirin enteric coated 81 milliGRAM(s) Oral daily  atorvastatin 20 milliGRAM(s) Oral at bedtime  carvedilol 3.125 milliGRAM(s) Oral every 12 hours  enoxaparin Injectable 40 milliGRAM(s) SubCutaneous daily  lidocaine   Patch 1 Patch Transdermal daily  losartan 100 milliGRAM(s) Oral daily  montelukast 10 milliGRAM(s) Oral daily  oxyCODONE  ER Tablet 10 milliGRAM(s) Oral every 12 hours  pantoprazole    Tablet 40 milliGRAM(s) Oral before breakfast  predniSONE   Tablet 10 milliGRAM(s) Oral daily  pregabalin 50 milliGRAM(s) Oral at bedtime  pregabalin 25 milliGRAM(s) Oral <User Schedule>  senna 2 Tablet(s) Oral at bedtime  sertraline 25 milliGRAM(s) Oral daily  sodium chloride 0.9% lock flush 3 milliLiter(s) IV Push every 8 hours  spironolactone 25 milliGRAM(s) Oral daily  tiotropium 18 MICROgram(s) Capsule 1 Capsule(s) Inhalation daily    MEDICATIONS  (PRN):  acetaminophen   Tablet .. 650 milliGRAM(s) Oral every 6 hours PRN Mild Pain (1 - 3)  oxycodone    5 mG/acetaminophen 325 mG 1 Tablet(s) Oral every 4 hours PRN Moderate Pain (4 - 6)  oxycodone    5 mG/acetaminophen 325 mG 2 Tablet(s) Oral every 4 hours PRN Severe Pain (7 - 10)      Allergies    No Known Allergies    Intolerances      Karnofsky Performance Score/Palliative Performance Status Version 2: 40 %    Vital Signs Last 24 Hrs  T(C): 36.4 (31 Oct 2019 10:17), Max: 36.7 (31 Oct 2019 06:17)  T(F): 97.6 (31 Oct 2019 10:17), Max: 98 (31 Oct 2019 06:17)  HR: 69 (31 Oct 2019 10:17) (65 - 93)  BP: 109/69 (31 Oct 2019 10:17) (109/69 - 142/68)  BP(mean): --  RR: 18 (31 Oct 2019 10:17) (18 - 19)  SpO2: 97% (31 Oct 2019 10:17) (95% - 99%)    PHYSICAL EXAM:    General:  AOx3 NAD  HEENT: [x ] normal  [ ] dry mouth  [ ] ET tube/trach    Lungs: [ x] comfortable [ ] tachypnea/labored breathing  [ ] excessive secretions    CV: [ x] normal  [ ] tachycardia    GI: [x ] normal  [ ] distended  [ ] tender  [ ] no BS               [ ] PEG/NG/OG tube    : [x ] normal  [ ] incontinent  [ ] oliguria/anuria  [ ] wu    MSK: [ ] normal  [x ] weakness  [ x] edema             [ ] ambulatory  [ ] bedbound/wheelchair bound    Skin: [ ] normal  [ ] pressure ulcers- Stage_____  [x ] no rash    LABS:                        12.8   10.79 )-----------( 129      ( 31 Oct 2019 06:29 )             41.5     10-31    140  |  99  |  31.0<H>  ----------------------------<  99  4.5   |  29.0  |  0.96    Ca    9.9      31 Oct 2019 06:29  Phos  2.8     10-31  Mg     2.0     10-31      PT/INR - ( 30 Oct 2019 06:12 )   PT: 12.0 sec;   INR: 1.04 ratio         PTT - ( 30 Oct 2019 06:12 )  PTT:33.0 sec    I&O's Summary    30 Oct 2019 07:01  -  31 Oct 2019 07:00  --------------------------------------------------------  IN: 290 mL / OUT: 1000 mL / NET: -710 mL        Thank you for the opportunity to assist with the care of this patient.   [facility] Consult [facility] [phone].

## 2019-10-31 NOTE — GOALS OF CARE CONVERSATION - ADVANCED CARE PLANNING - CONVERSATION DETAILS
Met initially with patient and wife with Dr. Mejias.   Patient affirmed would NOT want treatment ( chemo or surgery)  for liver lesion if + for cancer. Discussed risks/benefits of biopsy if NOT pursing treatment.  Discussed radiation  therapy - palliative in plan given his pain and spine condition. Discussed hospice services if ultimately not pursing treatment.   Discussed with Rad/Onc Dr. Gilbert benefits of liver biopsy. He stated could help with further treatment plan if patient continues to be symptomatic.   Discussed advance directives CPR/Intubation. Patient stated would NOT want such interventions. MOLST signed by patient    Second meeting with patient, wife, children ( daughter, son, BRIAN) with Dr. Mejias  Discussed risks/benefits of biopsy. Informed them of our discussion with Dr. Gilbert. Family encouraging patient to have biopsy.   They say father would not want treatment for now, but would consider it in the future. Explained to family risks/benefits of chemotherapy in a patient with multiple comorbidities and advance age.  Patient consented to liver biopsy with the urging of his family.   Dr. Mejias later showed son and wife  CT scans.

## 2019-10-31 NOTE — PROGRESS NOTE ADULT - SUBJECTIVE AND OBJECTIVE BOX
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 Chest abscess growing Strep milleri. CT A/P revealed 6x6cm necrotic liver mass v. abscess. On 10/3 patient had R VATS, chest washout, R CT placement and wound VAC and D/C home on 10/11/19. (28 Oct 2019 13:41) <END of Copied Text>    Pain regimen modified yesterday to allow Oxycontin 10mg and Lyrica 75mg daily.    VERBAL REPORT: "I'm tolerating it, but could do a little better."  Patient admits improved analgesia, but notes that he could use some additional relief. Per MAR, he last had Percoet in the 3AM hour. Use of breakthrough medication reviewed with patient.   Spouse, at the bedside, adds that this is the most comfortable she has seen him in a while.  Per RN, patient has been refusing the Percocet when offered.    PAIN SCORE:  6/10                 SCALE USED: VNRS    Allergies  No Known Allergies    PAST MEDICAL & SURGICAL HISTORY:  CHF (congestive heart failure)  CAD (coronary artery disease)  HLD (hyperlipidemia)  HTN (hypertension)  COPD (chronic obstructive pulmonary disease)  Stenosis of lumbosacral spine  H/O back injury  Artificial pacemaker      MEDICATIONS  (STANDING):  acetaminophen   Tablet .. 975 milliGRAM(s) Oral every 6 hours  ALBUTerol    90 MICROgram(s) HFA Inhaler 2 Puff(s) Inhalation every 6 hours  aspirin enteric coated 81 milliGRAM(s) Oral daily  atorvastatin 20 milliGRAM(s) Oral at bedtime  carvedilol 3.125 milliGRAM(s) Oral every 12 hours  enoxaparin Injectable 40 milliGRAM(s) SubCutaneous daily  lidocaine   Patch 1 Patch Transdermal daily  losartan 100 milliGRAM(s) Oral daily  montelukast 10 milliGRAM(s) Oral daily  oxyCODONE  ER Tablet 15 milliGRAM(s) Oral every 12 hours  pantoprazole    Tablet 40 milliGRAM(s) Oral before breakfast  predniSONE   Tablet 10 milliGRAM(s) Oral daily  pregabalin 50 milliGRAM(s) Oral at bedtime  pregabalin 25 milliGRAM(s) Oral <User Schedule>  senna 2 Tablet(s) Oral at bedtime  sertraline 25 milliGRAM(s) Oral daily  sodium chloride 0.9% lock flush 3 milliLiter(s) IV Push every 8 hours  spironolactone 25 milliGRAM(s) Oral daily  tiotropium 18 MICROgram(s) Capsule 1 Capsule(s) Inhalation daily    MEDICATIONS  (PRN):  oxycodone    5 mG/acetaminophen 325 mG 1 Tablet(s) Oral every 4 hours PRN Moderate Pain (4 - 6)      PHYSICAL EXAM:  GENERAL: NAD, well-groomed, well-developed  HEAD:  Atraumatic, Normocephalic  EYES: EOMI, PERRLA, conjunctiva and sclera clear  NERVOUS SYSTEM:  Alert & Oriented X3, Good concentration; Motor Strength 5/5 B/L upper and lower extremities  CHEST/LUNG: O2 via NC, Clear speech, no SOB  ABDOMEN: Bowel sounds present  MUSCULOSKELETAL: Thoracic and lumbar spine TTP      Vital Signs Last 24 Hrs  T(C): 37 (31 Oct 2019 16:31), Max: 37 (31 Oct 2019 16:31)  T(F): 98.6 (31 Oct 2019 16:31), Max: 98.6 (31 Oct 2019 16:31)  HR: 72 (31 Oct 2019 16:31) (69 - 93)  BP: 131/78 (31 Oct 2019 16:31) (109/69 - 142/68)  BP(mean): --  RR: 17 (31 Oct 2019 16:31) (17 - 18)  SpO2: 100% (31 Oct 2019 16:31) (95% - 100%)                          12.8   10.79 )-----------( 129      ( 31 Oct 2019 06:29 )             41.5         PT/INR - ( 30 Oct 2019 06:12 )   PT: 12.0 sec;   INR: 1.04 ratio    PTT - ( 30 Oct 2019 06:12 )  PTT:33.0 sec

## 2019-10-31 NOTE — PROGRESS NOTE ADULT - PROBLEM SELECTOR PLAN 7
Met with patient and wife with Dr. Mejias. Another meeting held with children.  See [facility] note for details - in summary  1. Patient wishes DNR/I- MOLST completed  2. Patient with urging from his family agreed to biopsy of liver lesion.  3. Plan for radiation

## 2019-10-31 NOTE — PROGRESS NOTE ADULT - PROBLEM SELECTOR PLAN 2
Met with patient and wife with Dr. Mejias. Patient continues to state that he would NOT want treatment if biopsy confirms cancer.  Discussed risks/benefits of undergoing interventions if not pursing treatment. Met with patient and wife with Dr. Mejias. Patient continues to state that he would NOT want treatment if biopsy confirms cancer.  Discussed risks/benefits of undergoing interventions if not pursing treatment.  Discussed with radiation oncologist Dr. Gilbert, biopsy could possible help with future treatment plan if patient continues to have symptoms

## 2019-10-31 NOTE — PROGRESS NOTE ADULT - ASSESSMENT
78y old Male with known history of benign tumor resection and pathological fracture of T4, s/p R VATS, chest washout on 10/03 for chest abscess growing Strep milleri. Now readmitted on 10/28/19 with progressively worsening back pain. CT Spine indicates T4 tumor extending into surrounding tissues with Lytic lesion at L2. He is presently A&Ox3, NAD, sitting up in chair. O2via NC, significant other present. He improved analgesia with addition of Oxycontin. He is not slumped over as yesterday, sitting up more straight. Discussed risk benefits of increasing the long acting opioid, in setting of COPD. Patient insists that would be preferred to assist with improving his analgesia.

## 2019-10-31 NOTE — PROGRESS NOTE ADULT - SUBJECTIVE AND OBJECTIVE BOX
JOHN CIFUENTES    [id number]    78y      Male    Patient is a 78y old  Male who presents with a chief complaint of Back pain (30 Oct 2019 19:19)      INTERVAL HPI/OVERNIGHT EVENTS:    Patient is doing ok, he is having pain in the upper back, pain meds some what help him, denies fever, chills, chest pain, nausea, vomiting.     REVIEW OF SYSTEMS:    CONSTITUTIONAL: No fever, some fatigue  RESPIRATORY: No cough, No shortness of breath  CARDIOVASCULAR: No chest pain, palpitations  GASTROINTESTINAL: No abdominal, No nausea, vomiting  NEUROLOGICAL: No headaches,  loss of strength.  MISCELLANEOUS: back pain         Vital Signs Last 24 Hrs  T(C): 37 (31 Oct 2019 16:31), Max: 37 (31 Oct 2019 16:31)  T(F): 98.6 (31 Oct 2019 16:31), Max: 98.6 (31 Oct 2019 16:31)  HR: 72 (31 Oct 2019 16:31) (69 - 93)  BP: 131/78 (31 Oct 2019 16:31) (109/69 - 142/68)  BP(mean): --  RR: 17 (31 Oct 2019 16:31) (17 - 18)  SpO2: 100% (31 Oct 2019 16:31) (95% - 100%)    PHYSICAL EXAM:    GENERAL: Elderly male looking comfortable   HEENT: PERRL, +EOMI  NECK: soft, Supple, No JVD,   CHEST/LUNG: Decrease air entry bilaterally; No wheezing, right sided chest wound, with minimal drainage  HEART: S1S2+, Regular rate and rhythm; No murmurs  ABDOMEN: Soft, Nontender, Nondistended; Bowel sounds present  EXTREMITIES:  1+ Peripheral Pulses, No edema  SKIN: No rashes or lesions  NEURO: AAOX3, no focal deficits, no motor r sensory loss  PSYCH: normal mood      LABS:                        12.8   10.79 )-----------( 129      ( 31 Oct 2019 06:29 )             41.5     10-31    140  |  99  |  31.0<H>  ----------------------------<  99  4.5   |  29.0  |  0.96    Ca    9.9      31 Oct 2019 06:29  Phos  2.8     10-31  Mg     2.0     10-31      PT/INR - ( 30 Oct 2019 06:12 )   PT: 12.0 sec;   INR: 1.04 ratio         PTT - ( 30 Oct 2019 06:12 )  PTT:33.0 sec        I&O's Summary    30 Oct 2019 07:01  -  31 Oct 2019 07:00  --------------------------------------------------------  IN: 290 mL / OUT: 1000 mL / NET: -710 mL    31 Oct 2019 07:01  -  31 Oct 2019 17:29  --------------------------------------------------------  IN: 360 mL / OUT: 0 mL / NET: 360 mL        MEDICATIONS  (STANDING):  acetaminophen   Tablet .. 975 milliGRAM(s) Oral every 6 hours  ALBUTerol    90 MICROgram(s) HFA Inhaler 2 Puff(s) Inhalation every 6 hours  aspirin enteric coated 81 milliGRAM(s) Oral daily  atorvastatin 20 milliGRAM(s) Oral at bedtime  carvedilol 3.125 milliGRAM(s) Oral every 12 hours  enoxaparin Injectable 40 milliGRAM(s) SubCutaneous daily  lidocaine   Patch 1 Patch Transdermal daily  losartan 100 milliGRAM(s) Oral daily  montelukast 10 milliGRAM(s) Oral daily  oxyCODONE  ER Tablet 15 milliGRAM(s) Oral every 12 hours  pantoprazole    Tablet 40 milliGRAM(s) Oral before breakfast  predniSONE   Tablet 10 milliGRAM(s) Oral daily  pregabalin 50 milliGRAM(s) Oral at bedtime  pregabalin 25 milliGRAM(s) Oral <User Schedule>  senna 2 Tablet(s) Oral at bedtime  sertraline 25 milliGRAM(s) Oral daily  sodium chloride 0.9% lock flush 3 milliLiter(s) IV Push every 8 hours  spironolactone 25 milliGRAM(s) Oral daily  tiotropium 18 MICROgram(s) Capsule 1 Capsule(s) Inhalation daily    MEDICATIONS  (PRN):  oxycodone    5 mG/acetaminophen 325 mG 1 Tablet(s) Oral every 4 hours PRN Moderate Pain (4 - 6) JOHN CIFUENTES    [id number]    78y      Male    Patient is a 78y old  Male who presents with a chief complaint of Back pain (30 Oct 2019 19:19)      INTERVAL HPI/OVERNIGHT EVENTS:    Patient is having pain in the upper back and intermittent b/l lower extremities numbness espcialy if he is being backwards on laying, pain meds some what help him, denies fever, chills, chest pain, nausea, vomiting.     REVIEW OF SYSTEMS:    CONSTITUTIONAL: No fever, some fatigue  RESPIRATORY: No cough, No shortness of breath  CARDIOVASCULAR: No chest pain, palpitations  GASTROINTESTINAL: No abdominal, No nausea, vomiting  NEUROLOGICAL: intermittent numbness of the lower extremities  MISCELLANEOUS: back pain         Vital Signs Last 24 Hrs  T(C): 37 (31 Oct 2019 16:31), Max: 37 (31 Oct 2019 16:31)  T(F): 98.6 (31 Oct 2019 16:31), Max: 98.6 (31 Oct 2019 16:31)  HR: 72 (31 Oct 2019 16:31) (69 - 93)  BP: 131/78 (31 Oct 2019 16:31) (109/69 - 142/68)  RR: 17 (31 Oct 2019 16:31) (17 - 18)  SpO2: 100% (31 Oct 2019 16:31) (95% - 100%)    PHYSICAL EXAM:    GENERAL: Elderly male looking comfortable   HEENT: PERRL, +EOMI  NECK: soft, Supple, No JVD,   CHEST/LUNG: Decrease air entry bilaterally; No wheezing, right sided chest wound, with minimal drainage  HEART: S1S2+, Regular rate and rhythm; No murmurs  ABDOMEN: Soft, Nontender, Nondistended; Bowel sounds present  EXTREMITIES:  1+ Peripheral Pulses, +3 edema on the right leg and +2 edema on the left.   SKIN: wound on the chest wall, chronic skin changes in the lower extremities.  NEURO: AAOX3, decrease power in lower ext due to pain in the back and has intermittent numbness.   PSYCH: normal mood      LABS:                        12.8   10.79 )-----------( 129      ( 31 Oct 2019 06:29 )             41.5     10-31    140  |  99  |  31.0<H>  ----------------------------<  99  4.5   |  29.0  |  0.96    Ca    9.9      31 Oct 2019 06:29  Phos  2.8     10-31  Mg     2.0     10-31      PT/INR - ( 30 Oct 2019 06:12 )   PT: 12.0 sec;   INR: 1.04 ratio         PTT - ( 30 Oct 2019 06:12 )  PTT:33.0 sec        I&O's Summary    30 Oct 2019 07:01  -  31 Oct 2019 07:00  --------------------------------------------------------  IN: 290 mL / OUT: 1000 mL / NET: -710 mL    31 Oct 2019 07:01  -  31 Oct 2019 17:29  --------------------------------------------------------  IN: 360 mL / OUT: 0 mL / NET: 360 mL        MEDICATIONS  (STANDING):  acetaminophen   Tablet .. 975 milliGRAM(s) Oral every 6 hours  ALBUTerol    90 MICROgram(s) HFA Inhaler 2 Puff(s) Inhalation every 6 hours  aspirin enteric coated 81 milliGRAM(s) Oral daily  atorvastatin 20 milliGRAM(s) Oral at bedtime  carvedilol 3.125 milliGRAM(s) Oral every 12 hours  enoxaparin Injectable 40 milliGRAM(s) SubCutaneous daily  lidocaine   Patch 1 Patch Transdermal daily  losartan 100 milliGRAM(s) Oral daily  montelukast 10 milliGRAM(s) Oral daily  oxyCODONE  ER Tablet 15 milliGRAM(s) Oral every 12 hours  pantoprazole    Tablet 40 milliGRAM(s) Oral before breakfast  predniSONE   Tablet 10 milliGRAM(s) Oral daily  pregabalin 50 milliGRAM(s) Oral at bedtime  pregabalin 25 milliGRAM(s) Oral <User Schedule>  senna 2 Tablet(s) Oral at bedtime  sertraline 25 milliGRAM(s) Oral daily  sodium chloride 0.9% lock flush 3 milliLiter(s) IV Push every 8 hours  spironolactone 25 milliGRAM(s) Oral daily  tiotropium 18 MICROgram(s) Capsule 1 Capsule(s) Inhalation daily    MEDICATIONS  (PRN):  oxycodone    5 mG/acetaminophen 325 mG 1 Tablet(s) Oral every 4 hours PRN Moderate Pain (4 - 6)

## 2019-10-31 NOTE — PROGRESS NOTE ADULT - SUBJECTIVE AND OBJECTIVE BOX
Subjective: Patient c/o back pain radiating to chest. Unable to tolerate getting OOB. Admits to B/L LE swelling. Denies fever, chills, cough, SOB.     Vital Signs:  Vital Signs Last 24 Hrs  T(C): 36.4 (10-31-19 @ 10:17), Max: 36.7 (10-31-19 @ 06:17)  T(F): 97.6 (10-31-19 @ 10:17), Max: 98 (10-31-19 @ 06:17)  HR: 69 (10-31-19 @ 10:17) (65 - 93)  BP: 109/69 (10-31-19 @ 10:17) (109/69 - 144/83)  RR: 18 (10-31-19 @ 10:17) (18 - 19)  SpO2: 97% (10-31-19 @ 10:17) (95% - 99%) on (O2)    I&O's Detail    30 Oct 2019 07:01  -  31 Oct 2019 07:00  --------------------------------------------------------  IN:    IV PiggyBack: 50 mL    Oral Fluid: 240 mL  Total IN: 290 mL    OUT:    Voided: 1000 mL  Total OUT: 1000 mL    Total NET: -710 mL      General: NAD  Neurology: Awake, nonfocal, HOWARD x 4  Eyes: Scleras clear, PERRLA/ EOMI, Gross vision intact  ENT: Gross hearing intact, grossly patent pharynx, no stridor  Neck: Neck supple, trachea midline, No JVD  Respiratory: CTA B/L, No wheezing, rales, rhonchi  CV: RRR, S1S2, no murmurs, rubs or gallops  Abdominal: Soft, NT, ND  Extremities: B/L LE edema  Skin: No Rashes, Hematoma, Ecchymosis  Psych: Oriented x 3, normal affect  Incisions: C/D/I      Relevant labs, radiology and Medications reviewed                        12.8   10.79 )-----------( 129      ( 31 Oct 2019 06:29 )             41.5     10-31    140  |  99  |  31.0<H>  ----------------------------<  99  4.5   |  29.0  |  0.96    Ca    9.9      31 Oct 2019 06:29  Phos  2.8     10-31  Mg     2.0     10-31      PT/INR - ( 30 Oct 2019 06:12 )   PT: 12.0 sec;   INR: 1.04 ratio         PTT - ( 30 Oct 2019 06:12 )  PTT:33.0 sec  MEDICATIONS  (STANDING):  ALBUTerol    90 MICROgram(s) HFA Inhaler 2 Puff(s) Inhalation every 6 hours  aspirin enteric coated 81 milliGRAM(s) Oral daily  atorvastatin 20 milliGRAM(s) Oral at bedtime  carvedilol 3.125 milliGRAM(s) Oral every 12 hours  enoxaparin Injectable 40 milliGRAM(s) SubCutaneous daily  lidocaine   Patch 1 Patch Transdermal daily  losartan 100 milliGRAM(s) Oral daily  montelukast 10 milliGRAM(s) Oral daily  oxyCODONE  ER Tablet 10 milliGRAM(s) Oral every 12 hours  pantoprazole    Tablet 40 milliGRAM(s) Oral before breakfast  predniSONE   Tablet 10 milliGRAM(s) Oral daily  pregabalin 50 milliGRAM(s) Oral at bedtime  pregabalin 25 milliGRAM(s) Oral <User Schedule>  senna 2 Tablet(s) Oral at bedtime  sertraline 25 milliGRAM(s) Oral daily  sodium chloride 0.9% lock flush 3 milliLiter(s) IV Push every 8 hours  spironolactone 25 milliGRAM(s) Oral daily  tiotropium 18 MICROgram(s) Capsule 1 Capsule(s) Inhalation daily    MEDICATIONS  (PRN):  acetaminophen   Tablet .. 650 milliGRAM(s) Oral every 6 hours PRN Mild Pain (1 - 3)  oxycodone    5 mG/acetaminophen 325 mG 1 Tablet(s) Oral every 4 hours PRN Moderate Pain (4 - 6)  oxycodone    5 mG/acetaminophen 325 mG 2 Tablet(s) Oral every 4 hours PRN Severe Pain (7 - 10)        Assessment  78y Male  w/ PAST MEDICAL & SURGICAL HISTORY:  CHF (congestive heart failure)  CAD (coronary artery disease)  HLD (hyperlipidemia)  HTN (hypertension)  COPD (chronic obstructive pulmonary disease)  Stenosis of lumbosacral spine  H/O back injury  Artificial pacemaker  admitted with complaints of Patient is a 78y old  Male who presents with a chief complaint of Back pain (30 Oct 2019 19:19)

## 2019-10-31 NOTE — GOALS OF CARE CONVERSATION - ADVANCED CARE PLANNING - WHAT MATTERS MOST
Patient wants quality of life- help with his pain, ambulate Patient wants quality of life- help with his pain, ambulate    Meeting time start 2;10pm  Time end 2:50pm

## 2019-10-31 NOTE — PROGRESS NOTE ADULT - ASSESSMENT
78M h/o CHF, CAD, HTN, HLD, COPD on 2L home O2, pna, emphysema s/p decortication 4/30/19, chronic back pain with history s/p benign tumor resection presented to  Dr. Dominguez's office with progressive worsening of constant, sharp 10/10 back pain between the scapula radiating to right chest wall  and intermittently to B/L LE. Recently underwent R VATS, chest washout, R CT placement and wound VAC on 10/3 for empyema following an initial decortication performed April 2019. 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  CT A/P revealed 6x6cm necrotic liver mass v. abscess. On ceftriaxone daily through 10/30 for prior Chest abscess growing Strep milleri. VAC removed.     PLAN  Neuro: Pain management. Appreciate chronic pain recs.   Pulm: Encourage coughing, deep breathing and use of incentive spirometry.  Change chest wound dressing daily.   Cardio: Monitor telemetry/alarms. C/W ASA.   GI: Tolerating diet. Continue stool softeners. Recommend IR biopsy of liver lesion in order to initiate palliative radiation therapy for compressing thoracic spine lesion.   Renal: Monitor urine output, supplement electrolytes as needed  Vasc: Lovenox SC/SCDs for DVT prophylaxis  Heme: Stable H/H.  ID: Off antibiotics. Stable.  Therapy: OOB/ambulate. TLSO brace. PT.       Discussed with Cardiothoracic Team at AM rounds.    Naima HOPPER  Thoracic Surgery   #[phone]

## 2019-10-31 NOTE — PROGRESS NOTE ADULT - PROBLEM SELECTOR PLAN 5
Discussed advance care planning CPR/ intubation. Patient states he has been discussed this in the past. He would not want such interventions, verbalized would want to be a DNR.   Reviewed MOLST and signed by patient completed IV abx

## 2019-10-31 NOTE — PROGRESS NOTE ADULT - ASSESSMENT
78 M with Hx of CHF, CAD, HTN, HLD, COPD on 2L home O2, pna, emphysema s/p decortication 4/30/19 ,Recently underwent R VATS, chest washout, R CT placement and wound VAC on 10/3 for empyema following an initial decortication performed April 2019. Last admission, chronic back pain with history s/p benign tumor resection presented to  Dr. Dominguez's office with progressive worsening of constant, sharp 10/10 back pain between the scapula radiating to right chest wall  and intermittently to B/L LE.  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 abscess, patient has been seen by Orthospine, as per them    based upon his statement of lower extremity weakness paresthesias and sharp pain going down his legs when he arches his back, this gentleman has essentially an unstable upper thoracic spine and recommended surgical management to him, resume recommending there is chance of impending paralysis is definitely an outcome at this point in time patient states based upon his past medical history he does not want an invasive spine surgery at this point in time patient wishes more for a biopsy and radiation, patient has been seen by radiation oncology and will be getting follow up from IR for possible biopsy,   patient has Hx of chest wall wound, he was discharged on IV ceftriaxone daily and has been continued  through 10/30 for prior Chest abscess growing Strep milleri. VAC removed form the chest wound, he refused liver biopsy last time when she was here, but wants now.   CT surgery requested transfer under medicine service for further care, being accepted under medicine.       Plan:     Back pain due to  fracture suspicious of metastasis on T4, T11 and possibly T6, CT A/P revealed 6x6cm necrotic liver mass v. abscess: Patient has been seen by   ortho spine, being followed by Pain management team and has been seen  by radiation oncology, will follow along, If path of spine lesion required for possible palliative radiation, then path of liver lesion may not be necessary to obtain. D/W Dr. Gonzalez.    possible alternative recommendations to TLSO brace as patient noncompliant, will discuss with orthospine team.     Liver lesions: Will monitor LFTs, will discuss with patient and team to see if we need to call Oncology team.     Hx of Empyema and decortication in setting of COPD: supplemental oxygen, Encourage coughing, deep breathing and use of incentive spirometry, further management as per CT surgery team, completed Ceftriaxone therapy, wound care for chest wall wound, will provide Duoneb, Inhalers and oral steroids.          Hx of CAD: will continue with ASA, Statins, BB.     Hx of HTN: will continue with home meds, will monitor bp.     Hx of CHF: Looks euvolemic, will continue with spironolactone, BB.       DVT prophylaxis: will add chemical DVT ppx if no intervention planned, mean while continue SCDs.      Disposition: Palliative consult called by CT surgery team. 78 M with Hx of CHF, CAD, HTN, HLD, COPD on 2L home O2, pna, emphysema s/p decortication 4/30/19 ,Recently underwent R VATS, chest washout, R CT placement and wound VAC on 10/3 for empyema following an initial decortication performed April 2019. Last admission, chronic back pain with history s/p benign tumor resection presented to  Dr. Dominguez's office with progressive worsening of constant, sharp 10/10 back pain between the scapula radiating to right chest wall  and intermittently to B/L LE.  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 abscess, patient has been seen by Orthospine, as per them    based upon his statement of lower extremity weakness paresthesias and sharp pain going down his legs when he arches his back, this gentleman has essentially an unstable upper thoracic spine and recommended surgical management to him, resume recommending there is chance of impending paralysis is definitely an outcome at this point in time patient states based upon his past medical history he does not want an invasive spine surgery at this point in time patient wishes more for a biopsy and radiation, patient has been seen by radiation oncology and will be getting follow up from IR for possible biopsy,   patient has Hx of chest wall wound, he was discharged on IV ceftriaxone daily and has been continued  through 10/30 for prior Chest abscess growing Strep milleri. VAC removed form the chest wound, he refused liver biopsy last time when she was here, but wants now.   CT surgery requested transfer under medicine service for further care, being accepted under medicine.       Plan:     Back pain due to  fracture suspicious of metastasis on T4, T11 and possibly T6, CT A/P revealed 6x6cm necrotic liver mass v. abscess with some signs of cord compression: Patient has been seen by ortho spine, he is refusing for any surgical intervention, being followed by Pain management team and has been seen  by radiation oncology, spoke with Dr Sepulveda, he need to be steriods IV pulse steroids Dexamethasone 8mg [id number] for 2 days then medrole dose pack, he will follow along with team, spoke with Dr Gilbert, he need to have some tissue biopsy, so patient is going to have liver biopsy tomorrow by IR, will keep him NPO, possible alternative recommendations to TLSO brace as patient noncompliant, patient has significant  patient has him, pain on pain meds, as per helping him some what, once stable, will start PT.     Suspected metastatic disease with spine and Liver lesions: Will monitor LFTs, scheduled to have biopsy, I dont see if there is CT of the chest, abdomen or pelvis done to see if there is any primary tumor, will order CT Ab/pelvis and chest, will also get ayaz fetoprotein, PSA,  and CEA, patient does not wanted to persue any chemotherapy treatment.     Hx of Empyema and decortication in setting of COPD: supplemental oxygen, Encourage coughing, deep breathing and use of incentive spirometry, further management as per CT surgery team, completed Ceftriaxone therapy, wound care for chest wall wound, will provide Duoneb, Inhalers.           Hx of CAD: will continue with ASA, Statins, BB.     Hx of HTN: will continue with home meds, will monitor bp.     acute on chronic systolic CHF: patient last TTE was done in september showed EF of 40% to 45%, moderate aortic stenosis, patient is looking volume over load with edema and elevated proBNP, will add lasix 40mg daily IV, will continue with spironolactone, and BB, will monitor BMP, I/O and daily weight.      B/L lower extremity edema with Right leg more swollen then left: will get doppler US of the leg, can not elevated leg as he is getting numbness if he is bending his spine, will provide stocking.       DVT prophylaxis: will add chemical DVT ppx if no intervention planned, mean while continue SCDs.      Disposition: Palliative consult appreciated.    Goals of care conversation: Had extensive discussion two times today with patient and family, spoke about over all picture, treatment option, please refer to the goals of care note written by Maikol in detail, patient wanted quality of life, no chemo or surgical intervention, he was clear about his code status and wanted to be DNR and DNI, MOLTs form filled signed and put in the chart, total time spent on goal of conversation each time was 35 minutesX2

## 2019-11-01 NOTE — PROGRESS NOTE ADULT - ASSESSMENT
78 year old gentleman with lytic lesion on T4 resulting in pathologic compression fracture and cord compression, suspected malignancy but without workup/diagnosis, now with progressive neurologic compromise overnight.

## 2019-11-01 NOTE — PROGRESS NOTE ADULT - ASSESSMENT
78y old Male with known history of benign tumor resection and pathological fracture of T4, s/p R VATS, chest washout on 10/03 for chest abscess growing Strep milleri. Now readmitted on 10/28/19 with progressively worsening back pain. CT Spine indicates T4 tumor extending into surrounding tissues with Lytic lesion at L2. He lost ability to walk during this stay, now s/p Radiation session x1. He is presently A&Ox3, NAD, supine in bed. SOB with O2via NC, significant other present. He has improved analgesia with addition of Oxycontin. Discussed continuing the current regimen. Patient agrees.

## 2019-11-01 NOTE — PROGRESS NOTE ADULT - PROBLEM SELECTOR PLAN 3
PREVENTING HEAD INJURY   Repeated concussions can result in permanent brain damage.   Since you have had one concussion, you should consider the risk of contact sports such as football.      Those with a history of one concussion are more likely than another to have a second concussion . (The more concussion a persons sustains, the greater the risk is to have another).     Seek evaluation with any significant head injury.    As discussed, wear protection helmet when applicable. (Bike riding, horse back riding, go carts)       Warm up exercise and lightly stretch muscles before exercise   Cool down and stretch muscles after exercise   Always notify parents or guardian of any sports injury      Stay hydrated during exercise - particularly in the heat   Build endurance slowly to avoid overuse injury    Avoid caffeine / energy drinks      If consumed socially and in large quantities, SPORTS DRINKS can lead to serious problems, such as, obesity, diabetes, and poor oral health  Try water and fruit as a substitution for sports drinks     Sports physicals do not substitute for an annual / comprehensive physical with your health care     provider.  Be sure to follow up with your health care provider for your annual physical.        Follow up for any questions or concerns   Medical compliance with plan discussed and risks of noncompliance reviewed    Thank you for visiting [facility].  Please follow up with your primary doctor as needed       Chey Zimmer FNP  Family Nurse Practitioner     
See above.
1. Patient understands concerns around risk/benefits of increasing opioid use, in setting of COPD.  2. Continuous Pulse Oximetry
Per Pain Management - OxycontinJacquelinerica

## 2019-11-01 NOTE — PROGRESS NOTE ADULT - SUBJECTIVE AND OBJECTIVE BOX
Arrived at bedside with Dr Ellis. Patient is off unit to the [facility]. Will follow up at later time.

## 2019-11-01 NOTE — PROGRESS NOTE ADULT - ASSESSMENT
78 M with Hx of CHF, CAD, HTN, HLD, COPD on 2L home O2, pna, emphysema s/p decortication 4/30/19 ,Recently underwent R VATS, chest washout, R CT placement and wound VAC on 10/3 for empyema following an initial decortication performed April 2019. Last admission, chronic back pain with history s/p benign tumor resection presented to  Dr. Dominguez's office with progressive worsening of constant, sharp 10/10 back pain between the scapula radiating to right chest wall  and intermittently to B/L LE.  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 abscess, patient has been seen by Orthospine, as per them    based upon his statement of lower extremity weakness paresthesias and sharp pain going down his legs when he arches his back, this gentleman has essentially an unstable upper thoracic spine and recommended surgical management to him, resume recommending there is chance of impending paralysis is definitely an outcome at this point in time patient states based upon his past medical history he does not want an invasive spine surgery at this point in time patient wishes more for a biopsy and radiation, patient has been seen by radiation oncology and will be getting follow up from IR for possible biopsy,   patient has Hx of chest wall wound, he was discharged on IV ceftriaxone daily and has been continued  through 10/30 for prior Chest abscess growing Strep milleri. VAC removed form the chest wound, he refused liver biopsy last time when she was here, but wants now.   CT surgery requested transfer under medicine service for further care, accepted under medicine.       Plan:     Back pain due to  fracture suspicious of metastasis on T4, T11 and possibly T6, CT A/P revealed 6x6cm necrotic liver mass v. abscess with some signs of cord compression: Patient has been seen by ortho spine, he is refusing for any surgical intervention, being followed by Pain management team and has been seen  by radiation oncology, spoke with Dr Sepulveda, he need to be steriods IV pulse steroids Dexamethasone 8mg [id number] for 2 days then medrole dose pack, he will follow along with team, spoke with Dr Gilbert, he went to have 1st session of palliative radiation, next wound be on Monday, he went for biopsy IR guided but could not tolerate to lay flat, he refused Surgery on his back so possible alternative recommendations to TLSO brace as patient noncompliant, patient has significant pain on pain meds, as per helping him some what, once stable, will start PT, pain meds are being adjusted.      Suspected metastatic disease with spine and Liver lesions: Will monitor LFTs, scheduled to have biopsy, I dont see if there is CT of the chest, abdomen or pelvis done to see if there is any primary tumor, ordered CT Ab/pelvis and chest, ayaz fetoprotein and PSA WNL,  is 37 and CEA 6.2,  patient does not wanted to persue any chemotherapy treatment, Oncology consult called Dr Membreno going to see the patient.     Hx of Empyema and decortication in setting of COPD: supplemental oxygen, Encourage coughing, deep breathing and use of incentive spirometry, further management as per CT surgery team, completed Ceftriaxone therapy, wound care for chest wall wound, will provide Duoneb, Inhalers.           Hx of CAD: will continue with ASA, Statins, BB.     Hx of HTN: will continue with home meds, will monitor bp.     acute on chronic systolic CHF: patient last TTE was done in september showed EF of 40% to 45%, moderate aortic stenosis, patient is looking volume over load with edema and elevated proBNP, continue lasix 40mg daily IV, will continue with spironolactone, and BB, his repeat proBNP is high, will monitor BMP, I/O and daily weight.      B/L lower extremity edema with Right leg more swollen then left:  doppler US of the leg is pending, can not elevated leg as he is getting numbness if he is bending his spine, will provide stocking.       DVT prophylaxis: will add chemical DVT ppx if no intervention planned, mean while continue SCDs.      Disposition: Palliative consult appreciated.    Code status: DNR/DNI, patient is leaning toward comfort and care

## 2019-11-01 NOTE — PROGRESS NOTE ADULT - SUBJECTIVE AND OBJECTIVE BOX
JOHN CIFUENTES    [id number]    78y      Male    Patient is a 78y old  Male who presents with a chief complaint of T4 lesion (01 Nov 2019 07:35)      INTERVAL HPI/OVERNIGHT EVENTS:    Patient is still having pain in the upper back and intermittent b/l lower extremities numbness especial if he is being backwards on laying, he has constipation as well, pain meds some what help him, denies fever, chills, chest pain, nausea, vomiting.     REVIEW OF SYSTEMS:    CONSTITUTIONAL: No fever, some fatigue  RESPIRATORY: No cough, No shortness of breath  CARDIOVASCULAR: No chest pain, palpitations  GASTROINTESTINAL: No abdominal, No nausea, vomiting  NEUROLOGICAL: intermittent numbness of the lower extremities  MISCELLANEOUS: back pain       Vital Signs Last 24 Hrs  T(C): 36.5 (01 Nov 2019 16:26), Max: 37.2 (31 Oct 2019 21:31)  T(F): 97.7 (01 Nov 2019 16:26), Max: 99 (31 Oct 2019 21:31)  HR: 71 (01 Nov 2019 09:31) (60 - 661)  BP: 100/66 (01 Nov 2019 09:31) (90/60 - 118/60)  RR: 18 (01 Nov 2019 05:33) (18 - 18)  SpO2: 98% (01 Nov 2019 08:54) (98% - 99%)    PHYSICAL EXAM:    GENERAL: Elderly male looking comfortable   HEENT: PERRL, +EOMI  NECK: soft, Supple, No JVD,   CHEST/LUNG: Decrease air entry bilaterally; No wheezing, right sided chest wound, with minimal drainage  HEART: S1S2+, Regular rate and rhythm; No murmurs  ABDOMEN: Soft, Nontender, Nondistended; Bowel sounds present  EXTREMITIES:  1+ Peripheral Pulses, +3 edema on the right leg and +2 edema on the left.   SKIN: wound on the chest wall, chronic skin changes in the lower extremities.  NEURO: AAOX3, decrease power in lower ext due to pain in the back and has intermittent numbness.   PSYCH: normal mood    LABS:                        12.4   9.72  )-----------( 123      ( 01 Nov 2019 07:09 )             41.3     11-01    140  |  98  |  44.0<H>  ----------------------------<  173<H>  4.8   |  29.0  |  1.33<H>    Ca    9.5      01 Nov 2019 07:09  Phos  4.3     11-01  Mg     2.2     11-01    TPro  7.2  /  Alb  3.6  /  TBili  0.4  /  DBili  0.1  /  AST  36  /  ALT  21  /  AlkPhos  116  11-01    PT/INR - ( 01 Nov 2019 07:09 )   PT: 12.7 sec;   INR: 1.10 ratio                 I&O's Summary    31 Oct 2019 07:01  -  01 Nov 2019 07:00  --------------------------------------------------------  IN: 360 mL / OUT: 0 mL / NET: 360 mL        MEDICATIONS  (STANDING):  acetaminophen   Tablet .. 975 milliGRAM(s) Oral every 6 hours  ALBUTerol    90 MICROgram(s) HFA Inhaler 2 Puff(s) Inhalation every 6 hours  aspirin enteric coated 81 milliGRAM(s) Oral daily  atorvastatin 20 milliGRAM(s) Oral at bedtime  carvedilol 3.125 milliGRAM(s) Oral every 12 hours  dexAMETHasone  IVPB 8 milliGRAM(s) IV Intermittent every 6 hours  enoxaparin Injectable 40 milliGRAM(s) SubCutaneous daily  furosemide   Injectable 40 milliGRAM(s) IV Push daily  lidocaine   Patch 1 Patch Transdermal daily  losartan 100 milliGRAM(s) Oral daily  montelukast 10 milliGRAM(s) Oral daily  oxyCODONE  ER Tablet 15 milliGRAM(s) Oral every 12 hours  pantoprazole    Tablet 40 milliGRAM(s) Oral before breakfast  pregabalin 50 milliGRAM(s) Oral at bedtime  pregabalin 25 milliGRAM(s) Oral <User Schedule>  senna 2 Tablet(s) Oral at bedtime  sertraline 25 milliGRAM(s) Oral daily  sodium chloride 0.9% lock flush 3 milliLiter(s) IV Push every 8 hours  spironolactone 25 milliGRAM(s) Oral daily  tiotropium 18 MICROgram(s) Capsule 1 Capsule(s) Inhalation daily    MEDICATIONS  (PRN):  bisacodyl Suppository 10 milliGRAM(s) Rectal daily PRN Constipation  HYDROmorphone  Injectable 0.5 milliGRAM(s) IV Push every 6 hours PRN Severe Pain persisting 1 hour after Percocet  HYDROmorphone  Injectable 0.5 milliGRAM(s) IV Push every 6 hours PRN sever pain  oxycodone    5 mG/acetaminophen 325 mG 1 Tablet(s) Oral every 4 hours PRN Moderate Pain (4 - 6)

## 2019-11-01 NOTE — PROGRESS NOTE ADULT - ATTENDING COMMENTS
Attending statement:  I have personally seen this patient, and formed a face to face diagnostic evaluation on this patient on this date.  I have reviewed the PA, NP and or Medical/PA student and/or Resident documentation and agree with the history, physical exam and plan of care except if noted otherwise.

## 2019-11-01 NOTE — PROGRESS NOTE ADULT - SUBJECTIVE AND OBJECTIVE BOX
Patient is a 78y old  Male who presents with a chief complaint of back pain (31 Oct 2019 17:29)    Overnight events:  Patient reports interval development of neurologic symptoms, beginning yesterday late afternoon/evening.  Specifically, he has noticed change in sensation progressing up his lower extremities bilaterally.  Additionally, he reports weakness and difficulty raising/moving his legs.  He is uncertain whether is he able to control urination, as he notes some tingling progressing up toward his belly button.      PHYSICAL EXAM:  Vital Signs Last 24 Hrs  T(C): 36.9 (01 Nov 2019 05:33), Max: 37.2 (31 Oct 2019 21:31)  T(F): 98.5 (01 Nov 2019 05:33), Max: 99 (31 Oct 2019 21:31)  HR: 70 (01 Nov 2019 05:33) (60 - 90)  BP: 118/60 (01 Nov 2019 05:33) (90/60 - 131/78)  BP(mean): --  RR: 18 (01 Nov 2019 05:33) (17 - 18)  SpO2: 99% (01 Nov 2019 05:33) (95% - 100%)    PHYSICAL EXAM:  GEN: seated comfortably in recliner chair, NAD  HEENT: EOMI, OP clear  CV: RRR  PULM: CTA b/l  ABD: Soft, nontender  EXT: bilateral pitting edema.  NEURO: decreased strength hip abduction and adduction.  Unable to raise knees bilaterally                            12.8   10.79 )-----------( 129      ( 31 Oct 2019 06:29 )             41.5     10-31    140  |  99  |  31.0<H>  ----------------------------<  99  4.5   |  29.0  |  0.96    Ca    9.9      31 Oct 2019 06:29  Phos  2.8     10-31  Mg     2.0     10-31      Tumor Markers: (***)

## 2019-11-01 NOTE — PROGRESS NOTE ADULT - SUBJECTIVE AND OBJECTIVE BOX
Patient is a 78y old  Male who presents with a chief complaint of T4 lesion/back pain (31 Oct 2019 17:29)    Patient had history of an incidental T4 lytic lesion with epidural extension and 6cm hypodensity in liver and pathologic comp fracture suspicious of metastasis on T4, T11 and possibly T6. Chest abscess growing Strep milleri. CT A/P revealed 6x6cm necrotic liver mass v. abscess. On 10/3 patient had R VATS, chest washout, R CT placement and wound VAC and D/C home on 10/11/19. (28 Oct 2019 13:41) <END of Copied Text>    Pain regimen modified yesterday to allow Oxycontin 15mg BID. He developed worsening myelopathic symptoms overnight. Sent to [facility], s/p 1 session of radiation.    VERBAL REPORT: "I feel good right now in this position."  Patient reports sleeping a little overnight primarily due to interruptions.    PAIN SCORE: 4-5/10 at rest                  SCALE USED: VNRS    Allergies  No Known Allergies      PAST MEDICAL & SURGICAL HISTORY:  CHF (congestive heart failure)  CAD (coronary artery disease)  HLD (hyperlipidemia)  HTN (hypertension)  COPD (chronic obstructive pulmonary disease)  Stenosis of lumbosacral spine  H/O back injury  Artificial pacemaker      MEDICATIONS  (STANDING):  acetaminophen   Tablet .. 975 milliGRAM(s) Oral every 6 hours  ALBUTerol    90 MICROgram(s) HFA Inhaler 2 Puff(s) Inhalation every 6 hours  aspirin enteric coated 81 milliGRAM(s) Oral daily  atorvastatin 20 milliGRAM(s) Oral at bedtime  carvedilol 3.125 milliGRAM(s) Oral every 12 hours  dexAMETHasone  IVPB 8 milliGRAM(s) IV Intermittent every 6 hours  enoxaparin Injectable 40 milliGRAM(s) SubCutaneous daily  furosemide   Injectable 40 milliGRAM(s) IV Push daily  lidocaine   Patch 1 Patch Transdermal daily  losartan 100 milliGRAM(s) Oral daily  montelukast 10 milliGRAM(s) Oral daily  oxyCODONE  ER Tablet 15 milliGRAM(s) Oral every 12 hours  pantoprazole    Tablet 40 milliGRAM(s) Oral before breakfast  pregabalin 50 milliGRAM(s) Oral at bedtime  pregabalin 25 milliGRAM(s) Oral <User Schedule>  senna 2 Tablet(s) Oral at bedtime  sertraline 25 milliGRAM(s) Oral daily  sodium chloride 0.9% lock flush 3 milliLiter(s) IV Push every 8 hours  spironolactone 25 milliGRAM(s) Oral daily  tiotropium 18 MICROgram(s) Capsule 1 Capsule(s) Inhalation daily    MEDICATIONS  (PRN):  HYDROmorphone  Injectable 0.5 milliGRAM(s) IV Push every 6 hours PRN Severe Pain persisting 1 hour after Percocet  oxycodone    5 mG/acetaminophen 325 mG 1 Tablet(s) Oral every 4 hours PRN Moderate Pain (4 - 6)      PHYSICAL EXAM:  GENERAL: NAD, overweight  HEAD:  Atraumatic, Normocephalic  EYES: EOMI, PERRLA, conjunctiva and sclera clear  NERVOUS SYSTEM:  Alert & Oriented X3, Good concentration; Motor Strength 5/5 B/L upper extremities  CHEST/LUNG: O2 via NC, Clear speech, +SOB  ABDOMEN: Bowel sounds present  MUSCULOSKELETAL: Thoracic and lumbar spine TTP      Vital Signs Last 24 Hrs  T(C): 36.9 (01 Nov 2019 09:31), Max: 37.2 (31 Oct 2019 21:31)  T(F): 98.4 (01 Nov 2019 09:31), Max: 99 (31 Oct 2019 21:31)  HR: 71 (01 Nov 2019 09:31) (60 - 661)  BP: 100/66 (01 Nov 2019 09:31) (90/60 - 131/78)  BP(mean): --  RR: 18 (01 Nov 2019 05:33) (17 - 18)  SpO2: 98% (01 Nov 2019 08:54) (98% - 100%)                          12.4   9.72  )-----------( 123      ( 01 Nov 2019 07:09 )             41.3       LIVER FUNCTIONS - ( 01 Nov 2019 07:09 )  Alb: 3.6 g/dL / Pro: 7.2 g/dL / ALK PHOS: 116 U/L / ALT: 21 U/L / AST: 36 U/L / GGT: x             PT/INR - ( 01 Nov 2019 07:09 )   PT: 12.7 sec;   INR: 1.10 ratio

## 2019-11-01 NOTE — PROGRESS NOTE ADULT - PROBLEM SELECTOR PROBLEM 3
Liver mass
Chronic obstructive pulmonary disease, unspecified COPD type
Pain in thoracic spine at multiple sites

## 2019-11-01 NOTE — PROGRESS NOTE ADULT - SUBJECTIVE AND OBJECTIVE BOX
Patient remains admitted for [place]. Neuro surgeon myself rest of visit and discuss with this patient has T4 pathology. We discussed the lytic pathology characteristic of T4 and now patient is presenting with a much expected progressive neurologic deficit. Earlier in the week patient was presenting with radicular signs and symptoms into his legs I believe consistent with T4 lesion. He states the paresthesias and weakness are progressing. Earlier in the week patient opted for nonsurgical approach which is going to be well respected from a surgical standpoint and in summary on today's visit patient also wishes for a nonsurgical approach. We've had a lengthy conversation with regard to palliative care invasive spine surgery such as a T4 corpectomy and a multilevel thoracic fusion.    Physical exam  Patient is seen and examined on for [place] bed 4209. He is sitting in a chair. He is alert he signed dictated x3 he is in no acute distress. Patient does state he has progressive lower extremity and lower abdominal sensory loss quarter" paresthesias. Patient also states his legs are becoming weak and is finding it more difficult to stand. This is a very expected clinical exam findings based on my earlier exam finding in the week as well as his thoracic pathology.    Impression and plan  T4 lesion  Discussed operative management with this patient now 2 times he is alert he's orientated x3 he is well engaged in the orthopedic conversation and he wishes more for a conservative route. Patient wishes to undergo biopsy which I believe is scheduled for the liver lesion am recommending chest abdomen pelvis CAT scans as well as a hematology workup/multiple myeloma workup. I also believe radiation may be beneficial to T4 as well as high-dose steroids at this point in time. And again patient does not wish for operative management.

## 2019-11-02 NOTE — PROGRESS NOTE ADULT - SUBJECTIVE AND OBJECTIVE BOX
JOHN CIFUENTES    [id number]    78y      Male    INTERVAL HPI/OVERNIGHT EVENTS:    patient being seen for multiple medical problems. patient seen at bedside and is obtunded on bipap.     REVIEW OF SYSTEMS:    unable to obtain secondary to mental status    Vital Signs Last 24 Hrs  T(C): 36.1 (02 Nov 2019 22:10), Max: 36.1 (02 Nov 2019 22:10)  T(F): 97 (02 Nov 2019 22:10), Max: 97 (02 Nov 2019 22:10)  HR: 61 (03 Nov 2019 06:34) (49 - 72)  BP: 72/48 (02 Nov 2019 22:10) (72/46 - 94/64)  BP(mean): --  RR: 16 (02 Nov 2019 22:10) (16 - 16)  SpO2: 98% (03 Nov 2019 06:34) (90% - 100%)    PHYSICAL EXAM:    GENERAL: NAD, on bipap   HEENT: bipap   NECK: soft, Supple, No JVD,   CHEST/LUNG: diminsihed   HEART: S1S2+,   ABDOMEN: Soft, Nontender, Nondistended; Bowel sounds present  EXTREMITIES: no edema  SKIN: No rashes or lesions  NEURO: obtunded      LABS:                        14.0   20.61 )-----------( 189      ( 02 Nov 2019 06:12 )             47.7     11-02    142  |  98  |  53.0<H>  ----------------------------<  152<H>  5.6<H>   |  31.0<H>  |  1.61<H>    Ca    9.8      02 Nov 2019 06:07        MEDICATIONS  (STANDING):  acetaminophen   Tablet .. 975 milliGRAM(s) Oral every 6 hours  acetaminophen  IVPB .. 1000 milliGRAM(s) IV Intermittent once  ALBUTerol    90 MICROgram(s) HFA Inhaler 2 Puff(s) Inhalation every 6 hours  aspirin enteric coated 81 milliGRAM(s) Oral daily  atorvastatin 20 milliGRAM(s) Oral at bedtime  carvedilol 3.125 milliGRAM(s) Oral every 12 hours  enoxaparin Injectable 40 milliGRAM(s) SubCutaneous daily  furosemide   Injectable 40 milliGRAM(s) IV Push daily  lidocaine   Patch 1 Patch Transdermal daily  montelukast 10 milliGRAM(s) Oral daily  pantoprazole    Tablet 40 milliGRAM(s) Oral before breakfast  senna 2 Tablet(s) Oral at bedtime  sertraline 25 milliGRAM(s) Oral daily  sodium chloride 0.9% lock flush 3 milliLiter(s) IV Push every 8 hours  tiotropium 18 MICROgram(s) Capsule 1 Capsule(s) Inhalation daily    MEDICATIONS  (PRN):  bisacodyl Suppository 10 milliGRAM(s) Rectal daily PRN Constipation  HYDROmorphone  Injectable 0.5 milliGRAM(s) IV Push every 8 hours PRN Severe Pain (7 - 10)      RADIOLOGY & ADDITIONAL TESTS:

## 2019-11-02 NOTE — DIETITIAN INITIAL EVALUATION ADULT. - ETIOLOGY
Related to inabiltiy to meet sufficient energy requirements in setting of suspected metastatic disease with spine and liver lesions, now with Resp acidosis, requiring BIPAP

## 2019-11-02 NOTE — PROGRESS NOTE ADULT - PROBLEM SELECTOR PLAN 1
See above.
See above.
-Patient started on dexamethasone.  Continue dexamethasone 8mg q6hrs.  -Continue workup of malignancy; patient scheduled for IR biopsy of liver today.  Awaiting tumor marker labs.  -Even though patient does not want chemotherapy, should consult medical oncology.  Less cytotoxic systemic therapy options, dependent upon diagnosis.  -Given progressive neurologic symptoms, recommended more emergent treatment.  Patient refused surgical intervention, though this would be most expeditious and provide tissue diagnosis.  Patient amenable to palliative spine radiation.  Will ask hospital administration for permission to bring patient over to [facility] today for radiation planning and begin treatments ASAP.
1. Initiate Tylenol 975mg PO q8hrs ATC x3 days   - PRN mild pain thereafter  2. Continue Percocet 5/325mg PO q4hrs PRN moderate/severe pain  - Discontinue Percocet 2 tablets  - Maximize use of PRN opioids by offering a dose at least q6hrs. Patient may refuse.  - IV opioids at discretion of Primary team  3. Increase Oxycontin to 15mg PO BID  - Offer with food  4. Continue Lyrica 25mg PO daily, in the AM; Lyrica 50mg PO QHS.  5. Continue Lidoderm Patches as ordered
1. Patient plan of care is palliative, keep pain under control.  2. meds       - all oral meds for pain d/c, patient is NPO        - start dilaudid 0.5 mg q 6 hrs for pain, patient not able to verbalize pain score, goal to keep patient comfortable and as pain free as possible        - please still monitor patient's vitals prior to administration      3. bowel regimen per primary team  4. start OFIRMEV X1. TO BE USED AFTER 3 DAYS  MG ATC HAS . DO NOT USE IN CONJUNCTION WITH DAILY 975 MG Q 6 DOSAGE OF TYLENOL. DO NOT EXCEED 4000 MG DAILY OF ACETAMINOPHEN.   4. will continue to follow. call with questions 
1. Tylenol 975mg PO q8hrs ATC x3 days   - PRN mild pain thereafter  2. Continue Percocet 5/325mg PO q4hrs PRN moderate/severe pain  - Maximize use of PRN opioids by offering a dose at least q6hrs. Patient may refuse.  - IV Dilaudid 0.5mg q6hrs PRN severe persistent pain  3. Continue Oxycontin 15mg PO BID  - Offer with food  4. Lyrica 25mg PO in the AM; Lyrica 50mg PO QHS.  - Elevated creatinine today; reduce Lyrica to 25mg daily if concern for ARF  5. Continue Lidoderm Patches as ordered.
Agreeable to radiation
See above.

## 2019-11-02 NOTE — PROGRESS NOTE ADULT - PROBLEM SELECTOR PLAN 2
1. Patient remains on Bi-PAP and followed by primary team. All oral opioids d/c due to respiratory status. Now palliative care planning, goal to keep patient comfortable.

## 2019-11-02 NOTE — DIETITIAN INITIAL EVALUATION ADULT. - PERTINENT LABORATORY DATA
11-02 Na142 mmol/L Glu 152 mg/dL<H> K+ 5.6 mmol/L<H> Cr  1.61 mg/dL<H> BUN 53.0 mg/dL<H> Phos n/a   Alb n/a   PAB n/a     n/a  HgbA1C

## 2019-11-02 NOTE — CHART NOTE - NSCHARTNOTEFT_GEN_A_CORE
Called by RN about ABG result showing severe hypercapnic respiratory failure with pH 7.07 and pCO2 108.  Chart reviewed- pt is DNR/DNI.  Will place on BiPAP. for hypercapnic respiratory failure and repeat ABG.  Cardiology PA to update family of clinical change. Called by RN about ABG result showing severe hypercapnic respiratory failure with pH 7.07 and pCO2 108.  Chart reviewed- pt is DNR/DNI.  Will place on BiPAP. for hypercapnic respiratory failure and repeat ABG.  Cardiology PA to update family of clinical change.    Etiology of worsening hypercapnic failure likely related to sedatives and pain meds, however pt with chronic pain due to lytic bone lesions, pathological fractures, etc and being managed by pain management, so will trial BiPAP first before alternating pain regimen. 1:45AM Called by RN about ABG result showing severe hypercapnic respiratory failure with pH 7.07 and pCO2 108.  Chart reviewed- pt is DNR/DNI.  Will place on BiPAP. for hypercapnic respiratory failure and repeat ABG.  Cardiology PA to update family of clinical change.    Went to bedside to assess pt.  Pt very lethargic but arousable to verbal stimuli and sternal rub and currently on BiPAP with respiratory therapist at bedside. Had ordered BiPAP with settings 12/5 but respiratory therapist believes pt can tolerate 18/9 so will change order.  Per RN and RT, pt was awake and conversant before receiving night time dose of Lyrica.    Etiology of worsening hypercapnic failure likely related to sedatives and pain meds, however pt with chronic pain due to lytic bone lesions, pathological fractures, etc and being managed by pain management, will d/c his Lyrica and pain meds for now but will have to be readjusted/re-ordered by day team with assistance of pain management service pending clinical improvement.    MOLST form in chart reflects that pt is DNR/DNI. 1:45AM Called by RN about ABG result showing severe acute hypercapnic respiratory failure with pH 7.07 and pCO2 108.  Chart reviewed- pt is DNR/DNI.  Will place on BiPAP. for hypercapnic respiratory failure and repeat ABG.  Cardiology PA to update family of clinical change.    Went to bedside to assess pt.  Pt very lethargic but arousable to verbal stimuli and sternal rub and currently on BiPAP with respiratory therapist at bedside. Had ordered BiPAP with settings 12/5 but respiratory therapist believes pt can tolerate 18/9 so will change order.  Per RN and RT, pt was awake and conversant before receiving night time dose of Lyrica.    Etiology of worsening hypercapnic failure likely related to sedatives and pain meds, however pt with chronic pain due to lytic bone lesions, pathological fractures, etc and being managed by pain management, will d/c his Lyrica and pain meds for now but will have to be readjusted/re-ordered by day team with assistance of pain management service pending clinical improvement.    MOLST form in chart reflects that pt is DNR/DNI.    5:30AM Followed up ABG, showed no improvement despite BiPAP.  Asked MICU PA about if any adjustments in BiPAP settings could be made or if should change to AVAPS. Pt pulling good tidal volumes and per MICU PA on optimal BiPAP settings. He did recommend lowering FiO2 from 100% since that could worsen hypercapnia.  Reduced FiO2 from 100% to 30%.  Family notified about pt deteriorating clinical status by cards PA wh confirmed pt remains DNR/DNI.  Prognosis very poor, please assess for transition to comfort care in AM. 1:45AM Called by RN about ABG result showing severe acute hypercapnic respiratory failure with pH 7.07 and pCO2 108.  Chart reviewed- pt is DNR/DNI.  Will place on BiPAP. for hypercapnic respiratory failure and repeat ABG.  Cardiology PA to update family of clinical change.    Went to bedside to assess pt.  Pt very lethargic but arousable to verbal stimuli and sternal rub and currently on BiPAP with respiratory therapist at bedside. Had ordered BiPAP with settings 12/5 but respiratory therapist believes pt can tolerate 18/9 so will change order.  Per RN and RT, pt was awake and conversant before receiving night time dose of Lyrica.    Etiology of worsening hypercapnic failure likely related to sedatives and pain meds, however pt with chronic pain due to lytic bone lesions, pathological fractures, etc and being managed by pain management, will d/c his Lyrica and pain meds for now but will have to be readjusted/re-ordered by day team with assistance of pain management service pending clinical improvement.    MOLST form in chart reflects that pt is DNR/DNI.    5:30AM Followed up ABG, showed no improvement despite BiPAP.  Asked MICU PA about if any adjustments in BiPAP settings could be made or if should change to AVAPS. Pt pulling good tidal volumes and per MICU PA on optimal BiPAP settings. He did recommend lowering FiO2 from 100% since that could worsen hypercapnia.  Reduced FiO2 from 100% to 30%.  Family notified about pt deteriorating clinical status by cards PA wh confirmed pt remains DNR/DNI.  Prognosis very poor, please assess for transition to comfort care in AM.    6AM:  Called by RN that pt now desatting to 88% on FiO2 30%, advised her to increase to FiO2 of 60%.

## 2019-11-02 NOTE — PROVIDER CONTACT NOTE (CRITICAL VALUE NOTIFICATION) - ASSESSMENT
Patient lethargic arouses to voice/minimal touch, RR 24, SPO2 95% on 4 L NC, Lung sounds diminished, wet gurgly voice.

## 2019-11-02 NOTE — DIETITIAN INITIAL EVALUATION ADULT. - PROBLEM SELECTOR PLAN 2
Neuro surgery consult Dr. Arias was called, he is out of office for the week, spoke to Neurosurgery NP Gwen, CT spine is ordered, she will assess the patient after the CT scan and will document the recommendation.  Cont. Pain medication  Discussed with dr. Dominguez

## 2019-11-02 NOTE — PROGRESS NOTE ADULT - ASSESSMENT
78y old  Male who presents with a chief complaint of T4 lesion/back pain (31 Oct 2019 17:29). Overnight patient's health status declined. Patient developed respiratory acidosis , unresponsive to BI-PAP. Now with continued BI-PAP machine and NPO. All oral opioid stopped due to respiratory status. Now looking for continued palliative management. Per cardiology, prognosis poor.

## 2019-11-02 NOTE — PROGRESS NOTE ADULT - PROBLEM SELECTOR PROBLEM 1
Abscess of chest
Cord compression
Lytic bone lesions on xray
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
Pain in thoracic spine at multiple sites
Pain in thoracic spine at multiple sites

## 2019-11-02 NOTE — DIETITIAN INITIAL EVALUATION ADULT. - OTHER INFO
Pt is a 78 year old Male with Hx of CHF, CAD, HTN, HLD, COPD on 2L home O2, pna, emphysema s/p decortication 4/30/19 ,Recently underwent R VATS, chest washout, R CT placement and wound VAC on 10/3 for empyema following an initial decortication performed April 2019. Last admission, chronic back pain with history s/p benign tumor resection presented to  Dr. Dominguez's office with progressive worsening of constant, sharp 10/10 back pain between the scapula radiating to right chest wall  and intermittently to B/L LE.  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 abscess (+suspected metastatic disease with spine and liver mets). Over past 24 hours, clinical status has declined, Patient has developed resp acidosis, currently lethargic on BIPAP, NPO. Family at bedside, not agreeable to feeding tube at this time. Pt with B/L leg edema noted.

## 2019-11-02 NOTE — CHART NOTE - NSCHARTNOTEFT_GEN_A_CORE
Asked to speak with patients family regarding events which occurred last night  patient has developed resp acidosis not responding to bi pap  Family has questions regarding why so sudden and what plan is  Goals of care discussed with family  At this time he is a DNR and family agrees  c/w bipap  check abg and cxr  Family is aware that his condition will likely not improve and palliative care is appropriate at this time  Feeding tube discussed and they are agreeable to hold off

## 2019-11-02 NOTE — PROGRESS NOTE ADULT - PROBLEM SELECTOR PROBLEM 2
Lytic bone lesions on xray
Chronic obstructive pulmonary disease, unspecified COPD type
Liver mass
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

## 2019-11-02 NOTE — DIETITIAN INITIAL EVALUATION ADULT. - MALNUTRITION
Severe-chronic NFPE: mild muscle mass loss in shoulder/clavicle and subcutaneous fat loss in orbit, +fluid accumulation in B/L lower extremities Severe-chronic

## 2019-11-02 NOTE — PROVIDER CONTACT NOTE (CRITICAL VALUE NOTIFICATION) - SITUATION
Increased lethargy in patient, patient complaining of SOB, " Hard to breathe", patient VSS, stating 95% on 4 L NC.

## 2019-11-02 NOTE — PROGRESS NOTE ADULT - SUBJECTIVE AND OBJECTIVE BOX
Called family and spoke to wife about patient's deteriorating clinical health and need for BIPAP.  Also explained seriousness of overall clinical status.  Family aware and will come see him this morning.   Continue BIPAP and will repeat ABG this morning. Prognosis poor.

## 2019-11-02 NOTE — PROGRESS NOTE ADULT - ASSESSMENT
78 M with Hx of CHF, CAD, HTN, HLD, COPD on 2L home O2, pna, emphysema s/p decortication 4/30/19 ,Recently underwent R VATS, chest washout, R CT placement and wound VAC on 10/3 for empyema following an initial decortication performed April 2019. Last admission, chronic back pain with history s/p benign tumor resection presented to  Dr. Dominguez's office with progressive worsening of constant, sharp 10/10 back pain between the scapula radiating to right chest wall  and intermittently to B/L LE.  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 abscess, patient has been seen by [facility], as per them    based upon his statement of lower extremity weakness paresthesias and sharp pain going down his legs when he arches his back, this gentleman has essentially an unstable upper thoracic spine and recommended surgical management to him, resume recommending there is chance of impending paralysis is definitely an outcome at this point in time patient states based upon his past medical history he does not want an invasive spine surgery at this point in time patient wishes more for a biopsy and radiation, patient has been seen by radiation oncology and will be getting follow up from IR for possible biopsy,   patient has Hx of chest wall wound, he was discharged on IV ceftriaxone daily and has been continued  through 10/30 for prior Chest abscess growing Strep milleri. VAC removed form the chest wound, he refused liver biopsy last time when she was here, but wants now.   CT surgery requested transfer under medicine service for further care,     Plan    Multiorgan failure - spoke to family whos leaning towards comfort care  --> would like to have their family come see the family before starting morphine drip     prognosis grim   Code status: DNR/DNI,

## 2019-11-02 NOTE — PROGRESS NOTE ADULT - SUBJECTIVE AND OBJECTIVE BOX
Chief Complaint: back pain    Patient seen and examined at bedside. Family at bedside. 78y old  Male who presents with a chief complaint of T4 lesion/back pain (31 Oct 2019 17:29). Overnight patient's health status declined. Patient developed respiratory acidosis , unresponsive to BI-PAP. Now with continued BI-PAP machine and NPO. All oral opioid stopped due to respiratory status. Now looking for continued palliative management. Per cardiology, prognosis poor.         Hospital course:    "Patient had history of an incidental T4 lytic lesion with epidural extension and 6cm hypodensity in liver and pathologic comp fracture suspicious of metastasis on T4, T11 and possibly T6. Chest abscess growing Strep milleri. CT A/P revealed 6x6cm necrotic liver mass v. abscess. On 10/3 patient had R VATS, chest washout, R CT placement and wound VAC and D/C home on 10/11/19. (28 Oct 2019 13:41)"       PAST MEDICAL & SURGICAL HISTORY:  CHF (congestive heart failure)  CAD (coronary artery disease)  HLD (hyperlipidemia)  HTN (hypertension)  COPD (chronic obstructive pulmonary disease)  Stenosis of lumbosacral spine  H/O back injury  Artificial pacemaker      SOCIAL HISTORY:  [ ] Denies Smoking, Alcohol, or Drug Use    Allergies    No Known Allergies    Intolerances        PAIN MEDICATIONS:  acetaminophen   Tablet .. 975 milliGRAM(s) Oral every 6 hours  HYDROmorphone  Injectable 0.5 milliGRAM(s) IV Push every 6 hours  sertraline 25 milliGRAM(s) Oral daily    Heme:  aspirin enteric coated 81 milliGRAM(s) Oral daily  enoxaparin Injectable 40 milliGRAM(s) SubCutaneous daily    Antibiotics:    Cardiac:  carvedilol 3.125 milliGRAM(s) Oral every 12 hours  furosemide   Injectable 40 milliGRAM(s) IV Push daily    Pulmonary:  ALBUTerol    90 MICROgram(s) HFA Inhaler 2 Puff(s) Inhalation every 6 hours  montelukast 10 milliGRAM(s) Oral daily  tiotropium 18 MICROgram(s) Capsule 1 Capsule(s) Inhalation daily    Endocrine  atorvastatin 20 milliGRAM(s) Oral at bedtime  dexAMETHasone  IVPB 8 milliGRAM(s) IV Intermittent every 6 hours    GI:  bisacodyl Suppository 10 milliGRAM(s) Rectal daily PRN  pantoprazole    Tablet 40 milliGRAM(s) Oral before breakfast  senna 2 Tablet(s) Oral at bedtime    All Other Meds:  lidocaine   Patch 1 Patch Transdermal daily  sodium chloride 0.9% lock flush 3 milliLiter(s) IV Push every 8 hours      REVIEW OF SYSTEMS:  CONSTITUTIONAL: Negative fever,chills  NECK: No pain or stiffness  RESPIRATORY: No cough, wheezing,  No shortness of breath  GASTROINTESTINAL: No abdominal, No nausea, vomiting; No diarrhea or constipation.   GENITOURINARY: No dysuria, frequency, or incontinence  NEUROLOGICAL: No headaches, memory loss, loss of strength, numbness, or  SKIN: No itching, burning, rashes, or lesions   MUSCULOSKELETAL: No joint pain or swelling; No muscle, back, or extremity pain    PHYSICAL EXAM:    Vital Signs Last 24 Hrs  T(C): 36.6 (01 Nov 2019 21:00), Max: 36.6 (01 Nov 2019 21:00)  T(F): 97.8 (01 Nov 2019 21:00), Max: 97.8 (01 Nov 2019 21:00)  HR: 67 (02 Nov 2019 05:34) (61 - 91)  BP: 72/46 (02 Nov 2019 12:15) (72/46 - 157/65)  BP(mean): --  RR: 22 (02 Nov 2019 05:34) (22 - 26)  SpO2: 94% (02 Nov 2019 12:15) (90% - 100%)    PAIN SCORE: unknown, patient unable to verbalize   SCALE USED: (1-10 VNRS)       GENERAL: Comfortable,using BI-PAP machine, family at bedside verbalizing for patient  HEENT:  Atraumatic, Normocephalic, PERRL, EOMI  NECK: Supple  LUNG: using BI-PAP machine  ABDOMEN: Soft, Nontender, Nondistended;   BACK: No midline bony tenderness to palpation, no step off or deformity noted.   EXTREMITIES:  HOWARD X 4; 2+ Peripheral Pulses, No clubbing, cyanosis, or edema   Motor strength 5/5. Sensation intact to light touch  SKIN: Warm and Dry    LABS:                          14.0   20.61 )-----------( 189      ( 02 Nov 2019 06:12 )             47.7     11-02    142  |  98  |  53.0<H>  ----------------------------<  152<H>  5.6<H>   |  31.0<H>  |  1.61<H>    Ca    9.8      02 Nov 2019 06:07  Phos  4.3     11-01  Mg     2.2     11-01    TPro  7.2  /  Alb  3.6  /  TBili  0.4  /  DBili  0.1  /  AST  36  /  ALT  21  /  AlkPhos  116  11-01    PT/INR - ( 01 Nov 2019 07:09 )   PT: 12.7 sec;   INR: 1.10 ratio               Drug Screen:        RADIOLOGY:      [ ]  [place]  Reviewed and Copied to Chart

## 2019-11-02 NOTE — PROVIDER CONTACT NOTE (CRITICAL VALUE NOTIFICATION) - ACTION/TREATMENT ORDERED:
Maintain Patient on BIAP and follow up in AM with what the family would like to do.
Keep patient on BIPAP until family arrives to assess situation and desire of what to do.
Place patient on BIPAP, repeat ABG at 0300, and continue to monitor.

## 2019-11-03 NOTE — PROGRESS NOTE ADULT - ASSESSMENT
78 M with Hx of CHF, CAD, HTN, HLD, COPD on 2L home O2, pna, emphysema s/p decortication 4/30/19 ,Recently underwent R VATS, chest washout, R CT placement and wound VAC on 10/3 for empyema following an initial decortication performed April 2019. Last admission, chronic back pain with history s/p benign tumor resection presented to  Dr. Dominguez's office with progressive worsening of constant, sharp 10/10 back pain between the scapula radiating to right chest wall  and intermittently to B/L LE.  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 abscess, patient has been seen by [facility], as per them    based upon his statement of lower extremity weakness paresthesias and sharp pain going down his legs when he arches his back, this gentleman has essentially an unstable upper thoracic spine and recommended surgical management to him, resume recommending there is chance of impending paralysis is definitely an outcome at this point in time patient states based upon his past medical history he does not want an invasive spine surgery at this point in time patient wishes more for a biopsy and radiation, patient has been seen by radiation oncology and will be getting follow up from IR for possible biopsy,   patient has Hx of chest wall wound, he was discharged on IV ceftriaxone daily and has been continued  through 10/30 for prior Chest abscess growing Strep milleri. VAC removed form the chest wound, he refused liver biopsy last time when she was here, but wants now.   CT surgery requested transfer under medicine service for further care,     Plan    Multiorgan failure - spoke to family whos leaning towards comfort care  --> would like to have their family come see the family before starting morphine drip   c.w bipap until family is ready     prognosis grim   Code status: DNR/DNI,   comfort care

## 2019-11-03 NOTE — PROGRESS NOTE ADULT - SUBJECTIVE AND OBJECTIVE BOX
JOHN CIFUENTES    [id number]    78y      Male    INTERVAL HPI/OVERNIGHT EVENTS:    patient is on comfort care. patient still on bipap    REVIEW OF SYSTEMS:    unable to obtain secondary to mental status     Vital Signs Last 24 Hrs  T(C): 36.1 (02 Nov 2019 22:10), Max: 36.1 (02 Nov 2019 22:10)  T(F): 97 (02 Nov 2019 22:10), Max: 97 (02 Nov 2019 22:10)  HR: 63 (03 Nov 2019 09:32) (49 - 65)  BP: 72/48 (02 Nov 2019 22:10) (72/48 - 94/64)  BP(mean): --  RR: 16 (02 Nov 2019 22:10) (16 - 16)  SpO2: 98% (03 Nov 2019 09:32) (97% - 100%)    PHYSICAL EXAM:    GENERAL: NAD, on bipap   HEENT: bipap   NECK: soft, Supple, No JVD,   CHEST/LUNG: diminsihed   HEART: S1S2+,   ABDOMEN: Soft, Nontender, Nondistended;   EXTREMITIES: no edema  SKIN: No rashes or lesions  NEURO: obtunded      LABS:                        14.0   20.61 )-----------( 189      ( 02 Nov 2019 06:12 )             47.7     11-02    142  |  98  |  53.0<H>  ----------------------------<  152<H>  5.6<H>   |  31.0<H>  |  1.61<H>    Ca    9.8      02 Nov 2019 06:07      MEDICATIONS  (STANDING):  morphine  Infusion 4 mG/Hr (4 mL/Hr) IV Continuous <Continuous>    MEDICATIONS  (PRN):  LORazepam   Injectable 1 milliGRAM(s) IV Push every 2 hours PRN Agitation  morphine  - Injectable 2 milliGRAM(s) IV Push every 2 hours PRN dyspnea      RADIOLOGY & ADDITIONAL TESTS:

## 2019-11-03 NOTE — CHART NOTE - NSCHARTNOTEFT_GEN_A_CORE
Patient found without pulse, resp or corneal reflexes  wife and children at bedside  Emotional support offerred   Dr. Goode notified

## 2019-11-03 NOTE — DISCHARGE NOTE FOR THE EXPIRED PATIENT - HOSPITAL COURSE
78 M with Hx of CHF, CAD, HTN, HLD, COPD on 2L home O2, pna, emphysema s/p decortication 4/30/19 ,Recently underwent R VATS, chest washout, R CT placement and wound VAC on 10/3 for empyema following an initial decortication performed April 2019. Last admission, chronic back pain with history s/p benign tumor resection presented to  Dr. Dominguez's office with progressive worsening of constant, sharp 10/10 back pain between the scapula radiating to right chest wall  and intermittently to B/L LE.  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 abscess, patient has been seen by [facility], as per them    based upon his statement of lower extremity weakness paresthesias and sharp pain going down his legs when he arches his back, this gentleman has essentially an unstable upper thoracic spine and recommended surgical management to him, resume recommending there is chance of impending paralysis is definitely an outcome at this point in time patient states based upon his past medical history he does not want an invasive spine surgery at this point in time patient wishes more for a biopsy and radiation, patient has been seen by radiation oncology and will be getting follow up from IR for possible biopsy,   patient has Hx of chest wall wound, he was discharged on IV ceftriaxone daily and has been continued  through 10/30 for prior Chest abscess growing Strep milleri. VAC removed form the chest wound, he refused liver biopsy last time when she was here, but wants now.   CT surgery requested transfer under medicine service for further care,     patient put on comfort care, and unfortunately passed away at 415pm

## 2019-11-03 NOTE — PROGRESS NOTE ADULT - PROVIDER SPECIALTY LIST ADULT
CT Surgery
Cardiology
Cardiology
Hospitalist
Hospitalist
Internal Medicine
Internal Medicine
Orthopedics
Orthopedics
Pain Medicine
Palliative Care
Rad Onc
Thoracic Surgery
Hospitalist
Thoracic Surgery

## 2019-11-23 LAB
CULTURE RESULTS: SIGNIFICANT CHANGE UP
SPECIMEN SOURCE: SIGNIFICANT CHANGE UP

## 2019-11-26 NOTE — DIETITIAN INITIAL EVALUATION ADULT. - FACTORS AFF FOOD INTAKE
persistent lack of appetite [Follow-Up - Clinic] : a clinic follow-up of [Coronary Artery Disease] : coronary artery disease [Hyperlipidemia] : hyperlipidemia [Medication Management] : Medication management [FreeTextEntry1] : He has felt well since last visit. Started finasteride - no effect yet.  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

## 2019-12-17 ENCOUNTER — APPOINTMENT (OUTPATIENT)
Dept: PULMONOLOGY | Facility: CLINIC | Age: 79
End: 2019-12-17

## 2020-02-05 NOTE — ED PROVIDER NOTE - CPE EDP SKIN NORM
What Type Of Note Output Would You Prefer (Optional)?: Standard Output
Hpi Title: Evaluation of Skin Lesions
How Severe Are Your Spot(S)?: moderate
normal...

## 2020-03-14 NOTE — DISCHARGE NOTE PROVIDER - CARE PROVIDERS DIRECT ADDRESSES
,[email or web address] Implemented All Universal Safety Interventions:  [place] to call system. Call bell, personal items and telephone within reach. Instruct patient to call for assistance. Room bathroom lighting operational. Non-slip footwear when patient is off stretcher. Physically safe environment: no spills, clutter or unnecessary equipment. Stretcher in lowest position, wheels locked, appropriate side rails in place.

## 2020-06-10 NOTE — INPATIENT CERTIFICATION FOR MEDICARE PATIENTS - RISKS OF ADVERSE EVENTS
[facility] Emergency Department  [address]  [facility] [address]  Phone:  [phone]  Fax:  [phone]                                    Katheryn Sellers   MRN: [id number]    Department:  [facility] Emergency Department   Date of Visit:  6/10/2020           After Visit Summary Signature Page    I have received my discharge instructions, and my questions have been answered. I have discussed any challenges I see with this plan with the nurse or doctor.    ..........................................................................................................................................  Patient/Patient Representative Signature      ..........................................................................................................................................  Patient Representative Print Name and Relationship to Patient    ..................................................               ................................................  Date                                   Time    ..........................................................................................................................................  Reviewed by Signature/Title    ...................................................              ..............................................  Date                                               Time          22EPIC Rev 08/18        Concern for cardiopulmonary deterioration/Concern for worsening infectious process

## 2020-09-15 NOTE — PRE-OP CHECKLIST - HAIR REMOVAL
COPD EDUCATION by COPD CLINICAL EDUCATOR  9/15/2020  at  10:52 AM by dAeline Benitez, RRT     Patient interviewed by COPD education team.  Patient unable to participate in full program.  Short intervention has been conducted.  A comprehensive packet including information about COPD completed with this patient covering: What is COPD (how the lungs work), daily medications rescue and maintenance, breathing techniques, infection prevention and oxygen safety were covered in detail.  A comprehensive packet including information about COPD, treatments,Action Plan and oxygen safety was given.   Request referral for Home Health and [facility].       hair removal not indicated

## 2020-09-29 NOTE — ED ADULT NURSE NOTE - AS SC BRADEN FRICTION
How Severe Is Your Acne?: moderate Is This A New Presentation, Or A Follow-Up?: Acne Females Only: When Was Your Last Menstrual Period?: 9/24/20 (1) problem

## 2020-12-05 NOTE — PRE-OP CHECKLIST - SKIN PREP
XR completed.  Pt. Taken to CT in stable condition via cart with CT tech.  Pt. Remains awake and alert answering questions appropriately.  Total output from NG tube = 900cc of green output at this time.   
n/a
done

## 2021-04-08 NOTE — ED ADULT NURSE NOTE - ISOLATION TYPE:
April 8, 2021      To Whom It May Concern:      Jerome Zamora was seen in our Emergency Department today, 04/08/21.  I expect his condition to improve over the next 2 days.  He may return to work/school when improved.    Sincerely,        Jose Carlos Peterson, DO         None
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
## 2021-06-22 NOTE — PATIENT PROFILE ADULT - NSPROPTRIGHTNOTIFYOBTAINDET_GEN_A_NUR
51F, PMH of HTN, asthma pw fever for a week. On Monday 6/14 She started having chills and fever ~101 that started in the afternoon and responded to tylenol. The fever recurred daily since, in the afternoon usually, started and Tmaxed today 103. ROS is positive only for generalized "heaviness" feeling and mild headache when her fever spikes. She denies visual disturbances photophobia, abdominal pain, nausea or vomiting, runny nose, cough, shortness of breath, chest pain. Denies diarrhea/constipation, urinary symptoms, joint pain, weight loss. [place] hours but did not sleep outside. Denies any rash, did not notice any tick bite. Has a dog that was with them and is usually outside the house but they check him for ticks regularly. Denies other traveling/sick contacts. Was seen by her PMD on 6/17 and was tested negative for lyme, COVID and UA was negative. On 6/20 was seen in urgent care and was given amoxicillin (took 4 doses). Denies any new medications/supplements.     In the ED, pt BP was initially 106/58 but dropped to 89/56 with HR of 113. Febrile to Tmax 101.5. SpO2 98% on RA.   Was given 2L IV bolus and given doxycyline, CTX and flagyl.  51F, PMH of HTN, asthma pw fever for a week. On Monday 6/14 She started having chills and fever ~101 that started in the afternoon and responded to tylenol. The fever recurred daily since, in the afternoon usually, started and Tmaxed today 103. ROS is positive only for generalized "heaviness" feeling and mild headache when her fever spikes. She denies visual disturbances photophobia, abdominal pain, nausea or vomiting, runny nose, cough, shortness of breath, chest pain. Denies diarrhea/constipation, urinary symptoms, joint pain, weight loss. [place] for a few hours but did not sleep outside. Denies any rash, did not notice any tick bite. Has a dog that was with them and is usually outside the house but they check him for ticks regularly. Denies other traveling/sick contacts. Was seen by her PMD on 6/17 and was tested negative for lyme, COVID and UA was negative. On 6/20 was seen in urgent care and was given amoxicillin (took 4 doses). Denies any new medications/supplements, no change in diet.      In the ED, pt BP was initially 106/58 but dropped to 89/56 with HR of 113. Febrile to Tmax 101.5. SpO2 98% on RA.   Was given 2L IV bolus and given doxycyline, CTX and flagyl.  doesn't know number

## 2021-07-20 NOTE — INPATIENT CERTIFICATION FOR MEDICARE PATIENTS - PHYSICIAN CONCUR
I concur with the Admission Order and I certify that services are provided in accordance with Section 42 CFR § 412.3
show

## 2021-08-12 NOTE — PROGRESS NOTE ADULT - SUBJECTIVE AND OBJECTIVE BOX
[facility]  INFECTIOUS DISEASES AND INTERNAL MEDICINE at [place]  =======================================================  Onesimo Henderson MD  Diplomates American Board of Internal Medicine and Infectious Diseases  Tel: [phone]      Fax: [phone]  =======================================================    JOHN CIFUENTES [id number]    Follow up: Cellulitis and abscess of old chest tube site    no fever or chills  no diarrhea   no abdominal pain       Allergies:  No Known Allergies      Antibiotics:  piperacillin/tazobactam IVPB.. 3.375 Gram(s) IV Intermittent every 8 hours  vancomycin  IVPB 1250 milliGRAM(s) IV Intermittent every 12 hours        REVIEW OF SYSTEMS:  CONSTITUTIONAL:  No Fever or chills  HEENT:  No diplopia or blurred vision.  No earache, sore throat or runny nose.  CARDIOVASCULAR:  No pressure, squeezing, strangling, tightness, heaviness or aching about the chest, neck, axilla or epigastrium.  RESPIRATORY:  No cough, shortness of breath  GASTROINTESTINAL:  No nausea, vomiting or diarrhea.  GENITOURINARY:  No dysuria, frequency or urgency.   MUSCULOSKELETAL:  no joint aches, no muscle pain  SKIN:  No change in skin, hair or nails.  NEUROLOGIC:  No Headaches, seizures  PSYCHIATRIC:  No disorder of thought or mood.  ENDOCRINE:  No heat or cold intolerance  HEMATOLOGICAL:  No easy bruising or bleeding.       Physical Exam:  Vital Signs Last 24 Hrs  T(C): 36.6 (30 Sep 2019 10:41), Max: 36.8 (29 Sep 2019 22:22)  T(F): 97.8 (30 Sep 2019 10:41), Max: 98.3 (29 Sep 2019 22:22)  HR: 63 (30 Sep 2019 10:41) (63 - 70)  BP: 116/71 (30 Sep 2019 10:41) (109/63 - 129/61)  RR: 18 (30 Sep 2019 10:41) (18 - 18)  SpO2: 100% (30 Sep 2019 10:41) (96% - 100%)      GEN: NAD, pleasant  HEENT: normocephalic and atraumatic. EOMI. PERRL.  Anicteric  NECK: Supple.   LUNGS: Decreased basal BS B/L   HEART: Regular rate and rhythm  ABDOMEN: Soft, nontender, and nondistended.  Positive bowel sounds.    : No CVA tenderness  EXTREMITIES: Without any edema.  MSK: No joint swelling  NEUROLOGIC: No Focal Deficits  PSYCHIATRIC: Appropriate affect .  SKIN: No Rash      Labs:  09-30    143  |  97<L>  |  39.0<H>  ----------------------------<  106  4.2   |  37.0<H>  |  1.15    Ca    8.7      30 Sep 2019 02:54  Mg     2.0     09-30    TPro  6.9  /  Alb  3.1<L>  /  TBili  0.3<L>  /  DBili  x   /  AST  11  /  ALT  <5  /  AlkPhos  82  09-29                          11.7   10.96 )-----------( 233      ( 30 Sep 2019 02:54 )             38.9       LIVER FUNCTIONS - ( 29 Sep 2019 06:07 )  Alb: 3.1 g/dL / Pro: 6.9 g/dL / ALK PHOS: 82 U/L / ALT: <5 U/L / AST: 11 U/L / GGT: x               RECENT CULTURES:  09-27 @ 16:00 .Surgical Swab chest wall abscess Streptococcus milleri, viridans group    Numerous Streptococcus milleri, viridans group  Culture in progress  Few White blood cells  Few Gram Positive Cocci in Pairs and Chains      09-26 @ 15:40 .Blood     No growth at 48 hours Lot # (Optional): [id number]

## 2022-03-02 NOTE — SWALLOW BEDSIDE ASSESSMENT ADULT - SWALLOW EVAL: DIAGNOSIS
Oral and pharyngeal stage of swallow judged to be WFL. Noted increased work of breathing for all po trials however work of breathing increased for soft consistencies
<-- Click to add NO pertinent Past Medical History

## 2022-12-26 NOTE — PRE-OP CHECKLIST - SITE MARKED BY ANESTHESIOLOGIST
"Subjective:       Patient ID: Norris Cuello is a 31 y.o. male.    Vitals:  height is 6' 6" (1.981 m) and weight is 123.3 kg (271 lb 12.8 oz). His temperature is 97.5 °F (36.4 °C). His blood pressure is 144/91 (abnormal) and his pulse is 75. His respiration is 20 and oxygen saturation is 96%.     Chief Complaint: COVID-19 Concerns    Pt states" c/o dad had got DX with covid on Saturday and wants to get tested to make sure he does not have covid. Sore throat, body aches, fatigue that has been going on for 2 days. Took night and day quil."       Constitution: Positive for fatigue.     Objective:      Physical Exam   Constitutional: He is oriented to person, place, and time.   HENT:   Head: Normocephalic and atraumatic.   Ears:   Right Ear: Tympanic membrane, external ear and ear canal normal.   Left Ear: Tympanic membrane, external ear and ear canal normal.   Nose: Nose normal.   Mouth/Throat: Mucous membranes are moist.   Eyes: Conjunctivae are normal. Extraocular movement intact   Neck: Neck supple.   Cardiovascular: Normal rate, regular rhythm, normal heart sounds and normal pulses.   Pulmonary/Chest: Effort normal and breath sounds normal.   Abdominal: Normal appearance. Soft.   Musculoskeletal: Normal range of motion.         General: Normal range of motion.   Neurological: He is alert and oriented to person, place, and time.   Skin: Skin is warm and dry. Capillary refill takes 2 to 3 seconds.   Psychiatric: His behavior is normal. Mood normal.   Nursing note and vitals reviewed.      Assessment:       1. Sore throat    2. COVID-19 virus not detected    3. Exposure to COVID-19 virus      Covid antigen: Negative    Plan:       Covid negative, patient with known exposure. Advised to retest if his symptoms worsen  Sore throat  -     SARS Coronavirus 2 Antigen, POCT Manual Read    COVID-19 virus not detected    Exposure to COVID-19 virus         Retest for Covid if your symptoms worsen  You can get free Covid " test by showing your insurance card at the pharmacy  Follow up as needed             n/a

## 2023-01-05 NOTE — PRE-OP CHECKLIST - WEIGHT IN KG
Patient in for labs/pheblotomy. Hold parameter of hgb <11. Hgb on 1/5/23 resulted at 10.8. Phlebotomy held for today. Patient will return 1/12/23.   111.1

## 2024-03-14 NOTE — PATIENT PROFILE ADULT - ARRIVAL FROM
follow-up with neuro ophthalmologist as needed. Home with home OT + 24/7 supervision and assist as needed with all ADLs and transfers + shower chair./Home OT
Doctor's office

## 2024-08-05 NOTE — DISCHARGE NOTE PROVIDER - NSDCQMAMI_CARD_ALL_CORE
Letter sent via my chart and printed for mail to discuss fast track option for colonoscopy screening    Please verify if any family history of colon polyps or colon cancer present as well as if Abhi Galvin has undergone previous colonoscopy   
No

## 2024-12-27 NOTE — PROGRESS NOTE ADULT - PROBLEM/PLAN-3
[facility]    FACILITY TRANSFER ORDERS      Patient Name: Juan Carlos Yoo Sr.  YOB: 1955    PCP: Nyla Rios FNP   PCP Address: [address] / [place] [address]  PCP Phone Number: [phone]  PCP Fax: [phone]    Encounter Date: 12/30/2024    Admit to: SNF    Vital Signs:  Routine    Diagnoses:   Active Hospital Problems    Diagnosis  POA    *Decompensated cirrhosis [K72.90, K74.60]  Yes    Cellulitis [L03.90]  No    Morbid obesity [E66.01]  Yes    Adrenal insufficiency [E27.40]  Yes    Moderate protein-calorie malnutrition [E44.0]  No    Edema [R60.9]  Yes    Palliative care encounter [Z51.5]  Not Applicable    Debility [R53.81]  Yes    Gram-negative bacteremia [R78.81]  Yes    Thrombocytopenia [D69.6]  Yes    Hyponatremia [E87.1]  Yes     Chronic    Encephalopathy, metabolic [G93.41]  Yes    Stage 3a chronic kidney disease [N18.31]  Yes    Microcytic anemia [D50.9]  Yes    JAE (acute kidney injury) [N17.9]  Yes    Atrial fibrillation [I48.91]  Yes    Coagulopathy [D68.9]  Yes    Goals of care, counseling/discussion [Z71.89]  Not Applicable      Resolved Hospital Problems    Diagnosis Date Resolved POA    Hyperkalemia [E87.5] 12/19/2024 Yes    Hypokalemia [E87.6] 12/19/2024 Yes    Hypophosphatemia [E83.39] 12/19/2024 Yes    Hyperkalemia [E87.5] 11/22/2024 No    Severe sepsis [A41.9, R65.20] 12/19/2024 Yes       Allergies:Review of patient's allergies indicates:  No Known Allergies    Diet: renal diet, fluid restriction: 1.5L daily, and 2 gram sodium diet    Activities: Activity as tolerated    Goals of Care Treatment Preferences:  Code Status: Full Code    Health care agent: Pt reports he has fille dout MPOA and his son is MPOA.  Health care agent number: No value filed.          What is most important right now is to focus on remaining as independent as possible, symptom/pain control, extending life as long as possible, even it it means sacrificing quality, 
curative/life-prolongation (regardless of treatment burdens).  Accordingly, we have decided that the best plan to meet the patient's goals includes continuing with treatment.      Nursing: n/a     Labs: Weekly CMP, PT/INR    CONSULTS:    Physical Therapy to evaluate and treat.  and Occupational Therapy to evaluate and treat.    MISCELLANEOUS CARE:  N/a    WOUND CARE ORDERS  Yes: Pressure Ulcer(s) Stage II :   Location: Sacrum  Apply Miconzazole:  Barrier ointment                       Frequency:  Daily                             If incontinent of stool or urine, apply thin layer Barrier cream                   twice daily and PRN to wound         Pressure relief measure:  for pressure redistribution and A/C Boots              Medications: Review discharge medications with patient and family and provide education.         Medication List        START taking these medications      cefadroxil 500 MG Cap  Commonly known as: DURICEF  Take 2 capsules (1,000 mg total) by mouth once. Give after HD session on 12/30 for 1 dose     famotidine 20 MG tablet  Commonly known as: PEPCID  Take 1 tablet (20 mg total) by mouth every other day.     lactulose 20 gram/30 mL Soln  Commonly known as: CHRONULAC  Take 45 mLs (30 g total) by mouth 3 (three) times daily. TITRATE TO 3-4 BOWEL MOVEMENTS DAILY.     metoprolol tartrate 25 MG tablet  Commonly known as: LOPRESSOR  Take 0.5 tablets (12.5 mg total) by mouth 2 (two) times daily.     midodrine 5 MG Tab  Commonly known as: PROAMATINE  Take 3 tablets (15 mg total) by mouth every 8 (eight) hours.     polyethylene glycol 17 gram Pwpk  Commonly known as: GLYCOLAX  Take 17 g by mouth 2 (two) times daily as needed for Constipation.            CHANGE how you take these medications      cyanocobalamin 1000 MCG tablet  Commonly known as: VITAMIN B-12  Take 1 tablet (1,000 mcg total) by mouth once daily.  What changed:   medication strength  how much to take            CONTINUE taking 
these medications      ascorbic acid (vitamin C) 500 MG tablet  Commonly known as: VITAMIN C  Take 500 mg by mouth once daily.     fish oil-omega-3 fatty acids 300-1,000 mg capsule  Take 1 g by mouth 2 (two) times daily.            STOP taking these medications      aMILoride 5 MG Tab  Commonly known as: MIDAMOR     amLODIPine 10 MG tablet  Commonly known as: NORVASC     furosemide 40 MG tablet  Commonly known as: LASIX     metoprolol succinate 25 MG 24 hr tablet  Commonly known as: TOPROL-XL     potassium chloride SA 20 MEQ tablet  Commonly known as: KLOR-CON M20     tamsulosin 0.4 mg Cap  Commonly known as: FLOMAX                Immunizations Administered as of 12/30/2024       Name Date Dose VIS Date Route Exp Date    COVID-19, MRNA, LN-S, PF (Moderna) 3/27/2021 0.5 mL 10/6/2009 Intramuscular --    Site: Left arm     : Modern[employer]     Lot: [id number]     Comment: Adminis     COVID-19, MRNA, LN-S, PF (Moderna) 2/25/2021 0.5 mL 12/1/2020 Intramuscular --    : Modern[employer]     Comment: Adminis             This patient has had both covid vaccinations    Some patients may experience side effects after vaccination.  These may include fever, headache, muscle or joint aches.  Most symptoms resolve with 24-48 hours and do not require urgent medical evaluation unless they persist for more than 72 hours or symptoms are concerning for an unrelated medical condition.          _________________________________  Glory Oliva MD  12/30/2024         
DISPLAY PLAN FREE TEXT

## 2025-01-06 NOTE — ED ADULT NURSE NOTE - NS ED NURSE RECORD ANOTHER VITAL SIGN
Scheduled medications given without difficulty.  Pt's assessment completed.  Pt pleasant lying in bed bed, denies pain, symptoms, and needs.  Safety measures in place.   Yes

## 2025-06-19 NOTE — H&P ADULT - PROBLEM SELECTOR PLAN 5
[facility] ([facility])  Brief Operative Note    SUMMARY     Surgery Date: 6/19/2025     Surgeons and Role:     * Arvin Miranda Jr., MD - Primary     * Josefina Avery MD - Resident - Assisting        Pre-op Diagnosis:  Hiatal hernia [K44.9]  Gastroesophageal reflux disease with esophagitis without hemorrhage [K21.00]    Post-op Diagnosis:  Post-Op Diagnosis Codes:     * Hiatal hernia [K44.9]     * Gastroesophageal reflux disease with esophagitis without hemorrhage [K21.00]    Procedure(s) (LRB):  XI ROBOTIC REPAIR, HIATAL HERNIA, W/ TOUPET FUNDOPLICATION (N/A)  EGD (ESOPHAGOGASTRODUODENOSCOPY) (N/A)    Anesthesia: General    Implants:  Implant Name Type Inv. Item Serial No.  Lot No. LRB No. Used Action   REINFORCEMENT BIO TISSUE - [id number]  REINFORCEMENT BIO TISSUE [id number] [employer]  N/A 1 Implanted       Operative Findings: Hiatal hernia repair w mesh and w toupet    Estimated Blood Loss: less than 10cc         Specimens:   Specimen (24h ago, onward)      None          * No specimens in log *    [id number]      
as above